# Patient Record
Sex: FEMALE | Race: WHITE | NOT HISPANIC OR LATINO | Employment: OTHER | URBAN - METROPOLITAN AREA
[De-identification: names, ages, dates, MRNs, and addresses within clinical notes are randomized per-mention and may not be internally consistent; named-entity substitution may affect disease eponyms.]

---

## 2017-01-23 ENCOUNTER — HOSPITAL ENCOUNTER (OUTPATIENT)
Dept: LAB | Facility: HOSPITAL | Age: 52
Discharge: HOME | End: 2017-01-23
Attending: INTERNAL MEDICINE | Admitting: INTERNAL MEDICINE

## 2017-01-23 LAB
ANION GAP SERPL CALC-SCNC: 9.5 MMOL/L (ref 10–20)
BASOPHILS # BLD AUTO: 0.1 10*3/UL (ref 0–0.2)
BASOPHILS NFR BLD AUTO: 1 % (ref 0–2)
BUN SERPL-MCNC: 11 MG/DL (ref 8–20)
BUN/CREAT SERPL: 13.8 (ref 5.4–26.2)
CALCIUM SERPL-MCNC: 9.3 MG/DL (ref 8.9–10.3)
CHLORIDE SERPL-SCNC: 114 MMOL/L (ref 101–111)
CONV CO2: 24 MMOL/L (ref 22–32)
CREAT UR-MCNC: 0.8 MG/DL (ref 0.4–1)
DIFFERENTIAL METHOD BLD: (no result)
EOSINOPHIL # BLD AUTO: 0.2 10*3/UL (ref 0–0.3)
EOSINOPHIL # BLD AUTO: 1 % (ref 0–3)
ERYTHROCYTE [DISTWIDTH] IN BLOOD BY AUTOMATED COUNT: 14.5 % (ref 11.5–14.5)
GLUCOSE SERPL-MCNC: 88 MG/DL (ref 65–99)
HCT VFR BLD AUTO: 44.2 % (ref 35–49)
HGB BLD-MCNC: 14.9 G/DL (ref 12–15)
LYMPHOCYTES # BLD AUTO: 3.6 10*3/UL (ref 0.8–4.8)
LYMPHOCYTES NFR BLD AUTO: 30 % (ref 18–42)
MCH RBC QN AUTO: 32 PG (ref 26–32)
MCHC RBC AUTO-ENTMCNC: 33.7 G/DL (ref 32–36)
MCV RBC AUTO: 94.9 FL (ref 80–94)
MONOCYTES # BLD AUTO: 0.8 10*3/UL (ref 0.1–1.3)
MONOCYTES NFR BLD AUTO: 7 % (ref 2–11)
NEUTROPHILS # BLD AUTO: 7.3 10*3/UL (ref 2.3–8.6)
NEUTROPHILS NFR BLD AUTO: 61 % (ref 50–75)
NRBC BLD AUTO-RTO: 0 /100{WBCS}
NRBC/RBC NFR BLD MANUAL: 0 10*3/UL
PLATELET # BLD AUTO: 347 10*3/UL (ref 150–450)
PMV BLD AUTO: 7 FL (ref 7.4–10.4)
POTASSIUM SERPL-SCNC: 3.5 MMOL/L (ref 3.6–5.1)
RBC # BLD AUTO: 4.66 10*6/UL (ref 4–5.4)
SODIUM SERPL-SCNC: 144 MMOL/L (ref 136–144)
WBC # BLD AUTO: 11.9 10*3/UL (ref 4.5–11.5)

## 2017-06-09 ENCOUNTER — OUTSIDE FACILITY SERVICE (OUTPATIENT)
Dept: NEUROLOGY | Facility: CLINIC | Age: 52
End: 2017-06-09

## 2017-06-09 ENCOUNTER — HOSPITAL ENCOUNTER (OUTPATIENT)
Dept: NEUROLOGY | Facility: HOSPITAL | Age: 52
Discharge: HOME | End: 2017-06-09
Attending: PODIATRIST | Admitting: PODIATRIST

## 2017-06-09 PROCEDURE — 95908 NRV CNDJ TST 3-4 STUDIES: CPT | Performed by: PSYCHIATRY & NEUROLOGY

## 2017-06-09 PROCEDURE — 95886 MUSC TEST DONE W/N TEST COMP: CPT | Performed by: PSYCHIATRY & NEUROLOGY

## 2017-06-20 ENCOUNTER — HOSPITAL ENCOUNTER (OUTPATIENT)
Dept: LAB | Facility: HOSPITAL | Age: 52
Discharge: HOME | End: 2017-06-20
Attending: PODIATRIST | Admitting: PODIATRIST

## 2017-06-20 LAB — ERYTHROCYTE [SEDIMENTATION RATE] IN BLOOD BY WESTERGREN METHOD: 6 MM/HR (ref 0–30)

## 2017-06-21 LAB
ANA SER QL IA: NORMAL
CHROMATIN AB SERPL-ACNC: <20 [IU]/ML (ref 0–20)

## 2017-06-26 ENCOUNTER — HOSPITAL ENCOUNTER (OUTPATIENT)
Dept: OTHER | Facility: HOSPITAL | Age: 52
Discharge: HOME | End: 2017-06-26
Attending: ANESTHESIOLOGY | Admitting: PODIATRIST

## 2017-06-26 LAB
ANION GAP SERPL CALC-SCNC: 12.8 MMOL/L (ref 10–20)
BASOPHILS # BLD AUTO: 0 10*3/UL (ref 0–0.2)
BASOPHILS NFR BLD AUTO: 0 % (ref 0–2)
BUN SERPL-MCNC: 11 MG/DL (ref 8–20)
BUN/CREAT SERPL: 12.2 (ref 5.4–26.2)
CALCIUM SERPL-MCNC: 9.8 MG/DL (ref 8.9–10.3)
CHLORIDE SERPL-SCNC: 113 MMOL/L (ref 101–111)
CONV CO2: 26 MMOL/L (ref 22–32)
CREAT UR-MCNC: 0.9 MG/DL (ref 0.4–1)
DIFFERENTIAL METHOD BLD: (no result)
EOSINOPHIL # BLD AUTO: 0 % (ref 0–3)
EOSINOPHIL # BLD AUTO: 0 10*3/UL (ref 0–0.3)
ERYTHROCYTE [DISTWIDTH] IN BLOOD BY AUTOMATED COUNT: 14.8 % (ref 11.5–14.5)
GLUCOSE SERPL-MCNC: 143 MG/DL (ref 65–99)
HCT VFR BLD AUTO: 46.2 % (ref 35–49)
HGB BLD-MCNC: 15.3 G/DL (ref 12–15)
LYMPHOCYTES # BLD AUTO: 3 10*3/UL (ref 0.8–4.8)
LYMPHOCYTES NFR BLD AUTO: 34 % (ref 18–42)
MCH RBC QN AUTO: 32.2 PG (ref 26–32)
MCHC RBC AUTO-ENTMCNC: 33.2 G/DL (ref 32–36)
MCV RBC AUTO: 96.9 FL (ref 80–94)
MONOCYTES # BLD AUTO: 0.5 10*3/UL (ref 0.1–1.3)
MONOCYTES NFR BLD AUTO: 6 % (ref 2–11)
NEUTROPHILS # BLD AUTO: 5.2 10*3/UL (ref 2.3–8.6)
NEUTROPHILS NFR BLD AUTO: 60 % (ref 50–75)
NRBC BLD AUTO-RTO: 0 /100{WBCS}
NRBC/RBC NFR BLD MANUAL: 0 10*3/UL
PLATELET # BLD AUTO: 357 10*3/UL (ref 150–450)
PMV BLD AUTO: 6.7 FL (ref 7.4–10.4)
POTASSIUM SERPL-SCNC: 4.8 MMOL/L (ref 3.6–5.1)
RBC # BLD AUTO: 4.77 10*6/UL (ref 4–5.4)
SODIUM SERPL-SCNC: 147 MMOL/L (ref 136–144)
WBC # BLD AUTO: 8.9 10*3/UL (ref 4.5–11.5)

## 2017-10-12 ENCOUNTER — HOSPITAL ENCOUNTER (OUTPATIENT)
Dept: LAB | Facility: HOSPITAL | Age: 52
Discharge: HOME | End: 2017-10-12
Attending: INTERNAL MEDICINE | Admitting: INTERNAL MEDICINE

## 2017-10-12 LAB — BNP SERPL-MCNC: 62 PG/ML

## 2017-10-13 ENCOUNTER — HOSPITAL ENCOUNTER (OUTPATIENT)
Dept: CARDIOLOGY | Facility: HOSPITAL | Age: 52
Discharge: HOME | End: 2017-10-13
Attending: INTERNAL MEDICINE | Admitting: INTERNAL MEDICINE

## 2017-12-05 ENCOUNTER — HOSPITAL ENCOUNTER (OUTPATIENT)
Dept: PREADMISSION TESTING | Facility: HOSPITAL | Age: 52
Discharge: HOME | End: 2017-12-05
Attending: PODIATRIST | Admitting: PODIATRIST

## 2017-12-05 LAB
ANION GAP SERPL CALC-SCNC: 10 MMOL/L (ref 10–20)
BASOPHILS # BLD AUTO: 0.1 10*3/UL (ref 0–0.2)
BASOPHILS NFR BLD AUTO: 1 % (ref 0–2)
BUN SERPL-MCNC: 11 MG/DL (ref 8–20)
BUN/CREAT SERPL: 12.2 (ref 5.4–26.2)
CALCIUM SERPL-MCNC: 9 MG/DL (ref 8.9–10.3)
CHLORIDE SERPL-SCNC: 112 MMOL/L (ref 101–111)
CONV CO2: 24 MMOL/L (ref 22–32)
CREAT UR-MCNC: 0.9 MG/DL (ref 0.4–1)
DIFFERENTIAL METHOD BLD: (no result)
EOSINOPHIL # BLD AUTO: 0.1 10*3/UL (ref 0–0.3)
EOSINOPHIL # BLD AUTO: 1 % (ref 0–3)
ERYTHROCYTE [DISTWIDTH] IN BLOOD BY AUTOMATED COUNT: 14.2 % (ref 11.5–14.5)
GLUCOSE SERPL-MCNC: 109 MG/DL (ref 65–99)
HCT VFR BLD AUTO: 41.6 % (ref 35–49)
HGB BLD-MCNC: 13.9 G/DL (ref 12–15)
LYMPHOCYTES # BLD AUTO: 2.1 10*3/UL (ref 0.8–4.8)
LYMPHOCYTES NFR BLD AUTO: 23 % (ref 18–42)
MCH RBC QN AUTO: 32.1 PG (ref 26–32)
MCHC RBC AUTO-ENTMCNC: 33.5 G/DL (ref 32–36)
MCV RBC AUTO: 95.7 FL (ref 80–94)
MONOCYTES # BLD AUTO: 0.3 10*3/UL (ref 0.1–1.3)
MONOCYTES NFR BLD AUTO: 3 % (ref 2–11)
NEUTROPHILS # BLD AUTO: 6.7 10*3/UL (ref 2.3–8.6)
NEUTROPHILS NFR BLD AUTO: 72 % (ref 50–75)
NRBC BLD AUTO-RTO: 0 /100{WBCS}
NRBC/RBC NFR BLD MANUAL: 0 10*3/UL
PLATELET # BLD AUTO: 301 10*3/UL (ref 150–450)
PMV BLD AUTO: 6.3 FL (ref 7.4–10.4)
POTASSIUM SERPL-SCNC: 4 MMOL/L (ref 3.6–5.1)
RBC # BLD AUTO: 4.34 10*6/UL (ref 4–5.4)
SODIUM SERPL-SCNC: 142 MMOL/L (ref 136–144)
WBC # BLD AUTO: 9.3 10*3/UL (ref 4.5–11.5)

## 2018-02-05 ENCOUNTER — HOSPITAL ENCOUNTER (OUTPATIENT)
Dept: PREADMISSION TESTING | Facility: HOSPITAL | Age: 53
Discharge: HOME | End: 2018-02-05
Attending: PODIATRIST | Admitting: PODIATRIST

## 2018-02-05 LAB
ANION GAP SERPL CALC-SCNC: 10.8 MMOL/L (ref 10–20)
BASOPHILS # BLD AUTO: 0.1 10*3/UL (ref 0–0.2)
BASOPHILS NFR BLD AUTO: 1 % (ref 0–2)
BUN SERPL-MCNC: 13 MG/DL (ref 8–20)
BUN/CREAT SERPL: 14.4 (ref 5.4–26.2)
CALCIUM SERPL-MCNC: 8.9 MG/DL (ref 8.9–10.3)
CHLORIDE SERPL-SCNC: 111 MMOL/L (ref 101–111)
CONV CO2: 23 MMOL/L (ref 22–32)
CREAT UR-MCNC: 0.9 MG/DL (ref 0.4–1)
DIFFERENTIAL METHOD BLD: (no result)
EOSINOPHIL # BLD AUTO: 0 % (ref 0–3)
EOSINOPHIL # BLD AUTO: 0 10*3/UL (ref 0–0.3)
ERYTHROCYTE [DISTWIDTH] IN BLOOD BY AUTOMATED COUNT: 14.1 % (ref 11.5–14.5)
GLUCOSE SERPL-MCNC: 119 MG/DL (ref 65–99)
HCT VFR BLD AUTO: 43 % (ref 35–49)
HGB BLD-MCNC: 14.6 G/DL (ref 12–15)
LYMPHOCYTES # BLD AUTO: 2.2 10*3/UL (ref 0.8–4.8)
LYMPHOCYTES NFR BLD AUTO: 22 % (ref 18–42)
MCH RBC QN AUTO: 31.8 PG (ref 26–32)
MCHC RBC AUTO-ENTMCNC: 33.9 G/DL (ref 32–36)
MCV RBC AUTO: 93.7 FL (ref 80–94)
MONOCYTES # BLD AUTO: 0.3 10*3/UL (ref 0.1–1.3)
MONOCYTES NFR BLD AUTO: 3 % (ref 2–11)
NEUTROPHILS # BLD AUTO: 7.2 10*3/UL (ref 2.3–8.6)
NEUTROPHILS NFR BLD AUTO: 74 % (ref 50–75)
NRBC BLD AUTO-RTO: 0 /100{WBCS}
NRBC/RBC NFR BLD MANUAL: 0 10*3/UL
PLATELET # BLD AUTO: 362 10*3/UL (ref 150–450)
PMV BLD AUTO: 6.7 FL (ref 7.4–10.4)
POTASSIUM SERPL-SCNC: 3.8 MMOL/L (ref 3.6–5.1)
RBC # BLD AUTO: 4.59 10*6/UL (ref 4–5.4)
SODIUM SERPL-SCNC: 141 MMOL/L (ref 136–144)
WBC # BLD AUTO: 9.7 10*3/UL (ref 4.5–11.5)

## 2018-06-18 ENCOUNTER — HOSPITAL ENCOUNTER (OUTPATIENT)
Dept: OTHER | Facility: HOSPITAL | Age: 53
Discharge: HOME | End: 2018-06-18
Attending: PODIATRIST | Admitting: PODIATRIST

## 2018-06-18 LAB
ANION GAP SERPL CALC-SCNC: 10.3 MMOL/L (ref 10–20)
BASOPHILS # BLD AUTO: 0.1 10*3/UL (ref 0–0.2)
BASOPHILS NFR BLD AUTO: 1 % (ref 0–2)
BUN SERPL-MCNC: 18 MG/DL (ref 8–20)
BUN/CREAT SERPL: 22.5 (ref 5.4–26.2)
CALCIUM SERPL-MCNC: 8.9 MG/DL (ref 8.9–10.3)
CHLORIDE SERPL-SCNC: 110 MMOL/L (ref 101–111)
CONV CO2: 24 MMOL/L (ref 22–32)
CREAT UR-MCNC: 0.8 MG/DL (ref 0.4–1)
DIFFERENTIAL METHOD BLD: (no result)
EOSINOPHIL # BLD AUTO: 0 % (ref 0–3)
EOSINOPHIL # BLD AUTO: 0 10*3/UL (ref 0–0.3)
ERYTHROCYTE [DISTWIDTH] IN BLOOD BY AUTOMATED COUNT: 15.1 % (ref 11.5–14.5)
GLUCOSE SERPL-MCNC: 149 MG/DL (ref 65–99)
HCT VFR BLD AUTO: 41.8 % (ref 35–49)
HGB BLD-MCNC: 13.5 G/DL (ref 12–15)
LYMPHOCYTES # BLD AUTO: 2.7 10*3/UL (ref 0.8–4.8)
LYMPHOCYTES NFR BLD AUTO: 23 % (ref 18–42)
MCH RBC QN AUTO: 30.3 PG (ref 26–32)
MCHC RBC AUTO-ENTMCNC: 32.4 G/DL (ref 32–36)
MCV RBC AUTO: 93.5 FL (ref 80–94)
MONOCYTES # BLD AUTO: 0.4 10*3/UL (ref 0.1–1.3)
MONOCYTES NFR BLD AUTO: 3 % (ref 2–11)
NEUTROPHILS # BLD AUTO: 8.8 10*3/UL (ref 2.3–8.6)
NEUTROPHILS NFR BLD AUTO: 73 % (ref 50–75)
NRBC BLD AUTO-RTO: 0 /100{WBCS}
NRBC/RBC NFR BLD MANUAL: 0 10*3/UL
PLATELET # BLD AUTO: 338 10*3/UL (ref 150–450)
PMV BLD AUTO: 6.4 FL (ref 7.4–10.4)
POTASSIUM SERPL-SCNC: 4.3 MMOL/L (ref 3.6–5.1)
RBC # BLD AUTO: 4.47 10*6/UL (ref 4–5.4)
SODIUM SERPL-SCNC: 140 MMOL/L (ref 136–144)
WBC # BLD AUTO: 12 10*3/UL (ref 4.5–11.5)

## 2018-10-03 ENCOUNTER — HOSPITAL ENCOUNTER (OUTPATIENT)
Dept: PREADMISSION TESTING | Facility: HOSPITAL | Age: 53
Discharge: HOME | End: 2018-10-03
Attending: PODIATRIST | Admitting: PODIATRIST

## 2018-10-03 LAB
ANION GAP SERPL CALC-SCNC: 14 MMOL/L (ref 10–20)
BASOPHILS # BLD AUTO: 0.1 10*3/UL (ref 0–0.2)
BASOPHILS NFR BLD AUTO: 1 % (ref 0–2)
BUN SERPL-MCNC: 15 MG/DL (ref 8–20)
BUN/CREAT SERPL: 16.7 (ref 5.4–26.2)
CALCIUM SERPL-MCNC: 9.1 MG/DL (ref 8.9–10.3)
CHLORIDE SERPL-SCNC: 108 MMOL/L (ref 101–111)
CONV CO2: 24 MMOL/L (ref 22–32)
CREAT UR-MCNC: 0.9 MG/DL (ref 0.4–1)
DIFFERENTIAL METHOD BLD: (no result)
EOSINOPHIL # BLD AUTO: 0 % (ref 0–3)
EOSINOPHIL # BLD AUTO: 0 10*3/UL (ref 0–0.3)
ERYTHROCYTE [DISTWIDTH] IN BLOOD BY AUTOMATED COUNT: 13.4 % (ref 11.5–14.5)
GLUCOSE SERPL-MCNC: 99 MG/DL (ref 65–99)
HCT VFR BLD AUTO: 42.2 % (ref 35–49)
HGB BLD-MCNC: 14.2 G/DL (ref 12–15)
LYMPHOCYTES # BLD AUTO: 3.4 10*3/UL (ref 0.8–4.8)
LYMPHOCYTES NFR BLD AUTO: 31 % (ref 18–42)
MCH RBC QN AUTO: 31.3 PG (ref 26–32)
MCHC RBC AUTO-ENTMCNC: 33.6 G/DL (ref 32–36)
MCV RBC AUTO: 93.1 FL (ref 80–94)
MONOCYTES # BLD AUTO: 0.5 10*3/UL (ref 0.1–1.3)
MONOCYTES NFR BLD AUTO: 5 % (ref 2–11)
NEUTROPHILS # BLD AUTO: 7.1 10*3/UL (ref 2.3–8.6)
NEUTROPHILS NFR BLD AUTO: 63 % (ref 50–75)
NRBC BLD AUTO-RTO: 0 /100{WBCS}
NRBC/RBC NFR BLD MANUAL: 0 10*3/UL
PLATELET # BLD AUTO: 313 10*3/UL (ref 150–450)
PMV BLD AUTO: 6.8 FL (ref 7.4–10.4)
POTASSIUM SERPL-SCNC: 4 MMOL/L (ref 3.6–5.1)
RBC # BLD AUTO: 4.53 10*6/UL (ref 4–5.4)
SODIUM SERPL-SCNC: 142 MMOL/L (ref 136–144)
WBC # BLD AUTO: 11.1 10*3/UL (ref 4.5–11.5)

## 2018-12-06 ENCOUNTER — HOSPITAL ENCOUNTER (OUTPATIENT)
Dept: FAMILY MEDICINE CLINIC | Facility: CLINIC | Age: 53
Setting detail: SPECIMEN
Discharge: HOME | End: 2018-12-06
Attending: FAMILY MEDICINE | Admitting: FAMILY MEDICINE

## 2018-12-06 LAB
ALBUMIN SERPL-MCNC: 3.7 G/DL (ref 3.5–4.8)
ALBUMIN/GLOB SERPL: 1.4 {RATIO} (ref 1–1.7)
ALP SERPL-CCNC: 93 IU/L (ref 32–91)
ALT SERPL-CCNC: 108 IU/L (ref 14–54)
AMPICILLIN SUSC ISLT: ABNORMAL
ANION GAP SERPL CALC-SCNC: 13.1 MMOL/L (ref 10–20)
AST SERPL-CCNC: 30 IU/L (ref 15–41)
AZTREONAM SUSC ISLT: ABNORMAL
BACTERIA ISLT: ABNORMAL
BACTERIA SPEC AEROBE CULT: ABNORMAL
BASOPHILS # BLD AUTO: 0.1 10*3/UL (ref 0–0.2)
BASOPHILS NFR BLD AUTO: 1 % (ref 0–2)
BILIRUB SERPL-MCNC: 0.6 MG/DL (ref 0.3–1.2)
BUN SERPL-MCNC: 18 MG/DL (ref 8–20)
BUN/CREAT SERPL: 22.5 (ref 5.4–26.2)
CALCIUM SERPL-MCNC: 9.2 MG/DL (ref 8.9–10.3)
CEFAZOLIN SUSC ISLT: ABNORMAL
CEFEPIME SUSC ISLT: ABNORMAL
CEFTRIAXONE SUSC ISLT: ABNORMAL
CHLORIDE SERPL-SCNC: 107 MMOL/L (ref 101–111)
CIPROFLOXACIN SUSC ISLT: ABNORMAL
COLONY COUNT: ABNORMAL
CONV CO2: 24 MMOL/L (ref 22–32)
CONV TOTAL PROTEIN: 6.3 G/DL (ref 6.1–7.9)
CREAT UR-MCNC: 0.8 MG/DL (ref 0.4–1)
DIFFERENTIAL METHOD BLD: (no result)
EOSINOPHIL # BLD AUTO: 0 % (ref 0–3)
EOSINOPHIL # BLD AUTO: 0 10*3/UL (ref 0–0.3)
ERYTHROCYTE [DISTWIDTH] IN BLOOD BY AUTOMATED COUNT: 14.3 % (ref 11.5–14.5)
GLOBULIN UR ELPH-MCNC: 2.6 G/DL (ref 2.5–3.8)
GLUCOSE SERPL-MCNC: 91 MG/DL (ref 65–99)
HCT VFR BLD AUTO: 41.6 % (ref 35–49)
HGB BLD-MCNC: 14.4 G/DL (ref 12–15)
LEVOFLOXACIN SUSC ISLT: ABNORMAL
LYMPHOCYTES # BLD AUTO: 3.1 10*3/UL (ref 0.8–4.8)
LYMPHOCYTES NFR BLD AUTO: 28 % (ref 18–42)
Lab: ABNORMAL
MCH RBC QN AUTO: 33.3 PG (ref 26–32)
MCHC RBC AUTO-ENTMCNC: 34.6 G/DL (ref 32–36)
MCV RBC AUTO: 96.1 FL (ref 80–94)
MEROPENEM SUSC ISLT: ABNORMAL
MICRO REPORT STATUS: ABNORMAL
MONOCYTES # BLD AUTO: 0.4 10*3/UL (ref 0.1–1.3)
MONOCYTES NFR BLD AUTO: 4 % (ref 2–11)
NEUTROPHILS # BLD AUTO: 7.4 10*3/UL (ref 2.3–8.6)
NEUTROPHILS NFR BLD AUTO: 67 % (ref 50–75)
NITROFURANTOIN SUSC ISLT: ABNORMAL
NRBC BLD AUTO-RTO: 0 /100{WBCS}
NRBC/RBC NFR BLD MANUAL: 0 10*3/UL
PIP+TAZO SUSC ISLT: ABNORMAL
PLATELET # BLD AUTO: 307 10*3/UL (ref 150–450)
PMV BLD AUTO: 7 FL (ref 7.4–10.4)
POTASSIUM SERPL-SCNC: 4.1 MMOL/L (ref 3.6–5.1)
RBC # BLD AUTO: 4.33 10*6/UL (ref 4–5.4)
SODIUM SERPL-SCNC: 140 MMOL/L (ref 136–144)
SPECIMEN SOURCE: ABNORMAL
SUSC METH SPEC: ABNORMAL
TETRACYCLINE SUSC ISLT: ABNORMAL
TOBRAMYCIN SUSC ISLT: ABNORMAL
TRIMETHOPRIM/SULFA: ABNORMAL
WBC # BLD AUTO: 10.9 10*3/UL (ref 4.5–11.5)

## 2018-12-07 LAB — HBA1C MFR BLD: 5.5 % (ref 0–5.6)

## 2018-12-12 ENCOUNTER — HOSPITAL ENCOUNTER (OUTPATIENT)
Dept: OTHER | Facility: HOSPITAL | Age: 53
Discharge: HOME | End: 2018-12-12
Attending: PODIATRIST | Admitting: PODIATRIST

## 2018-12-12 LAB
ANION GAP SERPL CALC-SCNC: 10.8 MMOL/L (ref 10–20)
BASOPHILS # BLD AUTO: 0 10*3/UL (ref 0–0.2)
BASOPHILS NFR BLD AUTO: 0 % (ref 0–2)
BUN SERPL-MCNC: 19 MG/DL (ref 8–20)
BUN/CREAT SERPL: 21.1 (ref 5.4–26.2)
CALCIUM SERPL-MCNC: 9.2 MG/DL (ref 8.9–10.3)
CHLORIDE SERPL-SCNC: 106 MMOL/L (ref 101–111)
CONV CO2: 25 MMOL/L (ref 22–32)
CREAT UR-MCNC: 0.9 MG/DL (ref 0.4–1)
DIFFERENTIAL METHOD BLD: (no result)
EOSINOPHIL # BLD AUTO: 0 % (ref 0–3)
EOSINOPHIL # BLD AUTO: 0 10*3/UL (ref 0–0.3)
ERYTHROCYTE [DISTWIDTH] IN BLOOD BY AUTOMATED COUNT: 14 % (ref 11.5–14.5)
GLUCOSE SERPL-MCNC: 139 MG/DL (ref 65–99)
HCT VFR BLD AUTO: 40.4 % (ref 35–49)
HGB BLD-MCNC: 14 G/DL (ref 12–15)
LYMPHOCYTES # BLD AUTO: 2.3 10*3/UL (ref 0.8–4.8)
LYMPHOCYTES NFR BLD AUTO: 24 % (ref 18–42)
MCH RBC QN AUTO: 32.9 PG (ref 26–32)
MCHC RBC AUTO-ENTMCNC: 34.7 G/DL (ref 32–36)
MCV RBC AUTO: 94.9 FL (ref 80–94)
MONOCYTES # BLD AUTO: 0.3 10*3/UL (ref 0.1–1.3)
MONOCYTES NFR BLD AUTO: 3 % (ref 2–11)
NEUTROPHILS # BLD AUTO: 6.9 10*3/UL (ref 2.3–8.6)
NEUTROPHILS NFR BLD AUTO: 73 % (ref 50–75)
NRBC BLD AUTO-RTO: 0 /100{WBCS}
NRBC/RBC NFR BLD MANUAL: 0 10*3/UL
PLATELET # BLD AUTO: 297 10*3/UL (ref 150–450)
PMV BLD AUTO: 6.7 FL (ref 7.4–10.4)
POTASSIUM SERPL-SCNC: 3.8 MMOL/L (ref 3.6–5.1)
RBC # BLD AUTO: 4.26 10*6/UL (ref 4–5.4)
SODIUM SERPL-SCNC: 138 MMOL/L (ref 136–144)
WBC # BLD AUTO: 9.6 10*3/UL (ref 4.5–11.5)

## 2019-03-08 ENCOUNTER — HOSPITAL ENCOUNTER (OUTPATIENT)
Dept: FAMILY MEDICINE CLINIC | Facility: CLINIC | Age: 54
Setting detail: SPECIMEN
Discharge: HOME | End: 2019-03-08
Attending: FAMILY MEDICINE | Admitting: FAMILY MEDICINE

## 2019-03-08 LAB
ALBUMIN SERPL-MCNC: 3.6 G/DL (ref 3.5–4.8)
ALBUMIN/GLOB SERPL: 1.4 {RATIO} (ref 1–1.7)
ALP SERPL-CCNC: 156 IU/L (ref 32–91)
ALT SERPL-CCNC: 182 IU/L (ref 14–54)
ANION GAP SERPL CALC-SCNC: 11.9 MMOL/L (ref 10–20)
AST SERPL-CCNC: 270 IU/L (ref 15–41)
BILIRUB SERPL-MCNC: 0.4 MG/DL (ref 0.3–1.2)
BUN SERPL-MCNC: 12 MG/DL (ref 8–20)
BUN/CREAT SERPL: 13.3 (ref 5.4–26.2)
CALCIUM SERPL-MCNC: 8.7 MG/DL (ref 8.9–10.3)
CHLORIDE SERPL-SCNC: 109 MMOL/L (ref 101–111)
CHOLEST SERPL-MCNC: 162 MG/DL
CHOLEST/HDLC SERPL: 3.2 {RATIO}
CONV CO2: 23 MMOL/L (ref 22–32)
CONV LDL CHOLESTEROL DIRECT: 88 MG/DL (ref 0–100)
CONV TOTAL PROTEIN: 6.2 G/DL (ref 6.1–7.9)
CREAT UR-MCNC: 0.9 MG/DL (ref 0.4–1)
GLOBULIN UR ELPH-MCNC: 2.6 G/DL (ref 2.5–3.8)
GLUCOSE SERPL-MCNC: 94 MG/DL (ref 65–99)
HDLC SERPL-MCNC: 50 MG/DL
LDLC/HDLC SERPL: 1.8 {RATIO}
LIPID INTERPRETATION: ABNORMAL
POTASSIUM SERPL-SCNC: 3.9 MMOL/L (ref 3.6–5.1)
SODIUM SERPL-SCNC: 140 MMOL/L (ref 136–144)
TRIGL SERPL-MCNC: 129 MG/DL
VLDLC SERPL CALC-MCNC: 24 MG/DL

## 2019-03-11 LAB
HAV IGM SERPL QL IA: NONREACTIVE
HBV CORE IGM SERPL QL IA: NONREACTIVE
HBV SURFACE AG SERPL QL IA: NONREACTIVE
HCV AB SER DONR QL: NORMAL
HCV AB SER DONR QL: NORMAL

## 2019-04-08 ENCOUNTER — HOSPITAL ENCOUNTER (OUTPATIENT)
Dept: PREADMISSION TESTING | Facility: HOSPITAL | Age: 54
Discharge: HOME | End: 2019-04-08
Attending: PODIATRIST | Admitting: PODIATRIST

## 2019-04-08 LAB
ANION GAP SERPL CALC-SCNC: 14 MMOL/L (ref 10–20)
BASOPHILS # BLD AUTO: 0.1 10*3/UL (ref 0–0.2)
BASOPHILS NFR BLD AUTO: 1 % (ref 0–2)
BUN SERPL-MCNC: 9 MG/DL (ref 8–20)
BUN/CREAT SERPL: 11.3 (ref 5.4–26.2)
CALCIUM SERPL-MCNC: 9 MG/DL (ref 8.9–10.3)
CHLORIDE SERPL-SCNC: 107 MMOL/L (ref 101–111)
CONV CO2: 23 MMOL/L (ref 22–32)
CREAT UR-MCNC: 0.8 MG/DL (ref 0.4–1)
DIFFERENTIAL METHOD BLD: (no result)
EOSINOPHIL # BLD AUTO: 0 % (ref 0–3)
EOSINOPHIL # BLD AUTO: 0 10*3/UL (ref 0–0.3)
ERYTHROCYTE [DISTWIDTH] IN BLOOD BY AUTOMATED COUNT: 14.9 % (ref 11.5–14.5)
GLUCOSE SERPL-MCNC: 104 MG/DL (ref 65–99)
HCT VFR BLD AUTO: 42.7 % (ref 35–49)
HGB BLD-MCNC: 14.3 G/DL (ref 12–15)
LYMPHOCYTES # BLD AUTO: 3 10*3/UL (ref 0.8–4.8)
LYMPHOCYTES NFR BLD AUTO: 29 % (ref 18–42)
MCH RBC QN AUTO: 31.8 PG (ref 26–32)
MCHC RBC AUTO-ENTMCNC: 33.4 G/DL (ref 32–36)
MCV RBC AUTO: 95.1 FL (ref 80–94)
MONOCYTES # BLD AUTO: 0.5 10*3/UL (ref 0.1–1.3)
MONOCYTES NFR BLD AUTO: 5 % (ref 2–11)
NEUTROPHILS # BLD AUTO: 6.8 10*3/UL (ref 2.3–8.6)
NEUTROPHILS NFR BLD AUTO: 65 % (ref 50–75)
NRBC BLD AUTO-RTO: 0 /100{WBCS}
NRBC/RBC NFR BLD MANUAL: 0 10*3/UL
PLATELET # BLD AUTO: 351 10*3/UL (ref 150–450)
PMV BLD AUTO: 6.4 FL (ref 7.4–10.4)
POTASSIUM SERPL-SCNC: 4 MMOL/L (ref 3.6–5.1)
RBC # BLD AUTO: 4.49 10*6/UL (ref 4–5.4)
SODIUM SERPL-SCNC: 140 MMOL/L (ref 136–144)
WBC # BLD AUTO: 10.3 10*3/UL (ref 4.5–11.5)

## 2019-04-30 ENCOUNTER — HOSPITAL ENCOUNTER (OUTPATIENT)
Dept: FAMILY MEDICINE CLINIC | Facility: CLINIC | Age: 54
Setting detail: SPECIMEN
Discharge: HOME | End: 2019-04-30
Attending: FAMILY MEDICINE | Admitting: FAMILY MEDICINE

## 2019-04-30 ENCOUNTER — CONVERSION ENCOUNTER (OUTPATIENT)
Dept: FAMILY MEDICINE CLINIC | Facility: CLINIC | Age: 54
End: 2019-04-30

## 2019-05-07 ENCOUNTER — HOSPITAL ENCOUNTER (OUTPATIENT)
Dept: FAMILY MEDICINE CLINIC | Facility: CLINIC | Age: 54
Setting detail: SPECIMEN
Discharge: HOME | End: 2019-05-07
Attending: FAMILY MEDICINE | Admitting: FAMILY MEDICINE

## 2019-05-07 LAB
ALBUMIN SERPL-MCNC: 3.7 G/DL (ref 3.5–4.8)
ALBUMIN/GLOB SERPL: 1.4 {RATIO} (ref 1–1.7)
ALP SERPL-CCNC: 88 IU/L (ref 32–91)
ALT SERPL-CCNC: 28 IU/L (ref 14–54)
ANION GAP SERPL CALC-SCNC: 14.1 MMOL/L (ref 10–20)
AST SERPL-CCNC: 27 IU/L (ref 15–41)
BILIRUB SERPL-MCNC: 0.6 MG/DL (ref 0.3–1.2)
BUN SERPL-MCNC: 10 MG/DL (ref 8–20)
BUN/CREAT SERPL: 12.5 (ref 5.4–26.2)
CALCIUM SERPL-MCNC: 9 MG/DL (ref 8.9–10.3)
CHLORIDE SERPL-SCNC: 110 MMOL/L (ref 101–111)
CONV CO2: 20 MMOL/L (ref 22–32)
CONV TOTAL PROTEIN: 6.3 G/DL (ref 6.1–7.9)
CREAT UR-MCNC: 0.8 MG/DL (ref 0.4–1)
GLOBULIN UR ELPH-MCNC: 2.6 G/DL (ref 2.5–3.8)
GLUCOSE SERPL-MCNC: 112 MG/DL (ref 65–99)
POTASSIUM SERPL-SCNC: 4.1 MMOL/L (ref 3.6–5.1)
SODIUM SERPL-SCNC: 140 MMOL/L (ref 136–144)

## 2019-05-20 ENCOUNTER — HOSPITAL ENCOUNTER (OUTPATIENT)
Dept: FAMILY MEDICINE CLINIC | Facility: CLINIC | Age: 54
Setting detail: SPECIMEN
Discharge: HOME | End: 2019-05-20
Attending: FAMILY MEDICINE | Admitting: FAMILY MEDICINE

## 2019-05-20 LAB
BACTERIA SPEC AEROBE CULT: NORMAL
BILIRUB UR QL STRIP: NEGATIVE MG/DL
CASTS URNS QL MICRO: ABNORMAL /[LPF]
COLOR UR: YELLOW
CONV BACTERIA IN URINE MICRO: NEGATIVE
CONV CLARITY OF URINE: CLEAR
CONV HYALINE CASTS IN URINE MICRO: 1 /[LPF] (ref 0–5)
CONV PROTEIN IN URINE BY AUTOMATED TEST STRIP: NEGATIVE MG/DL
CONV SMALL ROUND CELLS: ABNORMAL /[HPF]
CONV UROBILINOGEN IN URINE BY AUTOMATED TEST STRIP: 0.2 MG/DL
GLUCOSE UR QL: NEGATIVE MG/DL
HGB UR QL STRIP: NEGATIVE
KETONES UR QL STRIP: NEGATIVE MG/DL
LEUKOCYTE ESTERASE UR QL STRIP: ABNORMAL
Lab: NORMAL
MICRO REPORT STATUS: NORMAL
NITRITE UR QL STRIP: NEGATIVE
PH UR STRIP.AUTO: 6.5 [PH] (ref 4.5–8)
RBC #/AREA URNS HPF: 0 /[HPF] (ref 0–3)
SP GR UR: 1.02 (ref 1–1.03)
SPECIMEN SOURCE: NORMAL
SPERM URNS QL MICRO: ABNORMAL /[HPF]
SQUAMOUS SPT QL MICRO: 3 /[HPF] (ref 0–5)
UNIDENT CRYS URNS QL MICRO: ABNORMAL /[HPF]
WBC #/AREA URNS HPF: 5 /[HPF] (ref 0–5)
YEAST SPEC QL WET PREP: ABNORMAL /[HPF]

## 2019-06-04 VITALS
OXYGEN SATURATION: 95 % | HEART RATE: 77 BPM | BODY MASS INDEX: 18.83 KG/M2 | HEIGHT: 67 IN | DIASTOLIC BLOOD PRESSURE: 67 MMHG | SYSTOLIC BLOOD PRESSURE: 97 MMHG | WEIGHT: 120 LBS

## 2019-06-06 NOTE — PROGRESS NOTES
Visit Type:  Follow-up Visit  Referring Provider:  Momo Simeon MD  Primary Provider:  Marlo Maradiaga MD    CC:   f/u on COPD and  dysuria.    History of Present Illness:          The patient  presents for COPD follow-up visit.  The patient presents with cough and  shortness of breath with exertion, but has no history of wheezing.  Since the last visit, the patient feels their symptoms are unchanged and somewhat impair daily   activities.  The patient notes fair compliance with treatment plan.             The patient also  presents with dysuria.  The symptoms began 1 week ago.  The patient complains of burning on urination, frequency and urgency, but denies hesitancy, hematuria, fever and back pain.  No complaints of nausea, vomiting and abdominal   pain.        Past Medical History:     Reviewed history from 12/06/2018 and no changes required:        skin ca age 18        breast cancer (bilateral) 38        nodule in neck (ca? 2010)        Bone degeneration        lesion behind eyes (cancer associated retinopathopthy)        pancreatitis        Anxiety        Asthma        C O P D        Diabetes, Type 2        G E R D        Chest tightness        RA        back pain, lower        feet pain S/P multiple foot surgeries        Seizure disorder. generalized seizure, possible partial complex seizure, EEG 2015left frontal sharp activiey                                    tried depakote could not take due to nausea        migraines    Family History Summary:      Reviewed history Last on 03/08/2019 and no changes required:05/26/2019  Father - Has Family History of Heart Disease - Entered On: 1/20/2017  Mother - Has Family History of Heart Disease - Entered On: 1/20/2017  Father - Has FH Headaches - had co frequent ha - Entered On: 7/1/2015    General Comments - FH:  FH Heart Disease: Parents  FH COPD  FH Cancer  FH Diabetes  FH Stroke:Parents  FH Hypertension: Parents  FH High Cholesterol: Parents      Social  History:     Reviewed history from 2018 and no changes required:        Patient currently smokes every day.        Patient has been counseled to quit.        Passive Smoke: Y        Alcohol Use: N        Drug Use: N        HIV/High Risk: N        Regular Exercise: N                Risk Factors:     Smoked Tobacco Use:  Current every day smoker     Cigarettes:  Yes     Counseled to quit/cut down:  yes      Review of Systems     General       Complains of fatigue.    CV       Denies chest pain or discomfort.    Resp       Complains of cough and shortness of breath.       Denies chest discomfort, wheezing and excessive sputum.           Complains of urinary frequency and urinary urgency.       Denies kidney pain and flank pain.      Vital Signs:    Patient Profile:    54 Years Old Female  Height:     67 inches (170.18 cm)  Weight:     120 pounds  BMI:        18.79     O2 Sat:     95 %  Temp:       97.0 degrees F oral  Pulse rate: 77 / minute  BP Sittin / 67  (left arm)    Cuff size:  regular      Problems: Active problems were reviewed with the patient during this visit.  Medications: Medications were reviewed with the patient during this visit.  Allergies: Allergies were reviewed with the patient during this visit.        Vitals Entered By: Analilia CINTRON (2019 2:21 PM)        Physical Exam    General:      well developed, well nourished, in no acute distress.    Lungs:      clear bilaterally to auscultation.    Heart:      non-displaced PMI, chest non-tender; regular rate and rhythm, S1, S2 without murmurs, rubs, or gallops  Abdomen:       normal bowel sounds;.  no CVA tenderness.    Pulses:      pulses normal in all 4 extremities.    Extremities:      no clubbing, cyanosis, edema, or deformity noted with normal full range of motion of joints of all four extremities   Neurologic:      no focal deficits, cranial nerves II-XII grossly intact with normal sensation, reflexes, coordination, muscle  strength and tone.    Skin:      intact without lesions or rashes.      Diabetes Management Exam:      Foot Exam (with socks and/or shoes not present):        Pulses:           pulses normal in all 4 extremities.        Blood Pressure:  Today's BP: 97/67 mm Hg    Labwork:   Most Recent Lab Results:   LDL: 88 mg/dL 03/08/2019  HbA1c: : 5.5 % 12/07/2018    Active Medications (reviewed today):  VENTOLIN  (90 BASE) MCG/ACT INHALATION AEROSOL SOLUTION (ALBUTEROL SULFATE) 1 puff every 4-6 hours  KEFLEX 500 MG ORAL CAPSULE (CEPHALEXIN) Take one (1) tablet by mouth twice a day for 7 days  NITROFURANTOIN MONOHYD MACRO 100 MG ORAL CAPSULE (NITROFURANTOIN MONOHYD MACRO) Take 1 tablet by mouth twice a day.  FREESTYLE LITE TEST IN VITRO STRIP (GLUCOSE BLOOD) Check blood sugars twice a day.  FREESTYLE LANCETS 28 GAUGE (LANCETS) test EVERY DAY  PROMETHAZINE HCL 25 MG TABLET (PROMETHAZINE HCL) TAKE ONE TABLET BY MOUTH EVERY 4 TO 6 HOURS AS NEEDED  SPIRIVA RESPIMAT 2.5 MCG MIST INHAL (TIOTROPIUM BROMIDE MONOHYDRATE) TAKE TWO PUFFS BY MOUTH EVERY DAY  MIDODRINE HCL 2.5 MG TABLET (MIDODRINE HCL) TAKE ONE TABLET BY MOUTH EVERY DAY  TOPAMAX 100 MG ORAL TABLET (TOPIRAMATE) 1 tablet twice a day  ALPRAZOLAM 2 MG ORAL TABLET (ALPRAZOLAM) Take one (1) tablet by mouth three times a day  PERCOCET  MG ORAL TABLET (OXYCODONE-ACETAMINOPHEN) Take 1 tablet by mouth 4 times daily  METFORMIN  MG TABLET (METFORMIN HCL) TAKE ONE TABLET BY MOUTH THREE TIMES DAILY  METHOCARBAMOL 750 MG TABS (METHOCARBAMOL) TAKE ONE TABLET BY MOUTH THREE TIMES DAILY  PREDNISONE 10 MG TABLET (PREDNISONE) TAKE ONE TABLET BY MOUTH EVERY DAY  METOPROLOL SUCCINATE ER 25 MG ORAL TABLET EXTENDED RELEASE 24 HOUR (METOPROLOL SUCCINATE) p.o.  qd  HYDROXYZINE HCL 25 MG TABLET (HYDROXYZINE HCL) TAKE ONE TABLET BY MOUTH THREE TIMES DAILY  GABAPENTIN 300 MG ORAL CAPSULE (GABAPENTIN) qid  OMEPRAZOLE 20 MG TABLET DR (OMEPRAZOLE) TAKE ONE TABLET BY MOUTH TWICE  DAILY  SERTRALINE HCL 25 MG ORAL TABLET (SERTRALINE HCL) 1 q am  ALBUTEROL SULFATE (2.5 MG/3ML) 0.083% INHALATION NEBULIZATION SOLUTION (ALBUTEROL SULFATE) prn    Current Allergies (reviewed today):  PENICILLIN (Critical)  ASPIRIN (Critical)  CODEINE (Critical)  ERYTHROMYCIN (Critical)  MORPHINE (Critical)  SULFA (Critical)  * IV CONTRAST, FOR HEART CATH (Severe)        Impression & Recommendations:    Problem # 1:  UTI (ICD-599.0) (VFR08-Y64.0)  Assessment: New    The following medications were removed from the medication list:     Nitrofurantoin Monohyd Macro 100 Mg Oral Capsule (Nitrofurantoin monohyd macro) ..... Take 1 tablet by mouth twice a day.    Her updated medication list for this problem includes:     Keflex 500 Mg Oral Capsule (Cephalexin) ..... Take one (1) tablet by mouth twice a day     Macrobid 100 Mg Oral Capsule (Nitrofurantoin monohyd macro) ..... Take one (1) tablet by mouth twice a day for 7 days     Keflex 500 Mg Oral Capsule (Cephalexin) ..... Take one (1) tablet by mouth twice a day for 7 days    Encouraged to push clear liquids, get enough rest, and take acetaminophen as needed. To be seen in 10 days if no improvement, sooner if worse.      Problem # 2:  COPD (CIZ48-U29.9)  Assessment: Unchanged    Her updated medication list for this problem includes:     Ventolin Hfa 108 (90 Base) Mcg/act Inhalation Aerosol Solution (Albuterol sulfate) ..... 1 puff every 4-6 hours     Spiriva Respimat 2.5 Mcg Mist Inhal (Tiotropium bromide monohydrate) ..... Take two puffs by mouth every day     Albuterol Sulfate (2.5 Mg/3ml) 0.083% Inhalation Nebulization Solution (Albuterol sulfate) ..... Prn    Pulmonary Functions Reviewed:  O2 sat: 95 (04/30/2019)         Medications Added to Medication List This Visit:  1)  Benzonatate 200 Mg Oral Capsule (Benzonatate) .... Take 1 capsule three times daily as needed for cough  2)  Keflex 500 Mg Oral Capsule (Cephalexin) .... Take one (1) tablet by mouth twice a  day  3)  Macrobid 100 Mg Oral Capsule (Nitrofurantoin monohyd macro) .... Take one (1) tablet by mouth twice a day for 7 days    Other Orders:  Urinalysis Dip Stick/Tablet Rgnt NON-AUTO W/O [place]on ([id number])  [facility] CULTURE,URINE (URNC)  [facility] COMPREHENSIVE METABOLIC PANEL (CMP) (MPC)        Patient Instructions:  1)  Please schedule a follow-up appointment in 3 months.          Medications:  BENZONATATE 200 MG ORAL CAPSULE (BENZONATATE) Take 1 capsule three times daily as needed for cough  #15[Capsule] x 0      Entered and Authorized by:  Hiren Sellers MD      Electronically signed by:   Hiren Sellers MD on 05/06/2019      Method used:    Electronically to               [facility]* (retail)              [address] [place] [address]  [address]              Ph: [phone]              Fax: [phone]      Note to Pharmacy: Route: ORAL;       RxID:   [id number]  KEFLEX 500 MG ORAL CAPSULE (CEPHALEXIN) Take one (1) tablet by mouth twice a day  #14[Capsule] x 0      Entered and Authorized by:  Hiren Sellers MD      Electronically signed by:   Hiren Sellers MD on 05/03/2019      Method used:    Electronically to               [facility]* (retail)              [address] [place] [address]  [id number]              Ph: [phone]              Fax: [phone]      Note to Pharmacy: Route: ORAL;       RxID:   [id number]  MACROBID 100 MG ORAL CAPSULE (NITROFURANTOIN MONOHYD MACRO) Take one (1) tablet by mouth twice a day for 7 days  #14[Capsule] x 0      Entered and Authorized by:  Hiren Sellers MD      Electronically signed by:   Hiren Sellers MD on 04/30/2019      Method used:    Electronically to               [facility]* (retail)              [address] [place] [address]  [address]              Ph: [phone]              Fax: [phone]      Note to Pharmacy:  Route: ORAL;       RxID:   [id number]                Medication Administration    Orders Added:  1)  Urinalysis Dip Stick/Tablet Rgnt NON-AUTO W/O [place]on [[id number]]  2)  [facility] CULTURE,URINE [URNC]  3)  [facility] COMPREHENSIVE METABOLIC PANEL (CMP) [MPC]  4)  Ofc Vst, Est Level III [[id number]]  ]  Technician: Analilia CINTRON    Date/Time Collected: April 30, 2019 2:41 PM)  Date/Time Received: April 30, 2019 2:41 PM)  Performed by: yolanda    Routine Urinalysis          Normal Range   Color:      yellow          Color: Yellow   Appearance:  cloudy         Appearance: Clear  Leukocytes:  trace      Leukocytes: Negative   Nitrite:         positive       Nitrite: Negative  Urobilinogen:    0.2 mg/dL      Urobilinogen: Negative mg/dL  Protein:     negative mg/dL     Protein:  Negative mg/dL  pH:      6.0        pH:  4.5-8.0  Blood:       negative       Blood:  Negative  Spec. Gravity:   1.015      Spec Gravity:  1.005-1.030  Ketones:     negative mg/dL     Ketones:  Negative mg/dL  Bilirubin:   negative       Bilirubin:  Negative  Glucose:     negative mg/dL     Glucose:  Negative mg/dL                      Electronically signed by Hiren Sellers MD on 05/26/2019 at 4:32 PM  ________________________________________________________________________       Disclaimer: Converted Note message may not contain all data elements that existed in the legacy source system. Please see [facility] for the original note details.

## 2019-06-10 ENCOUNTER — HOSPITAL ENCOUNTER (OUTPATIENT)
Dept: LAB | Facility: HOSPITAL | Age: 54
Discharge: HOME | End: 2019-06-10
Attending: PODIATRIST | Admitting: PODIATRIST

## 2019-06-10 LAB
ANION GAP SERPL CALC-SCNC: 11.2 MMOL/L (ref 10–20)
BASOPHILS # BLD AUTO: 0.1 10*3/UL (ref 0–0.2)
BASOPHILS NFR BLD AUTO: 1 % (ref 0–2)
BUN SERPL-MCNC: 13 MG/DL (ref 8–20)
BUN/CREAT SERPL: 14.4 (ref 5.4–26.2)
CALCIUM SERPL-MCNC: 9.1 MG/DL (ref 8.9–10.3)
CHLORIDE SERPL-SCNC: 112 MMOL/L (ref 101–111)
CONV CO2: 22 MMOL/L (ref 22–32)
CREAT UR-MCNC: 0.9 MG/DL (ref 0.4–1)
DIFFERENTIAL METHOD BLD: (no result)
EOSINOPHIL # BLD AUTO: 0 % (ref 0–3)
EOSINOPHIL # BLD AUTO: 0 10*3/UL (ref 0–0.3)
ERYTHROCYTE [DISTWIDTH] IN BLOOD BY AUTOMATED COUNT: 15.2 % (ref 11.5–14.5)
GLUCOSE SERPL-MCNC: 104 MG/DL (ref 65–99)
HCT VFR BLD AUTO: 45.5 % (ref 35–49)
HGB BLD-MCNC: 15.1 G/DL (ref 12–15)
LYMPHOCYTES # BLD AUTO: 1.9 10*3/UL (ref 0.8–4.8)
LYMPHOCYTES NFR BLD AUTO: 19 % (ref 18–42)
MCH RBC QN AUTO: 31.8 PG (ref 26–32)
MCHC RBC AUTO-ENTMCNC: 33.2 G/DL (ref 32–36)
MCV RBC AUTO: 95.8 FL (ref 80–94)
MONOCYTES # BLD AUTO: 0.3 10*3/UL (ref 0.1–1.3)
MONOCYTES NFR BLD AUTO: 3 % (ref 2–11)
NEUTROPHILS # BLD AUTO: 7.8 10*3/UL (ref 2.3–8.6)
NEUTROPHILS NFR BLD AUTO: 77 % (ref 50–75)
NRBC BLD AUTO-RTO: 0 /100{WBCS}
NRBC/RBC NFR BLD MANUAL: 0 10*3/UL
PLATELET # BLD AUTO: 342 10*3/UL (ref 150–450)
PMV BLD AUTO: 6.5 FL (ref 7.4–10.4)
POTASSIUM SERPL-SCNC: 4.2 MMOL/L (ref 3.6–5.1)
RBC # BLD AUTO: 4.74 10*6/UL (ref 4–5.4)
SODIUM SERPL-SCNC: 141 MMOL/L (ref 136–144)
WBC # BLD AUTO: 10.1 10*3/UL (ref 4.5–11.5)

## 2019-06-17 ENCOUNTER — LAB (OUTPATIENT)
Dept: FAMILY MEDICINE CLINIC | Facility: CLINIC | Age: 54
End: 2019-06-17

## 2019-06-17 ENCOUNTER — OFFICE VISIT (OUTPATIENT)
Dept: FAMILY MEDICINE CLINIC | Facility: CLINIC | Age: 54
End: 2019-06-17

## 2019-06-17 VITALS
HEIGHT: 68 IN | DIASTOLIC BLOOD PRESSURE: 73 MMHG | TEMPERATURE: 97.1 F | HEART RATE: 74 BPM | SYSTOLIC BLOOD PRESSURE: 107 MMHG | BODY MASS INDEX: 18.49 KG/M2 | OXYGEN SATURATION: 97 % | WEIGHT: 122 LBS

## 2019-06-17 DIAGNOSIS — R10.32 ABDOMINAL PAIN, ACUTE, LEFT LOWER QUADRANT: ICD-10-CM

## 2019-06-17 DIAGNOSIS — R10.32 ABDOMINAL PAIN, ACUTE, LEFT LOWER QUADRANT: Primary | ICD-10-CM

## 2019-06-17 LAB
ALBUMIN SERPL-MCNC: 3.9 G/DL (ref 3.5–4.8)
ALBUMIN/GLOB SERPL: 1.4 G/DL (ref 1–1.7)
ALP SERPL-CCNC: 85 U/L (ref 32–91)
ALT SERPL W P-5'-P-CCNC: 59 U/L (ref 14–54)
ANION GAP SERPL CALCULATED.3IONS-SCNC: 7 MMOL/L (ref 10–20)
AST SERPL-CCNC: 100 U/L (ref 15–41)
BASOPHILS # BLD AUTO: 0 10*3/MM3 (ref 0–0.2)
BASOPHILS NFR BLD AUTO: 0.5 % (ref 0–1.5)
BILIRUB BLD-MCNC: NEGATIVE MG/DL
BILIRUB SERPL-MCNC: 0.6 MG/DL (ref 0.3–1.2)
BUN BLD-MCNC: 11 MG/DL (ref 8–20)
BUN/CREAT SERPL: 13.8 (ref 5.4–26.2)
CALCIUM SPEC-SCNC: 9 MG/DL (ref 8.9–10.3)
CHLORIDE SERPL-SCNC: 108 MMOL/L (ref 101–111)
CLARITY, POC: CLEAR
CO2 SERPL-SCNC: 23 MMOL/L (ref 22–32)
COLOR UR: YELLOW
CREAT BLD-MCNC: 0.8 MG/DL (ref 0.4–1)
DEPRECATED RDW RBC AUTO: 49.9 FL (ref 37–54)
EOSINOPHIL # BLD AUTO: 0 10*3/MM3 (ref 0–0.4)
EOSINOPHIL NFR BLD AUTO: 0.1 % (ref 0.3–6.2)
ERYTHROCYTE [DISTWIDTH] IN BLOOD BY AUTOMATED COUNT: 15 % (ref 12.3–15.4)
GFR SERPL CREATININE-BSD FRML MDRD: 75 ML/MIN/1.73
GLOBULIN UR ELPH-MCNC: 2.7 GM/DL (ref 2.5–3.8)
GLUCOSE BLD-MCNC: 112 MG/DL (ref 65–99)
GLUCOSE UR STRIP-MCNC: NEGATIVE MG/DL
HCT VFR BLD AUTO: 46.3 % (ref 34–46.6)
HGB BLD-MCNC: 15.2 G/DL (ref 12–15.9)
KETONES UR QL: NEGATIVE
LEUKOCYTE EST, POC: NEGATIVE
LYMPHOCYTES # BLD AUTO: 1.7 10*3/MM3 (ref 0.7–3.1)
LYMPHOCYTES NFR BLD AUTO: 16.9 % (ref 19.6–45.3)
MCH RBC QN AUTO: 31.4 PG (ref 26.6–33)
MCHC RBC AUTO-ENTMCNC: 32.9 G/DL (ref 31.5–35.7)
MCV RBC AUTO: 95.5 FL (ref 79–97)
MONOCYTES # BLD AUTO: 0.4 10*3/MM3 (ref 0.1–0.9)
MONOCYTES NFR BLD AUTO: 4.5 % (ref 5–12)
NEUTROPHILS # BLD AUTO: 7.6 10*3/MM3 (ref 1.7–7)
NEUTROPHILS NFR BLD AUTO: 78 % (ref 42.7–76)
NITRITE UR-MCNC: NEGATIVE MG/ML
NRBC BLD AUTO-RTO: 0.1 /100 WBC (ref 0–0.2)
PH UR: 6 [PH] (ref 5–8)
PLATELET # BLD AUTO: 328 10*3/MM3 (ref 140–450)
PMV BLD AUTO: 6.7 FL (ref 6–12)
POTASSIUM BLD-SCNC: 4 MMOL/L (ref 3.6–5.1)
PROT SERPL-MCNC: 6.6 G/DL (ref 6.1–7.9)
PROT UR STRIP-MCNC: NEGATIVE MG/DL
RBC # BLD AUTO: 4.84 10*6/MM3 (ref 3.77–5.28)
RBC # UR STRIP: NEGATIVE /UL
SODIUM BLD-SCNC: 138 MMOL/L (ref 136–144)
SP GR UR: 1.01 (ref 1–1.03)
UROBILINOGEN UR QL: NORMAL
WBC NRBC COR # BLD: 9.8 10*3/MM3 (ref 3.4–10.8)

## 2019-06-17 PROCEDURE — 99214 OFFICE O/P EST MOD 30 MIN: CPT | Performed by: FAMILY MEDICINE

## 2019-06-17 PROCEDURE — 85025 COMPLETE CBC W/AUTO DIFF WBC: CPT | Performed by: FAMILY MEDICINE

## 2019-06-17 PROCEDURE — 80053 COMPREHEN METABOLIC PANEL: CPT | Performed by: FAMILY MEDICINE

## 2019-06-17 PROCEDURE — 36415 COLL VENOUS BLD VENIPUNCTURE: CPT | Performed by: FAMILY MEDICINE

## 2019-06-17 RX ORDER — NITROFURANTOIN 25; 75 MG/1; MG/1
1 CAPSULE ORAL 2 TIMES DAILY
Refills: 0 | COMMUNITY
Start: 2019-04-30 | End: 2020-06-18

## 2019-06-17 RX ORDER — LIDOCAINE 50 MG/G
1 OINTMENT TOPICAL 4 TIMES DAILY
Refills: 0 | COMMUNITY
Start: 2019-05-02 | End: 2020-06-18

## 2019-06-17 RX ORDER — HYDROXYZINE PAMOATE 50 MG/1
50 CAPSULE ORAL 2 TIMES DAILY PRN
Refills: 1 | COMMUNITY
Start: 2019-06-03 | End: 2019-09-19 | Stop reason: SDUPTHER

## 2019-06-17 RX ORDER — TRAZODONE HYDROCHLORIDE 100 MG/1
TABLET ORAL
Refills: 1 | COMMUNITY
Start: 2019-06-03 | End: 2019-11-06 | Stop reason: SDUPTHER

## 2019-06-17 RX ORDER — OXYCODONE AND ACETAMINOPHEN 10; 325 MG/1; MG/1
1 TABLET ORAL 2 TIMES DAILY
Refills: 0 | COMMUNITY
Start: 2019-06-05 | End: 2022-05-21 | Stop reason: HOSPADM

## 2019-06-17 RX ORDER — PREDNISONE 10 MG/1
10 TABLET ORAL DAILY
Refills: 0 | COMMUNITY
Start: 2019-06-04 | End: 2019-08-27 | Stop reason: SDUPTHER

## 2019-06-17 RX ORDER — LAMOTRIGINE 200 MG/1
200 TABLET ORAL
Refills: 1 | COMMUNITY
Start: 2019-06-03 | End: 2019-12-18 | Stop reason: SDUPTHER

## 2019-06-17 RX ORDER — PROMETHAZINE HYDROCHLORIDE 25 MG/1
1 TABLET ORAL EVERY 4 HOURS PRN
Refills: 0 | COMMUNITY
Start: 2019-05-23 | End: 2019-06-17 | Stop reason: SDUPTHER

## 2019-06-17 RX ORDER — CIPROFLOXACIN 500 MG/1
500 TABLET, FILM COATED ORAL 2 TIMES DAILY
Qty: 14 TABLET | Refills: 0 | Status: SHIPPED | OUTPATIENT
Start: 2019-06-17 | End: 2019-06-24

## 2019-06-17 RX ORDER — PROMETHAZINE HYDROCHLORIDE 25 MG/1
25 TABLET ORAL EVERY 4 HOURS PRN
Qty: 20 TABLET | Refills: 0 | Status: SHIPPED | OUTPATIENT
Start: 2019-06-17 | End: 2019-08-30 | Stop reason: SDUPTHER

## 2019-06-17 RX ORDER — SERTRALINE HYDROCHLORIDE 25 MG/1
25 TABLET, FILM COATED ORAL
Refills: 1 | COMMUNITY
Start: 2019-06-03 | End: 2019-12-12 | Stop reason: SDUPTHER

## 2019-06-17 RX ORDER — ALPRAZOLAM 1 MG/1
1 TABLET ORAL 3 TIMES DAILY
Refills: 1 | COMMUNITY
Start: 2019-06-03 | End: 2019-12-18 | Stop reason: SDUPTHER

## 2019-06-17 RX ORDER — BLOOD-GLUCOSE METER
KIT MISCELLANEOUS 2 TIMES DAILY
Refills: 3 | COMMUNITY
Start: 2019-05-08 | End: 2019-11-27 | Stop reason: SDUPTHER

## 2019-06-17 RX ORDER — ALBUTEROL SULFATE 90 UG/1
1 AEROSOL, METERED RESPIRATORY (INHALATION)
Refills: 3 | COMMUNITY
Start: 2019-06-04 | End: 2019-11-06 | Stop reason: SDUPTHER

## 2019-06-17 RX ORDER — LANCETS 28 GAUGE
1 EACH MISCELLANEOUS 2 TIMES DAILY
Refills: 3 | COMMUNITY
Start: 2019-05-08 | End: 2020-10-04

## 2019-06-17 RX ORDER — HYDROXYZINE HYDROCHLORIDE 25 MG/1
25 TABLET, FILM COATED ORAL 3 TIMES DAILY
Refills: 3 | COMMUNITY
Start: 2019-06-04 | End: 2019-09-19 | Stop reason: SDUPTHER

## 2019-06-17 RX ORDER — LURASIDONE HYDROCHLORIDE 20 MG/1
1 TABLET, FILM COATED ORAL DAILY
Refills: 1 | COMMUNITY
Start: 2019-05-06 | End: 2019-11-22

## 2019-06-17 RX ORDER — METHOCARBAMOL 750 MG/1
750 TABLET, FILM COATED ORAL 3 TIMES DAILY
Refills: 0 | COMMUNITY
Start: 2019-06-04 | End: 2019-08-30 | Stop reason: SDUPTHER

## 2019-06-17 RX ORDER — OLANZAPINE 2.5 MG/1
2.5 TABLET ORAL
Refills: 1 | COMMUNITY
Start: 2019-06-03 | End: 2019-12-18 | Stop reason: SDUPTHER

## 2019-06-17 RX ORDER — GABAPENTIN 300 MG/1
1 CAPSULE ORAL 4 TIMES DAILY
Refills: 0 | COMMUNITY
Start: 2019-06-03 | End: 2020-09-02 | Stop reason: SDUPTHER

## 2019-06-17 RX ORDER — TIOTROPIUM BROMIDE INHALATION SPRAY 3.12 UG/1
2 SPRAY, METERED RESPIRATORY (INHALATION) DAILY
Refills: 3 | COMMUNITY
Start: 2019-06-04 | End: 2019-08-27 | Stop reason: SDUPTHER

## 2019-06-17 RX ORDER — TOPIRAMATE 100 MG/1
100 TABLET, FILM COATED ORAL 2 TIMES DAILY
Refills: 3 | COMMUNITY
Start: 2019-05-06 | End: 2019-10-07 | Stop reason: SDUPTHER

## 2019-06-17 NOTE — PROGRESS NOTES
"Oliver Rayo is a 54 y.o. female.     Urinary Frequency    Associated symptoms include frequency and nausea. Pertinent negatives include no hematuria or urgency.   Abdominal Pain   This is a new problem. The current episode started more than 1 month ago. The onset quality is gradual. The problem occurs daily. The problem has been gradually worsening. The pain is located in the LLQ. The pain is at a severity of 7/10. The pain is moderate. The quality of the pain is sharp and burning. Associated symptoms include dysuria, frequency and nausea. Pertinent negatives include no diarrhea, fever or hematuria. The pain is aggravated by movement. The pain is relieved by liquids. She has tried proton pump inhibitors for the symptoms. The treatment provided no relief. Her past medical history is significant for abdominal surgery.      Review of Systems   Constitutional: Positive for fatigue. Negative for fever.   Respiratory: Negative for cough, shortness of breath and wheezing.    Cardiovascular: Negative for chest pain and leg swelling.   Gastrointestinal: Positive for abdominal pain and nausea. Negative for blood in stool and diarrhea.   Genitourinary: Positive for dysuria and frequency. Negative for hematuria, pelvic pain and urgency.     Visit Vitals  /73 (BP Location: Right arm, Patient Position: Sitting, Cuff Size: Small Adult)   Pulse 74   Temp 97.1 °F (36.2 °C) (Oral)   Ht 172.7 cm (68\")   Wt 55.3 kg (122 lb)   SpO2 97%   BMI 18.55 kg/m²       Objective   Physical Exam   Constitutional: She is oriented to person, place, and time. She appears well-developed.   HENT:   Right Ear: External ear normal.   Left Ear: External ear normal.   Mouth/Throat: Oropharynx is clear and moist.   Eyes: Conjunctivae and EOM are normal. Pupils are equal, round, and reactive to light.   Neck: No thyromegaly present.   Cardiovascular: Normal rate and regular rhythm.   No murmur heard.  Pulmonary/Chest: Breath sounds " normal. She has no wheezes. She exhibits no tenderness.   Abdominal: Soft. Bowel sounds are normal. There is tenderness (LLQ + tenderness). There is no rebound and no guarding.   Musculoskeletal: Normal range of motion.   Lymphadenopathy:     She has no cervical adenopathy.   Neurological: She is alert and oriented to person, place, and time.   Skin: No rash noted.   Psychiatric: She has a normal mood and affect.   Vitals reviewed.        Assessment/Plan   Problems Addressed this Visit        Nervous and Auditory    Abdominal pain, acute, left lower quadrant - Primary    Relevant Medications    ciprofloxacin (CIPRO) 500 MG tablet    Other Relevant Orders    Comprehensive metabolic panel    CBC w AUTO Differential    CT Abdomen Pelvis With & Without Contrast    POCT urinalysis dipstick, automated (Completed)

## 2019-06-18 ENCOUNTER — TELEPHONE (OUTPATIENT)
Dept: FAMILY MEDICINE CLINIC | Facility: CLINIC | Age: 54
End: 2019-06-18

## 2019-06-18 NOTE — TELEPHONE ENCOUNTER
Tried calling pt about lab results but no answer and no voicemail.    Could not reach pt. Mailed her a letter with above information on labs.

## 2019-06-19 ENCOUNTER — CLINICAL SUPPORT NO REQUIREMENTS (OUTPATIENT)
Dept: CARDIOLOGY | Facility: CLINIC | Age: 54
End: 2019-06-19

## 2019-06-19 DIAGNOSIS — R55 SYNCOPE AND COLLAPSE: Primary | ICD-10-CM

## 2019-06-24 ENCOUNTER — TELEPHONE (OUTPATIENT)
Dept: FAMILY MEDICINE CLINIC | Facility: CLINIC | Age: 54
End: 2019-06-24

## 2019-06-24 ENCOUNTER — CLINICAL SUPPORT NO REQUIREMENTS (OUTPATIENT)
Dept: CARDIOLOGY | Facility: CLINIC | Age: 54
End: 2019-06-24

## 2019-06-24 DIAGNOSIS — R55 SYNCOPE AND COLLAPSE: Primary | ICD-10-CM

## 2019-06-24 RX ORDER — PREDNISONE 50 MG/1
TABLET ORAL
Qty: 3 TABLET | Refills: 0 | Status: SHIPPED | OUTPATIENT
Start: 2019-06-24 | End: 2019-11-06 | Stop reason: SDUPTHER

## 2019-06-24 RX ORDER — DIPHENHYDRAMINE HCL 50 MG
50 CAPSULE ORAL ONCE
Qty: 1 CAPSULE | Refills: 0 | Status: SHIPPED | OUTPATIENT
Start: 2019-06-24 | End: 2019-06-24

## 2019-06-24 NOTE — TELEPHONE ENCOUNTER
Pt. Called said [facility] called since she has slight allergy to contrast for the CT Scan, they said you would have to send in a pre-medication for her?  Needs med tonight!

## 2019-07-01 ENCOUNTER — CLINICAL SUPPORT NO REQUIREMENTS (OUTPATIENT)
Dept: CARDIOLOGY | Facility: CLINIC | Age: 54
End: 2019-07-01

## 2019-07-01 DIAGNOSIS — R55 SYNCOPE AND COLLAPSE: Primary | ICD-10-CM

## 2019-07-01 PROCEDURE — 93299 PR REM INTERROG ICPMS/SCRMS <30 D TECH REVIEW: CPT | Performed by: INTERNAL MEDICINE

## 2019-07-01 PROCEDURE — 93298 REM INTERROG DEV EVAL SCRMS: CPT | Performed by: INTERNAL MEDICINE

## 2019-07-01 RX ORDER — METOPROLOL SUCCINATE 25 MG/1
TABLET, EXTENDED RELEASE ORAL
Qty: 30 TABLET | Refills: 5 | Status: SHIPPED | OUTPATIENT
Start: 2019-07-01 | End: 2019-11-06 | Stop reason: SDUPTHER

## 2019-07-02 ENCOUNTER — CLINICAL SUPPORT NO REQUIREMENTS (OUTPATIENT)
Dept: CARDIOLOGY | Facility: CLINIC | Age: 54
End: 2019-07-02

## 2019-07-02 ENCOUNTER — TELEPHONE (OUTPATIENT)
Dept: FAMILY MEDICINE CLINIC | Facility: CLINIC | Age: 54
End: 2019-07-02

## 2019-07-02 DIAGNOSIS — R55 SYNCOPE, UNSPECIFIED SYNCOPE TYPE: Primary | ICD-10-CM

## 2019-07-03 RX ORDER — METHOCARBAMOL 750 MG/1
TABLET, FILM COATED ORAL
Qty: 90 TABLET | Refills: 0 | Status: SHIPPED | OUTPATIENT
Start: 2019-07-03 | End: 2019-08-01 | Stop reason: SDUPTHER

## 2019-07-04 RX ORDER — PROMETHAZINE HYDROCHLORIDE 25 MG/1
TABLET ORAL
Qty: 30 TABLET | Refills: 0 | Status: SHIPPED | OUTPATIENT
Start: 2019-07-04 | End: 2019-08-01 | Stop reason: SDUPTHER

## 2019-07-09 ENCOUNTER — CLINICAL SUPPORT NO REQUIREMENTS (OUTPATIENT)
Dept: CARDIOLOGY | Facility: CLINIC | Age: 54
End: 2019-07-09

## 2019-07-09 DIAGNOSIS — R55 SYNCOPE, UNSPECIFIED SYNCOPE TYPE: Primary | ICD-10-CM

## 2019-07-22 ENCOUNTER — TELEPHONE (OUTPATIENT)
Dept: NEUROLOGY | Facility: CLINIC | Age: 54
End: 2019-07-22

## 2019-07-22 RX ORDER — TOPIRAMATE 100 MG/1
100 TABLET, FILM COATED ORAL 2 TIMES DAILY
Refills: 3 | COMMUNITY
Start: 2019-05-06 | End: 2019-07-22 | Stop reason: SDUPTHER

## 2019-07-22 RX ORDER — TOPIRAMATE 100 MG/1
100 TABLET, FILM COATED ORAL 2 TIMES DAILY
Qty: 60 TABLET | Refills: 2 | Status: SHIPPED | OUTPATIENT
Start: 2019-07-22 | End: 2019-10-06 | Stop reason: SDUPTHER

## 2019-07-23 ENCOUNTER — CLINICAL SUPPORT NO REQUIREMENTS (OUTPATIENT)
Dept: CARDIOLOGY | Facility: CLINIC | Age: 54
End: 2019-07-23

## 2019-07-23 DIAGNOSIS — Z95.818 STATUS POST PLACEMENT OF IMPLANTABLE LOOP RECORDER: ICD-10-CM

## 2019-07-23 DIAGNOSIS — R55 SYNCOPE, UNSPECIFIED SYNCOPE TYPE: Primary | ICD-10-CM

## 2019-07-25 ENCOUNTER — CLINICAL SUPPORT NO REQUIREMENTS (OUTPATIENT)
Dept: CARDIOLOGY | Facility: CLINIC | Age: 54
End: 2019-07-25

## 2019-07-25 DIAGNOSIS — R55 SYNCOPE, UNSPECIFIED SYNCOPE TYPE: Primary | ICD-10-CM

## 2019-07-25 DIAGNOSIS — Z95.818 STATUS POST PLACEMENT OF IMPLANTABLE LOOP RECORDER: ICD-10-CM

## 2019-07-29 ENCOUNTER — CLINICAL SUPPORT NO REQUIREMENTS (OUTPATIENT)
Dept: CARDIOLOGY | Facility: CLINIC | Age: 54
End: 2019-07-29

## 2019-07-29 DIAGNOSIS — R55 SYNCOPE AND COLLAPSE: ICD-10-CM

## 2019-07-29 DIAGNOSIS — Z95.818 STATUS POST PLACEMENT OF IMPLANTABLE LOOP RECORDER: Primary | ICD-10-CM

## 2019-07-29 PROCEDURE — 93298 REM INTERROG DEV EVAL SCRMS: CPT | Performed by: INTERNAL MEDICINE

## 2019-07-29 PROCEDURE — 93299 PR REM INTERROG ICPMS/SCRMS <30 D TECH REVIEW: CPT | Performed by: INTERNAL MEDICINE

## 2019-07-30 RX ORDER — HYDROXYZINE HYDROCHLORIDE 25 MG/1
TABLET, FILM COATED ORAL
Qty: 90 TABLET | Refills: 1 | Status: SHIPPED | OUTPATIENT
Start: 2019-07-30 | End: 2019-09-19 | Stop reason: SDUPTHER

## 2019-07-30 RX ORDER — ALBUTEROL SULFATE 90 UG/1
AEROSOL, METERED RESPIRATORY (INHALATION)
Qty: 18 G | Refills: 3 | Status: SHIPPED | OUTPATIENT
Start: 2019-07-30 | End: 2019-11-24 | Stop reason: SDUPTHER

## 2019-07-30 RX ORDER — OMEPRAZOLE 20 MG/1
TABLET, DELAYED RELEASE ORAL
Qty: 30 EACH | Refills: 3 | Status: SHIPPED | OUTPATIENT
Start: 2019-07-30 | End: 2019-09-13 | Stop reason: SDUPTHER

## 2019-08-01 RX ORDER — PROMETHAZINE HYDROCHLORIDE 25 MG/1
TABLET ORAL
Qty: 30 TABLET | Refills: 0 | Status: SHIPPED | OUTPATIENT
Start: 2019-08-01 | End: 2019-08-30 | Stop reason: SDUPTHER

## 2019-08-01 RX ORDER — METHOCARBAMOL 750 MG/1
TABLET, FILM COATED ORAL
Qty: 90 TABLET | Refills: 0 | Status: SHIPPED | OUTPATIENT
Start: 2019-08-01 | End: 2019-08-30 | Stop reason: SDUPTHER

## 2019-08-02 ENCOUNTER — CLINICAL SUPPORT NO REQUIREMENTS (OUTPATIENT)
Dept: CARDIOLOGY | Facility: CLINIC | Age: 54
End: 2019-08-02

## 2019-08-02 DIAGNOSIS — R55 SYNCOPE, UNSPECIFIED SYNCOPE TYPE: Primary | ICD-10-CM

## 2019-08-02 DIAGNOSIS — Z95.818 STATUS POST PLACEMENT OF IMPLANTABLE LOOP RECORDER: ICD-10-CM

## 2019-08-05 ENCOUNTER — TELEPHONE (OUTPATIENT)
Dept: CARDIOLOGY | Facility: CLINIC | Age: 54
End: 2019-08-05

## 2019-08-05 ENCOUNTER — OFFICE VISIT (OUTPATIENT)
Dept: FAMILY MEDICINE CLINIC | Facility: CLINIC | Age: 54
End: 2019-08-05

## 2019-08-05 VITALS
DIASTOLIC BLOOD PRESSURE: 62 MMHG | SYSTOLIC BLOOD PRESSURE: 99 MMHG | OXYGEN SATURATION: 94 % | HEIGHT: 67 IN | BODY MASS INDEX: 19.46 KG/M2 | WEIGHT: 124 LBS | TEMPERATURE: 98 F | HEART RATE: 77 BPM

## 2019-08-05 DIAGNOSIS — R10.32 ABDOMINAL PAIN, LLQ: Primary | ICD-10-CM

## 2019-08-05 PROCEDURE — 99213 OFFICE O/P EST LOW 20 MIN: CPT | Performed by: FAMILY MEDICINE

## 2019-08-05 RX ORDER — DIPHENHYDRAMINE HCL 50 MG
1 CAPSULE ORAL DAILY PRN
Refills: 0 | COMMUNITY
Start: 2019-06-24 | End: 2019-09-19 | Stop reason: SDUPTHER

## 2019-08-05 RX ORDER — NICOTINE POLACRILEX 4 MG/1
1 GUM, CHEWING ORAL 2 TIMES DAILY
Refills: 3 | COMMUNITY
Start: 2019-07-30 | End: 2019-11-06 | Stop reason: SDUPTHER

## 2019-08-05 RX ORDER — METOPROLOL SUCCINATE 25 MG/1
25 TABLET, EXTENDED RELEASE ORAL DAILY
Refills: 5 | COMMUNITY
Start: 2019-07-29 | End: 2019-12-26

## 2019-08-05 NOTE — TELEPHONE ENCOUNTER
WE HAD REFERRED PATIENT TO DR. VORA BUT HE IS RETIRING AND HER APT WAS CANCELED. WHO ELSE CAN WE REFER THE PATIENT TO? #[phone]

## 2019-08-05 NOTE — PROGRESS NOTES
Subjective   Melvi Rayo is a 54 y.o. female.     History of Present Illness   The patient present for f/u on abdominal pain.The onset quality is gradual. The problem occurs daily. The problem has been gradually worsening. The pain is located in the LLQ. The pain is at a severity of 8/10. The pain is moderate. The quality of the pain is sharp and burning. Associated symptoms include  nausea. Pertinent negatives include no diarrhea, fever or hematuria. The pain is aggravated by movement.     The following portions of the patient's history were reviewed and updated as appropriate: allergies, current medications, past family history, past medical history, past social history and past surgical history.    Review of Systems   Constitutional: Positive for fatigue. Negative for fever.   Respiratory: Negative for cough and shortness of breath.    Cardiovascular: Negative for chest pain.   Gastrointestinal: Positive for abdominal pain and nausea. Negative for diarrhea and vomiting.   Genitourinary: Negative for dysuria and flank pain.       Objective   Physical Exam   Constitutional: She is oriented to person, place, and time. She appears well-developed.   Cardiovascular: Normal rate and regular rhythm.   Pulmonary/Chest: Effort normal and breath sounds normal. She has no wheezes.   Abdominal: Soft. Bowel sounds are normal. There is tenderness. There is no rebound.   Neurological: She is alert and oriented to person, place, and time.   Psychiatric: She has a normal mood and affect.   Vitals reviewed.        Assessment/Plan      Abdominal pain left lower quadrant  Unchanged - discussed lab result we will schedule for CT abdomen and pelvis.

## 2019-08-07 ENCOUNTER — CLINICAL SUPPORT NO REQUIREMENTS (OUTPATIENT)
Dept: CARDIOLOGY | Facility: CLINIC | Age: 54
End: 2019-08-07

## 2019-08-07 DIAGNOSIS — R55 SYNCOPE, UNSPECIFIED SYNCOPE TYPE: Primary | ICD-10-CM

## 2019-08-07 DIAGNOSIS — Z95.818 STATUS POST PLACEMENT OF IMPLANTABLE LOOP RECORDER: ICD-10-CM

## 2019-08-07 PROCEDURE — 93299 PR REM INTERROG ICPMS/SCRMS <30 D TECH REVIEW: CPT | Performed by: INTERNAL MEDICINE

## 2019-08-07 PROCEDURE — 93298 REM INTERROG DEV EVAL SCRMS: CPT | Performed by: INTERNAL MEDICINE

## 2019-08-09 ENCOUNTER — CLINICAL SUPPORT NO REQUIREMENTS (OUTPATIENT)
Dept: CARDIOLOGY | Facility: CLINIC | Age: 54
End: 2019-08-09

## 2019-08-09 DIAGNOSIS — Z95.818 STATUS POST PLACEMENT OF IMPLANTABLE LOOP RECORDER: ICD-10-CM

## 2019-08-09 DIAGNOSIS — R55 SYNCOPE, UNSPECIFIED SYNCOPE TYPE: Primary | ICD-10-CM

## 2019-08-12 ENCOUNTER — CLINICAL SUPPORT NO REQUIREMENTS (OUTPATIENT)
Dept: CARDIOLOGY | Facility: CLINIC | Age: 54
End: 2019-08-12

## 2019-08-12 ENCOUNTER — TELEPHONE (OUTPATIENT)
Dept: FAMILY MEDICINE CLINIC | Facility: CLINIC | Age: 54
End: 2019-08-12

## 2019-08-12 DIAGNOSIS — Z95.818 STATUS POST PLACEMENT OF IMPLANTABLE LOOP RECORDER: ICD-10-CM

## 2019-08-12 DIAGNOSIS — R55 SYNCOPE, UNSPECIFIED SYNCOPE TYPE: Primary | ICD-10-CM

## 2019-08-12 NOTE — TELEPHONE ENCOUNTER
The abdomen and pelvis negative for any acute pathology but it shows large burden of stool due to constipation.  Recommend stool softener and plenty of fluids

## 2019-08-13 NOTE — TELEPHONE ENCOUNTER
Patient notified and wants to know if something can be sent in, already tried Miralax and makes her sick.

## 2019-08-16 ENCOUNTER — CLINICAL SUPPORT NO REQUIREMENTS (OUTPATIENT)
Dept: CARDIOLOGY | Facility: CLINIC | Age: 54
End: 2019-08-16

## 2019-08-16 DIAGNOSIS — Z95.818 STATUS POST PLACEMENT OF IMPLANTABLE LOOP RECORDER: Primary | ICD-10-CM

## 2019-08-16 DIAGNOSIS — R55 SYNCOPE AND COLLAPSE: ICD-10-CM

## 2019-08-19 ENCOUNTER — CLINICAL SUPPORT NO REQUIREMENTS (OUTPATIENT)
Dept: CARDIOLOGY | Facility: CLINIC | Age: 54
End: 2019-08-19

## 2019-08-19 DIAGNOSIS — R55 SYNCOPE AND COLLAPSE: Primary | ICD-10-CM

## 2019-08-19 DIAGNOSIS — Z95.818 STATUS POST PLACEMENT OF IMPLANTABLE LOOP RECORDER: ICD-10-CM

## 2019-08-25 ENCOUNTER — HOSPITAL ENCOUNTER (OUTPATIENT)
Dept: CARDIOLOGY | Facility: HOSPITAL | Age: 54
Discharge: HOME | End: 2019-08-25

## 2019-08-25 ENCOUNTER — TRANSCRIBE ORDERS (OUTPATIENT)
Dept: ADMINISTRATIVE | Facility: HOSPITAL | Age: 54
End: 2019-08-25

## 2019-08-25 DIAGNOSIS — Z01.818 PRE-OP EXAM: Primary | ICD-10-CM

## 2019-08-25 DIAGNOSIS — Z01.818 PRE-OP EXAM: ICD-10-CM

## 2019-08-26 ENCOUNTER — TRANSCRIBE ORDERS (OUTPATIENT)
Dept: ADMINISTRATIVE | Facility: HOSPITAL | Age: 54
End: 2019-08-26

## 2019-08-26 ENCOUNTER — LAB (OUTPATIENT)
Dept: LAB | Facility: HOSPITAL | Age: 54
End: 2019-08-26

## 2019-08-26 ENCOUNTER — HOSPITAL ENCOUNTER (OUTPATIENT)
Dept: GENERAL RADIOLOGY | Facility: HOSPITAL | Age: 54
Discharge: HOME | End: 2019-08-26
Admitting: PODIATRIST

## 2019-08-26 ENCOUNTER — HOSPITAL ENCOUNTER (OUTPATIENT)
Dept: CARDIOLOGY | Facility: HOSPITAL | Age: 54
Discharge: HOME | End: 2019-08-26

## 2019-08-26 DIAGNOSIS — Z01.818 PRE-OP TESTING: ICD-10-CM

## 2019-08-26 DIAGNOSIS — Z01.818 PRE-OP TESTING: Primary | ICD-10-CM

## 2019-08-26 LAB
ANION GAP SERPL CALCULATED.3IONS-SCNC: 14.9 MMOL/L (ref 5–15)
BASOPHILS # BLD AUTO: 0.1 10*3/MM3 (ref 0–0.2)
BASOPHILS NFR BLD AUTO: 0.7 % (ref 0–1.5)
BUN BLD-MCNC: 17 MG/DL (ref 8–20)
BUN/CREAT SERPL: 17 (ref 5.4–26.2)
CALCIUM SPEC-SCNC: 8.9 MG/DL (ref 8.9–10.3)
CHLORIDE SERPL-SCNC: 106 MMOL/L (ref 101–111)
CO2 SERPL-SCNC: 22 MMOL/L (ref 22–32)
CREAT BLD-MCNC: 1 MG/DL (ref 0.4–1)
DEPRECATED RDW RBC AUTO: 46.8 FL (ref 37–54)
EOSINOPHIL # BLD AUTO: 0 10*3/MM3 (ref 0–0.4)
EOSINOPHIL NFR BLD AUTO: 0.4 % (ref 0.3–6.2)
ERYTHROCYTE [DISTWIDTH] IN BLOOD BY AUTOMATED COUNT: 14.1 % (ref 12.3–15.4)
GFR SERPL CREATININE-BSD FRML MDRD: 58 ML/MIN/1.73
GLUCOSE BLD-MCNC: 115 MG/DL (ref 65–99)
HCT VFR BLD AUTO: 42.5 % (ref 34–46.6)
HGB BLD-MCNC: 14.5 G/DL (ref 12–15.9)
LYMPHOCYTES # BLD AUTO: 2.6 10*3/MM3 (ref 0.7–3.1)
LYMPHOCYTES NFR BLD AUTO: 25.2 % (ref 19.6–45.3)
MCH RBC QN AUTO: 32 PG (ref 26.6–33)
MCHC RBC AUTO-ENTMCNC: 34.1 G/DL (ref 31.5–35.7)
MCV RBC AUTO: 93.8 FL (ref 79–97)
MONOCYTES # BLD AUTO: 0.4 10*3/MM3 (ref 0.1–0.9)
MONOCYTES NFR BLD AUTO: 4.3 % (ref 5–12)
NEUTROPHILS # BLD AUTO: 7 10*3/MM3 (ref 1.7–7)
NEUTROPHILS NFR BLD AUTO: 69.4 % (ref 42.7–76)
NRBC BLD AUTO-RTO: 0 /100 WBC (ref 0–0.2)
PLATELET # BLD AUTO: 326 10*3/MM3 (ref 140–450)
PMV BLD AUTO: 6.5 FL (ref 6–12)
POTASSIUM BLD-SCNC: 3.9 MMOL/L (ref 3.6–5.1)
RBC # BLD AUTO: 4.53 10*6/MM3 (ref 3.77–5.28)
SODIUM BLD-SCNC: 139 MMOL/L (ref 136–144)
WBC NRBC COR # BLD: 10.1 10*3/MM3 (ref 3.4–10.8)

## 2019-08-26 PROCEDURE — 93005 ELECTROCARDIOGRAM TRACING: CPT | Performed by: PODIATRIST

## 2019-08-26 PROCEDURE — 85025 COMPLETE CBC W/AUTO DIFF WBC: CPT

## 2019-08-26 PROCEDURE — 36415 COLL VENOUS BLD VENIPUNCTURE: CPT

## 2019-08-26 PROCEDURE — 80048 BASIC METABOLIC PNL TOTAL CA: CPT

## 2019-08-26 PROCEDURE — 71046 X-RAY EXAM CHEST 2 VIEWS: CPT

## 2019-08-27 ENCOUNTER — CLINICAL SUPPORT NO REQUIREMENTS (OUTPATIENT)
Dept: CARDIOLOGY | Facility: CLINIC | Age: 54
End: 2019-08-27

## 2019-08-27 DIAGNOSIS — Z95.818 STATUS POST PLACEMENT OF IMPLANTABLE LOOP RECORDER: ICD-10-CM

## 2019-08-27 DIAGNOSIS — R55 SYNCOPE, UNSPECIFIED SYNCOPE TYPE: Primary | ICD-10-CM

## 2019-08-27 RX ORDER — TIOTROPIUM BROMIDE INHALATION SPRAY 3.12 UG/1
SPRAY, METERED RESPIRATORY (INHALATION)
Qty: 4 G | Refills: 3 | Status: SHIPPED | OUTPATIENT
Start: 2019-08-27 | End: 2019-12-26

## 2019-08-27 RX ORDER — PREDNISONE 10 MG/1
TABLET ORAL
Qty: 90 TABLET | Refills: 0 | Status: SHIPPED | OUTPATIENT
Start: 2019-08-27 | End: 2019-11-06 | Stop reason: SDUPTHER

## 2019-08-30 RX ORDER — METHOCARBAMOL 750 MG/1
750 TABLET, FILM COATED ORAL 3 TIMES DAILY
Qty: 90 TABLET | Refills: 1 | Status: SHIPPED | OUTPATIENT
Start: 2019-08-30 | End: 2019-11-01 | Stop reason: SDUPTHER

## 2019-08-30 RX ORDER — PROMETHAZINE HYDROCHLORIDE 25 MG/1
25 TABLET ORAL EVERY 4 HOURS PRN
Qty: 30 TABLET | Refills: 0 | Status: SHIPPED | OUTPATIENT
Start: 2019-08-30 | End: 2019-09-25 | Stop reason: SDUPTHER

## 2019-09-03 ENCOUNTER — CLINICAL SUPPORT NO REQUIREMENTS (OUTPATIENT)
Dept: CARDIOLOGY | Facility: CLINIC | Age: 54
End: 2019-09-03

## 2019-09-03 DIAGNOSIS — Z95.818 STATUS POST PLACEMENT OF IMPLANTABLE LOOP RECORDER: ICD-10-CM

## 2019-09-03 DIAGNOSIS — R55 SYNCOPE, UNSPECIFIED SYNCOPE TYPE: Primary | ICD-10-CM

## 2019-09-13 RX ORDER — NICOTINE POLACRILEX 4 MG/1
GUM, CHEWING ORAL
Qty: 30 EACH | Refills: 3 | Status: SHIPPED | OUTPATIENT
Start: 2019-09-13 | End: 2020-02-06

## 2019-09-16 ENCOUNTER — CLINICAL SUPPORT NO REQUIREMENTS (OUTPATIENT)
Dept: CARDIOLOGY | Facility: CLINIC | Age: 54
End: 2019-09-16

## 2019-09-16 DIAGNOSIS — Z95.818 STATUS POST PLACEMENT OF IMPLANTABLE LOOP RECORDER: ICD-10-CM

## 2019-09-16 DIAGNOSIS — R55 SYNCOPE, UNSPECIFIED SYNCOPE TYPE: Primary | ICD-10-CM

## 2019-09-16 PROCEDURE — 93299 PR REM INTERROG ICPMS/SCRMS <30 D TECH REVIEW: CPT | Performed by: INTERNAL MEDICINE

## 2019-09-16 PROCEDURE — 93298 REM INTERROG DEV EVAL SCRMS: CPT | Performed by: INTERNAL MEDICINE

## 2019-09-19 RX ORDER — HYDROXYZINE HYDROCHLORIDE 25 MG/1
TABLET, FILM COATED ORAL
Qty: 90 TABLET | Refills: 1 | Status: SHIPPED | OUTPATIENT
Start: 2019-09-19 | End: 2019-11-24 | Stop reason: SDUPTHER

## 2019-09-25 PROCEDURE — 93010 ELECTROCARDIOGRAM REPORT: CPT | Performed by: INTERNAL MEDICINE

## 2019-09-25 RX ORDER — PROMETHAZINE HYDROCHLORIDE 25 MG/1
25 TABLET ORAL EVERY 4 HOURS PRN
Qty: 30 TABLET | Refills: 0 | Status: SHIPPED | OUTPATIENT
Start: 2019-09-25 | End: 2019-10-10 | Stop reason: SDUPTHER

## 2019-10-07 RX ORDER — TOPIRAMATE 100 MG/1
TABLET, FILM COATED ORAL
Qty: 60 TABLET | Refills: 3 | Status: SHIPPED | OUTPATIENT
Start: 2019-10-07 | End: 2019-12-18 | Stop reason: SDUPTHER

## 2019-10-10 RX ORDER — PROMETHAZINE HYDROCHLORIDE 25 MG/1
25 TABLET ORAL EVERY 6 HOURS PRN
Qty: 30 TABLET | Refills: 0 | Status: SHIPPED | OUTPATIENT
Start: 2019-10-10 | End: 2019-10-24 | Stop reason: SDUPTHER

## 2019-10-14 ENCOUNTER — CLINICAL SUPPORT NO REQUIREMENTS (OUTPATIENT)
Dept: CARDIOLOGY | Facility: CLINIC | Age: 54
End: 2019-10-14

## 2019-10-14 DIAGNOSIS — Z95.818 STATUS POST PLACEMENT OF IMPLANTABLE LOOP RECORDER: Primary | ICD-10-CM

## 2019-10-14 DIAGNOSIS — R55 SYNCOPE, UNSPECIFIED SYNCOPE TYPE: ICD-10-CM

## 2019-10-16 ENCOUNTER — CLINICAL SUPPORT NO REQUIREMENTS (OUTPATIENT)
Dept: CARDIOLOGY | Facility: CLINIC | Age: 54
End: 2019-10-16

## 2019-10-16 DIAGNOSIS — R55 SYNCOPE AND COLLAPSE: ICD-10-CM

## 2019-10-16 DIAGNOSIS — Z95.818 STATUS POST PLACEMENT OF IMPLANTABLE LOOP RECORDER: Primary | ICD-10-CM

## 2019-10-22 ENCOUNTER — CLINICAL SUPPORT NO REQUIREMENTS (OUTPATIENT)
Dept: CARDIOLOGY | Facility: CLINIC | Age: 54
End: 2019-10-22

## 2019-10-22 DIAGNOSIS — Z95.818 STATUS POST PLACEMENT OF IMPLANTABLE LOOP RECORDER: ICD-10-CM

## 2019-10-22 DIAGNOSIS — R55 SYNCOPE, UNSPECIFIED SYNCOPE TYPE: Primary | ICD-10-CM

## 2019-10-22 PROCEDURE — 93298 REM INTERROG DEV EVAL SCRMS: CPT | Performed by: INTERNAL MEDICINE

## 2019-10-22 PROCEDURE — 93299 PR REM INTERROG ICPMS/SCRMS <30 D TECH REVIEW: CPT | Performed by: INTERNAL MEDICINE

## 2019-10-23 ENCOUNTER — CLINICAL SUPPORT NO REQUIREMENTS (OUTPATIENT)
Dept: CARDIOLOGY | Facility: CLINIC | Age: 54
End: 2019-10-23

## 2019-10-23 DIAGNOSIS — Z95.818 STATUS POST PLACEMENT OF IMPLANTABLE LOOP RECORDER: ICD-10-CM

## 2019-10-23 DIAGNOSIS — R55 SYNCOPE AND COLLAPSE: Primary | ICD-10-CM

## 2019-10-24 ENCOUNTER — CLINICAL SUPPORT NO REQUIREMENTS (OUTPATIENT)
Dept: CARDIOLOGY | Facility: CLINIC | Age: 54
End: 2019-10-24

## 2019-10-24 DIAGNOSIS — R55 SYNCOPE AND COLLAPSE: Primary | ICD-10-CM

## 2019-10-24 DIAGNOSIS — Z95.818 STATUS POST PLACEMENT OF IMPLANTABLE LOOP RECORDER: ICD-10-CM

## 2019-10-25 RX ORDER — PROMETHAZINE HYDROCHLORIDE 25 MG/1
25 TABLET ORAL EVERY 6 HOURS PRN
Qty: 30 TABLET | Refills: 0 | Status: SHIPPED | OUTPATIENT
Start: 2019-10-25 | End: 2019-11-22 | Stop reason: SDUPTHER

## 2019-11-01 ENCOUNTER — TELEPHONE (OUTPATIENT)
Dept: CARDIOLOGY | Facility: CLINIC | Age: 54
End: 2019-11-01

## 2019-11-01 ENCOUNTER — TELEPHONE (OUTPATIENT)
Dept: NEUROLOGY | Facility: CLINIC | Age: 54
End: 2019-11-01

## 2019-11-01 NOTE — TELEPHONE ENCOUNTER
Pt called to ask about rheumatology referral. At last ofc visit he recommended to see rheumatology. Can you please put in a referral for the [facility]?

## 2019-11-01 NOTE — TELEPHONE ENCOUNTER
Patient calling because Dr. Steele had referred her to Rheumatology, Dr. Quijano.  Patient called back to let us know Dr. Quijano was retiring. Ivette advised apt with anyone in group. This was Aug 2019.    She is now saying they are not accepting new patients.... Needing new referral somewhere else. She said she has been having really bad, hard chest pains that last 45 minutes. I advised ER. Patient is on medicaid and has to pay everything she goes to ER.  I let her know Ivette is out of the office, and once again advised ER.

## 2019-11-01 NOTE — TELEPHONE ENCOUNTER
Pt is asking for medication for her migraine headaches. She is still on percocet 10/325 1TID from pain mgmt and has tried OTC tylenol and ibuprofen. Next F/U 12/8/19

## 2019-11-03 RX ORDER — METHOCARBAMOL 750 MG/1
TABLET, FILM COATED ORAL
Qty: 90 TABLET | Refills: 1 | Status: SHIPPED | OUTPATIENT
Start: 2019-11-03 | End: 2019-12-26

## 2019-11-05 NOTE — TELEPHONE ENCOUNTER
Note last summer indicated since her last visit 3/18 she had three no shows and cx her July 19 appt,    So no medication penidng the fu visit scheduled December

## 2019-11-06 RX ORDER — PREDNISONE 10 MG/1
TABLET ORAL
Qty: 90 TABLET | Refills: 0 | Status: SHIPPED | OUTPATIENT
Start: 2019-11-06 | End: 2020-02-10

## 2019-11-06 RX ORDER — MIDODRINE HYDROCHLORIDE 2.5 MG/1
1 TABLET ORAL DAILY
COMMUNITY
Start: 2018-04-03 | End: 2020-06-18

## 2019-11-06 RX ORDER — HYDROXYZINE PAMOATE 50 MG/1
50 CAPSULE ORAL 2 TIMES DAILY PRN
Refills: 2 | COMMUNITY
Start: 2019-10-09 | End: 2019-11-22

## 2019-11-06 RX ORDER — FLUTICASONE PROPIONATE 0.05 %
1 CREAM (GRAM) TOPICAL DAILY
Refills: 0 | COMMUNITY
Start: 2019-10-04 | End: 2020-12-21

## 2019-11-06 RX ORDER — TRAZODONE HYDROCHLORIDE 150 MG/1
300 TABLET ORAL NIGHTLY PRN
Refills: 2 | COMMUNITY
Start: 2019-10-09 | End: 2019-12-11 | Stop reason: SDUPTHER

## 2019-11-11 ENCOUNTER — CLINICAL SUPPORT NO REQUIREMENTS (OUTPATIENT)
Dept: CARDIOLOGY | Facility: CLINIC | Age: 54
End: 2019-11-11

## 2019-11-11 DIAGNOSIS — R55 SYNCOPE AND COLLAPSE: Primary | ICD-10-CM

## 2019-11-11 DIAGNOSIS — Z95.818 STATUS POST PLACEMENT OF IMPLANTABLE LOOP RECORDER: ICD-10-CM

## 2019-11-14 ENCOUNTER — CLINICAL SUPPORT NO REQUIREMENTS (OUTPATIENT)
Dept: CARDIOLOGY | Facility: CLINIC | Age: 54
End: 2019-11-14

## 2019-11-14 DIAGNOSIS — R55 SYNCOPE AND COLLAPSE: Primary | ICD-10-CM

## 2019-11-14 DIAGNOSIS — R55 SYNCOPE, UNSPECIFIED SYNCOPE TYPE: ICD-10-CM

## 2019-11-14 DIAGNOSIS — Z95.818 STATUS POST PLACEMENT OF IMPLANTABLE LOOP RECORDER: ICD-10-CM

## 2019-11-15 ENCOUNTER — CLINICAL SUPPORT NO REQUIREMENTS (OUTPATIENT)
Dept: CARDIOLOGY | Facility: CLINIC | Age: 54
End: 2019-11-15

## 2019-11-15 DIAGNOSIS — Z95.818 STATUS POST PLACEMENT OF IMPLANTABLE LOOP RECORDER: ICD-10-CM

## 2019-11-15 DIAGNOSIS — R55 SYNCOPE AND COLLAPSE: Primary | ICD-10-CM

## 2019-11-18 ENCOUNTER — TELEPHONE (OUTPATIENT)
Dept: FAMILY MEDICINE CLINIC | Facility: CLINIC | Age: 54
End: 2019-11-18

## 2019-11-18 ENCOUNTER — CLINICAL SUPPORT NO REQUIREMENTS (OUTPATIENT)
Dept: CARDIOLOGY | Facility: CLINIC | Age: 54
End: 2019-11-18

## 2019-11-18 DIAGNOSIS — R55 SYNCOPE AND COLLAPSE: Primary | ICD-10-CM

## 2019-11-18 DIAGNOSIS — Z95.818 STATUS POST PLACEMENT OF IMPLANTABLE LOOP RECORDER: ICD-10-CM

## 2019-11-19 DIAGNOSIS — F41.9 ANXIETY: Primary | ICD-10-CM

## 2019-11-19 NOTE — TELEPHONE ENCOUNTER
PATIENT IS SAYING NO ONE IN OFFICE IS TAKING NEW PATIENTS. IS THERE ANOTHER GROUP WE CAN REFER HER TOO?

## 2019-11-20 ENCOUNTER — CLINICAL SUPPORT NO REQUIREMENTS (OUTPATIENT)
Dept: CARDIOLOGY | Facility: CLINIC | Age: 54
End: 2019-11-20

## 2019-11-20 DIAGNOSIS — Z95.818 STATUS POST PLACEMENT OF IMPLANTABLE LOOP RECORDER: ICD-10-CM

## 2019-11-20 DIAGNOSIS — R55 SYNCOPE AND COLLAPSE: Primary | ICD-10-CM

## 2019-11-20 NOTE — TELEPHONE ENCOUNTER
Pt called back I explained that she needs to see her PCP for eval and see if they will refer her to RA doctor     Pt said she understood     Completed.

## 2019-11-20 NOTE — TELEPHONE ENCOUNTER
Lmtc, Referral was made based upon pt telling Dr. Steele that her foot doctor dx her with RA. He recommends that she follow up with her PCP at this time for eval and referral if they recommend it. Cardio does not handle RA.

## 2019-11-22 ENCOUNTER — OFFICE VISIT (OUTPATIENT)
Dept: FAMILY MEDICINE CLINIC | Facility: CLINIC | Age: 54
End: 2019-11-22

## 2019-11-22 ENCOUNTER — CLINICAL SUPPORT NO REQUIREMENTS (OUTPATIENT)
Dept: CARDIOLOGY | Facility: CLINIC | Age: 54
End: 2019-11-22

## 2019-11-22 VITALS
HEART RATE: 88 BPM | HEIGHT: 68 IN | TEMPERATURE: 97.1 F | DIASTOLIC BLOOD PRESSURE: 68 MMHG | BODY MASS INDEX: 19.55 KG/M2 | OXYGEN SATURATION: 94 % | SYSTOLIC BLOOD PRESSURE: 100 MMHG | WEIGHT: 129 LBS

## 2019-11-22 DIAGNOSIS — R25.2 LEG CRAMPS: ICD-10-CM

## 2019-11-22 DIAGNOSIS — R55 SYNCOPE AND COLLAPSE: Primary | ICD-10-CM

## 2019-11-22 DIAGNOSIS — R53.83 FATIGUE, UNSPECIFIED TYPE: ICD-10-CM

## 2019-11-22 DIAGNOSIS — Z95.818 STATUS POST PLACEMENT OF IMPLANTABLE LOOP RECORDER: ICD-10-CM

## 2019-11-22 DIAGNOSIS — E11.65 TYPE 2 DIABETES MELLITUS WITH HYPERGLYCEMIA, WITHOUT LONG-TERM CURRENT USE OF INSULIN (HCC): Primary | ICD-10-CM

## 2019-11-22 DIAGNOSIS — R30.0 BURNING WITH URINATION: ICD-10-CM

## 2019-11-22 DIAGNOSIS — I10 ESSENTIAL HYPERTENSION: ICD-10-CM

## 2019-11-22 DIAGNOSIS — F41.1 GENERALIZED ANXIETY DISORDER: ICD-10-CM

## 2019-11-22 PROBLEM — F41.8 MIXED ANXIETY DEPRESSIVE DISORDER: Status: ACTIVE | Noted: 2018-08-23

## 2019-11-22 LAB
BASOPHILS # BLD AUTO: 0.06 10*3/MM3 (ref 0–0.2)
BASOPHILS NFR BLD AUTO: 0.6 % (ref 0–1.5)
BILIRUB BLD-MCNC: ABNORMAL MG/DL
CLARITY, POC: CLEAR
COLOR UR: ABNORMAL
DEPRECATED RDW RBC AUTO: 44.8 FL (ref 37–54)
EOSINOPHIL # BLD AUTO: 0.01 10*3/MM3 (ref 0–0.4)
EOSINOPHIL NFR BLD AUTO: 0.1 % (ref 0.3–6.2)
ERYTHROCYTE [DISTWIDTH] IN BLOOD BY AUTOMATED COUNT: 13.1 % (ref 12.3–15.4)
GLUCOSE UR STRIP-MCNC: NEGATIVE MG/DL
HBA1C MFR BLD: 5.4 % (ref 3.5–5.6)
HCT VFR BLD AUTO: 43.4 % (ref 34–46.6)
HGB BLD-MCNC: 14 G/DL (ref 12–15.9)
IMM GRANULOCYTES # BLD AUTO: 0.02 10*3/MM3 (ref 0–0.05)
IMM GRANULOCYTES NFR BLD AUTO: 0.2 % (ref 0–0.5)
KETONES UR QL: NEGATIVE
LEUKOCYTE EST, POC: NEGATIVE
LYMPHOCYTES # BLD AUTO: 2.39 10*3/MM3 (ref 0.7–3.1)
LYMPHOCYTES NFR BLD AUTO: 23.4 % (ref 19.6–45.3)
MCH RBC QN AUTO: 29.5 PG (ref 26.6–33)
MCHC RBC AUTO-ENTMCNC: 32.3 G/DL (ref 31.5–35.7)
MCV RBC AUTO: 91.4 FL (ref 79–97)
MONOCYTES # BLD AUTO: 0.56 10*3/MM3 (ref 0.1–0.9)
MONOCYTES NFR BLD AUTO: 5.5 % (ref 5–12)
NEUTROPHILS # BLD AUTO: 7.19 10*3/MM3 (ref 1.7–7)
NEUTROPHILS NFR BLD AUTO: 70.2 % (ref 42.7–76)
NITRITE UR-MCNC: NEGATIVE MG/ML
NRBC BLD AUTO-RTO: 0 /100 WBC (ref 0–0.2)
PH UR: 5.5 [PH] (ref 5–8)
PLATELET # BLD AUTO: 384 10*3/MM3 (ref 140–450)
PMV BLD AUTO: 8.6 FL (ref 6–12)
PROT UR STRIP-MCNC: NEGATIVE MG/DL
RBC # BLD AUTO: 4.75 10*6/MM3 (ref 3.77–5.28)
RBC # UR STRIP: NEGATIVE /UL
SP GR UR: 1.03 (ref 1–1.03)
UROBILINOGEN UR QL: NORMAL
WBC NRBC COR # BLD: 10.23 10*3/MM3 (ref 3.4–10.8)

## 2019-11-22 PROCEDURE — 99214 OFFICE O/P EST MOD 30 MIN: CPT | Performed by: FAMILY MEDICINE

## 2019-11-22 PROCEDURE — 84443 ASSAY THYROID STIM HORMONE: CPT | Performed by: FAMILY MEDICINE

## 2019-11-22 PROCEDURE — 85025 COMPLETE CBC W/AUTO DIFF WBC: CPT | Performed by: FAMILY MEDICINE

## 2019-11-22 PROCEDURE — 82607 VITAMIN B-12: CPT | Performed by: FAMILY MEDICINE

## 2019-11-22 PROCEDURE — 80053 COMPREHEN METABOLIC PANEL: CPT | Performed by: FAMILY MEDICINE

## 2019-11-22 PROCEDURE — 83036 HEMOGLOBIN GLYCOSYLATED A1C: CPT | Performed by: FAMILY MEDICINE

## 2019-11-22 PROCEDURE — 36415 COLL VENOUS BLD VENIPUNCTURE: CPT | Performed by: FAMILY MEDICINE

## 2019-11-22 PROCEDURE — 82306 VITAMIN D 25 HYDROXY: CPT | Performed by: FAMILY MEDICINE

## 2019-11-23 LAB
25(OH)D3 SERPL-MCNC: 8.6 NG/ML (ref 30–100)
ALBUMIN SERPL-MCNC: 4 G/DL (ref 3.5–5.2)
ALBUMIN/GLOB SERPL: 1.3 G/DL
ALP SERPL-CCNC: 105 U/L (ref 39–117)
ALT SERPL W P-5'-P-CCNC: 17 U/L (ref 1–33)
ANION GAP SERPL CALCULATED.3IONS-SCNC: 11.2 MMOL/L (ref 5–15)
AST SERPL-CCNC: 12 U/L (ref 1–32)
BILIRUB SERPL-MCNC: <0.2 MG/DL (ref 0.2–1.2)
BUN BLD-MCNC: 14 MG/DL (ref 6–20)
BUN/CREAT SERPL: 16.7 (ref 7–25)
CALCIUM SPEC-SCNC: 9.1 MG/DL (ref 8.6–10.5)
CHLORIDE SERPL-SCNC: 106 MMOL/L (ref 98–107)
CO2 SERPL-SCNC: 22.8 MMOL/L (ref 22–29)
CREAT BLD-MCNC: 0.84 MG/DL (ref 0.57–1)
GFR SERPL CREATININE-BSD FRML MDRD: 71 ML/MIN/1.73
GLOBULIN UR ELPH-MCNC: 3.2 GM/DL
GLUCOSE BLD-MCNC: 105 MG/DL (ref 65–99)
POTASSIUM BLD-SCNC: 4.5 MMOL/L (ref 3.5–5.2)
PROT SERPL-MCNC: 7.2 G/DL (ref 6–8.5)
SODIUM BLD-SCNC: 140 MMOL/L (ref 136–145)
TSH SERPL DL<=0.05 MIU/L-ACNC: 0.47 UIU/ML (ref 0.27–4.2)
VIT B12 BLD-MCNC: 775 PG/ML (ref 211–946)

## 2019-11-25 ENCOUNTER — CLINICAL SUPPORT NO REQUIREMENTS (OUTPATIENT)
Dept: CARDIOLOGY | Facility: CLINIC | Age: 54
End: 2019-11-25

## 2019-11-25 DIAGNOSIS — R55 SYNCOPE AND COLLAPSE: Primary | ICD-10-CM

## 2019-11-25 DIAGNOSIS — Z95.818 STATUS POST PLACEMENT OF IMPLANTABLE LOOP RECORDER: ICD-10-CM

## 2019-11-25 DIAGNOSIS — Z95.818 STATUS POST PLACEMENT OF IMPLANTABLE LOOP RECORDER: Primary | ICD-10-CM

## 2019-11-25 DIAGNOSIS — R55 SYNCOPE AND COLLAPSE: ICD-10-CM

## 2019-11-25 PROCEDURE — 93298 REM INTERROG DEV EVAL SCRMS: CPT | Performed by: INTERNAL MEDICINE

## 2019-11-25 PROCEDURE — 93299 PR REM INTERROG ICPMS/SCRMS <30 D TECH REVIEW: CPT | Performed by: INTERNAL MEDICINE

## 2019-11-25 RX ORDER — PROMETHAZINE HYDROCHLORIDE 25 MG/1
TABLET ORAL
Qty: 30 TABLET | Refills: 0 | Status: SHIPPED | OUTPATIENT
Start: 2019-11-25 | End: 2019-12-31

## 2019-11-25 RX ORDER — HYDROXYZINE HYDROCHLORIDE 25 MG/1
TABLET, FILM COATED ORAL
Qty: 90 TABLET | Refills: 1 | Status: SHIPPED | OUTPATIENT
Start: 2019-11-25 | End: 2019-12-12 | Stop reason: SDUPTHER

## 2019-11-25 RX ORDER — ALBUTEROL SULFATE 90 UG/1
AEROSOL, METERED RESPIRATORY (INHALATION)
Qty: 8.5 G | Refills: 3 | Status: SHIPPED | OUTPATIENT
Start: 2019-11-25 | End: 2020-04-01

## 2019-11-27 RX ORDER — BLOOD-GLUCOSE METER
KIT MISCELLANEOUS
Qty: 90 EACH | Refills: 3 | Status: SHIPPED | OUTPATIENT
Start: 2019-11-27 | End: 2021-07-19

## 2019-12-01 PROBLEM — R25.2 LEG CRAMPS: Status: ACTIVE | Noted: 2019-12-01

## 2019-12-01 PROBLEM — R53.83 FATIGUE: Status: ACTIVE | Noted: 2019-12-01

## 2019-12-01 PROBLEM — E11.65 TYPE 2 DIABETES MELLITUS WITH HYPERGLYCEMIA, WITHOUT LONG-TERM CURRENT USE OF INSULIN (HCC): Status: ACTIVE | Noted: 2019-12-01

## 2019-12-02 RX ORDER — ERGOCALCIFEROL 1.25 MG/1
50000 CAPSULE ORAL WEEKLY
Qty: 12 CAPSULE | Refills: 4 | Status: SHIPPED | OUTPATIENT
Start: 2019-12-02 | End: 2020-12-21

## 2019-12-02 NOTE — ASSESSMENT & PLAN NOTE
Psychological condition is improving with treatment.  Regular aerobic exercise.  Psychological condition  will be reassessed in 3 months.

## 2019-12-02 NOTE — ASSESSMENT & PLAN NOTE
Diabetes is improving with treatment.   Continue current treatment regimen.  Reminded to bring in blood sugar diary at next visit.  Regular aerobic exercise.  Discussed ways to avoid symptomatic hypoglycemia.  Discussed sick day management.  Discussed foot care.  Reminded to get yearly retinal exam.  Diabetes will be reassessed in 3 months.

## 2019-12-05 RX ORDER — PROMETHAZINE HYDROCHLORIDE 25 MG/1
TABLET ORAL
Qty: 30 TABLET | Refills: 0 | OUTPATIENT
Start: 2019-12-05

## 2019-12-12 RX ORDER — TRAZODONE HYDROCHLORIDE 150 MG/1
300 TABLET ORAL NIGHTLY
Qty: 60 TABLET | Refills: 1 | Status: SHIPPED | OUTPATIENT
Start: 2019-12-12 | End: 2019-12-18 | Stop reason: SDUPTHER

## 2019-12-12 RX ORDER — SERTRALINE HYDROCHLORIDE 25 MG/1
25 TABLET, FILM COATED ORAL
Qty: 30 TABLET | Refills: 1 | Status: SHIPPED | OUTPATIENT
Start: 2019-12-12 | End: 2019-12-18 | Stop reason: SDUPTHER

## 2019-12-12 RX ORDER — ALPRAZOLAM 1 MG/1
1 TABLET ORAL 3 TIMES DAILY
Qty: 90 TABLET | Refills: 0 | Status: CANCELLED | OUTPATIENT
Start: 2019-12-12

## 2019-12-12 RX ORDER — HYDROXYZINE HYDROCHLORIDE 25 MG/1
25 TABLET, FILM COATED ORAL 3 TIMES DAILY
Qty: 90 TABLET | Refills: 1 | Status: SHIPPED | OUTPATIENT
Start: 2019-12-12 | End: 2019-12-18 | Stop reason: SDUPTHER

## 2019-12-12 RX ORDER — LAMOTRIGINE 200 MG/1
200 TABLET ORAL
Qty: 30 TABLET | Refills: 1 | Status: CANCELLED | OUTPATIENT
Start: 2019-12-12

## 2019-12-12 RX ORDER — OLANZAPINE 2.5 MG/1
2.5 TABLET ORAL
Qty: 30 TABLET | Refills: 1 | Status: CANCELLED | OUTPATIENT
Start: 2019-12-12

## 2019-12-13 ENCOUNTER — TELEPHONE (OUTPATIENT)
Dept: FAMILY MEDICINE CLINIC | Facility: CLINIC | Age: 54
End: 2019-12-13

## 2019-12-13 RX ORDER — HYDROXYZINE PAMOATE 50 MG/1
CAPSULE ORAL
Qty: 60 CAPSULE | OUTPATIENT
Start: 2019-12-13

## 2019-12-13 RX ORDER — LAMOTRIGINE 200 MG/1
TABLET ORAL
Qty: 30 TABLET | OUTPATIENT
Start: 2019-12-13

## 2019-12-13 RX ORDER — OLANZAPINE 2.5 MG/1
TABLET ORAL
Qty: 30 TABLET | OUTPATIENT
Start: 2019-12-13

## 2019-12-13 RX ORDER — ALPRAZOLAM 1 MG/1
TABLET ORAL
Qty: 90 TABLET | OUTPATIENT
Start: 2019-12-13

## 2019-12-18 ENCOUNTER — OFFICE VISIT (OUTPATIENT)
Dept: PSYCHIATRY | Facility: CLINIC | Age: 54
End: 2019-12-18

## 2019-12-18 ENCOUNTER — OFFICE VISIT (OUTPATIENT)
Dept: NEUROLOGY | Facility: CLINIC | Age: 54
End: 2019-12-18

## 2019-12-18 VITALS
WEIGHT: 130.2 LBS | BODY MASS INDEX: 20.93 KG/M2 | SYSTOLIC BLOOD PRESSURE: 137 MMHG | DIASTOLIC BLOOD PRESSURE: 85 MMHG | HEIGHT: 66 IN | HEART RATE: 88 BPM

## 2019-12-18 DIAGNOSIS — G40.909 SEIZURE DISORDER (HCC): ICD-10-CM

## 2019-12-18 DIAGNOSIS — F41.1 GENERALIZED ANXIETY DISORDER: ICD-10-CM

## 2019-12-18 DIAGNOSIS — F43.12 CHRONIC POSTTRAUMATIC STRESS DISORDER: ICD-10-CM

## 2019-12-18 DIAGNOSIS — F33.2 SEVERE RECURRENT MAJOR DEPRESSION WITHOUT PSYCHOTIC FEATURES (HCC): Primary | ICD-10-CM

## 2019-12-18 DIAGNOSIS — F41.1 GENERALIZED ANXIETY DISORDER: Primary | ICD-10-CM

## 2019-12-18 DIAGNOSIS — R51.9 NEW ONSET HEADACHE: ICD-10-CM

## 2019-12-18 PROBLEM — G43.909 MIGRAINES: Status: ACTIVE | Noted: 2019-12-18

## 2019-12-18 PROCEDURE — 90792 PSYCH DIAG EVAL W/MED SRVCS: CPT | Performed by: PSYCHIATRY & NEUROLOGY

## 2019-12-18 PROCEDURE — 99214 OFFICE O/P EST MOD 30 MIN: CPT | Performed by: PSYCHIATRY & NEUROLOGY

## 2019-12-18 RX ORDER — ALPRAZOLAM 1 MG/1
1 TABLET ORAL 3 TIMES DAILY
Qty: 90 TABLET | Refills: 3 | Status: SHIPPED | OUTPATIENT
Start: 2019-12-18 | End: 2020-01-14

## 2019-12-18 RX ORDER — TOPIRAMATE 100 MG/1
100 TABLET, FILM COATED ORAL 2 TIMES DAILY
Qty: 180 TABLET | Refills: 3 | Status: SHIPPED | OUTPATIENT
Start: 2019-12-18 | End: 2020-01-14

## 2019-12-18 RX ORDER — SERTRALINE HYDROCHLORIDE 25 MG/1
25 TABLET, FILM COATED ORAL
Qty: 30 TABLET | Refills: 2 | Status: SHIPPED | OUTPATIENT
Start: 2019-12-18 | End: 2020-04-01

## 2019-12-18 RX ORDER — LAMOTRIGINE 200 MG/1
200 TABLET ORAL
Qty: 90 TABLET | Refills: 3 | Status: SHIPPED | OUTPATIENT
Start: 2019-12-18 | End: 2019-12-18

## 2019-12-18 RX ORDER — HYDROXYZINE HYDROCHLORIDE 25 MG/1
25 TABLET, FILM COATED ORAL 3 TIMES DAILY
Qty: 90 TABLET | Refills: 2 | Status: SHIPPED | OUTPATIENT
Start: 2019-12-18 | End: 2020-01-31 | Stop reason: SDUPTHER

## 2019-12-18 RX ORDER — TRAZODONE HYDROCHLORIDE 150 MG/1
300 TABLET ORAL NIGHTLY
Qty: 60 TABLET | Refills: 2 | Status: SHIPPED | OUTPATIENT
Start: 2019-12-18 | End: 2020-04-01

## 2019-12-18 RX ORDER — OLANZAPINE 2.5 MG/1
2.5 TABLET ORAL
Qty: 30 TABLET | Refills: 2 | Status: SHIPPED | OUTPATIENT
Start: 2019-12-18 | End: 2020-03-06

## 2019-12-18 NOTE — PROGRESS NOTES
"Subjective:     Patient ID: Melvi Rayo is a 54 y.o. female.    Chief complaint: Complex Seizure Disorder,     Yearly f/u for complex seizures, doing well with current medications, Lamictal 200 mg per day.  Last seizure 4 months ago.  Witnessed by brother, \"goes into a back bend\" Feels drained for about 1/2 hour.     Migraines, not working for her headaches currently takes ibuprofen and doesn't help May have about 6 days without lizama per month. . Patient woke up this morning with a headache nearly everyday she's with a migraine. For past two months has new pain on right side of head Patient been having a sharp pain in the right eye, radiating to back of head with  a feeling like water running down her face. This has been associated with involuntary myoclonic jerk of right arm.    For chronic back pain she takes  patient having to take her percocet 4 times a day    Patient wants discuss getting a refill on xanax due to no show appointment with Dr. Smith.    Patient pcp was able to give her anti depressant and wanted her to discuss getting her xanax rx until her appointment with behavior health apt. 3/12/20.     The following portions of the patient's history were reviewed and updated as appropriate: allergies, current medications, past family history, past medical history, past social history, past surgical history and problem list.    Family History   Problem Relation Age of Onset   • Heart disease Mother    • Stroke Mother    • Hyperlipidemia Mother    • Hypertension Mother    • Heart disease Father    • Stroke Father    • Hypertension Father    • Hyperlipidemia Father    • Heart disease Other    • COPD Other    • Cancer Other    • Diabetes Other    • Hypertension Other    • Hyperlipidemia Other    • Stroke Other        Past Medical History:   Diagnosis Date   • Anxiety    • Asthma    • Breast cancer (CMS/[facility])    • Chest tightness    • COPD (chronic obstructive pulmonary disease) (CMS/HCC)    • Degenerative " disorder of bone    • Foot pain     S/P multiple foot surguries.   • GERD (gastroesophageal reflux disease)    • Lesion of eye     Lesion behind eye, cancer associated retinopathopthy. Documented from [facility].    • Low back pain    • Migraines    • Neck nodule 2010   • Pancreatitis    • RA (rheumatoid arthritis) (CMS/[facility])    • Seizure disorder (CMS/[facility])     Generalized seizure, possible partial complex.   • Skin cancer     Age 18.   • Type 2 diabetes mellitus (CMS/[facility])        Social History     Socioeconomic History   • Marital status:      Spouse name: Not on file   • Number of children: Not on file   • Years of education: Not on file   • Highest education level: Not on file   Tobacco Use   • Smoking status: Current Every Day Smoker   • Smokeless tobacco: Never Used   Substance and Sexual Activity   • Alcohol use: No     Frequency: Never   • Drug use: No   • Sexual activity: Defer         Current Outpatient Medications:   •  ALBUTEROL SULFATE  (90 Base) MCG/ACT inhaler, INHALE ONE PUFF by mouth EVERY 4 TO 6 HOURS, Disp: 8.5 g, Rfl: 3  •  ALPRAZolam (XANAX) 1 MG tablet, Take 1 tablet by mouth 3 (Three) Times a Day., Disp: 90 tablet, Rfl: 3  •  Diclofenac Sodium 1.5 % solution, 40 drops by Other route Daily., Disp: , Rfl: 0  •  docusate sodium (COLACE) 50 MG capsule, Take 1 capsule by mouth 2 (Two) Times a Day., Disp: 30 capsule, Rfl: 3  •  fluticasone (CUTIVATE) 0.05 % cream, Apply 1 dose topically Daily., Disp: , Rfl: 0  •  FREESTYLE LITE test strip, test TWICE DAILY, Disp: 90 each, Rfl: 3  •  gabapentin (NEURONTIN) 300 MG capsule, Take 1 capsule by mouth 4 (Four) Times a Day., Disp: , Rfl: 0  •  hydrOXYzine (ATARAX) 25 MG tablet, Take 1 tablet by mouth 3 (Three) Times a Day., Disp: 90 tablet, Rfl: 1  •  lamoTRIgine (LaMICtal) 200 MG tablet, Take 200 mg by mouth every night at bedtime., Disp: , Rfl: 1  •  Lancets (FREESTYLE) lancets, 1 each by Other route 2 (Two) Times a Day., Disp: , Rfl: 3  •   lidocaine (XYLOCAINE) 5 % ointment, Apply 1 tube topically to the appropriate area as directed 4 (Four) Times a Day., Disp: , Rfl: 0  •  metFORMIN (GLUCOPHAGE) 500 MG tablet, TAKE ONE TABLET BY MOUTH THREE TIMES DAILY, Disp: 90 tablet, Rfl: 3  •  methocarbamol (ROBAXIN) 750 MG tablet, TAKE ONE TABLET BY MOUTH THREE TIMES DAILY, Disp: 90 tablet, Rfl: 1  •  metoprolol succinate XL (TOPROL-XL) 25 MG 24 hr tablet, Take 25 mg by mouth Daily., Disp: , Rfl: 5  •  metoprolol tartrate (LOPRESSOR) 25 MG tablet, Take 1 tablet by mouth Daily., Disp: , Rfl:   •  midodrine (PROAMATINE) 2.5 MG tablet, Take 1 tablet by mouth Daily., Disp: , Rfl:   •  nitrofurantoin, macrocrystal-monohydrate, (MACROBID) 100 MG capsule, Take 1 capsule by mouth 2 (Two) Times a Day., Disp: , Rfl: 0  •  OLANZapine (zyPREXA) 2.5 MG tablet, Take 2.5 mg by mouth every night at bedtime., Disp: , Rfl: 1  •  Omeprazole 20 MG tablet delayed-release, TAKE ONE TABLET BY MOUTH TWICE DAILY, Disp: 30 each, Rfl: 3  •  oxyCODONE-acetaminophen (PERCOCET)  MG per tablet, Take 1 tablet by mouth Every 6 (Six) Hours As Needed., Disp: , Rfl: 0  •  predniSONE (DELTASONE) 10 MG tablet, TAKE ONE TABLET BY MOUTH EVERY DAY, Disp: 90 tablet, Rfl: 0  •  promethazine (PHENERGAN) 25 MG tablet, TAKE ONE TABLET BY MOUTH EVERY 6 HOURS AS NEEDED FOR NAUSEA, Disp: 30 tablet, Rfl: 0  •  sertraline (ZOLOFT) 25 MG tablet, Take 1 tablet by mouth every night at bedtime., Disp: 30 tablet, Rfl: 1  •  SPIRIVA RESPIMAT 2.5 MCG/ACT aerosol solution inhaler, TAKE TWO PUFFS BY MOUTH EVERY DAY, Disp: 4 g, Rfl: 3  •  topiramate (TOPAMAX) 100 MG tablet, TAKE ONE TABLET BY MOUTH TWICE DAILY, Disp: 60 tablet, Rfl: 3  •  traZODone (DESYREL) 150 MG tablet, Take 2 tablets by mouth Every Night., Disp: 60 tablet, Rfl: 1  •  vitamin D (ERGOCALCIFEROL) 1.25 MG (17588 UT) capsule capsule, Take 1 capsule by mouth 1 (One) Time Per Week., Disp: 12 capsule, Rfl: 4    Review of Systems   Constitutional:  Positive for fatigue. Negative for appetite change.   HENT: Negative for sinus pressure and sinus pain.    Eyes: Positive for visual disturbance. Negative for pain and itching.   Respiratory: Positive for shortness of breath. Negative for cough.    Gastrointestinal: Negative for constipation and diarrhea.   Endocrine: Negative for cold intolerance and heat intolerance.   Genitourinary: Negative for difficulty urinating and frequency.   Musculoskeletal: Positive for back pain. Negative for neck pain.   Allergic/Immunologic: Negative for environmental allergies.   Neurological: Positive for headaches. Negative for dizziness, tremors, seizures, syncope, facial asymmetry, speech difficulty, weakness, light-headedness and numbness.   Psychiatric/Behavioral: Negative for agitation and confusion.      I have reviewed ROS completed by medical assistant.     Objective:    Physical Exam   Constitutional: She is oriented to person, place, and time. She appears well-developed and well-nourished.   Neurological: She is alert and oriented to person, place, and time.   Psychiatric: She has a normal mood and affect. Her behavior is normal.   Vitals reviewed.      Assessment/Plan:    Melvi was seen today for seizures and migraine.    Diagnoses and all orders for this visit:    Generalized anxiety disorder  -     ALPRAZolam (XANAX) 1 MG tablet; Take 1 tablet by mouth 3 (Three) Times a Day.    Seizure disorder (CMS/[facility])  -     EEG (Hospital Performed); Future  -     MRI Brain With & Without Contrast; Future    New onset headache  -     MRI Brain With & Without Contrast; Future      Pt has history of seizure do, continue Lamictal  Pt has anxiety do,--will refill xanax through her appt with behavioral health, will likely have seizures if runs out of this mediation  New HA on the right side of the head, will obtain mri brain and EEG  Migraine, continue current medications, topamax    This document has been electronically signed by Mateus  Seipel, MD on December 18, 2019 9:11 AM

## 2019-12-18 NOTE — PATIENT INSTRUCTIONS
Living With Anxiety    After being diagnosed with an anxiety disorder, you may be relieved to know why you have felt or behaved a certain way. It is natural to also feel overwhelmed about the treatment ahead and what it will mean for your life. With care and support, you can manage this condition and recover from it.  How to cope with anxiety  Dealing with stress  Stress is your body’s reaction to life changes and events, both good and bad. Stress can last just a few hours or it can be ongoing. Stress can play a major role in anxiety, so it is important to learn both how to cope with stress and how to think about it differently.  Talk with your health care provider or a counselor to learn more about stress reduction. He or she may suggest some stress reduction techniques, such as:  · Music therapy. This can include creating or listening to music that you enjoy and that inspires you.  · Mindfulness-based meditation. This involves being aware of your normal breaths, rather than trying to control your breathing. It can be done while sitting or walking.  · Centering prayer. This is a kind of meditation that involves focusing on a word, phrase, or sacred image that is meaningful to you and that brings you peace.  · Deep breathing. To do this, expand your stomach and inhale slowly through your nose. Hold your breath for 3-5 seconds. Then exhale slowly, allowing your stomach muscles to relax.  · Self-talk. This is a skill where you identify thought patterns that lead to anxiety reactions and correct those thoughts.  · Muscle relaxation. This involves tensing muscles then relaxing them.  Choose a stress reduction technique that fits your lifestyle and personality. Stress reduction techniques take time and practice. Set aside 5-15 minutes a day to do them. Therapists can offer training in these techniques. The training may be covered by some insurance plans. Other things you can do to manage stress include:  · Keeping a  stress diary. This can help you learn what triggers your stress and ways to control your response.  · Thinking about how you respond to certain situations. You may not be able to control everything, but you can control your reaction.  · Making time for activities that help you relax, and not feeling guilty about spending your time in this way.  Therapy combined with coping and stress-reduction skills provides the best chance for successful treatment.  Medicines  Medicines can help ease symptoms. Medicines for anxiety include:  · Anti-anxiety drugs.  · Antidepressants.  · Beta-blockers.  Medicines may be used as the main treatment for anxiety disorder, along with therapy, or if other treatments are not working. Medicines should be prescribed by a health care provider.  Relationships  Relationships can play a big part in helping you recover. Try to spend more time connecting with trusted friends and family members. Consider going to couples counseling, taking family education classes, or going to family therapy. Therapy can help you and others better understand the condition.  How to recognize changes in your condition  Everyone has a different response to treatment for anxiety. Recovery from anxiety happens when symptoms decrease and stop interfering with your daily activities at home or work. This may mean that you will start to:  · Have better concentration and focus.  · Sleep better.  · Be less irritable.  · Have more energy.  · Have improved memory.  It is important to recognize when your condition is getting worse. Contact your health care provider if your symptoms interfere with home or work and you do not feel like your condition is improving.  Where to find help and support:  You can get help and support from these sources:  · Self-help groups.  · Online and community organizations.  · A trusted spiritual leader.  · Couples counseling.  · Family education classes.  · Family therapy.  Follow these instructions  at home:  · Eat a healthy diet that includes plenty of vegetables, fruits, whole grains, low-fat dairy products, and lean protein. Do not eat a lot of foods that are high in solid fats, added sugars, or salt.  · Exercise. Most adults should do the following:  ? Exercise for at least 150 minutes each week. The exercise should increase your heart rate and make you sweat (moderate-intensity exercise).  ? Strengthening exercises at least twice a week.  · Cut down on caffeine, tobacco, alcohol, and other potentially harmful substances.  · Get the right amount and quality of sleep. Most adults need 7-9 hours of sleep each night.  · Make choices that simplify your life.  · Take over-the-counter and prescription medicines only as told by your health care provider.  · Avoid caffeine, alcohol, and certain over-the-counter cold medicines. These may make you feel worse. Ask your pharmacist which medicines to avoid.  · Keep all follow-up visits as told by your health care provider. This is important.  Questions to ask your health care provider  · Would I benefit from therapy?  · How often should I follow up with a health care provider?  · How long do I need to take medicine?  · Are there any long-term side effects of my medicine?  · Are there any alternatives to taking medicine?  Contact a health care provider if:  · You have a hard time staying focused or finishing daily tasks.  · You spend many hours a day feeling worried about everyday life.  · You become exhausted by worry.  · You start to have headaches, feel tense, or have nausea.  · You urinate more than normal.  · You have diarrhea.  Get help right away if:  · You have a racing heart and shortness of breath.  · You have thoughts of hurting yourself or others.  If you ever feel like you may hurt yourself or others, or have thoughts about taking your own life, get help right away. You can go to your nearest emergency department or call:  · Your local emergency services  (911 in the [place]  · A suicide crisis helpline, such as the National Suicide Prevention Lifeline at 1-687.282.8270. This is open 24-hours a day.  Summary  · Taking steps to deal with stress can help calm you.  · Medicines cannot cure anxiety disorders, but they can help ease symptoms.  · Family, friends, and partners can play a big part in helping you recover from an anxiety disorder.  This information is not intended to replace advice given to you by your health care provider. Make sure you discuss any questions you have with your health care provider.  Document Released: 12/12/2017 Document Revised: 12/12/2017 Document Reviewed: 12/12/2017  [employer] Patient Education © 2019 [employer]
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
## 2019-12-20 NOTE — TELEPHONE ENCOUNTER
I have not send these meds before  Xanax, Lamictal, olanzapine, patient need to see psych which I have discussed her at the appointment and put order for psych referral in November.

## 2019-12-23 ENCOUNTER — CLINICAL SUPPORT NO REQUIREMENTS (OUTPATIENT)
Dept: CARDIOLOGY | Facility: CLINIC | Age: 54
End: 2019-12-23

## 2019-12-23 DIAGNOSIS — R55 SYNCOPE AND COLLAPSE: ICD-10-CM

## 2019-12-23 DIAGNOSIS — Z95.818 STATUS POST PLACEMENT OF IMPLANTABLE LOOP RECORDER: Primary | ICD-10-CM

## 2019-12-26 ENCOUNTER — CLINICAL SUPPORT NO REQUIREMENTS (OUTPATIENT)
Dept: CARDIOLOGY | Facility: CLINIC | Age: 54
End: 2019-12-26

## 2019-12-26 DIAGNOSIS — R55 SYNCOPE AND COLLAPSE: ICD-10-CM

## 2019-12-26 DIAGNOSIS — Z95.818 STATUS POST PLACEMENT OF IMPLANTABLE LOOP RECORDER: Primary | ICD-10-CM

## 2019-12-26 RX ORDER — TIOTROPIUM BROMIDE INHALATION SPRAY 3.12 UG/1
SPRAY, METERED RESPIRATORY (INHALATION)
Qty: 4 G | Refills: 3 | Status: SHIPPED | OUTPATIENT
Start: 2019-12-26 | End: 2020-04-26

## 2019-12-26 RX ORDER — METHOCARBAMOL 750 MG/1
TABLET, FILM COATED ORAL
Qty: 90 TABLET | Refills: 1 | Status: SHIPPED | OUTPATIENT
Start: 2019-12-26 | End: 2020-02-20

## 2019-12-26 RX ORDER — METOPROLOL SUCCINATE 25 MG/1
TABLET, EXTENDED RELEASE ORAL
Qty: 30 TABLET | Refills: 0 | Status: SHIPPED | OUTPATIENT
Start: 2019-12-26 | End: 2020-01-22

## 2019-12-27 PROCEDURE — 93298 REM INTERROG DEV EVAL SCRMS: CPT | Performed by: INTERNAL MEDICINE

## 2019-12-27 PROCEDURE — 93299 PR REM INTERROG ICPMS/SCRMS <30 D TECH REVIEW: CPT | Performed by: INTERNAL MEDICINE

## 2019-12-30 ENCOUNTER — CLINICAL SUPPORT NO REQUIREMENTS (OUTPATIENT)
Dept: CARDIOLOGY | Facility: CLINIC | Age: 54
End: 2019-12-30

## 2019-12-30 DIAGNOSIS — R55 SYNCOPE, UNSPECIFIED SYNCOPE TYPE: Primary | ICD-10-CM

## 2019-12-30 DIAGNOSIS — Z95.818 STATUS POST PLACEMENT OF IMPLANTABLE LOOP RECORDER: ICD-10-CM

## 2019-12-31 RX ORDER — PROMETHAZINE HYDROCHLORIDE 25 MG/1
TABLET ORAL
Qty: 30 TABLET | Refills: 0 | Status: SHIPPED | OUTPATIENT
Start: 2019-12-31 | End: 2020-01-30 | Stop reason: SDUPTHER

## 2019-12-31 NOTE — PROGRESS NOTES
NC, report received on 12/28 and reviewed on 12/30, symptom noted with regular rate and rhythm. [email or web address]

## 2020-01-02 ENCOUNTER — HOSPITAL ENCOUNTER (OUTPATIENT)
Dept: MRI IMAGING | Facility: HOSPITAL | Age: 55
Discharge: HOME | End: 2020-01-02

## 2020-01-02 ENCOUNTER — CLINICAL SUPPORT NO REQUIREMENTS (OUTPATIENT)
Dept: CARDIOLOGY | Facility: CLINIC | Age: 55
End: 2020-01-02

## 2020-01-02 ENCOUNTER — TELEPHONE (OUTPATIENT)
Dept: NEUROLOGY | Facility: CLINIC | Age: 55
End: 2020-01-02

## 2020-01-02 ENCOUNTER — HOSPITAL ENCOUNTER (OUTPATIENT)
Dept: NEUROLOGY | Facility: HOSPITAL | Age: 55
Discharge: HOME | End: 2020-01-02
Admitting: PSYCHIATRY & NEUROLOGY

## 2020-01-02 DIAGNOSIS — G40.909 SEIZURE DISORDER (HCC): ICD-10-CM

## 2020-01-02 DIAGNOSIS — R55 SYNCOPE, UNSPECIFIED SYNCOPE TYPE: Primary | ICD-10-CM

## 2020-01-02 DIAGNOSIS — R51.9 NEW ONSET HEADACHE: ICD-10-CM

## 2020-01-02 LAB — CREAT BLDA-MCNC: 0.8 MG/DL (ref 0.6–1.3)

## 2020-01-02 PROCEDURE — 25010000002 GADOTERIDOL PER 1 ML: Performed by: PSYCHIATRY & NEUROLOGY

## 2020-01-02 PROCEDURE — 95816 EEG AWAKE AND DROWSY: CPT

## 2020-01-02 PROCEDURE — 82565 ASSAY OF CREATININE: CPT

## 2020-01-02 PROCEDURE — A9576 INJ PROHANCE MULTIPACK: HCPCS | Performed by: PSYCHIATRY & NEUROLOGY

## 2020-01-02 PROCEDURE — 95816 EEG AWAKE AND DROWSY: CPT | Performed by: PSYCHIATRY & NEUROLOGY

## 2020-01-02 PROCEDURE — 70553 MRI BRAIN STEM W/O & W/DYE: CPT

## 2020-01-02 RX ADMIN — GADOTERIDOL 11 ML: 279.3 INJECTION, SOLUTION INTRAVENOUS at 13:15

## 2020-01-02 NOTE — TELEPHONE ENCOUNTER
The EEG showed potential epileptiform activity arising in the left frontal temporal region.    Structurally no abnormality is seen on the MRI scan in the left frontal temporal region but this may be the area of the brain from which the seizures are arising.

## 2020-01-06 ENCOUNTER — CLINICAL SUPPORT NO REQUIREMENTS (OUTPATIENT)
Dept: CARDIOLOGY | Facility: CLINIC | Age: 55
End: 2020-01-06

## 2020-01-06 DIAGNOSIS — Z95.818 STATUS POST PLACEMENT OF IMPLANTABLE LOOP RECORDER: ICD-10-CM

## 2020-01-06 DIAGNOSIS — R55 SYNCOPE, UNSPECIFIED SYNCOPE TYPE: Primary | ICD-10-CM

## 2020-01-06 NOTE — TELEPHONE ENCOUNTER
Patient feels she had a seizure on the left side frontal its swollen and there's a knot and staying really disoriented from it and when touches it makes her really dizzy. Patient noticed it 3 days ago and still staying dizzy not sure if she had a seizure she don't remember.

## 2020-01-06 NOTE — TELEPHONE ENCOUNTER
She hit he head?  Is there a bruise?    If she continues to get better, then there if nothing to do     if she feels she is getting worse and she hit her head then go to er, may need a ct of head

## 2020-01-07 ENCOUNTER — CLINICAL SUPPORT NO REQUIREMENTS (OUTPATIENT)
Dept: CARDIOLOGY | Facility: CLINIC | Age: 55
End: 2020-01-07

## 2020-01-07 DIAGNOSIS — R55 SYNCOPE, UNSPECIFIED SYNCOPE TYPE: Primary | ICD-10-CM

## 2020-01-07 DIAGNOSIS — R55 SYNCOPE AND COLLAPSE: ICD-10-CM

## 2020-01-07 DIAGNOSIS — Z95.818 STATUS POST PLACEMENT OF IMPLANTABLE LOOP RECORDER: ICD-10-CM

## 2020-01-09 ENCOUNTER — TELEPHONE (OUTPATIENT)
Dept: PSYCHIATRY | Facility: CLINIC | Age: 55
End: 2020-01-09

## 2020-01-09 NOTE — TELEPHONE ENCOUNTER
PATIENT IS CURRENTLY SEEING PAIN MANAGEMENT AND IS NEEDING A TAPER SCHEDULE FOR ALPRAZOLAM PLEASE

## 2020-01-10 NOTE — TELEPHONE ENCOUNTER
Xanax 1 mg BID for 2 weeks,   Then decrease to 0.5 mg TID for 2 weeks,   Then decrease to 0.5 mg po BID for 2 weeks, then 0.5 mg once per day for 2 weeks, then d/c

## 2020-01-13 DIAGNOSIS — F41.1 GENERALIZED ANXIETY DISORDER: ICD-10-CM

## 2020-01-14 RX ORDER — LAMOTRIGINE 200 MG/1
TABLET ORAL
Qty: 90 TABLET | Refills: 3 | Status: SHIPPED | OUTPATIENT
Start: 2020-01-14 | End: 2020-06-18 | Stop reason: ALTCHOICE

## 2020-01-14 RX ORDER — ALPRAZOLAM 1 MG/1
TABLET ORAL
Qty: 90 TABLET | Refills: 1 | Status: SHIPPED | OUTPATIENT
Start: 2020-01-14 | End: 2020-03-06

## 2020-01-14 RX ORDER — TOPIRAMATE 100 MG/1
TABLET, FILM COATED ORAL
Qty: 180 TABLET | Refills: 3 | Status: SHIPPED | OUTPATIENT
Start: 2020-01-14 | End: 2020-06-18 | Stop reason: SDUPTHER

## 2020-01-15 ENCOUNTER — CLINICAL SUPPORT NO REQUIREMENTS (OUTPATIENT)
Dept: CARDIOLOGY | Facility: CLINIC | Age: 55
End: 2020-01-15

## 2020-01-15 DIAGNOSIS — Z95.818 STATUS POST PLACEMENT OF IMPLANTABLE LOOP RECORDER: Primary | ICD-10-CM

## 2020-01-15 DIAGNOSIS — R55 SYNCOPE AND COLLAPSE: ICD-10-CM

## 2020-01-17 ENCOUNTER — OFFICE VISIT (OUTPATIENT)
Dept: FAMILY MEDICINE CLINIC | Facility: CLINIC | Age: 55
End: 2020-01-17

## 2020-01-17 VITALS
BODY MASS INDEX: 20.82 KG/M2 | SYSTOLIC BLOOD PRESSURE: 110 MMHG | TEMPERATURE: 97.4 F | OXYGEN SATURATION: 96 % | DIASTOLIC BLOOD PRESSURE: 75 MMHG | WEIGHT: 129 LBS | HEART RATE: 82 BPM

## 2020-01-17 DIAGNOSIS — R05.9 COUGH: ICD-10-CM

## 2020-01-17 DIAGNOSIS — R06.02 SOB (SHORTNESS OF BREATH): ICD-10-CM

## 2020-01-17 DIAGNOSIS — J44.1 COPD WITH ACUTE EXACERBATION (HCC): Primary | ICD-10-CM

## 2020-01-17 DIAGNOSIS — M19.90 ARTHRITIS: Primary | ICD-10-CM

## 2020-01-17 PROBLEM — J44.9 CHRONIC OBSTRUCTIVE PULMONARY DISEASE: Status: ACTIVE | Noted: 2019-03-08

## 2020-01-17 PROCEDURE — 87804 INFLUENZA ASSAY W/OPTIC: CPT | Performed by: FAMILY MEDICINE

## 2020-01-17 PROCEDURE — 99214 OFFICE O/P EST MOD 30 MIN: CPT | Performed by: FAMILY MEDICINE

## 2020-01-17 RX ORDER — BUDESONIDE AND FORMOTEROL FUMARATE DIHYDRATE 160; 4.5 UG/1; UG/1
2 AEROSOL RESPIRATORY (INHALATION)
Qty: 10.2 G | Refills: 3 | Status: SHIPPED | OUTPATIENT
Start: 2020-01-17 | End: 2020-01-30

## 2020-01-17 RX ORDER — CEFDINIR 300 MG/1
300 CAPSULE ORAL 2 TIMES DAILY
Qty: 14 CAPSULE | Refills: 0 | Status: SHIPPED | OUTPATIENT
Start: 2020-01-17 | End: 2020-01-24

## 2020-01-17 RX ORDER — BENZONATATE 200 MG/1
200 CAPSULE ORAL 3 TIMES DAILY PRN
Qty: 20 CAPSULE | Refills: 0 | Status: SHIPPED | OUTPATIENT
Start: 2020-01-17 | End: 2020-02-04 | Stop reason: SDUPTHER

## 2020-01-17 NOTE — PROGRESS NOTES
Subjective   Melvi Rayo is a 54 y.o. female.     URI    This is a new problem. The current episode started in the past 7 days. The problem has been gradually worsening. There has been no fever. Associated symptoms include congestion, coughing, headaches, nausea, rhinorrhea, sinus pain, swollen glands and wheezing. Pertinent negatives include no abdominal pain, chest pain, ear pain or vomiting.        The following portions of the patient's history were reviewed and updated as appropriate: past medical history, past social history, past surgical history and problem list.    Review of Systems   Constitutional: Positive for fatigue. Negative for fever.   HENT: Positive for congestion, postnasal drip, rhinorrhea and swollen glands. Negative for ear pain and trouble swallowing.    Eyes: Negative for blurred vision.   Respiratory: Positive for cough, shortness of breath and wheezing.    Cardiovascular: Negative for chest pain, palpitations and leg swelling.   Gastrointestinal: Positive for nausea. Negative for abdominal pain and vomiting.   Musculoskeletal: Positive for arthralgias.   Neurological: Positive for headache. Negative for dizziness.       Objective   Physical Exam   Constitutional: She is oriented to person, place, and time. She appears well-developed.   HENT:   Right Ear: External ear normal.   Left Ear: External ear normal.   Mouth/Throat: Oropharynx is clear and moist.   Eyes: Pupils are equal, round, and reactive to light. Conjunctivae and EOM are normal.   Cardiovascular: Normal rate, regular rhythm and normal heart sounds.   Pulmonary/Chest: Breath sounds normal. She has no wheezes.   Abdominal: Soft. Bowel sounds are normal. There is no tenderness.   Neurological: She is alert and oriented to person, place, and time.   Psychiatric: She has a normal mood and affect.   Vitals reviewed.        Assessment/Plan   Problems Addressed this Visit        Respiratory    Cough    Relevant Orders    POCT  Influenza A/B (Completed)    Full Pulmonary Function Test With Bronchodilator    SOB (shortness of breath)    Relevant Orders    POCT Influenza A/B (Completed)    Full Pulmonary Function Test With Bronchodilator    COPD with acute exacerbation (CMS/[facility]) - Primary     COPD is worsening.  Discussed monitoring symptoms and use of quick-relief medications and contacting us early in the course of exacerbations.  Warning signs of respiratory distress were reviewed with the patient.   Counseled to avoid exposure to cigarette smoke.  Medication changes per orders.             Relevant Medications    benzonatate (TESSALON) 200 MG capsule    budesonide-formoterol (SYMBICORT) 160-4.5 MCG/ACT inhaler

## 2020-01-18 PROBLEM — J44.1 COPD WITH ACUTE EXACERBATION (HCC): Status: ACTIVE | Noted: 2020-01-18

## 2020-01-18 NOTE — ASSESSMENT & PLAN NOTE
COPD is worsening.  Discussed monitoring symptoms and use of quick-relief medications and contacting us early in the course of exacerbations.  Warning signs of respiratory distress were reviewed with the patient.   Counseled to avoid exposure to cigarette smoke.  Medication changes per orders.

## 2020-01-20 LAB
EXPIRATION DATE: NORMAL
FLUAV AG NPH QL: NEGATIVE
FLUBV AG NPH QL: NEGATIVE
INTERNAL CONTROL: NORMAL
Lab: NORMAL

## 2020-01-22 RX ORDER — METOPROLOL SUCCINATE 25 MG/1
TABLET, EXTENDED RELEASE ORAL
Qty: 30 TABLET | Refills: 0 | Status: SHIPPED | OUTPATIENT
Start: 2020-01-22 | End: 2020-03-02

## 2020-01-27 ENCOUNTER — CLINICAL SUPPORT NO REQUIREMENTS (OUTPATIENT)
Dept: CARDIOLOGY | Facility: CLINIC | Age: 55
End: 2020-01-27

## 2020-01-29 ENCOUNTER — TELEPHONE (OUTPATIENT)
Dept: PSYCHIATRY | Facility: CLINIC | Age: 55
End: 2020-01-29

## 2020-01-30 ENCOUNTER — TELEPHONE (OUTPATIENT)
Dept: FAMILY MEDICINE CLINIC | Facility: CLINIC | Age: 55
End: 2020-01-30

## 2020-01-30 RX ORDER — PROMETHAZINE HYDROCHLORIDE 25 MG/1
25 TABLET ORAL EVERY 6 HOURS PRN
Qty: 30 TABLET | Refills: 0 | Status: SHIPPED | OUTPATIENT
Start: 2020-01-30 | End: 2020-02-28 | Stop reason: SDUPTHER

## 2020-01-31 RX ORDER — HYDROXYZINE 50 MG/1
50 TABLET, FILM COATED ORAL 3 TIMES DAILY
Qty: 90 TABLET | Refills: 1 | Status: SHIPPED | OUTPATIENT
Start: 2020-01-31 | End: 2020-04-21 | Stop reason: SDUPTHER

## 2020-02-03 ENCOUNTER — CLINICAL SUPPORT NO REQUIREMENTS (OUTPATIENT)
Dept: CARDIOLOGY | Facility: CLINIC | Age: 55
End: 2020-02-03

## 2020-02-03 DIAGNOSIS — R55 SYNCOPE, UNSPECIFIED SYNCOPE TYPE: Primary | ICD-10-CM

## 2020-02-03 DIAGNOSIS — Z95.818 STATUS POST PLACEMENT OF IMPLANTABLE LOOP RECORDER: ICD-10-CM

## 2020-02-04 RX ORDER — BENZONATATE 200 MG/1
200 CAPSULE ORAL 3 TIMES DAILY PRN
Qty: 20 CAPSULE | Refills: 0 | Status: SHIPPED | OUTPATIENT
Start: 2020-02-04 | End: 2020-02-28 | Stop reason: SDUPTHER

## 2020-02-05 PROCEDURE — 93298 REM INTERROG DEV EVAL SCRMS: CPT | Performed by: INTERNAL MEDICINE

## 2020-02-06 RX ORDER — NICOTINE POLACRILEX 4 MG/1
GUM, CHEWING ORAL
Qty: 56 EACH | Refills: 3 | Status: SHIPPED | OUTPATIENT
Start: 2020-02-06 | End: 2020-06-03

## 2020-02-10 RX ORDER — PREDNISONE 10 MG/1
TABLET ORAL
Qty: 90 TABLET | Refills: 0 | Status: SHIPPED | OUTPATIENT
Start: 2020-02-10 | End: 2020-05-07

## 2020-02-12 ENCOUNTER — CLINICAL SUPPORT NO REQUIREMENTS (OUTPATIENT)
Dept: CARDIOLOGY | Facility: CLINIC | Age: 55
End: 2020-02-12

## 2020-02-12 DIAGNOSIS — Z95.818 STATUS POST PLACEMENT OF IMPLANTABLE LOOP RECORDER: ICD-10-CM

## 2020-02-12 DIAGNOSIS — R55 SYNCOPE AND COLLAPSE: Primary | ICD-10-CM

## 2020-02-20 ENCOUNTER — TELEPHONE (OUTPATIENT)
Dept: PSYCHIATRY | Facility: CLINIC | Age: 55
End: 2020-02-20

## 2020-02-20 RX ORDER — METHOCARBAMOL 750 MG/1
TABLET, FILM COATED ORAL
Qty: 90 TABLET | Refills: 1 | Status: SHIPPED | OUTPATIENT
Start: 2020-02-20 | End: 2020-04-26

## 2020-02-25 ENCOUNTER — TELEPHONE (OUTPATIENT)
Dept: CARDIOLOGY | Facility: CLINIC | Age: 55
End: 2020-02-25

## 2020-02-26 RX ORDER — METHOCARBAMOL 750 MG/1
750 TABLET, FILM COATED ORAL 3 TIMES DAILY
Qty: 90 TABLET | Refills: 1 | OUTPATIENT
Start: 2020-02-26

## 2020-02-28 ENCOUNTER — TELEPHONE (OUTPATIENT)
Dept: FAMILY MEDICINE CLINIC | Facility: CLINIC | Age: 55
End: 2020-02-28

## 2020-02-28 ENCOUNTER — OFFICE VISIT (OUTPATIENT)
Dept: FAMILY MEDICINE CLINIC | Facility: CLINIC | Age: 55
End: 2020-02-28

## 2020-02-28 ENCOUNTER — LAB (OUTPATIENT)
Dept: FAMILY MEDICINE CLINIC | Facility: CLINIC | Age: 55
End: 2020-02-28

## 2020-02-28 ENCOUNTER — CLINICAL SUPPORT NO REQUIREMENTS (OUTPATIENT)
Dept: CARDIOLOGY | Facility: CLINIC | Age: 55
End: 2020-02-28

## 2020-02-28 VITALS
SYSTOLIC BLOOD PRESSURE: 92 MMHG | WEIGHT: 130 LBS | TEMPERATURE: 97.5 F | OXYGEN SATURATION: 94 % | BODY MASS INDEX: 20.89 KG/M2 | HEIGHT: 66 IN | DIASTOLIC BLOOD PRESSURE: 66 MMHG | HEART RATE: 81 BPM

## 2020-02-28 DIAGNOSIS — J44.9 CHRONIC OBSTRUCTIVE PULMONARY DISEASE, UNSPECIFIED COPD TYPE (HCC): Primary | ICD-10-CM

## 2020-02-28 DIAGNOSIS — E11.65 TYPE 2 DIABETES MELLITUS WITH HYPERGLYCEMIA, WITHOUT LONG-TERM CURRENT USE OF INSULIN (HCC): ICD-10-CM

## 2020-02-28 DIAGNOSIS — R55 SYNCOPE, UNSPECIFIED SYNCOPE TYPE: Primary | ICD-10-CM

## 2020-02-28 DIAGNOSIS — R42 DIZZINESS: ICD-10-CM

## 2020-02-28 DIAGNOSIS — Z95.818 STATUS POST PLACEMENT OF IMPLANTABLE LOOP RECORDER: ICD-10-CM

## 2020-02-28 PROCEDURE — 99214 OFFICE O/P EST MOD 30 MIN: CPT | Performed by: FAMILY MEDICINE

## 2020-02-28 PROCEDURE — 85025 COMPLETE CBC W/AUTO DIFF WBC: CPT | Performed by: FAMILY MEDICINE

## 2020-02-28 PROCEDURE — 36415 COLL VENOUS BLD VENIPUNCTURE: CPT

## 2020-02-28 PROCEDURE — 80048 BASIC METABOLIC PNL TOTAL CA: CPT | Performed by: FAMILY MEDICINE

## 2020-02-28 RX ORDER — HYDROXYZINE HYDROCHLORIDE 25 MG/1
25 TABLET, FILM COATED ORAL 3 TIMES DAILY
COMMUNITY
Start: 2020-02-05 | End: 2020-03-31

## 2020-02-28 RX ORDER — BENZONATATE 200 MG/1
200 CAPSULE ORAL 3 TIMES DAILY PRN
Qty: 20 CAPSULE | Refills: 0 | Status: SHIPPED | OUTPATIENT
Start: 2020-02-28 | End: 2020-05-27

## 2020-02-28 RX ORDER — PROMETHAZINE HYDROCHLORIDE 25 MG/1
25 TABLET ORAL EVERY 6 HOURS PRN
Qty: 30 TABLET | Refills: 0 | Status: SHIPPED | OUTPATIENT
Start: 2020-02-28 | End: 2020-03-18

## 2020-02-28 NOTE — TELEPHONE ENCOUNTER
Rx  done for Tessalon Pearles and promethazine  but too soon for methocarbamol  just refilled on 2/20/20.

## 2020-02-28 NOTE — TELEPHONE ENCOUNTER
Pt requesting refills on teslon pearls, promethazine and methocarbamol to [facility] in [place]. Thanks.

## 2020-02-29 LAB
ANION GAP SERPL CALCULATED.3IONS-SCNC: 10.8 MMOL/L (ref 5–15)
BASOPHILS # BLD AUTO: 0.05 10*3/MM3 (ref 0–0.2)
BASOPHILS NFR BLD AUTO: 0.4 % (ref 0–1.5)
BUN BLD-MCNC: 12 MG/DL (ref 6–20)
BUN/CREAT SERPL: 14.6 (ref 7–25)
CALCIUM SPEC-SCNC: 8.7 MG/DL (ref 8.6–10.5)
CHLORIDE SERPL-SCNC: 107 MMOL/L (ref 98–107)
CO2 SERPL-SCNC: 22.2 MMOL/L (ref 22–29)
CREAT BLD-MCNC: 0.82 MG/DL (ref 0.57–1)
DEPRECATED RDW RBC AUTO: 47.8 FL (ref 37–54)
EOSINOPHIL # BLD AUTO: 0.01 10*3/MM3 (ref 0–0.4)
EOSINOPHIL NFR BLD AUTO: 0.1 % (ref 0.3–6.2)
ERYTHROCYTE [DISTWIDTH] IN BLOOD BY AUTOMATED COUNT: 13.9 % (ref 12.3–15.4)
GFR SERPL CREATININE-BSD FRML MDRD: 73 ML/MIN/1.73
GLUCOSE BLD-MCNC: 121 MG/DL (ref 65–99)
HCT VFR BLD AUTO: 44.1 % (ref 34–46.6)
HGB BLD-MCNC: 14.5 G/DL (ref 12–15.9)
IMM GRANULOCYTES # BLD AUTO: 0.04 10*3/MM3 (ref 0–0.05)
IMM GRANULOCYTES NFR BLD AUTO: 0.4 % (ref 0–0.5)
LYMPHOCYTES # BLD AUTO: 2.34 10*3/MM3 (ref 0.7–3.1)
LYMPHOCYTES NFR BLD AUTO: 20.6 % (ref 19.6–45.3)
MCH RBC QN AUTO: 30.5 PG (ref 26.6–33)
MCHC RBC AUTO-ENTMCNC: 32.9 G/DL (ref 31.5–35.7)
MCV RBC AUTO: 92.8 FL (ref 79–97)
MONOCYTES # BLD AUTO: 0.44 10*3/MM3 (ref 0.1–0.9)
MONOCYTES NFR BLD AUTO: 3.9 % (ref 5–12)
NEUTROPHILS # BLD AUTO: 8.48 10*3/MM3 (ref 1.7–7)
NEUTROPHILS NFR BLD AUTO: 74.6 % (ref 42.7–76)
NRBC BLD AUTO-RTO: 0 /100 WBC (ref 0–0.2)
PLATELET # BLD AUTO: 373 10*3/MM3 (ref 140–450)
PMV BLD AUTO: 8.6 FL (ref 6–12)
POTASSIUM BLD-SCNC: 4.1 MMOL/L (ref 3.5–5.2)
RBC # BLD AUTO: 4.75 10*6/MM3 (ref 3.77–5.28)
SODIUM BLD-SCNC: 140 MMOL/L (ref 136–145)
WBC NRBC COR # BLD: 11.36 10*3/MM3 (ref 3.4–10.8)

## 2020-03-02 ENCOUNTER — CLINICAL SUPPORT NO REQUIREMENTS (OUTPATIENT)
Dept: CARDIOLOGY | Facility: CLINIC | Age: 55
End: 2020-03-02

## 2020-03-02 DIAGNOSIS — Z95.818 STATUS POST PLACEMENT OF IMPLANTABLE LOOP RECORDER: ICD-10-CM

## 2020-03-02 DIAGNOSIS — R55 SYNCOPE, UNSPECIFIED SYNCOPE TYPE: Primary | ICD-10-CM

## 2020-03-02 RX ORDER — METOPROLOL SUCCINATE 25 MG/1
TABLET, EXTENDED RELEASE ORAL
Qty: 30 TABLET | Refills: 0 | Status: SHIPPED | OUTPATIENT
Start: 2020-03-02 | End: 2020-04-03 | Stop reason: SDUPTHER

## 2020-03-05 ENCOUNTER — CLINICAL SUPPORT NO REQUIREMENTS (OUTPATIENT)
Dept: CARDIOLOGY | Facility: CLINIC | Age: 55
End: 2020-03-05

## 2020-03-05 ENCOUNTER — TELEPHONE (OUTPATIENT)
Dept: FAMILY MEDICINE CLINIC | Facility: CLINIC | Age: 55
End: 2020-03-05

## 2020-03-05 DIAGNOSIS — Z95.818 STATUS POST PLACEMENT OF IMPLANTABLE LOOP RECORDER: ICD-10-CM

## 2020-03-05 DIAGNOSIS — R55 SYNCOPE, UNSPECIFIED SYNCOPE TYPE: Primary | ICD-10-CM

## 2020-03-05 PROCEDURE — 93298 REM INTERROG DEV EVAL SCRMS: CPT | Performed by: INTERNAL MEDICINE

## 2020-03-06 DIAGNOSIS — F41.1 GENERALIZED ANXIETY DISORDER: ICD-10-CM

## 2020-03-06 RX ORDER — ALPRAZOLAM 1 MG/1
TABLET ORAL
Qty: 90 TABLET | Refills: 0 | Status: SHIPPED | OUTPATIENT
Start: 2020-03-06 | End: 2020-04-09

## 2020-03-06 RX ORDER — OLANZAPINE 2.5 MG/1
TABLET ORAL
Qty: 30 TABLET | Refills: 2 | Status: SHIPPED | OUTPATIENT
Start: 2020-03-06 | End: 2020-06-03 | Stop reason: SDUPTHER

## 2020-03-08 PROBLEM — R42 DIZZINESS: Status: ACTIVE | Noted: 2020-03-08

## 2020-03-08 NOTE — ASSESSMENT & PLAN NOTE
COPD is unchanged.  Discussed monitoring symptoms and use of quick-relief medications and contacting us early in the course of exacerbations.  Warning signs of respiratory distress were reviewed with the patient.   Counseled to avoid exposure to cigarette smoke.  Continue current medications.

## 2020-03-09 ENCOUNTER — CLINICAL SUPPORT NO REQUIREMENTS (OUTPATIENT)
Dept: CARDIOLOGY | Facility: CLINIC | Age: 55
End: 2020-03-09

## 2020-03-09 DIAGNOSIS — R55 SYNCOPE, UNSPECIFIED SYNCOPE TYPE: Primary | ICD-10-CM

## 2020-03-09 DIAGNOSIS — Z95.818 STATUS POST PLACEMENT OF IMPLANTABLE LOOP RECORDER: ICD-10-CM

## 2020-03-16 ENCOUNTER — CLINICAL SUPPORT NO REQUIREMENTS (OUTPATIENT)
Dept: CARDIOLOGY | Facility: CLINIC | Age: 55
End: 2020-03-16

## 2020-03-16 DIAGNOSIS — Z95.818 STATUS POST PLACEMENT OF IMPLANTABLE LOOP RECORDER: ICD-10-CM

## 2020-03-16 DIAGNOSIS — R55 SYNCOPE AND COLLAPSE: Primary | ICD-10-CM

## 2020-03-17 ENCOUNTER — CLINICAL SUPPORT NO REQUIREMENTS (OUTPATIENT)
Dept: CARDIOLOGY | Facility: CLINIC | Age: 55
End: 2020-03-17

## 2020-03-17 DIAGNOSIS — R55 SYNCOPE AND COLLAPSE: ICD-10-CM

## 2020-03-17 DIAGNOSIS — Z95.818 STATUS POST PLACEMENT OF IMPLANTABLE LOOP RECORDER: Primary | ICD-10-CM

## 2020-03-18 RX ORDER — PROMETHAZINE HYDROCHLORIDE 25 MG/1
TABLET ORAL
Qty: 30 TABLET | Refills: 0 | Status: SHIPPED | OUTPATIENT
Start: 2020-03-18 | End: 2020-04-23 | Stop reason: SDUPTHER

## 2020-03-26 ENCOUNTER — CLINICAL SUPPORT NO REQUIREMENTS (OUTPATIENT)
Dept: CARDIOLOGY | Facility: CLINIC | Age: 55
End: 2020-03-26

## 2020-03-26 DIAGNOSIS — R55 SYNCOPE, UNSPECIFIED SYNCOPE TYPE: Primary | ICD-10-CM

## 2020-03-26 DIAGNOSIS — Z95.818 STATUS POST PLACEMENT OF IMPLANTABLE LOOP RECORDER: ICD-10-CM

## 2020-03-30 ENCOUNTER — CLINICAL SUPPORT NO REQUIREMENTS (OUTPATIENT)
Dept: CARDIOLOGY | Facility: CLINIC | Age: 55
End: 2020-03-30

## 2020-03-30 DIAGNOSIS — R55 SYNCOPE AND COLLAPSE: Primary | ICD-10-CM

## 2020-03-30 DIAGNOSIS — Z95.818 STATUS POST PLACEMENT OF IMPLANTABLE LOOP RECORDER: ICD-10-CM

## 2020-03-31 RX ORDER — HYDROXYZINE HYDROCHLORIDE 25 MG/1
TABLET, FILM COATED ORAL
Qty: 90 TABLET | Refills: 2 | Status: SHIPPED | OUTPATIENT
Start: 2020-03-31 | End: 2020-07-06

## 2020-04-01 RX ORDER — TRAZODONE HYDROCHLORIDE 150 MG/1
TABLET ORAL
Qty: 60 TABLET | Refills: 2 | Status: SHIPPED | OUTPATIENT
Start: 2020-04-01 | End: 2020-06-03 | Stop reason: SDUPTHER

## 2020-04-01 RX ORDER — ALBUTEROL SULFATE 90 UG/1
AEROSOL, METERED RESPIRATORY (INHALATION)
Qty: 18 G | Refills: 3 | Status: SHIPPED | OUTPATIENT
Start: 2020-04-01 | End: 2020-08-02

## 2020-04-01 RX ORDER — SERTRALINE HYDROCHLORIDE 25 MG/1
TABLET, FILM COATED ORAL
Qty: 30 TABLET | Refills: 2 | Status: SHIPPED | OUTPATIENT
Start: 2020-04-01 | End: 2020-06-03 | Stop reason: SDUPTHER

## 2020-04-03 RX ORDER — METOPROLOL SUCCINATE 25 MG/1
25 TABLET, EXTENDED RELEASE ORAL DAILY
Qty: 90 TABLET | Refills: 0 | OUTPATIENT
Start: 2020-04-03 | End: 2020-06-25

## 2020-04-06 ENCOUNTER — CLINICAL SUPPORT NO REQUIREMENTS (OUTPATIENT)
Dept: CARDIOLOGY | Facility: CLINIC | Age: 55
End: 2020-04-06

## 2020-04-06 DIAGNOSIS — R55 SYNCOPE AND COLLAPSE: Primary | ICD-10-CM

## 2020-04-06 DIAGNOSIS — Z95.818 STATUS POST PLACEMENT OF IMPLANTABLE LOOP RECORDER: ICD-10-CM

## 2020-04-09 DIAGNOSIS — F41.1 GENERALIZED ANXIETY DISORDER: ICD-10-CM

## 2020-04-09 RX ORDER — ALPRAZOLAM 1 MG/1
TABLET ORAL
Qty: 90 TABLET | Refills: 1 | Status: SHIPPED | OUTPATIENT
Start: 2020-04-09 | End: 2020-06-18

## 2020-04-10 ENCOUNTER — CLINICAL SUPPORT NO REQUIREMENTS (OUTPATIENT)
Dept: CARDIOLOGY | Facility: CLINIC | Age: 55
End: 2020-04-10

## 2020-04-10 DIAGNOSIS — Z95.818 STATUS POST PLACEMENT OF IMPLANTABLE LOOP RECORDER: ICD-10-CM

## 2020-04-10 DIAGNOSIS — R55 SYNCOPE, UNSPECIFIED SYNCOPE TYPE: Primary | ICD-10-CM

## 2020-04-13 PROCEDURE — 93298 REM INTERROG DEV EVAL SCRMS: CPT | Performed by: INTERNAL MEDICINE

## 2020-04-22 RX ORDER — PROMETHAZINE HYDROCHLORIDE 25 MG/1
25 TABLET ORAL EVERY 6 HOURS PRN
Qty: 30 TABLET | Refills: 0 | Status: CANCELLED | OUTPATIENT
Start: 2020-04-22

## 2020-04-22 RX ORDER — HYDROXYZINE 50 MG/1
50 TABLET, FILM COATED ORAL 3 TIMES DAILY
Qty: 90 TABLET | Refills: 1 | Status: SHIPPED | OUTPATIENT
Start: 2020-04-22 | End: 2020-05-28

## 2020-04-23 ENCOUNTER — TELEPHONE (OUTPATIENT)
Dept: FAMILY MEDICINE CLINIC | Facility: CLINIC | Age: 55
End: 2020-04-23

## 2020-04-23 RX ORDER — PROMETHAZINE HYDROCHLORIDE 25 MG/1
25 TABLET ORAL EVERY 6 HOURS PRN
Qty: 30 TABLET | Refills: 0 | Status: SHIPPED | OUTPATIENT
Start: 2020-04-23 | End: 2020-05-08

## 2020-04-26 RX ORDER — METHOCARBAMOL 750 MG/1
TABLET, FILM COATED ORAL
Qty: 90 TABLET | Refills: 1 | Status: SHIPPED | OUTPATIENT
Start: 2020-04-26 | End: 2020-06-27

## 2020-04-26 RX ORDER — TIOTROPIUM BROMIDE INHALATION SPRAY 3.12 UG/1
SPRAY, METERED RESPIRATORY (INHALATION)
Qty: 4 G | Refills: 3 | Status: SHIPPED | OUTPATIENT
Start: 2020-04-26 | End: 2020-12-21

## 2020-05-07 RX ORDER — PREDNISONE 10 MG/1
TABLET ORAL
Qty: 90 TABLET | Refills: 0 | Status: SHIPPED | OUTPATIENT
Start: 2020-05-07 | End: 2020-08-02

## 2020-05-08 ENCOUNTER — CLINICAL SUPPORT NO REQUIREMENTS (OUTPATIENT)
Dept: CARDIOLOGY | Facility: CLINIC | Age: 55
End: 2020-05-08

## 2020-05-08 DIAGNOSIS — Z95.818 STATUS POST PLACEMENT OF IMPLANTABLE LOOP RECORDER: Primary | ICD-10-CM

## 2020-05-08 DIAGNOSIS — R55 SYNCOPE AND COLLAPSE: ICD-10-CM

## 2020-05-08 RX ORDER — PROMETHAZINE HYDROCHLORIDE 25 MG/1
TABLET ORAL
Qty: 30 TABLET | Refills: 0 | Status: SHIPPED | OUTPATIENT
Start: 2020-05-08 | End: 2020-06-30 | Stop reason: SDUPTHER

## 2020-05-11 ENCOUNTER — CLINICAL SUPPORT NO REQUIREMENTS (OUTPATIENT)
Dept: CARDIOLOGY | Facility: CLINIC | Age: 55
End: 2020-05-11

## 2020-05-11 DIAGNOSIS — R55 SYNCOPE AND COLLAPSE: Primary | ICD-10-CM

## 2020-05-11 DIAGNOSIS — Z95.818 STATUS POST PLACEMENT OF IMPLANTABLE LOOP RECORDER: ICD-10-CM

## 2020-05-11 PROCEDURE — 93298 REM INTERROG DEV EVAL SCRMS: CPT | Performed by: INTERNAL MEDICINE

## 2020-05-12 ENCOUNTER — CLINICAL SUPPORT NO REQUIREMENTS (OUTPATIENT)
Dept: CARDIOLOGY | Facility: CLINIC | Age: 55
End: 2020-05-12

## 2020-05-12 DIAGNOSIS — R55 SYNCOPE, UNSPECIFIED SYNCOPE TYPE: Primary | ICD-10-CM

## 2020-05-12 DIAGNOSIS — Z95.818 STATUS POST PLACEMENT OF IMPLANTABLE LOOP RECORDER: ICD-10-CM

## 2020-05-13 ENCOUNTER — CLINICAL SUPPORT NO REQUIREMENTS (OUTPATIENT)
Dept: CARDIOLOGY | Facility: CLINIC | Age: 55
End: 2020-05-13

## 2020-05-13 DIAGNOSIS — Z95.818 STATUS POST PLACEMENT OF IMPLANTABLE LOOP RECORDER: Primary | ICD-10-CM

## 2020-05-13 DIAGNOSIS — R55 SYNCOPE AND COLLAPSE: ICD-10-CM

## 2020-05-18 ENCOUNTER — CLINICAL SUPPORT NO REQUIREMENTS (OUTPATIENT)
Dept: CARDIOLOGY | Facility: CLINIC | Age: 55
End: 2020-05-18

## 2020-05-18 DIAGNOSIS — R55 SYNCOPE AND COLLAPSE: ICD-10-CM

## 2020-05-18 DIAGNOSIS — Z95.818 STATUS POST PLACEMENT OF IMPLANTABLE LOOP RECORDER: Primary | ICD-10-CM

## 2020-05-26 DIAGNOSIS — F41.1 GENERALIZED ANXIETY DISORDER: ICD-10-CM

## 2020-05-26 RX ORDER — PROMETHAZINE HYDROCHLORIDE 25 MG/1
TABLET ORAL
Qty: 30 TABLET | Refills: 0 | OUTPATIENT
Start: 2020-05-26

## 2020-05-27 ENCOUNTER — CLINICAL SUPPORT NO REQUIREMENTS (OUTPATIENT)
Dept: CARDIOLOGY | Facility: CLINIC | Age: 55
End: 2020-05-27

## 2020-05-27 DIAGNOSIS — R55 SYNCOPE, UNSPECIFIED SYNCOPE TYPE: Primary | ICD-10-CM

## 2020-05-27 RX ORDER — BENZONATATE 200 MG/1
CAPSULE ORAL
Qty: 20 CAPSULE | Refills: 0 | Status: SHIPPED | OUTPATIENT
Start: 2020-05-27 | End: 2020-08-11 | Stop reason: SDUPTHER

## 2020-05-28 RX ORDER — HYDROXYZINE 50 MG/1
TABLET, FILM COATED ORAL
Qty: 90 TABLET | Refills: 1 | Status: SHIPPED | OUTPATIENT
Start: 2020-05-28 | End: 2020-08-23

## 2020-05-28 RX ORDER — ALPRAZOLAM 1 MG/1
TABLET ORAL
Qty: 90 TABLET | Refills: 1 | OUTPATIENT
Start: 2020-05-28

## 2020-06-03 ENCOUNTER — OFFICE VISIT (OUTPATIENT)
Dept: PSYCHIATRY | Facility: CLINIC | Age: 55
End: 2020-06-03

## 2020-06-03 DIAGNOSIS — F33.2 SEVERE RECURRENT MAJOR DEPRESSION WITHOUT PSYCHOTIC FEATURES (HCC): Primary | ICD-10-CM

## 2020-06-03 DIAGNOSIS — F41.1 GENERALIZED ANXIETY DISORDER: ICD-10-CM

## 2020-06-03 DIAGNOSIS — F43.12 CHRONIC POSTTRAUMATIC STRESS DISORDER: ICD-10-CM

## 2020-06-03 DIAGNOSIS — G47.19 EXCESSIVE DAYTIME SLEEPINESS: ICD-10-CM

## 2020-06-03 PROCEDURE — 99213 OFFICE O/P EST LOW 20 MIN: CPT | Performed by: PSYCHIATRY & NEUROLOGY

## 2020-06-03 RX ORDER — TRAZODONE HYDROCHLORIDE 150 MG/1
300 TABLET ORAL NIGHTLY
Qty: 60 TABLET | Refills: 3 | Status: SHIPPED | OUTPATIENT
Start: 2020-06-03 | End: 2020-09-25

## 2020-06-03 RX ORDER — SERTRALINE HYDROCHLORIDE 25 MG/1
25 TABLET, FILM COATED ORAL
Qty: 30 TABLET | Refills: 3 | Status: SHIPPED | OUTPATIENT
Start: 2020-06-03 | End: 2020-09-02 | Stop reason: SDUPTHER

## 2020-06-03 RX ORDER — OLANZAPINE 2.5 MG/1
2.5 TABLET ORAL
Qty: 30 TABLET | Refills: 3 | Status: SHIPPED | OUTPATIENT
Start: 2020-06-03 | End: 2020-09-02 | Stop reason: SDUPTHER

## 2020-06-03 RX ORDER — NICOTINE POLACRILEX 4 MG/1
GUM, CHEWING ORAL
Qty: 30 EACH | Refills: 3 | Status: SHIPPED | OUTPATIENT
Start: 2020-06-03 | End: 2020-08-02

## 2020-06-03 NOTE — PATIENT INSTRUCTIONS
Living With Anxiety    After being diagnosed with an anxiety disorder, you may be relieved to know why you have felt or behaved a certain way. It is natural to also feel overwhelmed about the treatment ahead and what it will mean for your life. With care and support, you can manage this condition and recover from it.  How to cope with anxiety  Dealing with stress  Stress is your body’s reaction to life changes and events, both good and bad. Stress can last just a few hours or it can be ongoing. Stress can play a major role in anxiety, so it is important to learn both how to cope with stress and how to think about it differently.  Talk with your health care provider or a counselor to learn more about stress reduction. He or she may suggest some stress reduction techniques, such as:  · Music therapy. This can include creating or listening to music that you enjoy and that inspires you.  · Mindfulness-based meditation. This involves being aware of your normal breaths, rather than trying to control your breathing. It can be done while sitting or walking.  · Centering prayer. This is a kind of meditation that involves focusing on a word, phrase, or sacred image that is meaningful to you and that brings you peace.  · Deep breathing. To do this, expand your stomach and inhale slowly through your nose. Hold your breath for 3-5 seconds. Then exhale slowly, allowing your stomach muscles to relax.  · Self-talk. This is a skill where you identify thought patterns that lead to anxiety reactions and correct those thoughts.  · Muscle relaxation. This involves tensing muscles then relaxing them.  Choose a stress reduction technique that fits your lifestyle and personality. Stress reduction techniques take time and practice. Set aside 5-15 minutes a day to do them. Therapists can offer training in these techniques. The training may be covered by some insurance plans. Other things you can do to manage stress include:  · Keeping a  stress diary. This can help you learn what triggers your stress and ways to control your response.  · Thinking about how you respond to certain situations. You may not be able to control everything, but you can control your reaction.  · Making time for activities that help you relax, and not feeling guilty about spending your time in this way.  Therapy combined with coping and stress-reduction skills provides the best chance for successful treatment.  Medicines  Medicines can help ease symptoms. Medicines for anxiety include:  · Anti-anxiety drugs.  · Antidepressants.  · Beta-blockers.  Medicines may be used as the main treatment for anxiety disorder, along with therapy, or if other treatments are not working. Medicines should be prescribed by a health care provider.  Relationships  Relationships can play a big part in helping you recover. Try to spend more time connecting with trusted friends and family members. Consider going to couples counseling, taking family education classes, or going to family therapy. Therapy can help you and others better understand the condition.  How to recognize changes in your condition  Everyone has a different response to treatment for anxiety. Recovery from anxiety happens when symptoms decrease and stop interfering with your daily activities at home or work. This may mean that you will start to:  · Have better concentration and focus.  · Sleep better.  · Be less irritable.  · Have more energy.  · Have improved memory.  It is important to recognize when your condition is getting worse. Contact your health care provider if your symptoms interfere with home or work and you do not feel like your condition is improving.  Where to find help and support:  You can get help and support from these sources:  · Self-help groups.  · Online and community organizations.  · A trusted spiritual leader.  · Couples counseling.  · Family education classes.  · Family therapy.  Follow these instructions  at home:  · Eat a healthy diet that includes plenty of vegetables, fruits, whole grains, low-fat dairy products, and lean protein. Do not eat a lot of foods that are high in solid fats, added sugars, or salt.  · Exercise. Most adults should do the following:  ? Exercise for at least 150 minutes each week. The exercise should increase your heart rate and make you sweat (moderate-intensity exercise).  ? Strengthening exercises at least twice a week.  · Cut down on caffeine, tobacco, alcohol, and other potentially harmful substances.  · Get the right amount and quality of sleep. Most adults need 7-9 hours of sleep each night.  · Make choices that simplify your life.  · Take over-the-counter and prescription medicines only as told by your health care provider.  · Avoid caffeine, alcohol, and certain over-the-counter cold medicines. These may make you feel worse. Ask your pharmacist which medicines to avoid.  · Keep all follow-up visits as told by your health care provider. This is important.  Questions to ask your health care provider  · Would I benefit from therapy?  · How often should I follow up with a health care provider?  · How long do I need to take medicine?  · Are there any long-term side effects of my medicine?  · Are there any alternatives to taking medicine?  Contact a health care provider if:  · You have a hard time staying focused or finishing daily tasks.  · You spend many hours a day feeling worried about everyday life.  · You become exhausted by worry.  · You start to have headaches, feel tense, or have nausea.  · You urinate more than normal.  · You have diarrhea.  Get help right away if:  · You have a racing heart and shortness of breath.  · You have thoughts of hurting yourself or others.  If you ever feel like you may hurt yourself or others, or have thoughts about taking your own life, get help right away. You can go to your nearest emergency department or call:  · Your local emergency services  (911 in the [place]  · A suicide crisis helpline, such as the National Suicide Prevention Lifeline at 1-362.265.1302. This is open 24-hours a day.  Summary  · Taking steps to deal with stress can help calm you.  · Medicines cannot cure anxiety disorders, but they can help ease symptoms.  · Family, friends, and partners can play a big part in helping you recover from an anxiety disorder.  This information is not intended to replace advice given to you by your health care provider. Make sure you discuss any questions you have with your health care provider.  Document Released: 12/12/2017 Document Revised: 11/30/2018 Document Reviewed: 12/12/2017  Elsevier Patient Education © 2020 [employer]
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
## 2020-06-09 ENCOUNTER — CLINICAL SUPPORT NO REQUIREMENTS (OUTPATIENT)
Dept: CARDIOLOGY | Facility: CLINIC | Age: 55
End: 2020-06-09

## 2020-06-09 DIAGNOSIS — R55 SYNCOPE AND COLLAPSE: ICD-10-CM

## 2020-06-09 DIAGNOSIS — Z95.818 STATUS POST PLACEMENT OF IMPLANTABLE LOOP RECORDER: Primary | ICD-10-CM

## 2020-06-11 ENCOUNTER — TELEPHONE (OUTPATIENT)
Dept: CARDIOLOGY | Facility: CLINIC | Age: 55
End: 2020-06-11

## 2020-06-11 ENCOUNTER — CLINICAL SUPPORT NO REQUIREMENTS (OUTPATIENT)
Dept: CARDIOLOGY | Facility: CLINIC | Age: 55
End: 2020-06-11

## 2020-06-11 DIAGNOSIS — R55 SYNCOPE, UNSPECIFIED SYNCOPE TYPE: Primary | ICD-10-CM

## 2020-06-11 DIAGNOSIS — Z95.818 STATUS POST PLACEMENT OF IMPLANTABLE LOOP RECORDER: ICD-10-CM

## 2020-06-11 PROCEDURE — 93298 REM INTERROG DEV EVAL SCRMS: CPT | Performed by: INTERNAL MEDICINE

## 2020-06-11 NOTE — TELEPHONE ENCOUNTER
Spoke to patient, she is becoming SOA and lightheaded as well. Left arm numbness. Discussed OV for follow up.

## 2020-06-12 ENCOUNTER — CLINICAL SUPPORT NO REQUIREMENTS (OUTPATIENT)
Dept: CARDIOLOGY | Facility: CLINIC | Age: 55
End: 2020-06-12

## 2020-06-12 DIAGNOSIS — R55 SYNCOPE, UNSPECIFIED SYNCOPE TYPE: Primary | ICD-10-CM

## 2020-06-12 DIAGNOSIS — Z95.818 STATUS POST PLACEMENT OF IMPLANTABLE LOOP RECORDER: ICD-10-CM

## 2020-06-15 ENCOUNTER — OFFICE VISIT (OUTPATIENT)
Dept: CARDIOLOGY | Facility: CLINIC | Age: 55
End: 2020-06-15

## 2020-06-15 VITALS
WEIGHT: 126 LBS | HEART RATE: 74 BPM | SYSTOLIC BLOOD PRESSURE: 100 MMHG | HEIGHT: 66 IN | OXYGEN SATURATION: 94 % | DIASTOLIC BLOOD PRESSURE: 68 MMHG | BODY MASS INDEX: 20.25 KG/M2

## 2020-06-15 DIAGNOSIS — E11.9 TYPE 2 DIABETES MELLITUS WITHOUT COMPLICATION, WITH LONG-TERM CURRENT USE OF INSULIN (HCC): ICD-10-CM

## 2020-06-15 DIAGNOSIS — R55 SYNCOPE AND COLLAPSE: ICD-10-CM

## 2020-06-15 DIAGNOSIS — Z45.09 ENCOUNTER FOR LOOP RECORDER AT END OF BATTERY LIFE: Primary | ICD-10-CM

## 2020-06-15 DIAGNOSIS — J43.1 PANLOBULAR EMPHYSEMA (HCC): ICD-10-CM

## 2020-06-15 DIAGNOSIS — Z79.4 TYPE 2 DIABETES MELLITUS WITHOUT COMPLICATION, WITH LONG-TERM CURRENT USE OF INSULIN (HCC): ICD-10-CM

## 2020-06-15 DIAGNOSIS — R07.89 OTHER CHEST PAIN: ICD-10-CM

## 2020-06-15 PROCEDURE — 99214 OFFICE O/P EST MOD 30 MIN: CPT | Performed by: INTERNAL MEDICINE

## 2020-06-15 PROCEDURE — 93000 ELECTROCARDIOGRAM COMPLETE: CPT | Performed by: INTERNAL MEDICINE

## 2020-06-15 NOTE — PROGRESS NOTES
Subjective:     Encounter Date:06/15/2020      Patient ID: Melvi Rayo is a 55 y.o. female.    Chief Complaint : Follow-up for recurrent syncope, chest pain.  History of diabetes  History of Present Illness          This is a 55-year-old white female with past medical history of     #. Recurrent syncope status post IL are 05/16/2014, require explantation 09/03/2014 because of patient's insistence and pain,, Orthostatic hypotension possibly due to autonomic insufficiency  #  Recurrent CHEST PAIN  AND NEGATIVE Cath, 3/25/15,  2014,  and 2005  #. Diabetes, COPD, asthma, tobacco abuse  #. Questionable history of CVA and TIA in the past details are not available  #.  cholecystectomy, MARY JANE, next surgery, GERD, Diabetic neuropathy  #Cigarette smoker      here for  followup. Patient is complaining of  chest pain.,  Substernal, occurring at rest,   has no aggravating or relieving factors for chest pain.  Has reproducible tenderness in the  midsternal area.  Patient has recurrent dizziness and syncope which has not occurred in the recent past.  Labs done 12/06/2018 revealed a hemoglobin A1c of 5.5.  CMP, CBC was normal.  Labs from 2/28/2020 revealed glucose of 121 otherwise normal Chem-7 and CBC.  Patient's arterial blood pressure is 100/68, heart rate 74, O2 sat of 94% on room air.  Patient continues to smoke in spite of counseling.  Patient's ILR generator is end-of-life.       ASSESSMENT:  #  chest pain with reproducible tenderness, atypical for ischemia  #  autonomic insufficiency  #  bradycardia  #.  recurrent typical chest pain  #. diabetes and  autonomic a insufficiency  #. COPD tobacco abuse      PLAN:  We will schedule for loop recorder explant.  Patient wanted to know if family and implantable loop recorder but told her in the last few years she did not have any significant arrhythmia.  Explained risk benefits alternatives.  Reviewed EKG results with patient  Patient's chest pain appears atypical for angina,  her foot doctor told she has rheumatoid arthritis, will schedule her an appointment with rheumatology.  Loop recorder is not revealing any significant arrhythmias  Patient's syncope appears to be vasovagal      patient's chest pain i is of unclear etiology. patient had a cardiac catheterization in 2015 and 2014 which were normal. but she continues to smoke and has diabetes Will schedule for Lexiscan Cardiolite to help guide therapy.  Counseled on smoking cessation             Assessment:          Diagnosis Plan   1. Encounter for loop recorder at end of battery life  Loop Recorder Explant - Treatment Room    COVID PRE-OP / PRE-PROCEDURE SCREENING ORDER (NO ISOLATION) - Swab, Nasopharynx   2. Type 2 diabetes mellitus without complication, with long-term current use of insulin (CMS/[facility])  Loop Recorder Explant - Treatment Room    COVID PRE-OP / PRE-PROCEDURE SCREENING ORDER (NO ISOLATION) - Swab, Nasopharynx   3. Other chest pain  Loop Recorder Explant - Treatment Room    COVID PRE-OP / PRE-PROCEDURE SCREENING ORDER (NO ISOLATION) - Swab, Nasopharynx   4. Panlobular emphysema (CMS/[facility])  Loop Recorder Explant - Treatment Room    COVID PRE-OP / PRE-PROCEDURE SCREENING ORDER (NO ISOLATION) - Swab, Nasopharynx          Plan:         Past Medical History:  Past Medical History:   Diagnosis Date   • Anxiety    • Asthma    • Breast cancer (CMS/[facility])    • Chest tightness    • COPD (chronic obstructive pulmonary disease) (CMS/[facility])    • Degenerative disorder of bone    • Foot pain     S/P multiple foot surguries.   • GERD (gastroesophageal reflux disease)    • Lesion of eye     Lesion behind eye, cancer associated retinopathopthy. [facility]    • Low back pain    • Migraines    • Neck nodule 2010   • Pancreatitis    • RA (rheumatoid arthritis) (CMS/[facility])    • Seizure disorder (CMS/HCC)     Generalized seizure, possible partial complex.   • Skin cancer     Age 18.   • Type 2 diabetes mellitus (CMS/HCC)      Past  Surgical History:  Past Surgical History:   Procedure Laterality Date   • CARDIAC CATHETERIZATION      x2   • CARDIAC CATHETERIZATION  2015   •  SECTION      x2   • ESOPHAGEAL DILATATION     • FOOT SURGERY  2015   • FOOT SURGERY Left 2016   • FOOT SURGERY Right 2017   • MASTECTOMY Bilateral    • OTHER SURGICAL HISTORY  2017    LOOP Recorder   • DC  2016    [facility]   • TOTAL ABDOMINAL HYSTERECTOMY WITH SALPINGO OOPHORECTOMY        Allergies:  Allergies   Allergen Reactions   • Aspirin Palpitations   • Codeine Shortness Of Breath   • Contrast Dye Shortness Of Breath   • Erythromycin Hives   • Morphine Unknown (See Comments)   • Penicillin G Itching   • Sulfa Antibiotics Unknown (See Comments)     Home Meds:  Current Meds:     Current Outpatient Medications:   •  ALBUTEROL SULFATE  (90 Base) MCG/ACT inhaler, INHALE ONE PUFF by mouth EVERY 4 TO 6 HOURS, Disp: 18 g, Rfl: 3  •  ALPRAZolam (XANAX) 1 MG tablet, TAKE ONE TABLET BY MOUTH THREE TIMES DAILY (Patient taking differently: 2 (two) times a day.), Disp: 90 tablet, Rfl: 1  •  benzonatate (TESSALON) 200 MG capsule, TAKE ONE CAPSULE BY MOUTH THREE TIMES DAILY AS NEEDED FOR COUGH, Disp: 20 capsule, Rfl: 0  •  BREO ELLIPTA 200-25 MCG/INH inhaler, INHALE ONE PUFF BY MOUTH DAILY, Disp: 60 each, Rfl: 3  •  docusate sodium (COLACE) 50 MG capsule, Take 1 capsule by mouth 2 (Two) Times a Day., Disp: 30 capsule, Rfl: 3  •  gabapentin (NEURONTIN) 300 MG capsule, Take 1 capsule by mouth 4 (Four) Times a Day., Disp: , Rfl: 0  •  hydrOXYzine (ATARAX) 50 MG tablet, TAKE ONE TABLET BY MOUTH THREE TIMES DAILY, Disp: 90 tablet, Rfl: 1  •  lamoTRIgine (LaMICtal) 200 MG tablet, TAKE ONE TABLET BY MOUTH EVERY NIGHT AT BEDTIME, Disp: 90 tablet, Rfl: 3  •  lidocaine (XYLOCAINE) 5 % ointment, Apply 1 tube topically to the appropriate area as directed 4 (Four) Times a Day., Disp: , Rfl: 0  •  metFORMIN (GLUCOPHAGE) 500 MG tablet, TAKE ONE TABLET BY  MOUTH THREE TIMES DAILY, Disp: 90 tablet, Rfl: 3  •  methocarbamol (ROBAXIN) 750 MG tablet, TAKE ONE TABLET BY MOUTH THREE TIMES DAILY, Disp: 90 tablet, Rfl: 1  •  metoprolol succinate XL (TOPROL-XL) 25 MG 24 hr tablet, Take 1 tablet by mouth Daily., Disp: 90 tablet, Rfl: 0  •  midodrine (PROAMATINE) 2.5 MG tablet, Take 1 tablet by mouth Daily., Disp: , Rfl:   •  OLANZapine (zyPREXA) 2.5 MG tablet, Take 1 tablet by mouth every night at bedtime., Disp: 30 tablet, Rfl: 3  •  Omeprazole 20 MG tablet delayed-release, TAKE ONE TABLET BY MOUTH TWICE DAILY, Disp: 30 each, Rfl: 3  •  oxyCODONE-acetaminophen (PERCOCET)  MG per tablet, Take 1 tablet by mouth 3 (Three) Times a Day., Disp: , Rfl: 0  •  predniSONE (DELTASONE) 10 MG tablet, TAKE ONE TABLET BY MOUTH EVERY DAY, Disp: 90 tablet, Rfl: 0  •  sertraline (ZOLOFT) 25 MG tablet, Take 1 tablet by mouth every night at bedtime., Disp: 30 tablet, Rfl: 3  •  SPIRIVA RESPIMAT 2.5 MCG/ACT aerosol solution inhaler, INHALE TWO PUFFS BY MOUTH EVERY DAY, Disp: 4 g, Rfl: 3  •  topiramate (TOPAMAX) 100 MG tablet, TAKE ONE TABLET BY MOUTH TWICE DAILY, Disp: 180 tablet, Rfl: 3  •  traZODone (DESYREL) 150 MG tablet, Take 2 tablets by mouth Every Night., Disp: 60 tablet, Rfl: 3  •  vitamin D (ERGOCALCIFEROL) 1.25 MG (63455 UT) capsule capsule, Take 1 capsule by mouth 1 (One) Time Per Week., Disp: 12 capsule, Rfl: 4  •  Diclofenac Sodium 1.5 % solution, 40 drops by Other route Daily., Disp: , Rfl: 0  •  fluticasone (CUTIVATE) 0.05 % cream, Apply 1 dose topically Daily., Disp: , Rfl: 0  •  FREESTYLE LITE test strip, test TWICE DAILY, Disp: 90 each, Rfl: 3  •  hydrOXYzine (ATARAX) 25 MG tablet, TAKE ONE TABLET BY MOUTH THREE TIMES DAILY, Disp: 90 tablet, Rfl: 2  •  Lancets (FREESTYLE) lancets, 1 each by Other route 2 (Two) Times a Day., Disp: , Rfl: 3  •  nitrofurantoin, macrocrystal-monohydrate, (MACROBID) 100 MG capsule, Take 1 capsule by mouth 2 (Two) Times a Day., Disp: , Rfl: 0  •   promethazine (PHENERGAN) 25 MG tablet, TAKE ONE TABLET BY MOUTH EVERY SIX HOURS AS NEEDED FOR NAUSEA, Disp: 30 tablet, Rfl: 0  Social History:   Social History     Tobacco Use   • Smoking status: Current Every Day Smoker     Types: Cigarettes   • Smokeless tobacco: Never Used   Substance Use Topics   • Alcohol use: No     Frequency: Never      Family History:  Family History   Problem Relation Age of Onset   • Heart disease Mother    • Stroke Mother    • Hyperlipidemia Mother    • Hypertension Mother    • Heart disease Father    • Stroke Father    • Hypertension Father    • Hyperlipidemia Father    • Heart disease Other    • COPD Other    • Cancer Other    • Diabetes Other    • Hypertension Other    • Hyperlipidemia Other    • Stroke Other         The following portions of the patient's history were reviewed and updated as appropriate: allergies, current medications, past family history, past medical history, past social history, past surgical history and problem list.      Review of Systems   Constitution: Positive for malaise/fatigue. Negative for fever.   HENT: Negative for congestion and hearing loss.    Eyes: Negative for double vision and visual disturbance.   Cardiovascular: Positive for chest pain and syncope. Negative for claudication, dyspnea on exertion and leg swelling.   Respiratory: Negative for cough and shortness of breath.    Endocrine: Negative for cold intolerance.   Skin: Negative for color change and rash.   Musculoskeletal: Negative for arthritis and joint pain.   Gastrointestinal: Negative for abdominal pain and heartburn.   Genitourinary: Negative for hematuria.   Neurological: Positive for dizziness. Negative for excessive daytime sleepiness.   Psychiatric/Behavioral: Negative for depression. The patient is not nervous/anxious.    All other systems reviewed and are negative.    Comprehensive review of systems were reviewed and all others review of systems were found to be negative other  "than HPI      ECG 12 Lead  Date/Time: 6/15/2020 12:20 PM  Performed by: Bright Steele MD  Authorized by: Bright Steele MD   Comparison: compared with previous ECG from 8/26/2019  Comparison to previous ECG: EKG done today reviewed by me shows sinus rhythm at the rate of 70 bpm with nonspecific ST-T changes, no new change compared to 8/26/2019                 Objective:     Physical Exam  /68   Pulse 74   Ht 167.6 cm (66\")   Wt 57.2 kg (126 lb)   SpO2 94%   BMI 20.34 kg/m²   General:  Appears in no acute distress  Eyes: Sclera is anicteric,  conjunctiva is clear   HEENT:  No JVD. Thyroid not visibly enlarged. No mucosal pallor or cyanosis  Respiratory: Respirations regular and unlabored at rest.  Bilaterally good breath sounds, with good air entry in all fields. No crackles, rubs or wheezes auscultated  Cardiovascular: S1,S2 Regular rate and rhythm. No murmur, rub or gallop auscultated. No pretibial pitting edema  Gastrointestinal: Abdomen soft, flat, non tender. Bowel sounds present.   Musculoskeletal:  No abnormal movements  Extremities: No digital clubbing or cyanosis  Skin: Color pink. Skin warm and dry to touch. No rashes  No xanthoma  Neuro: Alert and awake, no lateralizing deficits appreciated    Lab Reviewed:                  "

## 2020-06-17 NOTE — PROGRESS NOTES
"Subjective: sz    Patient ID: Melvi Rayo is a 55 y.o. female.    History of Present Illness  TELE MED  TELEPHONE  You have chosen to receive care through a telephone visit. Do you consent to use a telephone visit for your medical care today? Yes  TOTAL TIME 14 MIN      Six month follow up on sieuzres doing well on Lamictal 200 mg per day.  Last seizure..........possible seizure about 6 weeks ago. ..  New problem pt reports spells of possible apnea during sleep, co shaking on the inside  Brother reports she has spells of her head falls on the table.  MRI  Mild changes suggestive chronic microvascular ischemic disease.  EEG   This is an abnormal study due to the finding of potential epileptiform activity arising in the left frontal temporal region  =================visit dec 2019==========================  Yearly f/u for complex seizures, doing well with current medications, Lamictal 200 mg per day.  Last seizure 4 months ago.  Witnessed by brother, \"goes into a back bend\" Feels drained for about 1/2 hour.    Migraines, not working for her headaches currently takes ibuprofen and doesn't help May have about 6 days without lizama per month. . Patient woke up this morning with a headache nearly everyday she's with a migraine. For past two months has new pain on right side of head Patient been having a sharp pain in the right eye, radiating to back of head with  a feeling like water running down her face. This has been associated with involuntary myoclonic jerk of right arm.   For chronic back pain she takes  patient having to take her percocet 4 times a day   Patient wants discuss getting a refill on xanax due to no show appointment with Dr. Smith.   Patient pcp was able to give her anti depressant and wanted her to discuss getting her xanax rx until her appointment with behavior health apt. 3/12/20.    Generalized anxiety disorder  -     ALPRAZolam (XANAX) 1 MG tablet; Take 1 tablet by mouth 3 (Three) Times a " Day.   Seizure disorder (CMS/HCC)  -     EEG (Hospital Performed); Future  -     MRI Brain With & Without Contrast; Future   New onset headache  -     MRI Brain With & Without Contrast; Future   Pt has history of seizure do, continue Lamictal  Pt has anxiety do,--will refill xanax through her appt with behavioral health, will likely have seizures if runs out of this mediation  New HA on the right side of the head, will obtain mri brain and EEG  Migraine, continue current medications, topamax  ========================================================      The following portions of the patient's history were reviewed and updated as appropriate: allergies, current medications, past family history, past medical history, past social history, past surgical history and problem list.    Family History   Problem Relation Age of Onset   • Heart disease Mother    • Stroke Mother    • Hyperlipidemia Mother    • Hypertension Mother    • Heart disease Father    • Stroke Father    • Hypertension Father    • Hyperlipidemia Father    • Heart disease Other    • COPD Other    • Cancer Other    • Diabetes Other    • Hypertension Other    • Hyperlipidemia Other    • Stroke Other        Past Medical History:   Diagnosis Date   • Anxiety    • Asthma    • Breast cancer (CMS/[facility])    • Chest tightness    • COPD (chronic obstructive pulmonary disease) (CMS/[facility])    • Degenerative disorder of bone    • Foot pain     S/P multiple foot surguries.   • GERD (gastroesophageal reflux disease)    • Lesion of eye     Lesion behind eye, cancer associated retinopathopthy. [facility]    • Low back pain    • Migraines    • Neck nodule 2010   • Pancreatitis    • RA (rheumatoid arthritis) (CMS/[facility])    • Seizure disorder (CMS/[facility])     Generalized seizure, possible partial complex.   • Skin cancer     Age 18.   • Type 2 diabetes mellitus (CMS/[facility])        Social History     Socioeconomic History   • Marital status:      Spouse name: Not  on file   • Number of children: Not on file   • Years of education: Not on file   • Highest education level: Not on file   Tobacco Use   • Smoking status: Current Every Day Smoker     Types: Cigarettes   • Smokeless tobacco: Never Used   Substance and Sexual Activity   • Alcohol use: No     Frequency: Never   • Drug use: No   • Sexual activity: Defer         Current Outpatient Medications:   •  ALBUTEROL SULFATE  (90 Base) MCG/ACT inhaler, INHALE ONE PUFF by mouth EVERY 4 TO 6 HOURS, Disp: 18 g, Rfl: 3  •  ALPRAZolam (XANAX) 1 MG tablet, TAKE ONE TABLET BY MOUTH THREE TIMES DAILY (Patient taking differently: 2 (two) times a day.), Disp: 90 tablet, Rfl: 1  •  benzonatate (TESSALON) 200 MG capsule, TAKE ONE CAPSULE BY MOUTH THREE TIMES DAILY AS NEEDED FOR COUGH, Disp: 20 capsule, Rfl: 0  •  BREO ELLIPTA 200-25 MCG/INH inhaler, INHALE ONE PUFF BY MOUTH DAILY, Disp: 60 each, Rfl: 3  •  Diclofenac Sodium 1.5 % solution, 40 drops by Other route Daily., Disp: , Rfl: 0  •  docusate sodium (COLACE) 50 MG capsule, Take 1 capsule by mouth 2 (Two) Times a Day., Disp: 30 capsule, Rfl: 3  •  fluticasone (CUTIVATE) 0.05 % cream, Apply 1 dose topically Daily., Disp: , Rfl: 0  •  FREESTYLE LITE test strip, test TWICE DAILY, Disp: 90 each, Rfl: 3  •  gabapentin (NEURONTIN) 300 MG capsule, Take 1 capsule by mouth 4 (Four) Times a Day., Disp: , Rfl: 0  •  hydrOXYzine (ATARAX) 25 MG tablet, TAKE ONE TABLET BY MOUTH THREE TIMES DAILY, Disp: 90 tablet, Rfl: 2  •  hydrOXYzine (ATARAX) 50 MG tablet, TAKE ONE TABLET BY MOUTH THREE TIMES DAILY, Disp: 90 tablet, Rfl: 1  •  lamoTRIgine (LaMICtal) 100 MG tablet, One in am and one and 1/2 each pm, Disp: 75 tablet, Rfl: 11  •  Lancets (FREESTYLE) lancets, 1 each by Other route 2 (Two) Times a Day., Disp: , Rfl: 3  •  lidocaine (XYLOCAINE) 5 % ointment, Apply 1 tube topically to the appropriate area as directed 4 (Four) Times a Day., Disp: , Rfl: 0  •  metFORMIN (GLUCOPHAGE) 500 MG tablet,  TAKE ONE TABLET BY MOUTH THREE TIMES DAILY, Disp: 90 tablet, Rfl: 3  •  methocarbamol (ROBAXIN) 750 MG tablet, TAKE ONE TABLET BY MOUTH THREE TIMES DAILY, Disp: 90 tablet, Rfl: 1  •  metoprolol succinate XL (TOPROL-XL) 25 MG 24 hr tablet, Take 1 tablet by mouth Daily., Disp: 90 tablet, Rfl: 0  •  midodrine (PROAMATINE) 2.5 MG tablet, Take 1 tablet by mouth Daily., Disp: , Rfl:   •  nitrofurantoin, macrocrystal-monohydrate, (MACROBID) 100 MG capsule, Take 1 capsule by mouth 2 (Two) Times a Day., Disp: , Rfl: 0  •  OLANZapine (zyPREXA) 2.5 MG tablet, Take 1 tablet by mouth every night at bedtime., Disp: 30 tablet, Rfl: 3  •  Omeprazole 20 MG tablet delayed-release, TAKE ONE TABLET BY MOUTH TWICE DAILY, Disp: 30 each, Rfl: 3  •  oxyCODONE-acetaminophen (PERCOCET)  MG per tablet, Take 1 tablet by mouth 3 (Three) Times a Day., Disp: , Rfl: 0  •  predniSONE (DELTASONE) 10 MG tablet, TAKE ONE TABLET BY MOUTH EVERY DAY, Disp: 90 tablet, Rfl: 0  •  promethazine (PHENERGAN) 25 MG tablet, TAKE ONE TABLET BY MOUTH EVERY SIX HOURS AS NEEDED FOR NAUSEA, Disp: 30 tablet, Rfl: 0  •  sertraline (ZOLOFT) 25 MG tablet, Take 1 tablet by mouth every night at bedtime., Disp: 30 tablet, Rfl: 3  •  SPIRIVA RESPIMAT 2.5 MCG/ACT aerosol solution inhaler, INHALE TWO PUFFS BY MOUTH EVERY DAY, Disp: 4 g, Rfl: 3  •  topiramate (TOPAMAX) 100 MG tablet, TAKE ONE TABLET BY MOUTH TWICE DAILY, Disp: 180 tablet, Rfl: 3  •  traZODone (DESYREL) 150 MG tablet, Take 2 tablets by mouth Every Night., Disp: 60 tablet, Rfl: 3  •  vitamin D (ERGOCALCIFEROL) 1.25 MG (02713 UT) capsule capsule, Take 1 capsule by mouth 1 (One) Time Per Week., Disp: 12 capsule, Rfl: 4    Review of Systems   Constitutional: Negative for activity change and appetite change.   HENT: Negative for ear discharge and ear pain.    Eyes: Negative for discharge and itching.   Respiratory: Negative for choking and shortness of breath.    Cardiovascular: Negative for chest pain.    Gastrointestinal: Negative for abdominal pain and nausea.   Endocrine: Negative for cold intolerance and heat intolerance.   Genitourinary: Negative for urgency.   Musculoskeletal: Negative for neck pain and neck stiffness.   Neurological: Negative for dizziness and headaches.   Psychiatric/Behavioral: Negative for agitation and behavioral problems.          I have reviewed ROS completed by medical assistant.     Objective:  Pt sounds alert, speech normal    Assessment/Plan:    Melvi was seen today for seizures.    Diagnoses and all orders for this visit:    Seizure disorder (CMS/HCC)    Intractable migraine without aura and without status migrainosus    Other orders  -     lamoTRIgine (LaMICtal) 100 MG tablet; One in am and one and 1/2 each pm      Possible continued seizure s  Increase lamictal to 250mg per day    Reviewed mri and eeg and labs    This document has been electronically signed by Joseph Seipel, MD on June 18, 2020 13:59

## 2020-06-18 ENCOUNTER — OFFICE VISIT (OUTPATIENT)
Dept: NEUROLOGY | Facility: CLINIC | Age: 55
End: 2020-06-18

## 2020-06-18 DIAGNOSIS — G40.909 SEIZURE DISORDER (HCC): Primary | ICD-10-CM

## 2020-06-18 DIAGNOSIS — G43.019 INTRACTABLE MIGRAINE WITHOUT AURA AND WITHOUT STATUS MIGRAINOSUS: ICD-10-CM

## 2020-06-18 PROCEDURE — 99213 OFFICE O/P EST LOW 20 MIN: CPT | Performed by: PSYCHIATRY & NEUROLOGY

## 2020-06-18 RX ORDER — TOPIRAMATE 100 MG/1
TABLET, FILM COATED ORAL
Qty: 75 TABLET | Refills: 5 | Status: SHIPPED | OUTPATIENT
Start: 2020-06-18 | End: 2020-12-21

## 2020-06-18 RX ORDER — ALPRAZOLAM 1 MG/1
1 TABLET ORAL 3 TIMES DAILY
COMMUNITY
End: 2020-06-30

## 2020-06-18 RX ORDER — LAMOTRIGINE 100 MG/1
TABLET ORAL
Qty: 75 TABLET | Refills: 11 | Status: SHIPPED | OUTPATIENT
Start: 2020-06-18 | End: 2020-06-18 | Stop reason: SINTOL

## 2020-06-22 ENCOUNTER — HOSPITAL ENCOUNTER (OUTPATIENT)
Dept: CARDIOLOGY | Facility: HOSPITAL | Age: 55
Discharge: HOME | End: 2020-06-22

## 2020-06-25 RX ORDER — METOPROLOL SUCCINATE 25 MG/1
TABLET, EXTENDED RELEASE ORAL
Qty: 90 TABLET | Refills: 2 | Status: SHIPPED | OUTPATIENT
Start: 2020-06-25 | End: 2020-12-21

## 2020-06-27 RX ORDER — METHOCARBAMOL 750 MG/1
TABLET, FILM COATED ORAL
Qty: 90 TABLET | Refills: 0 | Status: SHIPPED | OUTPATIENT
Start: 2020-06-27 | End: 2020-08-02

## 2020-06-29 ENCOUNTER — CLINICAL SUPPORT NO REQUIREMENTS (OUTPATIENT)
Dept: CARDIOLOGY | Facility: CLINIC | Age: 55
End: 2020-06-29

## 2020-06-29 DIAGNOSIS — R55 SYNCOPE AND COLLAPSE: Primary | ICD-10-CM

## 2020-06-29 DIAGNOSIS — Z95.818 STATUS POST PLACEMENT OF IMPLANTABLE LOOP RECORDER: ICD-10-CM

## 2020-06-29 DIAGNOSIS — F41.1 GENERALIZED ANXIETY DISORDER: Primary | ICD-10-CM

## 2020-06-30 ENCOUNTER — TELEPHONE (OUTPATIENT)
Dept: FAMILY MEDICINE CLINIC | Facility: CLINIC | Age: 55
End: 2020-06-30

## 2020-06-30 RX ORDER — ALPRAZOLAM 1 MG/1
TABLET ORAL
Qty: 90 TABLET | Refills: 2 | Status: SHIPPED | OUTPATIENT
Start: 2020-06-30 | End: 2020-09-02 | Stop reason: SDUPTHER

## 2020-06-30 RX ORDER — PROMETHAZINE HYDROCHLORIDE 25 MG/1
25 TABLET ORAL EVERY 8 HOURS PRN
Qty: 30 TABLET | Refills: 0 | Status: SHIPPED | OUTPATIENT
Start: 2020-06-30 | End: 2020-08-04

## 2020-07-06 RX ORDER — HYDROXYZINE HYDROCHLORIDE 25 MG/1
TABLET, FILM COATED ORAL
Qty: 90 TABLET | Refills: 2 | Status: SHIPPED | OUTPATIENT
Start: 2020-07-06 | End: 2020-09-25

## 2020-07-13 ENCOUNTER — CLINICAL SUPPORT NO REQUIREMENTS (OUTPATIENT)
Dept: CARDIOLOGY | Facility: CLINIC | Age: 55
End: 2020-07-13

## 2020-07-13 DIAGNOSIS — R55 SYNCOPE, UNSPECIFIED SYNCOPE TYPE: Primary | ICD-10-CM

## 2020-07-13 DIAGNOSIS — Z95.818 STATUS POST PLACEMENT OF IMPLANTABLE LOOP RECORDER: ICD-10-CM

## 2020-07-13 PROCEDURE — 93298 REM INTERROG DEV EVAL SCRMS: CPT | Performed by: INTERNAL MEDICINE

## 2020-07-14 DIAGNOSIS — R07.2 PRECORDIAL PAIN: Primary | ICD-10-CM

## 2020-08-02 RX ORDER — METHOCARBAMOL 750 MG/1
TABLET, FILM COATED ORAL
Qty: 90 TABLET | Refills: 0 | Status: SHIPPED | OUTPATIENT
Start: 2020-08-02 | End: 2020-09-03

## 2020-08-02 RX ORDER — NICOTINE POLACRILEX 4 MG/1
GUM, CHEWING ORAL
Qty: 90 EACH | Refills: 2 | Status: SHIPPED | OUTPATIENT
Start: 2020-08-02 | End: 2020-11-22

## 2020-08-02 RX ORDER — PREDNISONE 10 MG/1
TABLET ORAL
Qty: 90 TABLET | Refills: 0 | Status: SHIPPED | OUTPATIENT
Start: 2020-08-02 | End: 2020-11-05

## 2020-08-02 RX ORDER — ALBUTEROL SULFATE 90 UG/1
AEROSOL, METERED RESPIRATORY (INHALATION)
Qty: 8.5 G | Refills: 3 | Status: SHIPPED | OUTPATIENT
Start: 2020-08-02 | End: 2020-12-21

## 2020-08-04 RX ORDER — PROMETHAZINE HYDROCHLORIDE 25 MG/1
TABLET ORAL
Qty: 30 TABLET | Refills: 0 | Status: SHIPPED | OUTPATIENT
Start: 2020-08-04 | End: 2020-08-24 | Stop reason: SDUPTHER

## 2020-08-11 ENCOUNTER — OFFICE VISIT (OUTPATIENT)
Dept: FAMILY MEDICINE CLINIC | Facility: CLINIC | Age: 55
End: 2020-08-11

## 2020-08-11 VITALS
WEIGHT: 128 LBS | TEMPERATURE: 97.1 F | HEIGHT: 66 IN | OXYGEN SATURATION: 90 % | SYSTOLIC BLOOD PRESSURE: 96 MMHG | BODY MASS INDEX: 20.57 KG/M2 | DIASTOLIC BLOOD PRESSURE: 65 MMHG | HEART RATE: 88 BPM

## 2020-08-11 DIAGNOSIS — J44.9 CHRONIC OBSTRUCTIVE PULMONARY DISEASE, UNSPECIFIED COPD TYPE (HCC): Primary | ICD-10-CM

## 2020-08-11 DIAGNOSIS — I10 ESSENTIAL HYPERTENSION: ICD-10-CM

## 2020-08-11 DIAGNOSIS — R19.00 SWELLING OF ABDOMINAL WALL: ICD-10-CM

## 2020-08-11 DIAGNOSIS — E11.65 TYPE 2 DIABETES MELLITUS WITH HYPERGLYCEMIA, WITHOUT LONG-TERM CURRENT USE OF INSULIN (HCC): ICD-10-CM

## 2020-08-11 DIAGNOSIS — R10.811 RIGHT UPPER QUADRANT ABDOMINAL TENDERNESS, REBOUND TENDERNESS PRESENCE NOT SPECIFIED: ICD-10-CM

## 2020-08-11 PROCEDURE — 99214 OFFICE O/P EST MOD 30 MIN: CPT | Performed by: FAMILY MEDICINE

## 2020-08-11 RX ORDER — LAMOTRIGINE 200 MG/1
200 TABLET ORAL
COMMUNITY
Start: 2020-07-02 | End: 2020-09-02 | Stop reason: SDUPTHER

## 2020-08-11 RX ORDER — BENZONATATE 200 MG/1
200 CAPSULE ORAL 3 TIMES DAILY PRN
Qty: 20 CAPSULE | Refills: 0 | Status: SHIPPED | OUTPATIENT
Start: 2020-08-11 | End: 2020-09-24

## 2020-08-11 RX ORDER — SUMATRIPTAN 100 MG/1
1 TABLET, FILM COATED ORAL TAKE AS DIRECTED
Status: ON HOLD | COMMUNITY
Start: 2020-07-03 | End: 2022-05-18

## 2020-08-11 NOTE — PROGRESS NOTES
Subjective   Melvi Rayo is a 55 y.o. female.     Abdominal Pain   This is a new problem. The current episode started 1 to 4 weeks ago (1 to 2 weeks ago). The onset quality is gradual. The problem occurs constantly. The problem has been gradually worsening. The pain is located in the RLQ. The pain is at a severity of 7/10. The pain is moderate. The quality of the pain is sharp and aching. The abdominal pain radiates to the back. Associated symptoms include nausea. Pertinent negatives include no anorexia, diarrhea, dysuria, fever, frequency or vomiting. The pain is aggravated by movement. The pain is relieved by nothing. She has tried acetaminophen (NSAIDs) for the symptoms.    COPD  The patient  presents for  f/u on COPD.  The patient presents with cough, shortness of breath with exertion, but has no history of  shortness of breath at rest, wheezing, nocturnal awakenings and heartburn.  Since the last visit,   the patient feels their symptoms are improving and somewhat impair daily activities. The patient notes fair compliance with treatment plan.     HTN  The patient present for six-month follow-up on hypertension. The problem has been gradually improving since onset. The problem is controlled. Pertinent negatives include no anxiety, blurred vision, chest pain, palpitations, peripheral edema or shortness of breath. Diabetes   She presents for her follow-up diabetic visit. She has type 2 diabetes mellitus. Her disease course has been stable. Pertinent negatives for hypoglycemia include no dizziness, nervousness/anxiousness or tremors. Pertinent negatives for diabetes include no blurred vision, no foot paresthesias, no foot ulcerations, no polydipsia, no polyphagia and no polyuria. Symptoms are stable.    The following portions of the patient's history were reviewed and updated as appropriate: past medical history, past social history, past surgical history and problem list.    Review of Systems   Constitutional:  Positive for fatigue. Negative for fever.   Respiratory: Negative for cough, shortness of breath and wheezing.    Cardiovascular: Negative for chest pain and palpitations.   Gastrointestinal: Positive for abdominal distention, abdominal pain and nausea. Negative for anorexia, blood in stool, diarrhea and vomiting.   Genitourinary: Negative for difficulty urinating, dysuria and frequency.   Musculoskeletal: Positive for back pain.   Neurological: Negative for dizziness, weakness and headache.   Psychiatric/Behavioral: Negative for sleep disturbance. The patient is nervous/anxious.        Objective   Physical Exam   Constitutional: She is oriented to person, place, and time. She appears well-developed.   Neck: Normal range of motion. Neck supple.   Cardiovascular: Normal rate, regular rhythm and normal heart sounds.   Pulmonary/Chest: Effort normal and breath sounds normal. She has no wheezes.   Abdominal: Soft. Bowel sounds are normal. She exhibits distension. There is tenderness. There is guarding.   Neurological: She is alert and oriented to person, place, and time.   Psychiatric: She has a normal mood and affect.   Vitals reviewed.    Vitals:    08/11/20 1115   BP: 96/65   Pulse: 88   Temp: 97.1 °F (36.2 °C)   SpO2: 90%     Current Outpatient Medications on File Prior to Visit   Medication Sig Dispense Refill   • lamoTRIgine (LaMICtal) 200 MG tablet Take 200 mg by mouth every night at bedtime.     • SUMAtriptan (IMITREX) 100 MG tablet Take 1 tablet by mouth Take As Directed.     • ALBUTEROL SULFATE  (90 Base) MCG/ACT inhaler INHALE ONE PUFF BY MOUTH EVERY 4 TO 6 HOURS 8.5 g 3   • ALPRAZolam (XANAX) 1 MG tablet TAKE ONE TABLET BY MOUTH THREE TIMES DAILY 90 tablet 2   • BREO ELLIPTA 200-25 MCG/INH inhaler INHALE ONE PUFF BY MOUTH DAILY 60 each 3   • fluticasone (CUTIVATE) 0.05 % cream Apply 1 dose topically Daily.  0   • FREESTYLE LITE test strip test TWICE DAILY 90 each 3   • gabapentin (NEURONTIN) 300 MG  capsule Take 1 capsule by mouth 4 (Four) Times a Day.  0   • hydrOXYzine (ATARAX) 25 MG tablet TAKE ONE TABLET BY MOUTH THREE TIMES DAILY 90 tablet 2   • hydrOXYzine (ATARAX) 50 MG tablet TAKE ONE TABLET BY MOUTH THREE TIMES DAILY 90 tablet 1   • Lancets (FREESTYLE) lancets 1 each by Other route 2 (Two) Times a Day.  3   • metFORMIN (GLUCOPHAGE) 500 MG tablet TAKE ONE TABLET BY MOUTH THREE TIMES DAILY 90 tablet 3   • methocarbamol (ROBAXIN) 750 MG tablet TAKE ONE TABLET BY MOUTH THREE TIMES DAILY 90 tablet 0   • metoprolol succinate XL (TOPROL-XL) 25 MG 24 hr tablet TAKE ONE TABLET BY MOUTH EVERY DAY, need appointment for more refills. 90 tablet 2   • NON FORMULARY 1 dose into the nostril(s) as directed by provider Continuous. Oxygen 3lpm overnight and often during daytime     • OLANZapine (zyPREXA) 2.5 MG tablet Take 1 tablet by mouth every night at bedtime. 30 tablet 3   • Omeprazole 20 MG tablet delayed-release TAKE ONE TABLET BY MOUTH TWICE DAILY 90 each 2   • oxyCODONE-acetaminophen (PERCOCET)  MG per tablet Take 1 tablet by mouth 3 (Three) Times a Day.  0   • predniSONE (DELTASONE) 10 MG tablet TAKE ONE TABLET BY MOUTH EVERY DAY 90 tablet 0   • promethazine (PHENERGAN) 25 MG tablet TAKE ONE TABLET BY MOUTH EVERY EIGHT HOURS AS NEEDED FOR NAUSEA AND VOMITING 30 tablet 0   • sertraline (ZOLOFT) 25 MG tablet Take 1 tablet by mouth every night at bedtime. 30 tablet 3   • SPIRIVA RESPIMAT 2.5 MCG/ACT aerosol solution inhaler INHALE TWO PUFFS BY MOUTH EVERY DAY 4 g 3   • topiramate (TOPAMAX) 100 MG tablet One in am and one 1/2 at hs 75 tablet 5   • traZODone (DESYREL) 150 MG tablet Take 2 tablets by mouth Every Night. 60 tablet 3   • vitamin D (ERGOCALCIFEROL) 1.25 MG (67048 UT) capsule capsule Take 1 capsule by mouth 1 (One) Time Per Week. 12 capsule 4     No current facility-administered medications on file prior to visit.            Assessment/Plan   Problems Addressed this Visit        Cardiovascular and  Mediastinum    Essential hypertension     Hypertension is improving with treatment.  Continue current treatment regimen.  Dietary sodium restriction.  Weight loss.  Stop smoking.  Blood pressure will be reassessed in 3 months.         Relevant Orders    Comprehensive metabolic panel    Lipid panel       Respiratory    Chronic obstructive pulmonary disease (CMS/[facility]) - Primary     COPD is improving with treatment.  Discussed monitoring symptoms and use of quick-relief medications and contacting us early in the course of exacerbations.  Warning signs of respiratory distress were reviewed with the patient.   Counseled to avoid exposure to cigarette smoke.  Continue current medications.             Relevant Medications    benzonatate (TESSALON) 200 MG capsule       Endocrine    Type 2 diabetes mellitus with hyperglycemia, without long-term current use of insulin (CMS/[facility])     Diabetes is improving with treatment.   Continue current treatment regimen.  Reminded to bring in blood sugar diary at next visit.  Dietary recommendations for ADA diet.  Regular aerobic exercise.  Discussed ways to avoid symptomatic hypoglycemia.  Discussed sick day management.  Discussed foot care.  Reminded to get yearly retinal exam.  Diabetes will be reassessed in 3 months.         Relevant Orders    Hemoglobin A1c    Comprehensive metabolic panel    Lipid panel    MicroAlbumin, Urine, Random - Urine, Clean Catch       Nervous and Auditory    Right upper quadrant abdominal tenderness    Relevant Orders    Ambulatory Referral to General Surgery       Other    Swelling of abdominal wall - RUQ     Patient is S/P cholecystectomy - RUQ pain ans swelling.  Refer to general surgery.         Relevant Orders    Ambulatory Referral to General Surgery

## 2020-08-16 NOTE — ASSESSMENT & PLAN NOTE
Diabetes is improving with treatment.   Continue current treatment regimen.  Reminded to bring in blood sugar diary at next visit.  Dietary recommendations for ADA diet.  Regular aerobic exercise.  Discussed ways to avoid symptomatic hypoglycemia.  Discussed sick day management.  Discussed foot care.  Reminded to get yearly retinal exam.  Diabetes will be reassessed in 3 months.

## 2020-08-16 NOTE — ASSESSMENT & PLAN NOTE
Hypertension is improving with treatment.  Continue current treatment regimen.  Dietary sodium restriction.  Weight loss.  Stop smoking.  Blood pressure will be reassessed in 3 months.

## 2020-08-23 RX ORDER — HYDROXYZINE 50 MG/1
TABLET, FILM COATED ORAL
Qty: 90 TABLET | Refills: 1 | Status: SHIPPED | OUTPATIENT
Start: 2020-08-23 | End: 2020-10-27

## 2020-08-25 RX ORDER — PROMETHAZINE HYDROCHLORIDE 25 MG/1
25 TABLET ORAL EVERY 8 HOURS PRN
Qty: 30 TABLET | Refills: 0 | Status: SHIPPED | OUTPATIENT
Start: 2020-08-25 | End: 2020-09-24

## 2020-09-02 ENCOUNTER — OFFICE VISIT (OUTPATIENT)
Dept: PSYCHIATRY | Facility: CLINIC | Age: 55
End: 2020-09-02

## 2020-09-02 DIAGNOSIS — F41.1 GENERALIZED ANXIETY DISORDER: ICD-10-CM

## 2020-09-02 DIAGNOSIS — F43.12 CHRONIC POSTTRAUMATIC STRESS DISORDER: ICD-10-CM

## 2020-09-02 DIAGNOSIS — F33.2 SEVERE RECURRENT MAJOR DEPRESSION WITHOUT PSYCHOTIC FEATURES (HCC): Primary | ICD-10-CM

## 2020-09-02 PROCEDURE — 99213 OFFICE O/P EST LOW 20 MIN: CPT | Performed by: PSYCHIATRY & NEUROLOGY

## 2020-09-02 RX ORDER — LAMOTRIGINE 200 MG/1
200 TABLET ORAL
Qty: 30 TABLET | Refills: 3 | Status: SHIPPED | OUTPATIENT
Start: 2020-09-02 | End: 2020-11-18 | Stop reason: SDUPTHER

## 2020-09-02 RX ORDER — ALPRAZOLAM 1 MG/1
1 TABLET ORAL 3 TIMES DAILY
Qty: 90 TABLET | Refills: 2 | Status: SHIPPED | OUTPATIENT
Start: 2020-09-02 | End: 2020-12-30

## 2020-09-02 RX ORDER — SERTRALINE HYDROCHLORIDE 25 MG/1
25 TABLET, FILM COATED ORAL
Qty: 30 TABLET | Refills: 3 | Status: SHIPPED | OUTPATIENT
Start: 2020-09-02 | End: 2020-11-18 | Stop reason: SDUPTHER

## 2020-09-02 RX ORDER — GABAPENTIN 300 MG/1
300 CAPSULE ORAL 4 TIMES DAILY
Qty: 120 CAPSULE | Refills: 2 | Status: SHIPPED | OUTPATIENT
Start: 2020-09-02

## 2020-09-02 RX ORDER — OLANZAPINE 2.5 MG/1
2.5 TABLET ORAL
Qty: 30 TABLET | Refills: 3 | Status: SHIPPED | OUTPATIENT
Start: 2020-09-02 | End: 2020-11-18 | Stop reason: SDUPTHER

## 2020-09-02 NOTE — PATIENT INSTRUCTIONS
Living With Depression  Everyone experiences occasional disappointment, sadness, and loss in their lives. When you are feeling down, blue, or sad for at least 2 weeks in a row, it may mean that you have depression. Depression can affect your thoughts and feelings, relationships, daily activities, and physical health. It is caused by changes in the way your brain functions. If you receive a diagnosis of depression, your health care provider will tell you which type of depression you have and what treatment options are available to you.  If you are living with depression, there are ways to help you recover from it and also ways to prevent it from coming back.  How to cope with lifestyle changes  Coping with stress         Stress is your body’s reaction to life changes and events, both good and bad. Stressful situations may include:  · Getting .  · The death of a spouse.  · Losing a job.  · Retiring.  · Having a baby.  Stress can last just a few hours or it can be ongoing. Stress can play a major role in depression, so it is important to learn both how to cope with stress and how to think about it differently.  Talk with your health care provider or a counselor if you would like to learn more about stress reduction. He or she may suggest some stress reduction techniques, such as:  · Music therapy. This can include creating music or listening to music. Choose music that you enjoy and that inspires you.  · Mindfulness-based meditation. This kind of meditation can be done while sitting or walking. It involves being aware of your normal breaths, rather than trying to control your breathing.  · Centering prayer. This is a kind of meditation that involves focusing on a spiritual word or phrase. Choose a word, phrase, or sacred image that is meaningful to you and that brings you peace.  · Deep breathing. To do this, expand your stomach and inhale slowly through your nose. Hold your breath for 3-5 seconds, then exhale  slowly, allowing your stomach muscles to relax.  · Muscle relaxation. This involves intentionally tensing muscles then relaxing them.  Choose a stress reduction technique that fits your lifestyle and personality. Stress reduction techniques take time and practice to develop. Set aside 5-15 minutes a day to do them. Therapists can offer training in these techniques. The training may be covered by some insurance plans. Other things you can do to manage stress include:  · Keeping a stress diary. This can help you learn what triggers your stress and ways to control your response.  · Understanding what your limits are and saying no to requests or events that lead to a schedule that is too full.  · Thinking about how you respond to certain situations. You may not be able to control everything, but you can control how you react.  · Adding humor to your life by watching funny films or TV shows.  · Making time for activities that help you relax and not feeling guilty about spending your time this way.    Medicines  Your health care provider may suggest certain medicines if he or she feels that they will help improve your condition. Avoid using alcohol and other substances that may prevent your medicines from working properly (may interact). It is also important to:  · Talk with your pharmacist or health care provider about all the medicines that you take, their possible side effects, and what medicines are safe to take together.  · Make it your goal to take part in all treatment decisions (shared decision-making). This includes giving input on the side effects of medicines. It is best if shared decision-making with your health care provider is part of your total treatment plan.  If your health care provider prescribes a medicine, you may not notice the full benefits of it for 4-8 weeks. Most people who are treated for depression need to be on medicine for at least 6-12 months after they feel better. If you are taking  medicines as part of your treatment, do not stop taking medicines without first talking to your health care provider. You may need to have the medicine slowly decreased (tapered) over time to decrease the risk of harmful side effects.  Relationships  Your health care provider may suggest family therapy along with individual therapy and drug therapy. While there may not be family problems that are causing you to feel depressed, it is still important to make sure your family learns as much as they can about your mental health. Having your family’s support can help make your treatment successful.  How to recognize changes in your condition  Everyone has a different response to treatment for depression. Recovery from major depression happens when you have not had signs of major depression for two months. This may mean that you will start to:  · Have more interest in doing activities.  · Feel less hopeless than you did 2 months ago.  · Have more energy.  · Overeat less often, or have better or improving appetite.  · Have better concentration.  Your health care provider will work with you to decide the next steps in your recovery. It is also important to recognize when your condition is getting worse. Watch for these signs:  · Having fatigue or low energy.  · Eating too much or too little.  · Sleeping too much or too little.  · Feeling restless, agitated, or hopeless.  · Having trouble concentrating or making decisions.  · Having unexplained physical complaints.  · Feeling irritable, angry, or aggressive.  Get help as soon as you or your family members notice these symptoms coming back.  How to get support and help from others  How to talk with friends and family members about your condition    Talking to friends and family members about your condition can provide you with one way to get support and guidance. Reach out to trusted friends or family members, explain your symptoms to them, and let them know that you are  working with a health care provider to treat your depression.  Financial resources  Not all insurance plans cover mental health care, so it is important to check with your insurance carrier. If paying for co-pays or counseling services is a problem, search for a local or [facility] mental health care center. They may be able to offer public mental health care services at low or no cost when you are not able to see a private health care provider.  If you are taking medicine for depression, you may be able to get the generic form, which may be less expensive. Some makers of prescription medicines also offer help to patients who cannot afford the medicines they need.  Follow these instructions at home:    · Get the right amount and quality of sleep.  · Cut down on using caffeine, tobacco, alcohol, and other potentially harmful substances.  · Try to exercise, such as walking or lifting small weights.  · Take over-the-counter and prescription medicines only as told by your health care provider.  · Eat a healthy diet that includes plenty of vegetables, fruits, whole grains, low-fat dairy products, and lean protein. Do not eat a lot of foods that are high in solid fats, added sugars, or salt.  · Keep all follow-up visits as told by your health care provider. This is important.  Contact a health care provider if:  · You stop taking your antidepressant medicines, and you have any of these symptoms:  ? Nausea.  ? Headache.  ? Feeling lightheaded.  ? Chills and body aches.  ? Not being able to sleep (insomnia).  · You or your friends and family think your depression is getting worse.  Get help right away if:  · You have thoughts of hurting yourself or others.  If you ever feel like you may hurt yourself or others, or have thoughts about taking your own life, get help right away. You can go to your nearest emergency department or call:  · Your local emergency services (911 in the [place]  · A suicide crisis helpline, such as the  National Suicide Prevention Lifeline at 1-123.512.6922. This is open 24-hours a day.  Summary  · If you are living with depression, there are ways to help you recover from it and also ways to prevent it from coming back.  · Work with your health care team to create a management plan that includes counseling, stress management techniques, and healthy lifestyle habits.  This information is not intended to replace advice given to you by your health care provider. Make sure you discuss any questions you have with your health care provider.  Document Released: 11/20/2017 Document Revised: 04/10/2020 Document Reviewed: 11/20/2017  [employer] Patient Education © 2020 [employer]
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
## 2020-09-03 RX ORDER — METHOCARBAMOL 750 MG/1
TABLET, FILM COATED ORAL
Qty: 90 TABLET | Refills: 0 | Status: SHIPPED | OUTPATIENT
Start: 2020-09-03 | End: 2020-10-04

## 2020-09-24 RX ORDER — BENZONATATE 200 MG/1
CAPSULE ORAL
Qty: 20 CAPSULE | Refills: 0 | Status: SHIPPED | OUTPATIENT
Start: 2020-09-24 | End: 2020-12-21

## 2020-09-24 RX ORDER — PROMETHAZINE HYDROCHLORIDE 25 MG/1
TABLET ORAL
Qty: 30 TABLET | Refills: 0 | Status: SHIPPED | OUTPATIENT
Start: 2020-09-24 | End: 2020-10-30 | Stop reason: SDUPTHER

## 2020-09-25 RX ORDER — TRAZODONE HYDROCHLORIDE 150 MG/1
TABLET ORAL
Qty: 60 TABLET | Refills: 3 | Status: SHIPPED | OUTPATIENT
Start: 2020-09-25 | End: 2020-11-18 | Stop reason: SDUPTHER

## 2020-09-25 RX ORDER — HYDROXYZINE HYDROCHLORIDE 25 MG/1
TABLET, FILM COATED ORAL
Qty: 90 TABLET | Refills: 2 | Status: SHIPPED | OUTPATIENT
Start: 2020-09-25 | End: 2020-12-03

## 2020-09-29 ENCOUNTER — TELEPHONE (OUTPATIENT)
Dept: CARDIOLOGY | Facility: CLINIC | Age: 55
End: 2020-09-29

## 2020-10-02 DIAGNOSIS — E11.9 TYPE 2 DIABETES MELLITUS WITHOUT COMPLICATIONS (HCC): ICD-10-CM

## 2020-10-04 RX ORDER — METHOCARBAMOL 750 MG/1
TABLET, FILM COATED ORAL
Qty: 90 TABLET | Refills: 0 | Status: SHIPPED | OUTPATIENT
Start: 2020-10-04 | End: 2020-11-19 | Stop reason: SDUPTHER

## 2020-10-04 RX ORDER — LANCETS 28 GAUGE
EACH MISCELLANEOUS
Qty: 100 EACH | Refills: 3 | Status: SHIPPED | OUTPATIENT
Start: 2020-10-04

## 2020-10-06 ENCOUNTER — TELEPHONE (OUTPATIENT)
Dept: CARDIOLOGY | Facility: CLINIC | Age: 55
End: 2020-10-06

## 2020-10-27 RX ORDER — HYDROXYZINE 50 MG/1
TABLET, FILM COATED ORAL
Qty: 90 TABLET | Refills: 1 | Status: SHIPPED | OUTPATIENT
Start: 2020-10-27 | End: 2020-12-21

## 2020-10-31 RX ORDER — PROMETHAZINE HYDROCHLORIDE 25 MG/1
25 TABLET ORAL EVERY 8 HOURS PRN
Qty: 30 TABLET | Refills: 0 | Status: SHIPPED | OUTPATIENT
Start: 2020-10-31 | End: 2020-12-07

## 2020-11-05 RX ORDER — PREDNISONE 10 MG/1
TABLET ORAL
Qty: 90 TABLET | Refills: 0 | Status: SHIPPED | OUTPATIENT
Start: 2020-11-05 | End: 2020-12-21

## 2020-11-18 ENCOUNTER — OFFICE VISIT (OUTPATIENT)
Dept: PSYCHIATRY | Facility: CLINIC | Age: 55
End: 2020-11-18

## 2020-11-18 DIAGNOSIS — F43.12 CHRONIC POSTTRAUMATIC STRESS DISORDER: ICD-10-CM

## 2020-11-18 DIAGNOSIS — F41.1 GENERALIZED ANXIETY DISORDER: ICD-10-CM

## 2020-11-18 DIAGNOSIS — F33.2 SEVERE RECURRENT MAJOR DEPRESSION WITHOUT PSYCHOTIC FEATURES (HCC): Primary | ICD-10-CM

## 2020-11-18 PROCEDURE — 99213 OFFICE O/P EST LOW 20 MIN: CPT | Performed by: PSYCHIATRY & NEUROLOGY

## 2020-11-18 RX ORDER — SERTRALINE HYDROCHLORIDE 25 MG/1
25 TABLET, FILM COATED ORAL
Qty: 30 TABLET | Refills: 3 | Status: SHIPPED | OUTPATIENT
Start: 2020-11-18 | End: 2021-03-19 | Stop reason: SDUPTHER

## 2020-11-18 RX ORDER — OLANZAPINE 2.5 MG/1
2.5 TABLET ORAL
Qty: 30 TABLET | Refills: 3 | Status: SHIPPED | OUTPATIENT
Start: 2020-11-18 | End: 2020-12-20

## 2020-11-18 RX ORDER — LAMOTRIGINE 200 MG/1
200 TABLET ORAL
Qty: 30 TABLET | Refills: 3 | Status: SHIPPED | OUTPATIENT
Start: 2020-11-18 | End: 2021-04-14

## 2020-11-18 RX ORDER — TRAZODONE HYDROCHLORIDE 150 MG/1
300 TABLET ORAL NIGHTLY
Qty: 60 TABLET | Refills: 3 | Status: SHIPPED | OUTPATIENT
Start: 2020-11-18 | End: 2021-03-19 | Stop reason: SDUPTHER

## 2020-11-18 NOTE — PATIENT INSTRUCTIONS
Managing Post-Traumatic Stress Disorder  If you have been diagnosed with post-traumatic stress disorder (PTSD), you may be relieved that you now know why you have felt or behaved a certain way. Still, you may feel overwhelmed about the treatment ahead. You may also wonder how to get the support you need and how to deal with the condition day-to-day.  If you are living with PTSD, there are ways to help you recover from it and manage your symptoms.  How to manage lifestyle changes  Managing stress  Stress is your body's reaction to life changes and events, both good and bad. Stress can make PTSD worse. Take the following steps to manage stress:  · Talk with your health care provider or a counselor if you would like to learn more about techniques to reduce your stress. He or she may suggest some stress reduction techniques such as:  ? Muscle relaxation exercises.  ? Regular exercise.  ? Meditation, yoga, or other mind-body exercises.  ? Breathing exercises.  ? Listening to quiet music.  ? Spending time outside.  · Maintain a healthy lifestyle. Eat a healthy diet, exercise regularly, get plenty of sleep, and take time to relax.  · Spend time with others. Talk with them about how you are feeling and what kind of support you need. Try not to isolate yourself, even though you may feel like doing that. Isolating yourself can delay your recovery.  · Do activities and hobbies that you enjoy.  · Pace yourself when doing stressful things. Take breaks, and reward yourself when you finish. Make sure that you do not overload your schedule.    Medicines  Your health care provider may suggest certain medicines if he or she feels that they will help to improve your condition. Medicines for depression (antidepressants) or severe loss of contact with reality (antipsychotics) may be used to treat PTSD. Avoid using alcohol and other substances that may prevent your medicines from working properly. It is also important to:  · Talk with  your pharmacist or health care provider about all medicines that you take, their possible side effects, and which medicines are safe to take together.  · Make it your goal to take part in all treatment decisions (shared decision-making). Ask about possible side effects of medicines that your health care provider recommends, and tell him or her how you feel about having those side effects. It is best if shared decision-making with your health care provider is part of your total treatment plan.  If your health care provider prescribes a medicine, you may not notice the full benefits of it for 4-8 weeks. Most people who are treated for PTSD need to take medicine for at least 6-12 months after they feel better. If you are taking medicines as part of your treatment, do not stop taking medicines before you ask your health care provider if it is safe to stop. You may need to have the medicine slowly decreased (tapered) over time to lower the risk of harmful side effects.  Relationships  Many people who have PTSD have difficulty trusting others. Make an effort to:  · Take risks and develop trust with close friends and family members. Developing trust in others can help you feel safe and connect you with emotional support.  · Be open and honest about your feelings.  · Have fun and relax in safe spaces, such as with friends and family.  · Think about going to couples counseling, family education classes, or family therapy. Your loved ones may not always know how to be supportive. Therapy can be helpful for everyone.  How to recognize changes in your condition  Be aware of your symptoms and how often you have them. The following symptoms mean that you need to seek help for your PTSD:  · You feel suspicious and angry.  · You have repeated flashbacks.  · You avoid going out or being with others.  · You have an increasing number of fights with close friends or family members, such as your spouse.  · You have thoughts about  hurting yourself or others.  · You cannot get relief from feelings of depression or anxiety.  Follow these instructions at home:  Lifestyle  · Exercise regularly. Try to do 30 or more minutes of physical activity on most days of the week.  · Try to get 7-9 hours of sleep each night. To help with sleep:  ? Keep your bedroom cool and dark.  ? Avoid screen time before bedtime. This means avoiding use of your TV, computer, tablet, and cell phone.  · Practice self-soothing skills and use them daily.  · Try to have fun and seek humor in your life.  Eating and drinking  · Do not eat a heavy meal during the hour before you go to bed.  · Do not drink alcohol or caffeinated drinks before bed.  · Avoid using alcohol or drugs.  General instructions  · If your PTSD is affecting your marriage or family, seek help from a family therapist.  · Take over-the-counter and prescription medicines only as told by your health care provider.  · Make sure to let all of your health care providers know that you have PTSD. This is especially important if you are having surgery or need to be admitted to the hospital.  · Keep all follow-up visits as told by your health care providers. This is important.  Where to find support  Talking to others  · Explain that PTSD is a mental health problem. It is something that a person can develop after experiencing or seeing a life-threatening event. Tell them that PTSD makes you feel stress like you did during the event.  · Talk to your loved ones about the symptoms you have. Also tell them what things or situations can cause symptoms to start (are triggers for you).  · Assure your loved ones that there are treatments to help PTSD. Discuss possibly seeking family therapy or couples therapy.  · If you are worried or fearful about seeking treatment, ask for support.  · Keep daily contact with at least one trusted friend or family member.  Finances  Not all insurance plans cover mental health care, so it is  important to check with your insurance carrier. If paying for co-pays or counseling services is a problem, search for a local or [facility] mental health care center. Public mental health care services may be offered there at a low cost or no cost when you are not able to see a private health care provider. If you are a , contact a local veterans organization or [facility] for more information.  If you are taking medicine for PTSD, you may be able to get the genericform, which may be less expensive than brand-name medicine. Some makers of prescription medicines also offer help to patients who cannot afford the medicines that they need.  Therapy and support groups  · Find a support group in your community. Often, groups are available for  veterans, trauma victims, and family members or caregivers.  · Look into volunteer opportunities. Taking part in these can help you feel more connected to your community.  · Contact a local organization to find out if you are eligible for a service dog.  Where to find more information  Go to this website to find more information about PTSD, treatment of PTSD, and how to get support:  · National Center for PTSD: www.ptsd.va.gov  Contact a health care provider if:  · Your symptoms get worse or do not get better.  Get help right away if:  · You have thoughts about hurting yourself or others.  If you ever feel like you may hurt yourself or others, or have thoughts about taking your own life, get help right away. You can go to your nearest emergency department or call:  · Your local emergency services (911 in the [place]  · A suicide crisis helpline, such as the National Suicide Prevention Lifeline at 1-815.149.7331. This is open 24-hours a day.  Summary  · If you are living with PTSD, there are ways to help you recover from it and manage your symptoms.  · Find supportive environments and people who understand PTSD. Spend time in those places, and maintain contact with  those people.  · Work with your health care team to create a plan for managing PTSD. The plan should include counseling, stress reduction techniques, and healthy lifestyle habits.  This information is not intended to replace advice given to you by your health care provider. Make sure you discuss any questions you have with your health care provider.  Document Released: 04/19/2018 Document Revised: 04/10/2020 Document Reviewed: 04/19/2018  Elsevier Patient Education © 2020 [employer]
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
## 2020-11-19 RX ORDER — METHOCARBAMOL 750 MG/1
750 TABLET, FILM COATED ORAL 3 TIMES DAILY
Qty: 90 TABLET | Refills: 0 | Status: SHIPPED | OUTPATIENT
Start: 2020-11-19 | End: 2020-12-28

## 2020-11-22 RX ORDER — OMEPRAZOLE 20 MG/1
TABLET, DELAYED RELEASE ORAL
Qty: 90 EACH | Refills: 2 | Status: SHIPPED | OUTPATIENT
Start: 2020-11-22 | End: 2020-12-21

## 2020-12-03 ENCOUNTER — OFFICE VISIT (OUTPATIENT)
Dept: CARDIOLOGY | Facility: CLINIC | Age: 55
End: 2020-12-03

## 2020-12-03 VITALS
WEIGHT: 129 LBS | BODY MASS INDEX: 20.73 KG/M2 | HEIGHT: 66 IN | OXYGEN SATURATION: 91 % | HEART RATE: 71 BPM | TEMPERATURE: 97.1 F | SYSTOLIC BLOOD PRESSURE: 85 MMHG | DIASTOLIC BLOOD PRESSURE: 51 MMHG

## 2020-12-03 DIAGNOSIS — R42 DIZZINESS: Primary | ICD-10-CM

## 2020-12-03 DIAGNOSIS — E11.9 TYPE 2 DIABETES MELLITUS WITHOUT COMPLICATION, WITH LONG-TERM CURRENT USE OF INSULIN (HCC): ICD-10-CM

## 2020-12-03 DIAGNOSIS — Z79.4 TYPE 2 DIABETES MELLITUS WITHOUT COMPLICATION, WITH LONG-TERM CURRENT USE OF INSULIN (HCC): ICD-10-CM

## 2020-12-03 DIAGNOSIS — R55 SYNCOPE AND COLLAPSE: ICD-10-CM

## 2020-12-03 DIAGNOSIS — R07.2 PRECORDIAL PAIN: ICD-10-CM

## 2020-12-03 DIAGNOSIS — I95.89 CHRONIC HYPOTENSION: ICD-10-CM

## 2020-12-03 DIAGNOSIS — J43.1 PANLOBULAR EMPHYSEMA (HCC): ICD-10-CM

## 2020-12-03 DIAGNOSIS — Z45.09 ENCOUNTER FOR LOOP RECORDER AT END OF BATTERY LIFE: ICD-10-CM

## 2020-12-03 PROCEDURE — 99214 OFFICE O/P EST MOD 30 MIN: CPT | Performed by: INTERNAL MEDICINE

## 2020-12-03 RX ORDER — MIDODRINE HYDROCHLORIDE 10 MG/1
10 TABLET ORAL
Qty: 90 TABLET | Refills: 5 | Status: SHIPPED | OUTPATIENT
Start: 2020-12-03 | End: 2021-05-14

## 2020-12-03 NOTE — PROGRESS NOTES
Subjective:     Encounter Date:12/03/2020      Patient ID: Melvi Rayo is a 55 y.o. female.    Chief Complaint : Follow-up for chronic chest pain, chronic dizziness, chronic hypotension, implantable loop recorder.  History of Present Illness        This is a 55-year-old white female with past medical history of     #. Recurrent syncope status post IL are 05/16/2014, require explantation 09/03/2014 because of patient's insistence and pain,, Orthostatic hypotension possibly due to autonomic insufficiency  #  Recurrent CHEST PAIN  AND NEGATIVE Cath, 3/25/15,  2014,  and 2005  #. Diabetes, COPD, asthma, tobacco abuse  #. Questionable history of CVA and TIA in the past details are not available  #.  cholecystectomy, MARY JANE, next surgery, GERD, Diabetic neuropathy  #Cigarette smoker      here for  followup.  Patient is complaining of recurrent dizziness with no aggravating or relieving factors.  Patient's arterial blood pressure is low at 85/51.  Heart rate 71, O2 sat of 91% on room air.  Patient has chronic chest pain will schedule for stress test in the past and patient canceled and refused to have stress test.  Patient has reproducible tenderness in midsternal area.  Labs done 12/06/2018 revealed a hemoglobin A1c of 5.5.  CMP, CBC was normal.  Labs from 2/28/2020 revealed glucose of 121 otherwise normal Chem-7 and CBC.  Patient continues to smoke in spite of counseling.  Patient's ILR generator is end-of-life.       ASSESSMENT:  #ILR end-of-life  #Chronic hypotension  #  chest pain with reproducible tenderness, atypical for ischemia  #  autonomic insufficiency  #  bradycardia  #. diabetes and  autonomic a insufficiency  #. COPD tobacco abuse      PLAN:  Patient's blood pressure is low we will start her on midodrine.  Risk benefits alternatives explained.  Patient's ILR is end-of-life will schedule explained.  Counseled on smoking cessation.  Patient's chest pain i is of unclear etiology. patient had a cardiac  catheterization in 2015 and 2014 which were normal. but she continues to smoke and has diabetes Will schedule for Lexiscan Cardiolite to help guide therapy, patient refused to have stress test..  Advised follow-up closely with PMD.  Patient states her diabetes is running well.        Assessment:          Diagnosis Plan   1. Dizziness  COVID PRE-OP / PRE-PROCEDURE SCREENING ORDER (NO ISOLATION) - Swab, Nasopharynx    Loop Recorder Explant - Treatment Room   2. Chronic hypotension  COVID PRE-OP / PRE-PROCEDURE SCREENING ORDER (NO ISOLATION) - Swab, Nasopharynx    Loop Recorder Explant - Treatment Room   3. Precordial pain  COVID PRE-OP / PRE-PROCEDURE SCREENING ORDER (NO ISOLATION) - Swab, Nasopharynx    Loop Recorder Explant - Treatment Room   4. Encounter for loop recorder at end of battery life  COVID PRE-OP / PRE-PROCEDURE SCREENING ORDER (NO ISOLATION) - Swab, Nasopharynx    Loop Recorder Explant - Treatment Room   5. Syncope and collapse  COVID PRE-OP / PRE-PROCEDURE SCREENING ORDER (NO ISOLATION) - Swab, Nasopharynx    Loop Recorder Explant - Treatment Room   6. Type 2 diabetes mellitus without complication, with long-term current use of insulin (CMS/[facility])  COVID PRE-OP / PRE-PROCEDURE SCREENING ORDER (NO ISOLATION) - Swab, Nasopharynx    Loop Recorder Explant - Treatment Room   7. Panlobular emphysema (CMS/HCC)  COVID PRE-OP / PRE-PROCEDURE SCREENING ORDER (NO ISOLATION) - Swab, Nasopharynx    Loop Recorder Explant - Treatment Room          Plan:         Past Medical History:  Past Medical History:   Diagnosis Date   • Anxiety    • Asthma    • Breast cancer (CMS/[facility])    • Chest tightness    • COPD (chronic obstructive pulmonary disease) (CMS/[facility])    • Degenerative disorder of bone    • Foot pain     S/P multiple foot surguries.   • GERD (gastroesophageal reflux disease)    • Lesion of eye     Lesion behind eye, cancer associated retinopathopthy. Documented from [facility].    • Low back pain    • Migraines    •  Neck nodule    • Pancreatitis    • RA (rheumatoid arthritis) (CMS/HCC)    • Seizure disorder (CMS/HCC)     Generalized seizure, possible partial complex.   • Skin cancer     Age 18.   • Stroke (CMS/HCC)    • Type 2 diabetes mellitus (CMS/HCC)      Past Surgical History:  Past Surgical History:   Procedure Laterality Date   • CARDIAC CATHETERIZATION      x2   • CARDIAC CATHETERIZATION  2015   •  SECTION      x2   • ESOPHAGEAL DILATATION     • FOOT SURGERY  2015   • FOOT SURGERY Left 2016   • FOOT SURGERY Right 2017   • MASTECTOMY Bilateral    • OTHER SURGICAL HISTORY  2017    LOOP Recorder   • DC  2016    [facility]   • TOTAL ABDOMINAL HYSTERECTOMY WITH SALPINGO OOPHORECTOMY        Allergies:  Allergies   Allergen Reactions   • Aspirin Palpitations   • Codeine Shortness Of Breath   • Contrast Dye Shortness Of Breath   • Erythromycin Hives   • Morphine Unknown (See Comments)   • Penicillin G Itching   • Sulfa Antibiotics Unknown (See Comments)     Home Meds:  Current Meds:     Current Outpatient Medications:   •  ALBUTEROL SULFATE  (90 Base) MCG/ACT inhaler, INHALE ONE PUFF BY MOUTH EVERY 4 TO 6 HOURS, Disp: 8.5 g, Rfl: 3  •  ALPRAZolam (XANAX) 1 MG tablet, Take 1 tablet by mouth 3 (Three) Times a Day., Disp: 90 tablet, Rfl: 2  •  benzonatate (TESSALON) 200 MG capsule, TAKE ONE CAPSULE BY MOUTH THREE TIMES DAILY AS NEEDED FOR cough, Disp: 20 capsule, Rfl: 0  •  Breo Ellipta 200-25 MCG/INH inhaler, INHALE ONE PUFF BY MOUTH DAILY, Disp: 60 each, Rfl: 3  •  fluticasone (CUTIVATE) 0.05 % cream, Apply 1 dose topically Daily., Disp: , Rfl: 0  •  FREESTYLE LITE test strip, test TWICE DAILY, Disp: 90 each, Rfl: 3  •  gabapentin (NEURONTIN) 300 MG capsule, Take 1 capsule by mouth 4 (Four) Times a Day., Disp: 120 capsule, Rfl: 2  •  GNP Omeprazole 20 MG tablet delayed-release, TAKE ONE TABLET BY MOUTH TWICE DAILY, Disp: 90 each, Rfl: 2  •  hydrOXYzine (ATARAX) 50 MG tablet, TAKE ONE  TABLET BY MOUTH THREE TIMES DAILY, Disp: 90 tablet, Rfl: 1  •  lamoTRIgine (LaMICtal) 200 MG tablet, Take 1 tablet by mouth every night at bedtime., Disp: 30 tablet, Rfl: 3  •  Lancets (freestyle) lancets, TEST EVERY DAY, Disp: 100 each, Rfl: 3  •  metFORMIN (GLUCOPHAGE) 500 MG tablet, TAKE ONE TABLET BY MOUTH THREE TIMES DAILY, Disp: 90 tablet, Rfl: 3  •  methocarbamol (ROBAXIN) 750 MG tablet, Take 1 tablet by mouth 3 (Three) Times a Day., Disp: 90 tablet, Rfl: 0  •  metoprolol succinate XL (TOPROL-XL) 25 MG 24 hr tablet, TAKE ONE TABLET BY MOUTH EVERY DAY, need appointment for more refills., Disp: 90 tablet, Rfl: 2  •  NON FORMULARY, 1 dose into the nostril(s) as directed by provider Continuous. Oxygen 3lpm overnight and often during daytime, Disp: , Rfl:   •  OLANZapine (zyPREXA) 2.5 MG tablet, Take 1 tablet by mouth every night at bedtime., Disp: 30 tablet, Rfl: 3  •  oxyCODONE-acetaminophen (PERCOCET)  MG per tablet, Take 1 tablet by mouth 3 (Three) Times a Day., Disp: , Rfl: 0  •  predniSONE (DELTASONE) 10 MG tablet, TAKE ONE TABLET BY MOUTH EVERY DAY, Disp: 90 tablet, Rfl: 0  •  promethazine (PHENERGAN) 25 MG tablet, Take 1 tablet by mouth Every 8 (Eight) Hours As Needed for Nausea or Vomiting., Disp: 30 tablet, Rfl: 0  •  sertraline (ZOLOFT) 25 MG tablet, Take 1 tablet by mouth every night at bedtime., Disp: 30 tablet, Rfl: 3  •  SPIRIVA RESPIMAT 2.5 MCG/ACT aerosol solution inhaler, INHALE TWO PUFFS BY MOUTH EVERY DAY, Disp: 4 g, Rfl: 3  •  SUMAtriptan (IMITREX) 100 MG tablet, Take 1 tablet by mouth Take As Directed., Disp: , Rfl:   •  topiramate (TOPAMAX) 100 MG tablet, One in am and one 1/2 at hs, Disp: 75 tablet, Rfl: 5  •  traZODone (DESYREL) 150 MG tablet, Take 2 tablets by mouth Every Night., Disp: 60 tablet, Rfl: 3  •  vitamin D (ERGOCALCIFEROL) 1.25 MG (23266 UT) capsule capsule, Take 1 capsule by mouth 1 (One) Time Per Week., Disp: 12 capsule, Rfl: 4  •  midodrine (PROAMATINE) 10 MG tablet,  "Take 1 tablet by mouth 3 (Three) Times a Day Before Meals., Disp: 90 tablet, Rfl: 5  Social History:   Social History     Tobacco Use   • Smoking status: Current Every Day Smoker     Types: Cigarettes   • Smokeless tobacco: Never Used   Substance Use Topics   • Alcohol use: No     Frequency: Never      Family History:  Family History   Problem Relation Age of Onset   • Heart disease Mother    • Stroke Mother    • Hyperlipidemia Mother    • Hypertension Mother    • Heart disease Father    • Stroke Father    • Hypertension Father    • Hyperlipidemia Father    • Heart disease Other    • COPD Other    • Cancer Other    • Diabetes Other    • Hypertension Other    • Hyperlipidemia Other    • Stroke Other         The following portions of the patient's history were reviewed and updated as appropriate: allergies, current medications, past family history, past medical history, past social history, past surgical history and problem list.      Review of Systems   Cardiovascular: Positive for chest pain. Negative for leg swelling and palpitations.   Respiratory: Positive for shortness of breath.    Neurological: Positive for dizziness and numbness.     All other systems are negative    Procedures EKG from 6/18/2020 reviewed by me shows sinus rhythm with rate of 70 bpm with nonspecific ST-T changes       Objective:     Physical Exam  BP (!) 85/51 (BP Location: Left arm, Patient Position: Sitting, Cuff Size: Large Adult)   Pulse 71   Temp 97.1 °F (36.2 °C)   Ht 167.6 cm (66\")   Wt 58.5 kg (129 lb)   SpO2 91%   BMI 20.82 kg/m²   General:  Appears in no acute distress  Eyes: Sclera is anicteric,  conjunctiva is clear   HEENT:  No JVD. Thyroid not visibly enlarged. No mucosal pallor or cyanosis  Respiratory: Respirations regular and unlabored at rest.  Bilaterally good breath sounds, with good air entry in all fields. No crackles, rubs or wheezes auscultated  Cardiovascular: S1,S2 Regular rate and rhythm. No murmur, rub or " gallop auscultated. No pretibial pitting edema.  Reproducible tenderness to chest wall palpitation present.  Gastrointestinal: Abdomen soft, flat, non tender. Bowel sounds present.   Musculoskeletal:  No abnormal movements  Extremities: No digital clubbing or cyanosis  Skin: Color pink. Skin warm and dry to touch. No rashes  No xanthoma  Neuro: Alert and awake, no lateralizing deficits appreciated  Physical exam is unchanged.  Lab Reviewed:

## 2020-12-03 NOTE — H&P (VIEW-ONLY)
Subjective:     Encounter Date:12/03/2020      Patient ID: Melvi Rayo is a 55 y.o. female.    Chief Complaint : Follow-up for chronic chest pain, chronic dizziness, chronic hypotension, implantable loop recorder.  History of Present Illness        This is a 55-year-old white female with past medical history of     #. Recurrent syncope status post IL are 05/16/2014, require explantation 09/03/2014 because of patient's insistence and pain,, Orthostatic hypotension possibly due to autonomic insufficiency  #  Recurrent CHEST PAIN  AND NEGATIVE Cath, 3/25/15,  2014,  and 2005  #. Diabetes, COPD, asthma, tobacco abuse  #. Questionable history of CVA and TIA in the past details are not available  #.  cholecystectomy, MARY JANE, next surgery, GERD, Diabetic neuropathy  #Cigarette smoker      here for  followup.  Patient is complaining of recurrent dizziness with no aggravating or relieving factors.  Patient's arterial blood pressure is low at 85/51.  Heart rate 71, O2 sat of 91% on room air.  Patient has chronic chest pain will schedule for stress test in the past and patient canceled and refused to have stress test.  Patient has reproducible tenderness in midsternal area.  Labs done 12/06/2018 revealed a hemoglobin A1c of 5.5.  CMP, CBC was normal.  Labs from 2/28/2020 revealed glucose of 121 otherwise normal Chem-7 and CBC.  Patient continues to smoke in spite of counseling.  Patient's ILR generator is end-of-life.       ASSESSMENT:  #ILR end-of-life  #Chronic hypotension  #  chest pain with reproducible tenderness, atypical for ischemia  #  autonomic insufficiency  #  bradycardia  #. diabetes and  autonomic a insufficiency  #. COPD tobacco abuse      PLAN:  Patient's blood pressure is low we will start her on midodrine.  Risk benefits alternatives explained.  Patient's ILR is end-of-life will schedule explained.  Counseled on smoking cessation.  Patient's chest pain i is of unclear etiology. patient had a cardiac  catheterization in 2015 and 2014 which were normal. but she continues to smoke and has diabetes Will schedule for Lexiscan Cardiolite to help guide therapy, patient refused to have stress test..  Advised follow-up closely with PMD.  Patient states her diabetes is running well.        Assessment:          Diagnosis Plan   1. Dizziness  COVID PRE-OP / PRE-PROCEDURE SCREENING ORDER (NO ISOLATION) - Swab, Nasopharynx    Loop Recorder Explant - Treatment Room   2. Chronic hypotension  COVID PRE-OP / PRE-PROCEDURE SCREENING ORDER (NO ISOLATION) - Swab, Nasopharynx    Loop Recorder Explant - Treatment Room   3. Precordial pain  COVID PRE-OP / PRE-PROCEDURE SCREENING ORDER (NO ISOLATION) - Swab, Nasopharynx    Loop Recorder Explant - Treatment Room   4. Encounter for loop recorder at end of battery life  COVID PRE-OP / PRE-PROCEDURE SCREENING ORDER (NO ISOLATION) - Swab, Nasopharynx    Loop Recorder Explant - Treatment Room   5. Syncope and collapse  COVID PRE-OP / PRE-PROCEDURE SCREENING ORDER (NO ISOLATION) - Swab, Nasopharynx    Loop Recorder Explant - Treatment Room   6. Type 2 diabetes mellitus without complication, with long-term current use of insulin (CMS/[facility])  COVID PRE-OP / PRE-PROCEDURE SCREENING ORDER (NO ISOLATION) - Swab, Nasopharynx    Loop Recorder Explant - Treatment Room   7. Panlobular emphysema (CMS/HCC)  COVID PRE-OP / PRE-PROCEDURE SCREENING ORDER (NO ISOLATION) - Swab, Nasopharynx    Loop Recorder Explant - Treatment Room          Plan:         Past Medical History:  Past Medical History:   Diagnosis Date   • Anxiety    • Asthma    • Breast cancer (CMS/[facility])    • Chest tightness    • COPD (chronic obstructive pulmonary disease) (CMS/[facility])    • Degenerative disorder of bone    • Foot pain     S/P multiple foot surguries.   • GERD (gastroesophageal reflux disease)    • Lesion of eye     Lesion behind eye, cancer associated retinopathopthy. Documented from [facility].    • Low back pain    • Migraines    •  Neck nodule    • Pancreatitis    • RA (rheumatoid arthritis) (CMS/HCC)    • Seizure disorder (CMS/HCC)     Generalized seizure, possible partial complex.   • Skin cancer     Age 18.   • Stroke (CMS/HCC)    • Type 2 diabetes mellitus (CMS/HCC)      Past Surgical History:  Past Surgical History:   Procedure Laterality Date   • CARDIAC CATHETERIZATION      x2   • CARDIAC CATHETERIZATION  2015   •  SECTION      x2   • ESOPHAGEAL DILATATION     • FOOT SURGERY  2015   • FOOT SURGERY Left 2016   • FOOT SURGERY Right 2017   • MASTECTOMY Bilateral    • OTHER SURGICAL HISTORY  2017    LOOP Recorder   • DC  2016    [facility]   • TOTAL ABDOMINAL HYSTERECTOMY WITH SALPINGO OOPHORECTOMY        Allergies:  Allergies   Allergen Reactions   • Aspirin Palpitations   • Codeine Shortness Of Breath   • Contrast Dye Shortness Of Breath   • Erythromycin Hives   • Morphine Unknown (See Comments)   • Penicillin G Itching   • Sulfa Antibiotics Unknown (See Comments)     Home Meds:  Current Meds:     Current Outpatient Medications:   •  ALBUTEROL SULFATE  (90 Base) MCG/ACT inhaler, INHALE ONE PUFF BY MOUTH EVERY 4 TO 6 HOURS, Disp: 8.5 g, Rfl: 3  •  ALPRAZolam (XANAX) 1 MG tablet, Take 1 tablet by mouth 3 (Three) Times a Day., Disp: 90 tablet, Rfl: 2  •  benzonatate (TESSALON) 200 MG capsule, TAKE ONE CAPSULE BY MOUTH THREE TIMES DAILY AS NEEDED FOR cough, Disp: 20 capsule, Rfl: 0  •  Breo Ellipta 200-25 MCG/INH inhaler, INHALE ONE PUFF BY MOUTH DAILY, Disp: 60 each, Rfl: 3  •  fluticasone (CUTIVATE) 0.05 % cream, Apply 1 dose topically Daily., Disp: , Rfl: 0  •  FREESTYLE LITE test strip, test TWICE DAILY, Disp: 90 each, Rfl: 3  •  gabapentin (NEURONTIN) 300 MG capsule, Take 1 capsule by mouth 4 (Four) Times a Day., Disp: 120 capsule, Rfl: 2  •  GNP Omeprazole 20 MG tablet delayed-release, TAKE ONE TABLET BY MOUTH TWICE DAILY, Disp: 90 each, Rfl: 2  •  hydrOXYzine (ATARAX) 50 MG tablet, TAKE ONE  TABLET BY MOUTH THREE TIMES DAILY, Disp: 90 tablet, Rfl: 1  •  lamoTRIgine (LaMICtal) 200 MG tablet, Take 1 tablet by mouth every night at bedtime., Disp: 30 tablet, Rfl: 3  •  Lancets (freestyle) lancets, TEST EVERY DAY, Disp: 100 each, Rfl: 3  •  metFORMIN (GLUCOPHAGE) 500 MG tablet, TAKE ONE TABLET BY MOUTH THREE TIMES DAILY, Disp: 90 tablet, Rfl: 3  •  methocarbamol (ROBAXIN) 750 MG tablet, Take 1 tablet by mouth 3 (Three) Times a Day., Disp: 90 tablet, Rfl: 0  •  metoprolol succinate XL (TOPROL-XL) 25 MG 24 hr tablet, TAKE ONE TABLET BY MOUTH EVERY DAY, need appointment for more refills., Disp: 90 tablet, Rfl: 2  •  NON FORMULARY, 1 dose into the nostril(s) as directed by provider Continuous. Oxygen 3lpm overnight and often during daytime, Disp: , Rfl:   •  OLANZapine (zyPREXA) 2.5 MG tablet, Take 1 tablet by mouth every night at bedtime., Disp: 30 tablet, Rfl: 3  •  oxyCODONE-acetaminophen (PERCOCET)  MG per tablet, Take 1 tablet by mouth 3 (Three) Times a Day., Disp: , Rfl: 0  •  predniSONE (DELTASONE) 10 MG tablet, TAKE ONE TABLET BY MOUTH EVERY DAY, Disp: 90 tablet, Rfl: 0  •  promethazine (PHENERGAN) 25 MG tablet, Take 1 tablet by mouth Every 8 (Eight) Hours As Needed for Nausea or Vomiting., Disp: 30 tablet, Rfl: 0  •  sertraline (ZOLOFT) 25 MG tablet, Take 1 tablet by mouth every night at bedtime., Disp: 30 tablet, Rfl: 3  •  SPIRIVA RESPIMAT 2.5 MCG/ACT aerosol solution inhaler, INHALE TWO PUFFS BY MOUTH EVERY DAY, Disp: 4 g, Rfl: 3  •  SUMAtriptan (IMITREX) 100 MG tablet, Take 1 tablet by mouth Take As Directed., Disp: , Rfl:   •  topiramate (TOPAMAX) 100 MG tablet, One in am and one 1/2 at hs, Disp: 75 tablet, Rfl: 5  •  traZODone (DESYREL) 150 MG tablet, Take 2 tablets by mouth Every Night., Disp: 60 tablet, Rfl: 3  •  vitamin D (ERGOCALCIFEROL) 1.25 MG (13562 UT) capsule capsule, Take 1 capsule by mouth 1 (One) Time Per Week., Disp: 12 capsule, Rfl: 4  •  midodrine (PROAMATINE) 10 MG tablet,  "Take 1 tablet by mouth 3 (Three) Times a Day Before Meals., Disp: 90 tablet, Rfl: 5  Social History:   Social History     Tobacco Use   • Smoking status: Current Every Day Smoker     Types: Cigarettes   • Smokeless tobacco: Never Used   Substance Use Topics   • Alcohol use: No     Frequency: Never      Family History:  Family History   Problem Relation Age of Onset   • Heart disease Mother    • Stroke Mother    • Hyperlipidemia Mother    • Hypertension Mother    • Heart disease Father    • Stroke Father    • Hypertension Father    • Hyperlipidemia Father    • Heart disease Other    • COPD Other    • Cancer Other    • Diabetes Other    • Hypertension Other    • Hyperlipidemia Other    • Stroke Other         The following portions of the patient's history were reviewed and updated as appropriate: allergies, current medications, past family history, past medical history, past social history, past surgical history and problem list.      Review of Systems   Cardiovascular: Positive for chest pain. Negative for leg swelling and palpitations.   Respiratory: Positive for shortness of breath.    Neurological: Positive for dizziness and numbness.     All other systems are negative    Procedures EKG from 6/18/2020 reviewed by me shows sinus rhythm with rate of 70 bpm with nonspecific ST-T changes       Objective:     Physical Exam  BP (!) 85/51 (BP Location: Left arm, Patient Position: Sitting, Cuff Size: Large Adult)   Pulse 71   Temp 97.1 °F (36.2 °C)   Ht 167.6 cm (66\")   Wt 58.5 kg (129 lb)   SpO2 91%   BMI 20.82 kg/m²   General:  Appears in no acute distress  Eyes: Sclera is anicteric,  conjunctiva is clear   HEENT:  No JVD. Thyroid not visibly enlarged. No mucosal pallor or cyanosis  Respiratory: Respirations regular and unlabored at rest.  Bilaterally good breath sounds, with good air entry in all fields. No crackles, rubs or wheezes auscultated  Cardiovascular: S1,S2 Regular rate and rhythm. No murmur, rub or " gallop auscultated. No pretibial pitting edema.  Reproducible tenderness to chest wall palpitation present.  Gastrointestinal: Abdomen soft, flat, non tender. Bowel sounds present.   Musculoskeletal:  No abnormal movements  Extremities: No digital clubbing or cyanosis  Skin: Color pink. Skin warm and dry to touch. No rashes  No xanthoma  Neuro: Alert and awake, no lateralizing deficits appreciated  Physical exam is unchanged.  Lab Reviewed:

## 2020-12-07 ENCOUNTER — TELEPHONE (OUTPATIENT)
Dept: CARDIOLOGY | Facility: CLINIC | Age: 55
End: 2020-12-07

## 2020-12-07 RX ORDER — PROMETHAZINE HYDROCHLORIDE 25 MG/1
TABLET ORAL
Qty: 30 TABLET | Refills: 0 | Status: SHIPPED | OUTPATIENT
Start: 2020-12-07 | End: 2020-12-21

## 2020-12-19 ENCOUNTER — LAB (OUTPATIENT)
Dept: LAB | Facility: HOSPITAL | Age: 55
End: 2020-12-19

## 2020-12-19 DIAGNOSIS — R07.2 PRECORDIAL PAIN: ICD-10-CM

## 2020-12-19 DIAGNOSIS — I95.89 CHRONIC HYPOTENSION: ICD-10-CM

## 2020-12-19 DIAGNOSIS — R55 SYNCOPE AND COLLAPSE: ICD-10-CM

## 2020-12-19 DIAGNOSIS — E11.9 TYPE 2 DIABETES MELLITUS WITHOUT COMPLICATION, WITH LONG-TERM CURRENT USE OF INSULIN (HCC): ICD-10-CM

## 2020-12-19 DIAGNOSIS — J43.1 PANLOBULAR EMPHYSEMA (HCC): ICD-10-CM

## 2020-12-19 DIAGNOSIS — R42 DIZZINESS: ICD-10-CM

## 2020-12-19 DIAGNOSIS — Z79.4 TYPE 2 DIABETES MELLITUS WITHOUT COMPLICATION, WITH LONG-TERM CURRENT USE OF INSULIN (HCC): ICD-10-CM

## 2020-12-19 DIAGNOSIS — Z45.09 ENCOUNTER FOR LOOP RECORDER AT END OF BATTERY LIFE: ICD-10-CM

## 2020-12-19 PROCEDURE — U0004 COV-19 TEST NON-CDC HGH THRU: HCPCS

## 2020-12-19 PROCEDURE — C9803 HOPD COVID-19 SPEC COLLECT: HCPCS

## 2020-12-20 DIAGNOSIS — F33.2 SEVERE RECURRENT MAJOR DEPRESSION WITHOUT PSYCHOTIC FEATURES (HCC): ICD-10-CM

## 2020-12-20 LAB — SARS-COV-2 RNA RESP QL NAA+PROBE: NOT DETECTED

## 2020-12-20 RX ORDER — OLANZAPINE 2.5 MG/1
TABLET ORAL
Qty: 30 TABLET | Refills: 3 | Status: SHIPPED | OUTPATIENT
Start: 2020-12-20 | End: 2020-12-21

## 2020-12-21 RX ORDER — OLANZAPINE 2.5 MG/1
2.5 TABLET ORAL NIGHTLY
COMMUNITY
End: 2021-03-19 | Stop reason: SDUPTHER

## 2020-12-21 RX ORDER — HYDROXYZINE 50 MG/1
50 TABLET, FILM COATED ORAL 3 TIMES DAILY
COMMUNITY
End: 2020-12-30

## 2020-12-21 RX ORDER — PROMETHAZINE HYDROCHLORIDE 25 MG/1
25 TABLET ORAL 3 TIMES DAILY
COMMUNITY
End: 2021-01-08

## 2020-12-21 RX ORDER — METOPROLOL SUCCINATE 25 MG/1
25 TABLET, EXTENDED RELEASE ORAL DAILY
COMMUNITY
End: 2021-04-20

## 2020-12-21 RX ORDER — OMEPRAZOLE 20 MG/1
20 CAPSULE, DELAYED RELEASE ORAL 2 TIMES DAILY
COMMUNITY

## 2020-12-21 RX ORDER — BENZONATATE 200 MG/1
200 CAPSULE ORAL 3 TIMES DAILY PRN
COMMUNITY
End: 2021-04-07

## 2020-12-21 RX ORDER — TOPIRAMATE 100 MG/1
100 TABLET, FILM COATED ORAL 2 TIMES DAILY
COMMUNITY
End: 2021-07-15

## 2020-12-21 RX ORDER — ALBUTEROL SULFATE 90 UG/1
2 AEROSOL, METERED RESPIRATORY (INHALATION) EVERY 4 HOURS PRN
COMMUNITY
End: 2021-01-08

## 2020-12-21 RX ORDER — ERGOCALCIFEROL 1.25 MG/1
50000 CAPSULE ORAL WEEKLY
COMMUNITY
End: 2020-12-28

## 2020-12-21 RX ORDER — PREDNISONE 10 MG/1
10 TABLET ORAL DAILY
COMMUNITY
End: 2021-01-27

## 2020-12-21 RX ORDER — TIOTROPIUM BROMIDE INHALATION SPRAY 3.12 UG/1
2 SPRAY, METERED RESPIRATORY (INHALATION)
Status: ON HOLD | COMMUNITY
End: 2022-05-18

## 2020-12-22 ENCOUNTER — HOSPITAL ENCOUNTER (OUTPATIENT)
Dept: CARDIOLOGY | Facility: HOSPITAL | Age: 55
Discharge: HOME | End: 2020-12-22
Admitting: INTERNAL MEDICINE

## 2020-12-22 VITALS
SYSTOLIC BLOOD PRESSURE: 99 MMHG | HEART RATE: 77 BPM | RESPIRATION RATE: 16 BRPM | TEMPERATURE: 97.6 F | OXYGEN SATURATION: 96 % | DIASTOLIC BLOOD PRESSURE: 63 MMHG | HEIGHT: 67 IN | WEIGHT: 128.31 LBS | BODY MASS INDEX: 20.14 KG/M2

## 2020-12-22 DIAGNOSIS — J43.1 PANLOBULAR EMPHYSEMA (HCC): ICD-10-CM

## 2020-12-22 DIAGNOSIS — R55 SYNCOPE AND COLLAPSE: ICD-10-CM

## 2020-12-22 DIAGNOSIS — Z45.09 ENCOUNTER FOR LOOP RECORDER AT END OF BATTERY LIFE: ICD-10-CM

## 2020-12-22 DIAGNOSIS — Z79.4 TYPE 2 DIABETES MELLITUS WITHOUT COMPLICATION, WITH LONG-TERM CURRENT USE OF INSULIN (HCC): ICD-10-CM

## 2020-12-22 DIAGNOSIS — R42 DIZZINESS: ICD-10-CM

## 2020-12-22 DIAGNOSIS — E11.9 TYPE 2 DIABETES MELLITUS WITHOUT COMPLICATION, WITH LONG-TERM CURRENT USE OF INSULIN (HCC): ICD-10-CM

## 2020-12-22 DIAGNOSIS — I95.89 CHRONIC HYPOTENSION: ICD-10-CM

## 2020-12-22 DIAGNOSIS — R07.2 PRECORDIAL PAIN: ICD-10-CM

## 2020-12-22 PROCEDURE — 33286 RMVL SUBQ CAR RHYTHM MNTR: CPT | Performed by: INTERNAL MEDICINE

## 2020-12-22 PROCEDURE — 33286 RMVL SUBQ CAR RHYTHM MNTR: CPT

## 2020-12-22 RX ORDER — LIDOCAINE HYDROCHLORIDE AND EPINEPHRINE BITARTRATE 20; .01 MG/ML; MG/ML
20 INJECTION, SOLUTION SUBCUTANEOUS ONCE
Status: COMPLETED | OUTPATIENT
Start: 2020-12-22 | End: 2020-12-22

## 2020-12-22 RX ADMIN — LIDOCAINE HYDROCHLORIDE,EPINEPHRINE BITARTRATE 10 ML: 20; .01 INJECTION, SOLUTION INFILTRATION; PERINEURAL at 11:30

## 2020-12-22 NOTE — DISCHARGE INSTRUCTIONS
Wound Closure Removal, Care After  This sheet gives you information about how to care for yourself after your stitches (sutures), staples, or skin adhesives have been removed. Your health care provider may also give you more specific instructions. If you have problems or questions, contact your health care provider.  What can I expect after the procedure?  After your sutures or staples have been removed or your skin adhesives have fallen off, it is common to have:  · Some discomfort and swelling in the area.  · Slight redness in the area.  Follow these instructions at home:  If you have a bandage:  · Wash your hands with soap and water before you change your bandage (dressing). If soap and water are not available, use hand .  · Change your dressing as told by your health care provider. If your dressing becomes wet or dirty, or develops a bad smell, change it as soon as possible.  · If your dressing sticks to your skin, pour warm, clean water over it until it loosens and can be removed without pulling apart the wound edges. Pat the area dry with a soft, clean towel. Do not rub the wound because that may cause bleeding.  Wound care    · Keep the wound area dry and clean.  · Check your wound every day for signs of infection. Check for:  ? Redness, swelling, or pain.  ? Fluid or blood.  ? Warmth.  ? Pus or a bad smell.  · Wash your hands with soap and water before and after touching your wound.  · Apply cream or ointment only as told by your health care provider. If you are using cream or ointment, wash the area with soap and water 2 times a day to remove all the cream or ointment. Rinse off the soap and pat the area dry with a clean towel.  · If skin glue or adhesive strips were applied after sutures or staples were removed, leave these closures in place until they peel off on their own. If adhesive strip edges start to loosen and curl up, you may trim the loose edges. Do not remove adhesive strips completely  unless your health care provider tells you to do that.  · Continue to protect the wound from injury.  · Do not pick at your wound. Picking can cause an infection.  Bathing  · Do not take baths, swim, or use a hot tub until your health care provider approves.  · Ask your health care provider when it is okay to shower.  · Follow these steps for showering:  ? If you have a dressing, remove it before getting into the shower.  ? In the shower, allow soapy water to get on the wound. Avoid scrubbing the wound.  ? When you get out of the shower, dry the wound by patting it with a clean towel.  ? Reapply a dressing over the wound if needed.  Scar care  · When your wound has completely healed, take actions to help decrease the size of your scar:  ? Wear sunscreen over the scar or cover it with clothing when you are outside. New scars get sunburned easily, which can make scarring worse.  ? Gently massage the scarred area. This can decrease scar thickness.  General instructions  · Take over-the-counter and prescription medicines only as told by your health care provider.  · Keep all follow-up visits as told by your health care provider. This is important.  Contact a health care provider if:  · You have redness, swelling, or pain around your wound.  · You have fluid or blood coming from your wound.  · Your wound feels warm to the touch.  · You have pus or a bad smell coming from your wound.  · Your wound opens up.  · You have chills.  Get help right away if:  · You have a fever.  · You have redness that is spreading from your wound.  Summary  · Change your dressing as told by your health care provider. If your dressing becomes wet or dirty, or develops a bad smell, change it as soon as possible.  · Check your wound every day for signs of infection.  · Wash your hands with soap and water before and after touching your wound.  This information is not intended to replace advice given to you by your health care provider. Make sure  you discuss any questions you have with your health care provider.  Document Revised: 11/30/2018 Document Reviewed: 10/08/2018  Elsevier Patient Education © 2020 [employer]  FOLLOW UP WITH DR. CLEMENTS 1 WEEK IN OFFICE FOR WOUND CHECK AND STABLE REMOVAL.  KEEP DRESSING ON AND CLEAN AND DRY. OK TO COVER TO TAKE A SHOWER.

## 2020-12-22 NOTE — DISCHARGE INSTR - APPOINTMENTS
Call to make follow up appointment with Dr. Steele for 1 week.  Keep dressing on and clean and dry.  Can cover to take shower.

## 2020-12-28 DIAGNOSIS — F41.1 GENERALIZED ANXIETY DISORDER: ICD-10-CM

## 2020-12-28 RX ORDER — METHOCARBAMOL 750 MG/1
TABLET, FILM COATED ORAL
Qty: 90 TABLET | Refills: 0 | Status: SHIPPED | OUTPATIENT
Start: 2020-12-28 | End: 2021-01-27

## 2020-12-28 RX ORDER — ERGOCALCIFEROL 1.25 MG/1
CAPSULE ORAL
Qty: 12 CAPSULE | Refills: 4 | Status: SHIPPED | OUTPATIENT
Start: 2020-12-28 | End: 2022-01-15

## 2020-12-29 ENCOUNTER — CLINICAL SUPPORT NO REQUIREMENTS (OUTPATIENT)
Dept: CARDIOLOGY | Facility: CLINIC | Age: 55
End: 2020-12-29

## 2020-12-29 DIAGNOSIS — R42 DIZZINESS: ICD-10-CM

## 2020-12-29 DIAGNOSIS — Z95.818 STATUS POST PLACEMENT OF IMPLANTABLE LOOP RECORDER: Primary | ICD-10-CM

## 2020-12-29 NOTE — PROGRESS NOTES
Patient came to office today s/p loop recorder removal. Removed dressing and staples with no problem. Incision well approximated, no redness, swelling or drainage noted. Advised her she may shower, no creams or lotions for 2 more weeks to allow skin to heal.

## 2020-12-30 RX ORDER — HYDROXYZINE HYDROCHLORIDE 25 MG/1
TABLET, FILM COATED ORAL
Qty: 90 TABLET | Refills: 2 | Status: SHIPPED | OUTPATIENT
Start: 2020-12-30 | End: 2021-03-19 | Stop reason: DRUGHIGH

## 2020-12-30 RX ORDER — HYDROXYZINE 50 MG/1
TABLET, FILM COATED ORAL
Qty: 90 TABLET | Refills: 1 | Status: SHIPPED | OUTPATIENT
Start: 2020-12-30 | End: 2021-02-24

## 2020-12-30 RX ORDER — ALPRAZOLAM 1 MG/1
TABLET ORAL
Qty: 90 TABLET | Refills: 1 | Status: SHIPPED | OUTPATIENT
Start: 2020-12-30 | End: 2021-03-19

## 2021-01-08 RX ORDER — ALBUTEROL SULFATE 90 UG/1
AEROSOL, METERED RESPIRATORY (INHALATION)
Qty: 18 G | Refills: 1 | Status: SHIPPED | OUTPATIENT
Start: 2021-01-08 | End: 2021-03-06

## 2021-01-08 RX ORDER — PROMETHAZINE HYDROCHLORIDE 25 MG/1
TABLET ORAL
Qty: 30 TABLET | Refills: 0 | Status: SHIPPED | OUTPATIENT
Start: 2021-01-08

## 2021-01-27 RX ORDER — PREDNISONE 10 MG/1
TABLET ORAL
Qty: 90 TABLET | Refills: 1 | Status: SHIPPED | OUTPATIENT
Start: 2021-01-27 | End: 2021-12-04 | Stop reason: HOSPADM

## 2021-01-27 RX ORDER — METHOCARBAMOL 750 MG/1
TABLET, FILM COATED ORAL
Qty: 90 TABLET | Refills: 0 | Status: SHIPPED | OUTPATIENT
Start: 2021-01-27

## 2021-02-20 DIAGNOSIS — F41.1 GENERALIZED ANXIETY DISORDER: ICD-10-CM

## 2021-02-20 RX ORDER — ALPRAZOLAM 1 MG/1
TABLET ORAL
Qty: 90 TABLET | Refills: 1 | OUTPATIENT
Start: 2021-02-20

## 2021-02-23 RX ORDER — METHOCARBAMOL 750 MG/1
TABLET, FILM COATED ORAL
Qty: 90 TABLET | Refills: 0 | OUTPATIENT
Start: 2021-02-23

## 2021-02-23 RX ORDER — NICOTINE POLACRILEX 4 MG/1
GUM, CHEWING ORAL
OUTPATIENT
Start: 2021-02-23

## 2021-02-24 RX ORDER — HYDROXYZINE 50 MG/1
TABLET, FILM COATED ORAL
Qty: 90 TABLET | Refills: 1 | Status: SHIPPED | OUTPATIENT
Start: 2021-02-24 | End: 2021-04-15

## 2021-03-06 RX ORDER — ALBUTEROL SULFATE 90 UG/1
AEROSOL, METERED RESPIRATORY (INHALATION)
Qty: 18 G | Refills: 1 | Status: SHIPPED | OUTPATIENT
Start: 2021-03-06 | End: 2021-05-17

## 2021-03-19 ENCOUNTER — OFFICE VISIT (OUTPATIENT)
Dept: PSYCHIATRY | Facility: CLINIC | Age: 56
End: 2021-03-19

## 2021-03-19 DIAGNOSIS — F41.1 GENERALIZED ANXIETY DISORDER: Chronic | ICD-10-CM

## 2021-03-19 DIAGNOSIS — F43.12 CHRONIC POSTTRAUMATIC STRESS DISORDER: Chronic | ICD-10-CM

## 2021-03-19 DIAGNOSIS — F33.2 SEVERE RECURRENT MAJOR DEPRESSION WITHOUT PSYCHOTIC FEATURES (HCC): Primary | Chronic | ICD-10-CM

## 2021-03-19 DIAGNOSIS — Z79.899 ENCOUNTER FOR LONG-TERM (CURRENT) USE OF OTHER MEDICATIONS: ICD-10-CM

## 2021-03-19 PROCEDURE — 99214 OFFICE O/P EST MOD 30 MIN: CPT | Performed by: PSYCHIATRY & NEUROLOGY

## 2021-03-19 RX ORDER — ALPRAZOLAM 1 MG/1
TABLET ORAL
Qty: 75 TABLET | Refills: 2 | Status: SHIPPED | OUTPATIENT
Start: 2021-03-19 | End: 2021-06-11

## 2021-03-19 RX ORDER — TRAZODONE HYDROCHLORIDE 150 MG/1
300 TABLET ORAL NIGHTLY
Qty: 60 TABLET | Refills: 3 | Status: SHIPPED | OUTPATIENT
Start: 2021-03-19 | End: 2021-05-12

## 2021-03-19 RX ORDER — OLANZAPINE 5 MG/1
5 TABLET ORAL NIGHTLY
Qty: 30 TABLET | Refills: 2 | Status: SHIPPED | OUTPATIENT
Start: 2021-03-19 | End: 2021-06-25 | Stop reason: SDUPTHER

## 2021-03-19 RX ORDER — SERTRALINE HYDROCHLORIDE 25 MG/1
25 TABLET, FILM COATED ORAL
Qty: 30 TABLET | Refills: 3 | Status: SHIPPED | OUTPATIENT
Start: 2021-03-19 | End: 2021-06-25

## 2021-03-19 NOTE — PATIENT INSTRUCTIONS
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
## 2021-04-07 RX ORDER — BENZONATATE 200 MG/1
CAPSULE ORAL
Qty: 20 CAPSULE | Refills: 0 | Status: ON HOLD | OUTPATIENT
Start: 2021-04-07 | End: 2022-05-18

## 2021-04-14 DIAGNOSIS — F33.2 SEVERE RECURRENT MAJOR DEPRESSION WITHOUT PSYCHOTIC FEATURES (HCC): ICD-10-CM

## 2021-04-14 RX ORDER — LAMOTRIGINE 200 MG/1
TABLET ORAL
Qty: 30 TABLET | Refills: 3 | Status: SHIPPED | OUTPATIENT
Start: 2021-04-14 | End: 2021-06-25 | Stop reason: SDUPTHER

## 2021-04-14 RX ORDER — OLANZAPINE 2.5 MG/1
TABLET ORAL
Qty: 30 TABLET | Refills: 3 | OUTPATIENT
Start: 2021-04-14

## 2021-04-15 RX ORDER — HYDROXYZINE 50 MG/1
TABLET, FILM COATED ORAL
Qty: 90 TABLET | Refills: 1 | Status: SHIPPED | OUTPATIENT
Start: 2021-04-15 | End: 2021-06-15 | Stop reason: SDUPTHER

## 2021-04-20 RX ORDER — METOPROLOL SUCCINATE 25 MG/1
TABLET, EXTENDED RELEASE ORAL
Qty: 90 TABLET | Refills: 2 | Status: SHIPPED | OUTPATIENT
Start: 2021-04-20 | End: 2022-01-10

## 2021-04-30 ENCOUNTER — OUTSIDE FACILITY SERVICE (OUTPATIENT)
Dept: FAMILY MEDICINE CLINIC | Facility: CLINIC | Age: 56
End: 2021-04-30

## 2021-04-30 PROCEDURE — OUTSIDEPOS PR OUTSIDE POS PLACEHOLDER: Performed by: FAMILY MEDICINE

## 2021-05-12 RX ORDER — TRAZODONE HYDROCHLORIDE 150 MG/1
TABLET ORAL
Qty: 60 TABLET | Refills: 1 | Status: SHIPPED | OUTPATIENT
Start: 2021-05-12 | End: 2021-06-25 | Stop reason: SDUPTHER

## 2021-05-14 RX ORDER — MIDODRINE HYDROCHLORIDE 10 MG/1
TABLET ORAL
Qty: 90 TABLET | Refills: 5 | Status: SHIPPED | OUTPATIENT
Start: 2021-05-14 | End: 2021-11-17

## 2021-05-17 RX ORDER — ALBUTEROL SULFATE 90 UG/1
AEROSOL, METERED RESPIRATORY (INHALATION)
Qty: 18 G | Refills: 1 | Status: SHIPPED | OUTPATIENT
Start: 2021-05-17 | End: 2021-07-15

## 2021-05-18 ENCOUNTER — TELEPHONE (OUTPATIENT)
Dept: FAMILY MEDICINE CLINIC | Facility: CLINIC | Age: 56
End: 2021-05-18

## 2021-05-18 NOTE — TELEPHONE ENCOUNTER
Rx changed to Anoro, patient must make appointment she is past due for fasting labs and appointment.

## 2021-05-18 NOTE — TELEPHONE ENCOUNTER
Received fax from [facility] in [place] that the Breo is not covered.  Preferred:  Fluticasone Propion-Salmeterol 250-50 mcg  Budesonide-Formoterol  160-4.5 mcg  Albuterol Sulfate  Anoro Ellipta  Flovent HFA  110 mcg  Incruse Ellipta  Spiriva with HandiHaler  Stiolto Respimat

## 2021-06-11 DIAGNOSIS — F41.1 GENERALIZED ANXIETY DISORDER: Chronic | ICD-10-CM

## 2021-06-11 RX ORDER — ALPRAZOLAM 1 MG/1
TABLET ORAL
Qty: 75 TABLET | Refills: 0 | Status: SHIPPED | OUTPATIENT
Start: 2021-06-11 | End: 2021-07-15

## 2021-06-15 RX ORDER — HYDROXYZINE 50 MG/1
50 TABLET, FILM COATED ORAL 3 TIMES DAILY
Qty: 90 TABLET | Refills: 0 | Status: SHIPPED | OUTPATIENT
Start: 2021-06-15 | End: 2021-07-15

## 2021-06-25 ENCOUNTER — OFFICE VISIT (OUTPATIENT)
Dept: PSYCHIATRY | Facility: CLINIC | Age: 56
End: 2021-06-25

## 2021-06-25 DIAGNOSIS — F33.2 SEVERE RECURRENT MAJOR DEPRESSION WITHOUT PSYCHOTIC FEATURES (HCC): Primary | Chronic | ICD-10-CM

## 2021-06-25 DIAGNOSIS — F41.1 GENERALIZED ANXIETY DISORDER: Chronic | ICD-10-CM

## 2021-06-25 DIAGNOSIS — F43.12 CHRONIC POSTTRAUMATIC STRESS DISORDER: Chronic | ICD-10-CM

## 2021-06-25 PROBLEM — F41.8 MIXED ANXIETY DEPRESSIVE DISORDER: Status: RESOLVED | Noted: 2018-08-23 | Resolved: 2021-06-25

## 2021-06-25 PROCEDURE — 99214 OFFICE O/P EST MOD 30 MIN: CPT | Performed by: PSYCHIATRY & NEUROLOGY

## 2021-06-25 RX ORDER — LAMOTRIGINE 200 MG/1
200 TABLET ORAL
Qty: 30 TABLET | Refills: 3 | Status: SHIPPED | OUTPATIENT
Start: 2021-06-25 | End: 2021-08-13

## 2021-06-25 RX ORDER — OLANZAPINE 5 MG/1
5 TABLET ORAL NIGHTLY
Qty: 30 TABLET | Refills: 2 | Status: ON HOLD | OUTPATIENT
Start: 2021-06-25 | End: 2022-05-18

## 2021-06-25 RX ORDER — SERTRALINE HYDROCHLORIDE 25 MG/1
25 TABLET, FILM COATED ORAL
Qty: 30 TABLET | Refills: 3 | Status: SHIPPED | OUTPATIENT
Start: 2021-06-25 | End: 2021-08-13

## 2021-06-25 RX ORDER — TRAZODONE HYDROCHLORIDE 150 MG/1
300 TABLET ORAL NIGHTLY
Qty: 60 TABLET | Refills: 2 | Status: SHIPPED | OUTPATIENT
Start: 2021-06-25 | End: 2021-10-19
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
## 2021-06-25 NOTE — PATIENT INSTRUCTIONS

## 2021-07-14 DIAGNOSIS — F41.1 GENERALIZED ANXIETY DISORDER: Chronic | ICD-10-CM

## 2021-07-15 RX ORDER — ALPRAZOLAM 1 MG/1
TABLET ORAL
Qty: 75 TABLET | Refills: 2 | Status: SHIPPED | OUTPATIENT
Start: 2021-07-15 | End: 2021-10-21

## 2021-07-15 RX ORDER — TOPIRAMATE 100 MG/1
TABLET, FILM COATED ORAL
Qty: 75 TABLET | Refills: 5 | Status: SHIPPED | OUTPATIENT
Start: 2021-07-15 | End: 2022-05-27 | Stop reason: SDUPTHER

## 2021-07-15 RX ORDER — ALBUTEROL SULFATE 90 UG/1
AEROSOL, METERED RESPIRATORY (INHALATION)
Qty: 18 G | Refills: 1 | Status: SHIPPED | OUTPATIENT
Start: 2021-07-15 | End: 2021-09-21

## 2021-07-15 RX ORDER — HYDROXYZINE 50 MG/1
TABLET, FILM COATED ORAL
Qty: 90 TABLET | Refills: 0 | Status: SHIPPED | OUTPATIENT
Start: 2021-07-15 | End: 2022-01-13

## 2021-07-19 RX ORDER — BLOOD-GLUCOSE METER
KIT MISCELLANEOUS
Qty: 100 EACH | Refills: 3 | Status: SHIPPED | OUTPATIENT
Start: 2021-07-19

## 2021-08-13 DIAGNOSIS — F33.2 SEVERE RECURRENT MAJOR DEPRESSION WITHOUT PSYCHOTIC FEATURES (HCC): Chronic | ICD-10-CM

## 2021-08-13 RX ORDER — LAMOTRIGINE 200 MG/1
TABLET ORAL
Qty: 30 TABLET | Refills: 3 | Status: SHIPPED | OUTPATIENT
Start: 2021-08-13 | End: 2022-04-06

## 2021-08-13 RX ORDER — SERTRALINE HYDROCHLORIDE 25 MG/1
25 TABLET, FILM COATED ORAL
Qty: 30 TABLET | Refills: 3 | Status: SHIPPED | OUTPATIENT
Start: 2021-08-13 | End: 2021-11-17 | Stop reason: SDUPTHER

## 2021-09-21 RX ORDER — ALBUTEROL SULFATE 90 UG/1
AEROSOL, METERED RESPIRATORY (INHALATION)
Qty: 18 G | Refills: 3 | Status: SHIPPED | OUTPATIENT
Start: 2021-09-21 | End: 2022-05-10

## 2021-10-18 DIAGNOSIS — F41.1 GENERALIZED ANXIETY DISORDER: Chronic | ICD-10-CM

## 2021-10-19 RX ORDER — TRAZODONE HYDROCHLORIDE 150 MG/1
300 TABLET ORAL NIGHTLY
Qty: 60 TABLET | Refills: 0 | Status: SHIPPED | OUTPATIENT
Start: 2021-10-19 | End: 2021-11-17 | Stop reason: SDUPTHER

## 2021-10-21 RX ORDER — ALPRAZOLAM 1 MG/1
TABLET ORAL
Qty: 75 TABLET | Refills: 0 | Status: SHIPPED | OUTPATIENT
Start: 2021-10-21 | End: 2021-11-17 | Stop reason: SDUPTHER

## 2021-11-17 ENCOUNTER — OFFICE VISIT (OUTPATIENT)
Dept: PSYCHIATRY | Facility: CLINIC | Age: 56
End: 2021-11-17

## 2021-11-17 DIAGNOSIS — F43.12 CHRONIC POSTTRAUMATIC STRESS DISORDER: Chronic | ICD-10-CM

## 2021-11-17 DIAGNOSIS — F33.2 SEVERE RECURRENT MAJOR DEPRESSION WITHOUT PSYCHOTIC FEATURES (HCC): Primary | Chronic | ICD-10-CM

## 2021-11-17 DIAGNOSIS — F41.1 GENERALIZED ANXIETY DISORDER: Chronic | ICD-10-CM

## 2021-11-17 PROCEDURE — 99214 OFFICE O/P EST MOD 30 MIN: CPT | Performed by: PSYCHIATRY & NEUROLOGY

## 2021-11-17 RX ORDER — ALPRAZOLAM 1 MG/1
TABLET ORAL
Qty: 75 TABLET | Refills: 2 | Status: SHIPPED | OUTPATIENT
Start: 2021-11-17 | End: 2022-02-10

## 2021-11-17 RX ORDER — SERTRALINE HYDROCHLORIDE 25 MG/1
25 TABLET, FILM COATED ORAL
Qty: 30 TABLET | Refills: 3 | Status: SHIPPED | OUTPATIENT
Start: 2021-11-17 | End: 2022-04-01

## 2021-11-17 RX ORDER — MIDODRINE HYDROCHLORIDE 10 MG/1
TABLET ORAL
Qty: 90 TABLET | Refills: 0 | Status: ON HOLD | OUTPATIENT
Start: 2021-11-17 | End: 2021-12-04

## 2021-11-17 RX ORDER — TRAZODONE HYDROCHLORIDE 150 MG/1
300 TABLET ORAL NIGHTLY
Qty: 60 TABLET | Refills: 3 | Status: SHIPPED | OUTPATIENT
Start: 2021-11-17 | End: 2022-03-08

## 2021-11-17 RX ORDER — TRAZODONE HYDROCHLORIDE 150 MG/1
TABLET ORAL
Qty: 60 TABLET | Refills: 0 | OUTPATIENT
Start: 2021-11-17
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
## 2021-11-17 NOTE — TELEPHONE ENCOUNTER
Rx Refill Note  Requested Prescriptions     Pending Prescriptions Disp Refills   • midodrine (PROAMATINE) 10 MG tablet [Pharmacy Med Name: midodrine 10 mg tablet] 90 tablet 5     Sig: TAKE ONE TABLET BY MOUTH THREE TIMES DAILY      Last office visit with prescribing clinician: 12/3/2020      Next office visit with prescribing clinician: Visit date not found            Maddi Garnica MA  11/17/21, 08:05 EST

## 2021-11-17 NOTE — PATIENT INSTRUCTIONS

## 2021-11-19 DIAGNOSIS — F41.1 GENERALIZED ANXIETY DISORDER: Chronic | ICD-10-CM

## 2021-11-19 RX ORDER — ALPRAZOLAM 1 MG/1
TABLET ORAL
Qty: 75 TABLET | Refills: 0 | OUTPATIENT
Start: 2021-11-19

## 2021-12-03 ENCOUNTER — HOSPITAL ENCOUNTER (OUTPATIENT)
Facility: HOSPITAL | Age: 56
Setting detail: OBSERVATION
Discharge: HOME | End: 2021-12-04
Attending: EMERGENCY MEDICINE | Admitting: EMERGENCY MEDICINE

## 2021-12-03 ENCOUNTER — APPOINTMENT (OUTPATIENT)
Dept: GENERAL RADIOLOGY | Facility: HOSPITAL | Age: 56
End: 2021-12-03

## 2021-12-03 DIAGNOSIS — R07.89 CHEST TIGHTNESS: Primary | ICD-10-CM

## 2021-12-03 DIAGNOSIS — J44.1 CHRONIC OBSTRUCTIVE PULMONARY DISEASE WITH ACUTE EXACERBATION (HCC): ICD-10-CM

## 2021-12-03 LAB
ALBUMIN SERPL-MCNC: 3.7 G/DL (ref 3.5–5.2)
ALBUMIN/GLOB SERPL: 1.3 G/DL
ALP SERPL-CCNC: 121 U/L (ref 39–117)
ALT SERPL W P-5'-P-CCNC: 13 U/L (ref 1–33)
ANION GAP SERPL CALCULATED.3IONS-SCNC: 8 MMOL/L (ref 5–15)
AST SERPL-CCNC: 13 U/L (ref 1–32)
BASOPHILS # BLD AUTO: 0 10*3/MM3 (ref 0–0.2)
BASOPHILS NFR BLD AUTO: 0.4 % (ref 0–1.5)
BILIRUB SERPL-MCNC: 0.2 MG/DL (ref 0–1.2)
BUN SERPL-MCNC: 9 MG/DL (ref 6–20)
BUN/CREAT SERPL: 12 (ref 7–25)
CALCIUM SPEC-SCNC: 8.5 MG/DL (ref 8.6–10.5)
CHLORIDE SERPL-SCNC: 109 MMOL/L (ref 98–107)
CO2 SERPL-SCNC: 27 MMOL/L (ref 22–29)
CREAT SERPL-MCNC: 0.75 MG/DL (ref 0.57–1)
DEPRECATED RDW RBC AUTO: 48.6 FL (ref 37–54)
EOSINOPHIL # BLD AUTO: 0.1 10*3/MM3 (ref 0–0.4)
EOSINOPHIL NFR BLD AUTO: 1.1 % (ref 0.3–6.2)
ERYTHROCYTE [DISTWIDTH] IN BLOOD BY AUTOMATED COUNT: 14.9 % (ref 12.3–15.4)
GFR SERPL CREATININE-BSD FRML MDRD: 80 ML/MIN/1.73
GLOBULIN UR ELPH-MCNC: 2.8 GM/DL
GLUCOSE SERPL-MCNC: 89 MG/DL (ref 65–99)
HCT VFR BLD AUTO: 42 % (ref 34–46.6)
HGB BLD-MCNC: 14.3 G/DL (ref 12–15.9)
LYMPHOCYTES # BLD AUTO: 3.1 10*3/MM3 (ref 0.7–3.1)
LYMPHOCYTES NFR BLD AUTO: 30 % (ref 19.6–45.3)
MCH RBC QN AUTO: 31.5 PG (ref 26.6–33)
MCHC RBC AUTO-ENTMCNC: 34 G/DL (ref 31.5–35.7)
MCV RBC AUTO: 92.7 FL (ref 79–97)
MONOCYTES # BLD AUTO: 0.7 10*3/MM3 (ref 0.1–0.9)
MONOCYTES NFR BLD AUTO: 6.5 % (ref 5–12)
NEUTROPHILS NFR BLD AUTO: 6.5 10*3/MM3 (ref 1.7–7)
NEUTROPHILS NFR BLD AUTO: 62 % (ref 42.7–76)
NRBC BLD AUTO-RTO: 0.1 /100 WBC (ref 0–0.2)
NT-PROBNP SERPL-MCNC: 117.7 PG/ML (ref 0–900)
PLATELET # BLD AUTO: 346 10*3/MM3 (ref 140–450)
PMV BLD AUTO: 6.3 FL (ref 6–12)
POTASSIUM SERPL-SCNC: 4.5 MMOL/L (ref 3.5–5.2)
PROT SERPL-MCNC: 6.5 G/DL (ref 6–8.5)
RBC # BLD AUTO: 4.53 10*6/MM3 (ref 3.77–5.28)
SARS-COV-2 RNA PNL SPEC NAA+PROBE: NOT DETECTED
SODIUM SERPL-SCNC: 144 MMOL/L (ref 136–145)
TROPONIN T SERPL-MCNC: <0.01 NG/ML (ref 0–0.03)
WBC NRBC COR # BLD: 10.4 10*3/MM3 (ref 3.4–10.8)

## 2021-12-03 PROCEDURE — 71045 X-RAY EXAM CHEST 1 VIEW: CPT

## 2021-12-03 PROCEDURE — 87635 SARS-COV-2 COVID-19 AMP PRB: CPT | Performed by: EMERGENCY MEDICINE

## 2021-12-03 PROCEDURE — 80053 COMPREHEN METABOLIC PANEL: CPT | Performed by: EMERGENCY MEDICINE

## 2021-12-03 PROCEDURE — 94799 UNLISTED PULMONARY SVC/PX: CPT

## 2021-12-03 PROCEDURE — G0378 HOSPITAL OBSERVATION PER HR: HCPCS

## 2021-12-03 PROCEDURE — 93005 ELECTROCARDIOGRAM TRACING: CPT | Performed by: EMERGENCY MEDICINE

## 2021-12-03 PROCEDURE — 25010000002 ONDANSETRON PER 1 MG: Performed by: EMERGENCY MEDICINE

## 2021-12-03 PROCEDURE — C9803 HOPD COVID-19 SPEC COLLECT: HCPCS

## 2021-12-03 PROCEDURE — 25010000002 FUROSEMIDE PER 20 MG: Performed by: EMERGENCY MEDICINE

## 2021-12-03 PROCEDURE — 36415 COLL VENOUS BLD VENIPUNCTURE: CPT

## 2021-12-03 PROCEDURE — 25010000002 METHYLPREDNISOLONE PER 125 MG: Performed by: EMERGENCY MEDICINE

## 2021-12-03 PROCEDURE — 94640 AIRWAY INHALATION TREATMENT: CPT

## 2021-12-03 PROCEDURE — 99284 EMERGENCY DEPT VISIT MOD MDM: CPT

## 2021-12-03 PROCEDURE — 83880 ASSAY OF NATRIURETIC PEPTIDE: CPT | Performed by: EMERGENCY MEDICINE

## 2021-12-03 PROCEDURE — 96374 THER/PROPH/DIAG INJ IV PUSH: CPT

## 2021-12-03 PROCEDURE — 93005 ELECTROCARDIOGRAM TRACING: CPT

## 2021-12-03 PROCEDURE — 84484 ASSAY OF TROPONIN QUANT: CPT | Performed by: EMERGENCY MEDICINE

## 2021-12-03 PROCEDURE — 85025 COMPLETE CBC W/AUTO DIFF WBC: CPT | Performed by: EMERGENCY MEDICINE

## 2021-12-03 PROCEDURE — 96375 TX/PRO/DX INJ NEW DRUG ADDON: CPT

## 2021-12-03 RX ORDER — FENTANYL CITRATE 50 UG/ML
25 INJECTION, SOLUTION INTRAMUSCULAR; INTRAVENOUS ONCE
Status: DISCONTINUED | OUTPATIENT
Start: 2021-12-03 | End: 2021-12-03

## 2021-12-03 RX ORDER — ALBUTEROL SULFATE 2.5 MG/3ML
2.5 SOLUTION RESPIRATORY (INHALATION) ONCE
Status: COMPLETED | OUTPATIENT
Start: 2021-12-03 | End: 2021-12-03

## 2021-12-03 RX ORDER — SODIUM CHLORIDE 0.9 % (FLUSH) 0.9 %
10 SYRINGE (ML) INJECTION AS NEEDED
Status: DISCONTINUED | OUTPATIENT
Start: 2021-12-03 | End: 2021-12-04 | Stop reason: HOSPADM

## 2021-12-03 RX ORDER — ONDANSETRON 2 MG/ML
4 INJECTION INTRAMUSCULAR; INTRAVENOUS ONCE
Status: COMPLETED | OUTPATIENT
Start: 2021-12-03 | End: 2021-12-03

## 2021-12-03 RX ORDER — ACETAMINOPHEN 500 MG
1000 TABLET ORAL ONCE
Status: COMPLETED | OUTPATIENT
Start: 2021-12-03 | End: 2021-12-03

## 2021-12-03 RX ORDER — FUROSEMIDE 10 MG/ML
40 INJECTION INTRAMUSCULAR; INTRAVENOUS ONCE
Status: COMPLETED | OUTPATIENT
Start: 2021-12-03 | End: 2021-12-03

## 2021-12-03 RX ORDER — IPRATROPIUM BROMIDE AND ALBUTEROL SULFATE 2.5; .5 MG/3ML; MG/3ML
3 SOLUTION RESPIRATORY (INHALATION) ONCE
Status: COMPLETED | OUTPATIENT
Start: 2021-12-03 | End: 2021-12-03

## 2021-12-03 RX ORDER — METHYLPREDNISOLONE SODIUM SUCCINATE 125 MG/2ML
125 INJECTION, POWDER, LYOPHILIZED, FOR SOLUTION INTRAMUSCULAR; INTRAVENOUS ONCE
Status: COMPLETED | OUTPATIENT
Start: 2021-12-03 | End: 2021-12-03

## 2021-12-03 RX ORDER — ASPIRIN 325 MG
325 TABLET ORAL ONCE
Status: DISCONTINUED | OUTPATIENT
Start: 2021-12-03 | End: 2021-12-04 | Stop reason: HOSPADM

## 2021-12-03 RX ADMIN — IPRATROPIUM BROMIDE AND ALBUTEROL SULFATE 3 ML: .5; 3 SOLUTION RESPIRATORY (INHALATION) at 21:40

## 2021-12-03 RX ADMIN — SODIUM CHLORIDE, PRESERVATIVE FREE 10 ML: 5 INJECTION INTRAVENOUS at 21:47

## 2021-12-03 RX ADMIN — ONDANSETRON 4 MG: 2 INJECTION INTRAMUSCULAR; INTRAVENOUS at 20:49

## 2021-12-03 RX ADMIN — ALBUTEROL SULFATE 2.5 MG: 2.5 SOLUTION RESPIRATORY (INHALATION) at 21:34

## 2021-12-03 RX ADMIN — FUROSEMIDE 40 MG: 10 INJECTION INTRAMUSCULAR; INTRAVENOUS at 20:52

## 2021-12-03 RX ADMIN — ACETAMINOPHEN 1000 MG: 500 TABLET, FILM COATED ORAL at 21:46

## 2021-12-03 RX ADMIN — METHYLPREDNISOLONE SODIUM SUCCINATE 125 MG: 125 INJECTION, POWDER, FOR SOLUTION INTRAMUSCULAR; INTRAVENOUS at 21:46

## 2021-12-04 ENCOUNTER — APPOINTMENT (OUTPATIENT)
Dept: NUCLEAR MEDICINE | Facility: HOSPITAL | Age: 56
End: 2021-12-04

## 2021-12-04 ENCOUNTER — APPOINTMENT (OUTPATIENT)
Dept: CARDIOLOGY | Facility: HOSPITAL | Age: 56
End: 2021-12-04

## 2021-12-04 VITALS
HEART RATE: 84 BPM | RESPIRATION RATE: 17 BRPM | TEMPERATURE: 97.5 F | BODY MASS INDEX: 26.06 KG/M2 | HEIGHT: 67 IN | WEIGHT: 166 LBS | OXYGEN SATURATION: 92 % | SYSTOLIC BLOOD PRESSURE: 151 MMHG | DIASTOLIC BLOOD PRESSURE: 96 MMHG

## 2021-12-04 LAB
ANION GAP SERPL CALCULATED.3IONS-SCNC: 10 MMOL/L (ref 5–15)
BH CV ECHO MEAS - ACS: 1.7 CM
BH CV ECHO MEAS - AO MAX PG (FULL): 1.9 MMHG
BH CV ECHO MEAS - AO MAX PG: 6.3 MMHG
BH CV ECHO MEAS - AO MEAN PG (FULL): 1.5 MMHG
BH CV ECHO MEAS - AO MEAN PG: 3.3 MMHG
BH CV ECHO MEAS - AO ROOT AREA (BSA CORRECTED): 1.6
BH CV ECHO MEAS - AO ROOT AREA: 7.4 CM^2
BH CV ECHO MEAS - AO ROOT DIAM: 3.1 CM
BH CV ECHO MEAS - AO V2 MAX: 125.7 CM/SEC
BH CV ECHO MEAS - AO V2 MEAN: 84.6 CM/SEC
BH CV ECHO MEAS - AO V2 VTI: 32.4 CM
BH CV ECHO MEAS - AORTIC HR: 69.8 BPM
BH CV ECHO MEAS - AORTIC R-R: 0.86 SEC
BH CV ECHO MEAS - ASC AORTA: 3.4 CM
BH CV ECHO MEAS - AVA(I,A): 2 CM^2
BH CV ECHO MEAS - AVA(I,D): 2 CM^2
BH CV ECHO MEAS - AVA(V,A): 2.4 CM^2
BH CV ECHO MEAS - AVA(V,D): 2.4 CM^2
BH CV ECHO MEAS - BSA(HAYCOCK): 1.9 M^2
BH CV ECHO MEAS - BSA: 1.9 M^2
BH CV ECHO MEAS - BZI_BMI: 26 KILOGRAMS/M^2
BH CV ECHO MEAS - BZI_METRIC_HEIGHT: 170.2 CM
BH CV ECHO MEAS - BZI_METRIC_WEIGHT: 75.3 KG
BH CV ECHO MEAS - CI(AO): 8.9 L/MIN/M^2
BH CV ECHO MEAS - CI(LVOT): 2.4 L/MIN/M^2
BH CV ECHO MEAS - CO(AO): 16.6 L/MIN
BH CV ECHO MEAS - CO(LVOT): 4.5 L/MIN
BH CV ECHO MEAS - EDV(CUBED): 82.5 ML
BH CV ECHO MEAS - EDV(MOD-SP2): 71.3 ML
BH CV ECHO MEAS - EDV(MOD-SP4): 65.8 ML
BH CV ECHO MEAS - EDV(TEICH): 85.5 ML
BH CV ECHO MEAS - EF(CUBED): 69 %
BH CV ECHO MEAS - EF(MOD-BP): 67 %
BH CV ECHO MEAS - EF(MOD-SP2): 67.8 %
BH CV ECHO MEAS - EF(MOD-SP4): 69.1 %
BH CV ECHO MEAS - EF(TEICH): 60.9 %
BH CV ECHO MEAS - ESV(CUBED): 25.5 ML
BH CV ECHO MEAS - ESV(MOD-SP2): 23 ML
BH CV ECHO MEAS - ESV(MOD-SP4): 20.3 ML
BH CV ECHO MEAS - ESV(TEICH): 33.4 ML
BH CV ECHO MEAS - FS: 32.4 %
BH CV ECHO MEAS - IVS/LVPW: 0.81
BH CV ECHO MEAS - IVSD: 1 CM
BH CV ECHO MEAS - LA DIMENSION(2D): 3.5 CM
BH CV ECHO MEAS - LA DIMENSION: 4.2 CM
BH CV ECHO MEAS - LA/AO: 1.4
BH CV ECHO MEAS - LV DIASTOLIC VOL/BSA (35-75): 35.2 ML/M^2
BH CV ECHO MEAS - LV MASS(C)D: 172.6 GRAMS
BH CV ECHO MEAS - LV MASS(C)DI: 92.4 GRAMS/M^2
BH CV ECHO MEAS - LV MAX PG: 4.4 MMHG
BH CV ECHO MEAS - LV MEAN PG: 1.7 MMHG
BH CV ECHO MEAS - LV SYSTOLIC VOL/BSA (12-30): 10.9 ML/M^2
BH CV ECHO MEAS - LV V1 MAX: 105.3 CM/SEC
BH CV ECHO MEAS - LV V1 MEAN: 59.2 CM/SEC
BH CV ECHO MEAS - LV V1 VTI: 22 CM
BH CV ECHO MEAS - LVIDD: 4.4 CM
BH CV ECHO MEAS - LVIDS: 2.9 CM
BH CV ECHO MEAS - LVOT AREA: 2.9 CM^2
BH CV ECHO MEAS - LVOT DIAM: 1.9 CM
BH CV ECHO MEAS - LVPWD: 1.2 CM
BH CV ECHO MEAS - MV A MAX VEL: 65.6 CM/SEC
BH CV ECHO MEAS - MV DEC SLOPE: 325.3 CM/SEC^2
BH CV ECHO MEAS - MV DEC TIME: 0.23 SEC
BH CV ECHO MEAS - MV E MAX VEL: 75.5 CM/SEC
BH CV ECHO MEAS - MV E/A: 1.2
BH CV ECHO MEAS - MV MAX PG: 3.7 MMHG
BH CV ECHO MEAS - MV MEAN PG: 1.7 MMHG
BH CV ECHO MEAS - MV V2 MAX: 96.6 CM/SEC
BH CV ECHO MEAS - MV V2 MEAN: 60.7 CM/SEC
BH CV ECHO MEAS - MV V2 VTI: 22.7 CM
BH CV ECHO MEAS - MVA(VTI): 2.8 CM^2
BH CV ECHO MEAS - PA ACC TIME: 0.06 SEC
BH CV ECHO MEAS - PA MAX PG (FULL): 3.7 MMHG
BH CV ECHO MEAS - PA MAX PG: 4.9 MMHG
BH CV ECHO MEAS - PA MEAN PG (FULL): 1.6 MMHG
BH CV ECHO MEAS - PA MEAN PG: 2.3 MMHG
BH CV ECHO MEAS - PA PR(ACCEL): 53.3 MMHG
BH CV ECHO MEAS - PA V2 MAX: 111.2 CM/SEC
BH CV ECHO MEAS - PA V2 MEAN: 69.4 CM/SEC
BH CV ECHO MEAS - PA V2 VTI: 23.2 CM
BH CV ECHO MEAS - PULM A REVS DUR: 0.1 SEC
BH CV ECHO MEAS - PULM A REVS VEL: 33.3 CM/SEC
BH CV ECHO MEAS - PULM DIAS VEL: 53.1 CM/SEC
BH CV ECHO MEAS - PULM S/D: 1.1
BH CV ECHO MEAS - PULM SYS VEL: 57.2 CM/SEC
BH CV ECHO MEAS - RV MAX PG: 1.2 MMHG
BH CV ECHO MEAS - RV MEAN PG: 0.74 MMHG
BH CV ECHO MEAS - RV V1 MAX: 54.7 CM/SEC
BH CV ECHO MEAS - RV V1 MEAN: 40.6 CM/SEC
BH CV ECHO MEAS - RV V1 VTI: 12.1 CM
BH CV ECHO MEAS - RVDD: 3.3 CM
BH CV ECHO MEAS - SI(AO): 127.4 ML/M^2
BH CV ECHO MEAS - SI(CUBED): 30.5 ML/M^2
BH CV ECHO MEAS - SI(LVOT): 34.1 ML/M^2
BH CV ECHO MEAS - SI(MOD-SP2): 25.9 ML/M^2
BH CV ECHO MEAS - SI(MOD-SP4): 24.3 ML/M^2
BH CV ECHO MEAS - SI(TEICH): 27.9 ML/M^2
BH CV ECHO MEAS - SV(AO): 238 ML
BH CV ECHO MEAS - SV(CUBED): 56.9 ML
BH CV ECHO MEAS - SV(LVOT): 63.7 ML
BH CV ECHO MEAS - SV(MOD-SP2): 48.3 ML
BH CV ECHO MEAS - SV(MOD-SP4): 45.4 ML
BH CV ECHO MEAS - SV(TEICH): 52.1 ML
BH CV REST NUCLEAR ISOTOPE DOSE: 7.9 MCI
BH CV STRESS BP STAGE 1: NORMAL
BH CV STRESS BP STAGE 2: NORMAL
BH CV STRESS COMMENTS STAGE 1: NORMAL
BH CV STRESS COMMENTS STAGE 2: NORMAL
BH CV STRESS DOSE REGADENOSON STAGE 1: 0.4
BH CV STRESS DURATION MIN STAGE 1: 1
BH CV STRESS DURATION MIN STAGE 2: 0
BH CV STRESS DURATION SEC STAGE 1: 10
BH CV STRESS DURATION SEC STAGE 2: 9
BH CV STRESS HR STAGE 1: 98
BH CV STRESS HR STAGE 2: 100
BH CV STRESS NUCLEAR ISOTOPE DOSE: 21.4 MCI
BH CV STRESS PROTOCOL 1: NORMAL
BH CV STRESS RECOVERY BP: NORMAL MMHG
BH CV STRESS RECOVERY HR: 102 BPM
BH CV STRESS STAGE 1: 1
BH CV STRESS STAGE 2: 2
BUN SERPL-MCNC: 9 MG/DL (ref 6–20)
BUN/CREAT SERPL: 12.9 (ref 7–25)
CALCIUM SPEC-SCNC: 8.3 MG/DL (ref 8.6–10.5)
CHLORIDE SERPL-SCNC: 108 MMOL/L (ref 98–107)
CO2 SERPL-SCNC: 25 MMOL/L (ref 22–29)
CREAT SERPL-MCNC: 0.7 MG/DL (ref 0.57–1)
DEPRECATED RDW RBC AUTO: 46.4 FL (ref 37–54)
ERYTHROCYTE [DISTWIDTH] IN BLOOD BY AUTOMATED COUNT: 14.5 % (ref 12.3–15.4)
GFR SERPL CREATININE-BSD FRML MDRD: 87 ML/MIN/1.73
GLUCOSE BLDC GLUCOMTR-MCNC: 102 MG/DL (ref 70–105)
GLUCOSE BLDC GLUCOMTR-MCNC: 152 MG/DL (ref 70–105)
GLUCOSE BLDC GLUCOMTR-MCNC: 193 MG/DL (ref 70–105)
GLUCOSE SERPL-MCNC: 150 MG/DL (ref 65–99)
HCT VFR BLD AUTO: 40.5 % (ref 34–46.6)
HGB BLD-MCNC: 13.9 G/DL (ref 12–15.9)
LV EF NUC BP: 78 %
MAXIMAL PREDICTED HEART RATE: 165 BPM
MCH RBC QN AUTO: 32.1 PG (ref 26.6–33)
MCHC RBC AUTO-ENTMCNC: 34.2 G/DL (ref 31.5–35.7)
MCV RBC AUTO: 93.7 FL (ref 79–97)
PERCENT MAX PREDICTED HR: 65.45 %
PLATELET # BLD AUTO: 333 10*3/MM3 (ref 140–450)
PMV BLD AUTO: 6.7 FL (ref 6–12)
POTASSIUM SERPL-SCNC: 3.6 MMOL/L (ref 3.5–5.2)
RBC # BLD AUTO: 4.32 10*6/MM3 (ref 3.77–5.28)
SODIUM SERPL-SCNC: 143 MMOL/L (ref 136–145)
STRESS BASELINE BP: NORMAL MMHG
STRESS BASELINE HR: 73 BPM
STRESS PERCENT HR: 77 %
STRESS POST PEAK BP: NORMAL MMHG
STRESS POST PEAK HR: 108 BPM
STRESS TARGET HR: 140 BPM
TROPONIN T SERPL-MCNC: <0.01 NG/ML (ref 0–0.03)
WBC NRBC COR # BLD: 9.1 10*3/MM3 (ref 3.4–10.8)

## 2021-12-04 PROCEDURE — G0378 HOSPITAL OBSERVATION PER HR: HCPCS

## 2021-12-04 PROCEDURE — 93306 TTE W/DOPPLER COMPLETE: CPT | Performed by: INTERNAL MEDICINE

## 2021-12-04 PROCEDURE — 93017 CV STRESS TEST TRACING ONLY: CPT

## 2021-12-04 PROCEDURE — 93005 ELECTROCARDIOGRAM TRACING: CPT | Performed by: EMERGENCY MEDICINE

## 2021-12-04 PROCEDURE — 80048 BASIC METABOLIC PNL TOTAL CA: CPT | Performed by: EMERGENCY MEDICINE

## 2021-12-04 PROCEDURE — 82962 GLUCOSE BLOOD TEST: CPT

## 2021-12-04 PROCEDURE — 84484 ASSAY OF TROPONIN QUANT: CPT | Performed by: EMERGENCY MEDICINE

## 2021-12-04 PROCEDURE — 93016 CV STRESS TEST SUPVJ ONLY: CPT | Performed by: NURSE PRACTITIONER

## 2021-12-04 PROCEDURE — A9502 TC99M TETROFOSMIN: HCPCS | Performed by: EMERGENCY MEDICINE

## 2021-12-04 PROCEDURE — 25010000002 REGADENOSON 0.4 MG/5ML SOLUTION: Performed by: EMERGENCY MEDICINE

## 2021-12-04 PROCEDURE — 78452 HT MUSCLE IMAGE SPECT MULT: CPT

## 2021-12-04 PROCEDURE — 78452 HT MUSCLE IMAGE SPECT MULT: CPT | Performed by: INTERNAL MEDICINE

## 2021-12-04 PROCEDURE — 0 TECHNETIUM TETROFOSMIN KIT: Performed by: EMERGENCY MEDICINE

## 2021-12-04 PROCEDURE — 63710000001 INSULIN LISPRO (HUMAN) PER 5 UNITS: Performed by: PHYSICIAN ASSISTANT

## 2021-12-04 PROCEDURE — 93018 CV STRESS TEST I&R ONLY: CPT | Performed by: INTERNAL MEDICINE

## 2021-12-04 PROCEDURE — 85027 COMPLETE CBC AUTOMATED: CPT | Performed by: EMERGENCY MEDICINE

## 2021-12-04 PROCEDURE — 63710000001 PREDNISONE PER 1 MG: Performed by: PHYSICIAN ASSISTANT

## 2021-12-04 PROCEDURE — 93010 ELECTROCARDIOGRAM REPORT: CPT | Performed by: INTERNAL MEDICINE

## 2021-12-04 PROCEDURE — 93306 TTE W/DOPPLER COMPLETE: CPT

## 2021-12-04 RX ORDER — SODIUM CHLORIDE 0.9 % (FLUSH) 0.9 %
10 SYRINGE (ML) INJECTION AS NEEDED
Status: DISCONTINUED | OUTPATIENT
Start: 2021-12-04 | End: 2021-12-04 | Stop reason: HOSPADM

## 2021-12-04 RX ORDER — MIDODRINE HYDROCHLORIDE 5 MG/1
10 TABLET ORAL
Status: DISCONTINUED | OUTPATIENT
Start: 2021-12-04 | End: 2021-12-04

## 2021-12-04 RX ORDER — OXYCODONE HYDROCHLORIDE 5 MG/1
10 TABLET ORAL EVERY 4 HOURS PRN
Refills: 0 | Status: DISCONTINUED | OUTPATIENT
Start: 2021-12-04 | End: 2021-12-04 | Stop reason: HOSPADM

## 2021-12-04 RX ORDER — OLANZAPINE 10 MG/2ML
1 INJECTION, POWDER, LYOPHILIZED, FOR SOLUTION INTRAMUSCULAR
Status: DISCONTINUED | OUTPATIENT
Start: 2021-12-04 | End: 2021-12-04 | Stop reason: HOSPADM

## 2021-12-04 RX ORDER — NICOTINE POLACRILEX 4 MG
15 LOZENGE BUCCAL
Status: DISCONTINUED | OUTPATIENT
Start: 2021-12-04 | End: 2021-12-04 | Stop reason: HOSPADM

## 2021-12-04 RX ORDER — METOPROLOL SUCCINATE 25 MG/1
25 TABLET, EXTENDED RELEASE ORAL
Status: DISCONTINUED | OUTPATIENT
Start: 2021-12-04 | End: 2021-12-04 | Stop reason: HOSPADM

## 2021-12-04 RX ORDER — METHYLPREDNISOLONE SODIUM SUCCINATE 125 MG/2ML
60 INJECTION, POWDER, LYOPHILIZED, FOR SOLUTION INTRAMUSCULAR; INTRAVENOUS EVERY 12 HOURS
Status: DISCONTINUED | OUTPATIENT
Start: 2021-12-04 | End: 2021-12-04

## 2021-12-04 RX ORDER — PANTOPRAZOLE SODIUM 40 MG/1
40 TABLET, DELAYED RELEASE ORAL EVERY MORNING
Status: DISCONTINUED | OUTPATIENT
Start: 2021-12-04 | End: 2021-12-04 | Stop reason: HOSPADM

## 2021-12-04 RX ORDER — TOPIRAMATE 100 MG/1
100 TABLET, FILM COATED ORAL DAILY
Status: DISCONTINUED | OUTPATIENT
Start: 2021-12-04 | End: 2021-12-04 | Stop reason: HOSPADM

## 2021-12-04 RX ORDER — METHOCARBAMOL 500 MG/1
750 TABLET, FILM COATED ORAL 3 TIMES DAILY
Status: DISCONTINUED | OUTPATIENT
Start: 2021-12-04 | End: 2021-12-04 | Stop reason: HOSPADM

## 2021-12-04 RX ORDER — GABAPENTIN 300 MG/1
300 CAPSULE ORAL 4 TIMES DAILY
Status: DISCONTINUED | OUTPATIENT
Start: 2021-12-04 | End: 2021-12-04 | Stop reason: HOSPADM

## 2021-12-04 RX ORDER — NITROGLYCERIN 0.4 MG/1
0.4 TABLET SUBLINGUAL
Status: DISCONTINUED | OUTPATIENT
Start: 2021-12-04 | End: 2021-12-04 | Stop reason: HOSPADM

## 2021-12-04 RX ORDER — SODIUM CHLORIDE 0.9 % (FLUSH) 0.9 %
10 SYRINGE (ML) INJECTION EVERY 12 HOURS SCHEDULED
Status: DISCONTINUED | OUTPATIENT
Start: 2021-12-04 | End: 2021-12-04 | Stop reason: HOSPADM

## 2021-12-04 RX ORDER — TRAZODONE HYDROCHLORIDE 100 MG/1
300 TABLET ORAL NIGHTLY
Status: DISCONTINUED | OUTPATIENT
Start: 2021-12-04 | End: 2021-12-04 | Stop reason: HOSPADM

## 2021-12-04 RX ORDER — LIDOCAINE 50 MG/G
1 PATCH TOPICAL
Status: DISCONTINUED | OUTPATIENT
Start: 2021-12-04 | End: 2021-12-04 | Stop reason: HOSPADM

## 2021-12-04 RX ORDER — INSULIN LISPRO 100 [IU]/ML
0-9 INJECTION, SOLUTION INTRAVENOUS; SUBCUTANEOUS
Status: DISCONTINUED | OUTPATIENT
Start: 2021-12-04 | End: 2021-12-04 | Stop reason: HOSPADM

## 2021-12-04 RX ORDER — IPRATROPIUM BROMIDE AND ALBUTEROL SULFATE 2.5; .5 MG/3ML; MG/3ML
3 SOLUTION RESPIRATORY (INHALATION) EVERY 6 HOURS PRN
Status: DISCONTINUED | OUTPATIENT
Start: 2021-12-04 | End: 2021-12-04 | Stop reason: HOSPADM

## 2021-12-04 RX ORDER — DEXTROSE MONOHYDRATE 25 G/50ML
25 INJECTION, SOLUTION INTRAVENOUS
Status: DISCONTINUED | OUTPATIENT
Start: 2021-12-04 | End: 2021-12-04 | Stop reason: HOSPADM

## 2021-12-04 RX ORDER — HYDROXYZINE HYDROCHLORIDE 25 MG/1
50 TABLET, FILM COATED ORAL 3 TIMES DAILY
Status: DISCONTINUED | OUTPATIENT
Start: 2021-12-04 | End: 2021-12-04 | Stop reason: HOSPADM

## 2021-12-04 RX ORDER — INSULIN LISPRO 100 [IU]/ML
0-9 INJECTION, SOLUTION INTRAVENOUS; SUBCUTANEOUS AS NEEDED
Status: DISCONTINUED | OUTPATIENT
Start: 2021-12-04 | End: 2021-12-04 | Stop reason: HOSPADM

## 2021-12-04 RX ORDER — TOPIRAMATE 100 MG/1
150 TABLET, FILM COATED ORAL NIGHTLY
Status: DISCONTINUED | OUTPATIENT
Start: 2021-12-04 | End: 2021-12-04 | Stop reason: HOSPADM

## 2021-12-04 RX ORDER — ALPRAZOLAM 0.5 MG/1
0.5 TABLET ORAL DAILY
Status: DISCONTINUED | OUTPATIENT
Start: 2021-12-04 | End: 2021-12-04 | Stop reason: HOSPADM

## 2021-12-04 RX ORDER — PREDNISONE 10 MG/1
TABLET ORAL
Qty: 30 TABLET | Refills: 0 | Status: ON HOLD | OUTPATIENT
Start: 2021-12-04 | End: 2022-05-18

## 2021-12-04 RX ORDER — PREDNISONE 20 MG/1
40 TABLET ORAL
Status: DISCONTINUED | OUTPATIENT
Start: 2021-12-04 | End: 2021-12-04 | Stop reason: HOSPADM

## 2021-12-04 RX ORDER — SODIUM CHLORIDE 9 MG/ML
100 INJECTION, SOLUTION INTRAVENOUS CONTINUOUS
Status: DISCONTINUED | OUTPATIENT
Start: 2021-12-04 | End: 2021-12-04

## 2021-12-04 RX ORDER — ACETAMINOPHEN 325 MG/1
650 TABLET ORAL EVERY 6 HOURS PRN
Status: DISCONTINUED | OUTPATIENT
Start: 2021-12-04 | End: 2021-12-04 | Stop reason: HOSPADM

## 2021-12-04 RX ORDER — LAMOTRIGINE 100 MG/1
200 TABLET ORAL NIGHTLY
Status: DISCONTINUED | OUTPATIENT
Start: 2021-12-04 | End: 2021-12-04 | Stop reason: HOSPADM

## 2021-12-04 RX ORDER — ALPRAZOLAM 1 MG/1
1 TABLET ORAL 2 TIMES DAILY
Status: DISCONTINUED | OUTPATIENT
Start: 2021-12-04 | End: 2021-12-04 | Stop reason: HOSPADM

## 2021-12-04 RX ORDER — NICOTINE 21 MG/24HR
1 PATCH, TRANSDERMAL 24 HOURS TRANSDERMAL
Status: DISCONTINUED | OUTPATIENT
Start: 2021-12-04 | End: 2021-12-04 | Stop reason: HOSPADM

## 2021-12-04 RX ORDER — SERTRALINE HYDROCHLORIDE 25 MG/1
25 TABLET, FILM COATED ORAL NIGHTLY
Status: DISCONTINUED | OUTPATIENT
Start: 2021-12-04 | End: 2021-12-04 | Stop reason: HOSPADM

## 2021-12-04 RX ORDER — OLANZAPINE 5 MG/1
5 TABLET ORAL NIGHTLY
Status: DISCONTINUED | OUTPATIENT
Start: 2021-12-04 | End: 2021-12-04 | Stop reason: HOSPADM

## 2021-12-04 RX ORDER — METHOCARBAMOL 500 MG/1
750 TABLET, FILM COATED ORAL 3 TIMES DAILY
Status: DISCONTINUED | OUTPATIENT
Start: 2021-12-04 | End: 2021-12-04

## 2021-12-04 RX ORDER — CEPHALEXIN 500 MG/1
500 CAPSULE ORAL 2 TIMES DAILY
Qty: 14 CAPSULE | Refills: 0 | Status: SHIPPED | OUTPATIENT
Start: 2021-12-04 | End: 2021-12-11

## 2021-12-04 RX ADMIN — TOPIRAMATE 100 MG: 100 TABLET, FILM COATED ORAL at 13:31

## 2021-12-04 RX ADMIN — NICOTINE 1 PATCH: 21 PATCH, EXTENDED RELEASE TRANSDERMAL at 13:59

## 2021-12-04 RX ADMIN — GABAPENTIN 300 MG: 300 CAPSULE ORAL at 17:45

## 2021-12-04 RX ADMIN — METHOCARBAMOL 750 MG: 500 TABLET ORAL at 12:55

## 2021-12-04 RX ADMIN — METOPROLOL SUCCINATE 25 MG: 25 TABLET, EXTENDED RELEASE ORAL at 13:31

## 2021-12-04 RX ADMIN — SODIUM CHLORIDE, PRESERVATIVE FREE 10 ML: 5 INJECTION INTRAVENOUS at 01:52

## 2021-12-04 RX ADMIN — SODIUM CHLORIDE, PRESERVATIVE FREE 10 ML: 5 INJECTION INTRAVENOUS at 08:51

## 2021-12-04 RX ADMIN — TETROFOSMIN 1 DOSE: 1.38 INJECTION, POWDER, LYOPHILIZED, FOR SOLUTION INTRAVENOUS at 07:50

## 2021-12-04 RX ADMIN — HYDROXYZINE HYDROCHLORIDE 50 MG: 25 TABLET, FILM COATED ORAL at 17:45

## 2021-12-04 RX ADMIN — ACETAMINOPHEN 650 MG: 325 TABLET, FILM COATED ORAL at 08:49

## 2021-12-04 RX ADMIN — PREDNISONE 40 MG: 20 TABLET ORAL at 08:49

## 2021-12-04 RX ADMIN — SODIUM CHLORIDE 100 ML/HR: 9 INJECTION, SOLUTION INTRAVENOUS at 01:52

## 2021-12-04 RX ADMIN — TETROFOSMIN 1 DOSE: 1.38 INJECTION, POWDER, LYOPHILIZED, FOR SOLUTION INTRAVENOUS at 09:30

## 2021-12-04 RX ADMIN — PANTOPRAZOLE SODIUM 40 MG: 40 TABLET, DELAYED RELEASE ORAL at 13:31

## 2021-12-04 RX ADMIN — ALPRAZOLAM 0.5 MG: 0.5 TABLET ORAL at 13:59

## 2021-12-04 RX ADMIN — OXYCODONE 10 MG: 5 TABLET ORAL at 12:55

## 2021-12-04 RX ADMIN — INSULIN LISPRO 2 UNITS: 100 INJECTION, SOLUTION INTRAVENOUS; SUBCUTANEOUS at 08:51

## 2021-12-04 RX ADMIN — REGADENOSON 0.4 MG: 0.08 INJECTION, SOLUTION INTRAVENOUS at 09:30

## 2021-12-04 RX ADMIN — GABAPENTIN 300 MG: 300 CAPSULE ORAL at 13:28

## 2021-12-04 NOTE — PLAN OF CARE
Goal Outcome Evaluation:  Plan of Care Reviewed With: patient        Progress: improving  Outcome Summary: Pt was admitted for COPD exacerbation and chest tightness. VSS. Pt is on 3L NC which is also her home oxygen. Pt was very sleepy when first got here but few hours later she was alert and oriented. Pt c/o of pain when coughing. She states that the pain is in her lungs.She did not ask for any medication. Pt refused bedside comode and is ambulating to the bathroom with the use of her cane. NS at 100 ml/hr. Myoview scheduled for today. NPO since midnight. Pt states that she has had a myoview recently and she will get another one. Will continue to monitor.

## 2021-12-04 NOTE — PLAN OF CARE
Goal Outcome Evaluation:  Plan of Care Reviewed With: patient           Outcome Summary: Pt  to get myoview today r/t chest tightness. VSS.  Pt on 3L NC. No c/o pain at this time. Will continue to monitor.

## 2021-12-04 NOTE — ED PROVIDER NOTES
Subjective   Chief complaint shortness of breath chest tightness retaining water    History present is a 56-year-old female history of COPD on oxygen 3 L nasal cannula at home complains of about a 3-day history of a cough has been nonproductive tightness in her chest shortness of breath and retaining water.  States she ran out of her oxygen a few days ago.  Denies any fever chills or ill exposures foreign travels.  Symptoms are moderate to severe worse with exertion better with rest.  Denies any recent long car ride plane or immobilization foreign travels or recent hospitalization.  No one in the home with similar illness.  And no other associated symptoms.  There is no neck arm or jaw pain no syncope but some lightheadedness.          Review of Systems   Constitutional: Negative for chills and fever.   HENT: Positive for congestion. Negative for sinus pressure.    Eyes: Negative for photophobia and visual disturbance.   Respiratory: Positive for chest tightness and shortness of breath.    Cardiovascular: Positive for chest pain and leg swelling.   Gastrointestinal: Negative for abdominal pain and vomiting.   Endocrine: Negative for cold intolerance and heat intolerance.   Genitourinary: Negative for difficulty urinating and dysuria.   Musculoskeletal: Positive for arthralgias. Negative for back pain.   Skin: Negative for color change and rash.   Neurological: Positive for light-headedness. Negative for dizziness.   Psychiatric/Behavioral: Negative for agitation and behavioral problems.       Past Medical History:   Diagnosis Date   • Anxiety    • Asthma    • Breast cancer ([facility])    • Chest tightness    • COPD (chronic obstructive pulmonary disease) ([facility])    • Degenerative disorder of bone    • Foot pain     S/P multiple foot surguries.   • GERD (gastroesophageal reflux disease)    • Lesion of eye     Lesion behind eye, cancer associated retinopathopthy. Documented from [facility].    • Low back pain    • Migraines     • Neck nodule    • Pancreatitis    • RA (rheumatoid arthritis) (HCC)    • Seizure disorder (HCC)     Generalized seizure, possible partial complex.   • Skin cancer     Age 18.   • Stroke (HCC)    • Type 2 diabetes mellitus (HCC)        Allergies   Allergen Reactions   • Aspirin Palpitations   • Codeine Shortness Of Breath   • Contrast Dye Shortness Of Breath   • Erythromycin Hives   • Morphine Unknown (See Comments)   • Penicillin G Itching   • Sulfa Antibiotics Unknown (See Comments)   • Fentanyl Itching       Past Surgical History:   Procedure Laterality Date   • CARDIAC CATHETERIZATION      x2   • CARDIAC CATHETERIZATION  2015   •  SECTION      x2   • ESOPHAGEAL DILATATION     • FOOT SURGERY  2015   • FOOT SURGERY Left 2016   • FOOT SURGERY Right 2017   • MASTECTOMY Bilateral    • OTHER SURGICAL HISTORY  2017    LOOP Recorder   • DC  2016    [facility]   • TOTAL ABDOMINAL HYSTERECTOMY WITH SALPINGO OOPHORECTOMY         Family History   Problem Relation Age of Onset   • Heart disease Mother    • Stroke Mother    • Hyperlipidemia Mother    • Hypertension Mother    • Heart disease Father    • Stroke Father    • Hypertension Father    • Hyperlipidemia Father    • Heart disease Other    • COPD Other    • Cancer Other    • Diabetes Other    • Hypertension Other    • Hyperlipidemia Other    • Stroke Other        Social History     Socioeconomic History   • Marital status:    Tobacco Use   • Smoking status: Current Every Day Smoker     Types: Cigarettes   • Smokeless tobacco: Never Used   Vaping Use   • Vaping Use: Never used   Substance and Sexual Activity   • Alcohol use: No   • Drug use: No   • Sexual activity: Defer     Prior to Admission medications    Medication Sig Start Date End Date Taking? Authorizing Provider   albuterol sulfate  (90 Base) MCG/ACT inhaler INHALE ONE PUFF BY MOUTH EVERY 4 TO 6 HOURS 21   Hiren Sellers MD   ALPRAZolam (XANAX) 1 MG  tablet 1 tab po at 8 AM and 8 Pm and 0.5 tab po at noon 11/17/21   Mayra Jaime MD   benzonatate (TESSALON) 200 MG capsule TAKE ONE CAPSULE BY MOUTH THREE TIMES DAILY AS NEEDED FOR COUGH 4/7/21   Hiren Sellers MD   Breo Ellipta 200-25 MCG/INH inhaler INHALE ONE PUFFS BY MOUTH DAILY 9/21/21   Hiren Sellers MD   cephalexin (KEFLEX) 500 MG capsule Take 1,000 mg by mouth 2 (Two) Times a Day. 2/26/21   Evelin Winters MD   FREESTYLE LITE test strip test TWICE DAILY 7/19/21   Hiren Sellers MD   gabapentin (NEURONTIN) 300 MG capsule Take 1 capsule by mouth 4 (Four) Times a Day. 9/2/20   Mayra Jaime MD   hydrOXYzine (ATARAX) 50 MG tablet TAKE ONE TABLET BY MOUTH THREE TIMES DAILY 7/15/21   Mayra Jaime MD   lamoTRIgine (LaMICtal) 200 MG tablet TAKE ONE TABLET BY MOUTH EVERY NIGHT AT BEDTIME 8/13/21   Mayra Jaime MD   Lancets (freestyle) lancets TEST EVERY DAY 10/4/20   Hiren Sellers MD   metFORMIN (GLUCOPHAGE) 500 MG tablet Take 500 mg by mouth 2 (Two) Times a Day.    Evelin Winters MD   methocarbamol (ROBAXIN) 750 MG tablet TAKE ONE TABLET BY MOUTH THREE TIMES DAILY 1/27/21   Hiren Sellers MD   metoprolol succinate XL (TOPROL-XL) 25 MG 24 hr tablet TAKE ONE TABLET BY MOUTH EVERY DAY, need appointment for more refills. 4/20/21   Bright Steele MD   midodrine (PROAMATINE) 10 MG tablet TAKE ONE TABLET BY MOUTH THREE TIMES DAILY 11/17/21   Bright Steele MD   NON FORMULARY 1 dose into the nostril(s) as directed by provider Continuous. Oxygen 3 lpm    Evelin Winters MD   OLANZapine (zyPREXA) 5 MG tablet Take 1 tablet by mouth Every Night. 6/25/21   Mayra Jaime MD   omeprazole (priLOSEC) 20 MG capsule Take 20 mg by mouth 2 (two) times a day.    Evelin Winters, MD   Omeprazole 20 MG tablet delayed-release  6/13/21   Provider, MD Evelin   oxyCODONE-acetaminophen (PERCOCET)  MG per tablet Take 1 tablet by mouth 2 (two) times a  day. 6/5/19   Evelin Winters MD   predniSONE (DELTASONE) 10 MG tablet TAKE ONE TABLET BY MOUTH EVERY DAY 1/27/21   Hiren Sellers MD   promethazine (PHENERGAN) 25 MG tablet TAKE ONE TABLET BY MOUTH EVERY 8 HOURS AS NEEDED FOR NAUSEA AND VOMITING 1/8/21   Hiren Sellers MD   sertraline (ZOLOFT) 25 MG tablet Take 1 tablet by mouth every night at bedtime. 11/17/21   Mayra Jaime MD   sucralfate (CARAFATE) 1 g tablet  6/13/21   Evelin Winters MD   SUMAtriptan (IMITREX) 100 MG tablet Take 1 tablet by mouth Take As Directed. For migraines 7/3/20   Evelin Winters MD   tiotropium bromide monohydrate (Spiriva Respimat) 2.5 MCG/ACT aerosol solution inhaler Inhale 2 puffs Daily.    Evelin Winters MD   topiramate (TOPAMAX) 100 MG tablet TAKE ONE TABLET BY MOUTH EVERY MORNING AND ONE AND ONE-HALF (0.5) EVERY NIGHT AT BEDTIME 7/15/21   Seipel, Joseph F, MD   traZODone (DESYREL) 150 MG tablet Take 2 tablets by mouth Every Night. 11/17/21   Mayra Jaime MD   umeclidinium-vilanterol (ANORO ELLIPTA) 62.5-25 MCG/INH aerosol powder  inhaler Inhale 1 puff Daily. 5/18/21   Hiren Sellers MD   vitamin D (ERGOCALCIFEROL) 1.25 MG (96934 UT) capsule capsule TAKE ONE CAPSULE BY MOUTH EVERY WEEK 12/28/20   Hiren Sellers MD           Objective   Physical Exam  56-year-old awake alert no acute distress sats 93%.  HEENT extraocular muscles intact pupils equal and reactive sclera clear mouth clear neck supple no adenopathy no jugular bruits lungs diffuse rhonchi and wheezes throughout no retractions heart regular without murmur abdomen soft without tenderness good bowel sounds no pulsatile mass bruits extremities 1-2+ edema no palpable cords or Homans' sign or evidence of DVT pulses equal throughout the lower extremities.  Skin the patient has some chronic venous stasis to the right lower leg.  Neurologic she is awake alert follows commands motor strength normal without focal  weakness  Procedures           ED Course      Results for orders placed or performed during the hospital encounter of 12/03/21   COVID-19,CEPHEID/FREDO/BDMAX,COR/KAYLEE/PAD/KING IN-HOUSE(OR EMERGENT/ADD-ON),NP SWAB IN TRANSPORT MEDIA 3-4 HR TAT, RT-PCR - Swab, Nasopharynx    Specimen: Nasopharynx; Swab   Result Value Ref Range    COVID19 Not Detected Not Detected - Ref. Range   Comprehensive Metabolic Panel    Specimen: Blood   Result Value Ref Range    Glucose 89 65 - 99 mg/dL    BUN 9 6 - 20 mg/dL    Creatinine 0.75 0.57 - 1.00 mg/dL    Sodium 144 136 - 145 mmol/L    Potassium 4.5 3.5 - 5.2 mmol/L    Chloride 109 (H) 98 - 107 mmol/L    CO2 27.0 22.0 - 29.0 mmol/L    Calcium 8.5 (L) 8.6 - 10.5 mg/dL    Total Protein 6.5 6.0 - 8.5 g/dL    Albumin 3.70 3.50 - 5.20 g/dL    ALT (SGPT) 13 1 - 33 U/L    AST (SGOT) 13 1 - 32 U/L    Alkaline Phosphatase 121 (H) 39 - 117 U/L    Total Bilirubin 0.2 0.0 - 1.2 mg/dL    eGFR Non African Amer 80 >60 mL/min/1.73    Globulin 2.8 gm/dL    A/G Ratio 1.3 g/dL    BUN/Creatinine Ratio 12.0 7.0 - 25.0    Anion Gap 8.0 5.0 - 15.0 mmol/L   BNP    Specimen: Blood   Result Value Ref Range    proBNP 117.7 0.0 - 900.0 pg/mL   Troponin    Specimen: Blood   Result Value Ref Range    Troponin T <0.010 0.000 - 0.030 ng/mL   CBC Auto Differential    Specimen: Blood   Result Value Ref Range    WBC 10.40 3.40 - 10.80 10*3/mm3    RBC 4.53 3.77 - 5.28 10*6/mm3    Hemoglobin 14.3 12.0 - 15.9 g/dL    Hematocrit 42.0 34.0 - 46.6 %    MCV 92.7 79.0 - 97.0 fL    MCH 31.5 26.6 - 33.0 pg    MCHC 34.0 31.5 - 35.7 g/dL    RDW 14.9 12.3 - 15.4 %    RDW-SD 48.6 37.0 - 54.0 fl    MPV 6.3 6.0 - 12.0 fL    Platelets 346 140 - 450 10*3/mm3    Neutrophil % 62.0 42.7 - 76.0 %    Lymphocyte % 30.0 19.6 - 45.3 %    Monocyte % 6.5 5.0 - 12.0 %    Eosinophil % 1.1 0.3 - 6.2 %    Basophil % 0.4 0.0 - 1.5 %    Neutrophils, Absolute 6.50 1.70 - 7.00 10*3/mm3    Lymphocytes, Absolute 3.10 0.70 - 3.10 10*3/mm3    Monocytes,  Absolute 0.70 0.10 - 0.90 10*3/mm3    Eosinophils, Absolute 0.10 0.00 - 0.40 10*3/mm3    Basophils, Absolute 0.00 0.00 - 0.20 10*3/mm3    nRBC 0.1 0.0 - 0.2 /100 WBC   ECG 12 Lead   Result Value Ref Range    QT Interval 432 ms     XR Chest 1 View    Result Date: 12/3/2021  No acute process.  Electronically Signed By-Fab Dominguez MD On:12/3/2021 8:23 PM This report was finalized on [id number] by  Fab Dominguez MD.    Medications   sodium chloride 0.9 % flush 10 mL (has no administration in time range)   albuterol (PROVENTIL) nebulizer solution 0.083% 2.5 mg/3mL (has no administration in time range)   ipratropium-albuterol (DUO-NEB) nebulizer solution 3 mL (has no administration in time range)   acetaminophen (TYLENOL) tablet 1,000 mg (has no administration in time range)   aspirin tablet 325 mg (has no administration in time range)   methylPREDNISolone sodium succinate (SOLU-Medrol) injection 125 mg (has no administration in time range)   ondansetron (ZOFRAN) injection 4 mg (4 mg Intravenous Given 12/3/21 2049)   furosemide (LASIX) injection 40 mg (40 mg Intravenous Given 12/3/21 2052)          EKG my interpretation normal sinus rhythm rate of 78 normal axis hypertrophy QTC of 492 normal EKG                                        MDM  Number of Diagnoses or Management Options  Chest tightness: new and requires workup  Chronic obstructive pulmonary disease with acute exacerbation (HCC): established and worsening  Diagnosis management comments: Medical decision making.  Patient had IV established placed on a monitor and placed on 3 L nasal cannula and had the above exam evaluation EKG was a sinus rhythm without acute findings basic labs reviewed by me CBC electrolytes troponin and proBNP all within normal limits chest x-ray without acute findings reviewed by me over 19 -.  Patient had diffuse scattered wheezes throughout was given additional DuoNeb and albuterol her 25 mg Solu-Medrol IV.  I do not see evidence of  acute cardiac ischemia DVT or pulmonary embolism aortic dissection by clinical exam she is got rhonchi and wheezing throughout she ran out of her oxygen about 3 days ago and I suspect this triggered her COPD we talked about the findings.  She is much improved currently being a gram of Tylenol p.o. and aspirin p.o. as well.  She was admitted to the observation unit for serial troponins stress testing with nebulizer and steroids.  Stable unremarkable ER course.  Improved       Amount and/or Complexity of Data Reviewed  Clinical lab tests: reviewed  Tests in the radiology section of CPT®: reviewed    Risk of Complications, Morbidity, and/or Mortality  Presenting problems: high  Diagnostic procedures: high  Management options: high    Patient Progress  Patient progress: stable      Final diagnoses:   Chest tightness   Chronic obstructive pulmonary disease with acute exacerbation (HCC)       ED Disposition  ED Disposition     ED Disposition Condition Comment    Decision to Admit            No follow-up provider specified.       Medication List      No changes were made to your prescriptions during this visit.          Branden Chapa MD  12/03/21 0592

## 2021-12-04 NOTE — DISCHARGE SUMMARY
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 50 g 1   • sildenafil (REVATIO) 20 MG tablet TAKE 1-5 TABLETS BY MOUTH AS NEEDED FOR ERECTILE DYSFUNCTION 6 tablet 11   • ARIPiprazole (ABILIFY) 10 MG tablet TAKE 1 TABLET BY MOUTH DAILY 90 tablet 1   • acetaminophen (TYLENOL) 325 MG tablet      • hydroCORTisone (HYDROCORTISONE) 2.5 % rectal cream Place 1 application rectally 2 times daily. 30 g 3   • naproxen (NAPROSYN) 500 MG tablet Take 1 tablet by mouth 2 times daily. 30 tablet 0     No current facility-administered medications for this visit.     ALLERGIES:  No Known Allergies    Social History     Tobacco Use   • Smoking status: Former Smoker     Quit date: 2020     Years since quittin.0   • Smokeless tobacco: Never Used   • Tobacco comment: vape   Substance Use Topics   • Alcohol use: Yes     Alcohol/week: 2.0 standard drinks     Types: 2 Cans of beer per week     Comment: socially at college    • Drug use: Not Currently       Review of Systems   Constitutional: Negative for chills, diaphoresis, fatigue and fever.   Gastrointestinal: Negative for abdominal pain, nausea and vomiting.   Genitourinary: Negative for difficulty  [facility]    Nicolasa Guajardo, ANIBAL    CHIEF COMPLAINT:     Cough/dyspnea with chest pain    HISTORY OF PRESENT ILLNESS:    Obtained from ED provider HPI on 12/3/2021:  56-year-old female history of COPD on oxygen 3 L nasal cannula at home complains of about a 3-day history of a cough has been nonproductive tightness in her chest shortness of breath and retaining water.  States she ran out of her oxygen a few days ago.  Denies any fever chills or ill exposures foreign travels.  Symptoms are moderate to severe worse with exertion better with rest.  Denies any recent long car ride plane or immobilization foreign travels or recent hospitalization.  No one in the home with similar illness.  And no other associated symptoms.  There is no neck arm or jaw pain no syncope but some lightheadedness.    12/4/2021: Patient emergency HPI noted above including approximately 3 to 4-day history of a persistent nonproductive cough with some increasing dyspnea and pain that radiates around her chest from her back primarily when coughing.  She notes a significant weight gain over the past year (this is confirmed in patient's record which shows close to 40 pound weight gain in 12 months).  Some generalized edema is also noted along with some nausea without vomiting.  No palpitations, tachycardia, fever, heat or cold intolerance or changes in bowel or bladder habits are noted.  She confirms compliance with all of her outpatient medical therapies.        Past Medical History:   Diagnosis Date   • Anxiety    • Asthma    • Breast cancer (HCC)    • Chest tightness    • COPD (chronic obstructive pulmonary disease) (HCC)    • Degenerative disorder of bone    • Foot pain     S/P multiple foot surguries.   • GERD (gastroesophageal reflux disease)    • Lesion of eye     Lesion behind eye, cancer associated retinopathopthy. Documented from [facility].    • Low back pain    • Migraines    • Neck nodule 2010   •  Pancreatitis    • RA (rheumatoid arthritis) (HCC)    • Seizure disorder (HCC)     Generalized seizure, possible partial complex.   • Skin cancer     Age 18.   • Stroke (HCC)    • Type 2 diabetes mellitus (HCC)      Past Surgical History:   Procedure Laterality Date   • CARDIAC CATHETERIZATION      x2   • CARDIAC CATHETERIZATION  2015   •  SECTION      x2   • ESOPHAGEAL DILATATION     • FOOT SURGERY  2015   • FOOT SURGERY Left 2016   • FOOT SURGERY Right 2017   • MASTECTOMY Bilateral    • OTHER SURGICAL HISTORY  2017    LOOP Recorder   • DC  2016    [facility]   • TOTAL ABDOMINAL HYSTERECTOMY WITH SALPINGO OOPHORECTOMY       Family History   Problem Relation Age of Onset   • Heart disease Mother    • Stroke Mother    • Hyperlipidemia Mother    • Hypertension Mother    • Heart disease Father    • Stroke Father    • Hypertension Father    • Hyperlipidemia Father    • Heart disease Other    • COPD Other    • Cancer Other    • Diabetes Other    • Hypertension Other    • Hyperlipidemia Other    • Stroke Other      Social History     Tobacco Use   • Smoking status: Current Every Day Smoker     Packs/day: 0.50     Types: Cigarettes     Start date: 2011   • Smokeless tobacco: Never Used   Vaping Use   • Vaping Use: Never used   Substance Use Topics   • Alcohol use: No   • Drug use: No     Medications Prior to Admission   Medication Sig Dispense Refill Last Dose   • ALPRAZolam (XANAX) 1 MG tablet 1 tab po at 8 AM and 8 Pm and 0.5 tab po at noon 75 tablet 2 12/3/2021 at 0700   • FREESTYLE LITE test strip test TWICE DAILY 100 each 3 12/3/2021 at 1500   • gabapentin (NEURONTIN) 300 MG capsule Take 1 capsule by mouth 4 (Four) Times a Day. 120 capsule 2 12/3/2021 at 1300   • hydrOXYzine (ATARAX) 50 MG tablet TAKE ONE TABLET BY MOUTH THREE TIMES DAILY 90 tablet 0 12/3/2021 at 0900   • lamoTRIgine (LaMICtal) 200 MG tablet TAKE ONE TABLET BY MOUTH EVERY NIGHT AT BEDTIME 30 tablet 3 Past Month at  urinating.   Musculoskeletal: Positive for arthralgias, back pain and myalgias. Negative for gait problem, joint swelling, neck pain and neck stiffness.   Skin: Negative for rash and wound.   Neurological: Negative for weakness and numbness.   Psychiatric/Behavioral: Negative for sleep disturbance.        Physical Exam  Vitals and nursing note reviewed.   Constitutional:       General: He is not in acute distress.     Appearance: Normal appearance. He is well-developed. He is not ill-appearing or diaphoretic.   HENT:      Mouth/Throat:      Mouth: Mucous membranes are moist.   Musculoskeletal:         General: No deformity.      Lumbar back: No swelling, spasms, tenderness or bony tenderness. Normal range of motion.      Comments: +mild tenderness to palpation of R lumbar paraspinal musculature  Negative MAIKEL  No pain with piriformis stretch  Negative straight leg test on the R   Skin:     General: Skin is warm and dry.      Findings: No erythema or rash.   Neurological:      Mental Status: He is alert.      Sensory: No sensory deficit.      Motor: No weakness or atrophy.      Gait: Gait normal.      Deep Tendon Reflexes:      Reflex Scores:       Patellar reflexes are 2+ on the right side and 2+ on the left side.       Achilles reflexes are 2+ on the right side and 2+ on the left side.         Assessment/Plan:    1. Acute right-sided low back pain without sciatica  Most consistent with paraspinal muscle strain. Likely predisposed because of his caddying and dead lifting at the gym. No bony tenderness, red flag symptoms, or neurological signs on exam, so will start with conservative treatment. He will begin with core exercises at home before formal physical therapy.  - meloxicam (MOBIC) 15 MG tablet; Take 1 tablet by mouth daily.  Dispense: 15 tablet; Refill: 0  -core exercises  -avoid aggravating activity        Dagoberto Garcia MD     Unknown time   • Lancets (freestyle) lancets TEST EVERY  each 3 12/3/2021 at Unknown time   • metFORMIN (GLUCOPHAGE) 500 MG tablet Take 500 mg by mouth 2 (Two) Times a Day.   12/3/2021 at 1100   • methocarbamol (ROBAXIN) 750 MG tablet TAKE ONE TABLET BY MOUTH THREE TIMES DAILY 90 tablet 0 12/3/2021 at 0900   • metoprolol succinate XL (TOPROL-XL) 25 MG 24 hr tablet TAKE ONE TABLET BY MOUTH EVERY DAY, need appointment for more refills. 90 tablet 2 12/3/2021 at 1300   • omeprazole (priLOSEC) 20 MG capsule Take 20 mg by mouth 2 (two) times a day.   12/3/2021 at 1300   • oxyCODONE-acetaminophen (PERCOCET)  MG per tablet Take 1 tablet by mouth 2 (two) times a day.  0 12/3/2021 at 0900   • predniSONE (DELTASONE) 10 MG tablet TAKE ONE TABLET BY MOUTH EVERY DAY 90 tablet 1 12/3/2021 at 1000   • promethazine (PHENERGAN) 25 MG tablet TAKE ONE TABLET BY MOUTH EVERY 8 HOURS AS NEEDED FOR NAUSEA AND VOMITING 30 tablet 0 12/3/2021 at 1100   • sertraline (ZOLOFT) 25 MG tablet Take 1 tablet by mouth every night at bedtime. 30 tablet 3 12/3/2021 at 1100   • tiotropium bromide monohydrate (Spiriva Respimat) 2.5 MCG/ACT aerosol solution inhaler Inhale 2 puffs Daily.   Past Month at Unknown time   • topiramate (TOPAMAX) 100 MG tablet TAKE ONE TABLET BY MOUTH EVERY MORNING AND ONE AND ONE-HALF (0.5) EVERY NIGHT AT BEDTIME 75 tablet 5 12/3/2021 at 1100   • albuterol sulfate  (90 Base) MCG/ACT inhaler INHALE ONE PUFF BY MOUTH EVERY 4 TO 6 HOURS 18 g 3 12/3/2021 at 0900   • benzonatate (TESSALON) 200 MG capsule TAKE ONE CAPSULE BY MOUTH THREE TIMES DAILY AS NEEDED FOR COUGH 20 capsule 0    • Breo Ellipta 200-25 MCG/INH inhaler INHALE ONE PUFFS BY MOUTH DAILY 60 each 3 Unknown at Unknown time   • NON FORMULARY 1 dose into the nostril(s) as directed by provider Continuous. Oxygen 3 lpm   Unknown at Unknown time   • OLANZapine (zyPREXA) 5 MG tablet Take 1 tablet by mouth Every Night. 30 tablet 2 Unknown at Unknown time   •  Omeprazole 20 MG tablet delayed-release       • sucralfate (CARAFATE) 1 g tablet    Unknown at Unknown time   • SUMAtriptan (IMITREX) 100 MG tablet Take 1 tablet by mouth Take As Directed. For migraines      • traZODone (DESYREL) 150 MG tablet Take 2 tablets by mouth Every Night. 60 tablet 3 12/2/2021 at 2200   • umeclidinium-vilanterol (ANORO ELLIPTA) 62.5-25 MCG/INH aerosol powder  inhaler Inhale 1 puff Daily. 60 each 0    • vitamin D (ERGOCALCIFEROL) 1.25 MG (64064 UT) capsule capsule TAKE ONE CAPSULE BY MOUTH EVERY WEEK 12 capsule 4 Unknown at Unknown time   • [DISCONTINUED] cephalexin (KEFLEX) 500 MG capsule Take 1,000 mg by mouth 2 (Two) Times a Day.   Unknown at Unknown time     Allergies:  Aspirin, Codeine, Contrast dye, Erythromycin, Morphine, Penicillin g, Sulfa antibiotics, and Fentanyl      There is no immunization history on file for this patient.        REVIEW OF SYSTEMS:    Review of Systems   Constitutional: Negative.   HENT: Negative.    Eyes: Negative.    Cardiovascular: Positive for chest pain, dyspnea on exertion and leg swelling. Negative for near-syncope, palpitations and syncope.   Respiratory: Positive for cough and shortness of breath. Negative for sputum production.    Endocrine: Negative.    Skin: Negative.    Musculoskeletal: Negative.    Gastrointestinal: Positive for nausea. Negative for abdominal pain, constipation, diarrhea, hematemesis, hematochezia, melena and vomiting.   Genitourinary: Negative.    Neurological: Negative.    Psychiatric/Behavioral: Negative.        Vital Signs  Temp:  [96.8 °F (36 °C)-98.3 °F (36.8 °C)] 97.5 °F (36.4 °C)  Heart Rate:  [62-90] 84  Resp:  [14-20] 17  BP: (100-151)/(67-96) 151/96          Physical Exam:  Physical Exam  Vitals reviewed.   Constitutional:       General: She is not in acute distress.     Appearance: Normal appearance. She is normal weight. She is not ill-appearing, toxic-appearing or diaphoretic.   HENT:      Head: Normocephalic and  atraumatic.      Right Ear: External ear normal.      Left Ear: External ear normal.      Nose: Nose normal.      Mouth/Throat:      Mouth: Mucous membranes are moist.   Eyes:      Extraocular Movements: Extraocular movements intact.   Cardiovascular:      Rate and Rhythm: Normal rate and regular rhythm.      Pulses: Normal pulses.      Heart sounds: Normal heart sounds.   Pulmonary:      Effort: Pulmonary effort is normal.      Breath sounds: Normal breath sounds.   Abdominal:      General: Bowel sounds are normal. There is no distension.      Palpations: Abdomen is soft.      Tenderness: There is no abdominal tenderness.   Musculoskeletal:         General: Normal range of motion.      Cervical back: Normal range of motion.      Right lower leg: No edema.      Left lower leg: No edema.   Skin:     General: Skin is warm and dry.      Capillary Refill: Capillary refill takes less than 2 seconds.   Neurological:      General: No focal deficit present.      Mental Status: She is alert and oriented to person, place, and time.   Psychiatric:         Mood and Affect: Mood normal.         Behavior: Behavior normal.         Thought Content: Thought content normal.         Judgment: Judgment normal.         Emotional Behavior:   Normal   Debilities:  None  Results Review:    I reviewed the patient's new clinical results.  Lab Results (most recent)     Procedure Component Value Units Date/Time    POC Glucose Once [[id number]]  (Normal) Collected: 12/04/21 1126    Specimen: Blood Updated: 12/04/21 1127     Glucose 102 mg/dL      Comment: Serial Number: [id number]:  [id number]       POC Glucose Once [[id number]]  (Abnormal) Collected: 12/04/21 0739    Specimen: Blood Updated: 12/04/21 0741     Glucose 152 mg/dL      Comment: Serial Number: [id number]:  [id number]       Basic Metabolic Panel [[id number]]  (Abnormal) Collected: 12/04/21 0613    Specimen: Blood Updated: 12/04/21 0720     Glucose 150 mg/dL      BUN 9 mg/dL       Creatinine 0.70 mg/dL      Sodium 143 mmol/L      Potassium 3.6 mmol/L      Chloride 108 mmol/L      CO2 25.0 mmol/L      Calcium 8.3 mg/dL      eGFR Non African Amer 87 mL/min/1.73      BUN/Creatinine Ratio 12.9     Anion Gap 10.0 mmol/L     Narrative:      GFR Normal >60  Chronic Kidney Disease <60  Kidney Failure <15      CBC (No Diff) [[id number]]  (Normal) Collected: 12/04/21 0613    Specimen: Blood Updated: 12/04/21 0649     WBC 9.10 10*3/mm3      RBC 4.32 10*6/mm3      Hemoglobin 13.9 g/dL      Hematocrit 40.5 %      MCV 93.7 fL      MCH 32.1 pg      MCHC 34.2 g/dL      RDW 14.5 %      RDW-SD 46.4 fl      MPV 6.7 fL      Platelets 333 10*3/mm3     Troponin [[id number]]  (Normal) Collected: 12/04/21 0047    Specimen: Blood Updated: 12/04/21 0122     Troponin T <0.010 ng/mL     Narrative:      Troponin T Reference Range:  <= 0.03 ng/mL-   Negative for AMI  >0.03 ng/mL-     Abnormal for myocardial necrosis.  Clinicians would have to utilize clinical acumen, EKG, Troponin and serial changes to determine if it is an Acute Myocardial Infarction or myocardial injury due to an underlying chronic condition.       Results may be falsely decreased if patient taking Biotin.      COVID-19,CEPHEID/FREDO/BDMAX,COR/KAYLEE/PAD/KING IN-HOUSE(OR EMERGENT/ADD-ON),NP SWAB IN TRANSPORT MEDIA 3-4 HR TAT, RT-PCR - Swab, Nasopharynx [[id number]]  (Normal) Collected: 12/03/21 2025    Specimen: Swab from Nasopharynx Updated: 12/03/21 2056     COVID19 Not Detected    Narrative:      Fact sheet for providers: https://www.fda.gov/media/789611/download     Fact sheet for patients: https://www.fda.gov/media/550501/download  Fact sheet for providers: https://www.fda.gov/media/529717/download    Fact sheet for patients: https://www.fda.gov/media/512176/download    Test performed by PCR.    Comprehensive Metabolic Panel [[id number]]  (Abnormal) Collected: 12/03/21 2024    Specimen: Blood Updated: 12/03/21 2051     Glucose 89 mg/dL      BUN 9 mg/dL       Creatinine 0.75 mg/dL      Sodium 144 mmol/L      Potassium 4.5 mmol/L      Chloride 109 mmol/L      CO2 27.0 mmol/L      Calcium 8.5 mg/dL      Total Protein 6.5 g/dL      Albumin 3.70 g/dL      ALT (SGPT) 13 U/L      AST (SGOT) 13 U/L      Alkaline Phosphatase 121 U/L      Total Bilirubin 0.2 mg/dL      eGFR Non African Amer 80 mL/min/1.73      Globulin 2.8 gm/dL      A/G Ratio 1.3 g/dL      BUN/Creatinine Ratio 12.0     Anion Gap 8.0 mmol/L     Narrative:      GFR Normal >60  Chronic Kidney Disease <60  Kidney Failure <15      Troponin [[id number]]  (Normal) Collected: 12/03/21 2024    Specimen: Blood Updated: 12/03/21 2051     Troponin T <0.010 ng/mL     Narrative:      Troponin T Reference Range:  <= 0.03 ng/mL-   Negative for AMI  >0.03 ng/mL-     Abnormal for myocardial necrosis.  Clinicians would have to utilize clinical acumen, EKG, Troponin and serial changes to determine if it is an Acute Myocardial Infarction or myocardial injury due to an underlying chronic condition.       Results may be falsely decreased if patient taking Biotin.      BNP [[id number]]  (Normal) Collected: 12/03/21 2024    Specimen: Blood Updated: 12/03/21 2049     proBNP 117.7 pg/mL     Narrative:      Among patients with dyspnea, NT-proBNP is highly sensitive for the detection of acute congestive heart failure. In addition NT-proBNP of <300 pg/ml effectively rules out acute congestive heart failure with 99% negative predictive value.    Results may be falsely decreased if patient taking Biotin.      CBC & Differential [[id number]]  (Normal) Collected: 12/03/21 2024    Specimen: Blood Updated: 12/03/21 2031    Narrative:      The following orders were created for panel order CBC & Differential.  Procedure                               Abnormality         Status                     ---------                               -----------         ------                     CBC Auto Differential[[id number]]        Normal              Final  result                 Please view results for these tests on the individual orders.    CBC Auto Differential [[id number]]  (Normal) Collected: 12/03/21 2024    Specimen: Blood Updated: 12/03/21 2031     WBC 10.40 10*3/mm3      RBC 4.53 10*6/mm3      Hemoglobin 14.3 g/dL      Hematocrit 42.0 %      MCV 92.7 fL      MCH 31.5 pg      MCHC 34.0 g/dL      RDW 14.9 %      RDW-SD 48.6 fl      MPV 6.3 fL      Platelets 346 10*3/mm3      Neutrophil % 62.0 %      Lymphocyte % 30.0 %      Monocyte % 6.5 %      Eosinophil % 1.1 %      Basophil % 0.4 %      Neutrophils, Absolute 6.50 10*3/mm3      Lymphocytes, Absolute 3.10 10*3/mm3      Monocytes, Absolute 0.70 10*3/mm3      Eosinophils, Absolute 0.10 10*3/mm3      Basophils, Absolute 0.00 10*3/mm3      nRBC 0.1 /100 WBC           Imaging Results (Most Recent)     Procedure Component Value Units Date/Time    XR Chest 1 View [[id number]] Collected: 12/03/21 2022     Updated: 12/03/21 2025    Narrative:         DATE OF EXAM:   12/3/2021 8:03 PM     PROCEDURE:   XR CHEST 1 VW-     INDICATIONS:   For breath chest pain short of breath     COMPARISON:  08/26/2019     TECHNIQUE:   Portable chest radiograph.     FINDINGS:    Heart size top normal. Aortic arch atherosclerosis. No focal  consolidation, pneumothorax, or large effusion. The bony thorax appears  intact.       Impression:      No acute process.      Electronically Signed By-Fab Dominguez MD On:12/3/2021 8:23 PM  This report was finalized on [id number] by  Fab Dominguez MD.        reviewed    ECG/EMG Results (most recent)     Procedure Component Value Units Date/Time    ECG 12 Lead [[id number]] Collected: 12/03/21 1714     Updated: 12/03/21 1808     QT Interval 432 ms     Narrative:      HEART RATE= 78  bpm  RR Interval= 772  ms  WV Interval= 140  ms  P Horizontal Axis= -16  deg  P Front Axis= 54  deg  QRSD Interval= 75  ms  QT Interval= 432  ms  QRS Axis= 8  deg  T Wave Axis= 26  deg  - OTHERWISE NORMAL ECG -  Sinus  rhythm  Low voltage, precordial leads  Electronically Signed By:   Date and Time of Study: 2021-12-03 17:14:02    ECG 12 Lead [[id number]] Collected: 12/04/21 0537     Updated: 12/04/21 0538     QT Interval 454 ms     Narrative:      HEART RATE= 73  bpm  RR Interval= 824  ms  MN Interval= 150  ms  P Horizontal Axis= -4  deg  P Front Axis= 42  deg  QRSD Interval= 77  ms  QT Interval= 454  ms  QRS Axis= -3  deg  T Wave Axis= -1  deg  - ABNORMAL ECG -  Sinus rhythm  Nonspecific T abnormalities, anterior leads  When compared with ECG of 03-Dec-2021 17:14:02,  Significant axis, voltage or hypertrophy change  Electronically Signed By:   Date and Time of Study: 2021-12-04 05:37:27    Adult Transthoracic Echo Complete W/ Cont if Necessary Per Protocol [[id number]] Collected: 12/04/21 1013     Updated: 12/04/21 1629     BSA 1.9 m^2      RVIDd 3.3 cm      IVSd 1.0 cm      LVIDd 4.4 cm      LVIDs 2.9 cm      LVPWd 1.2 cm      IVS/LVPW 0.81     FS 32.4 %      EDV(Teich) 85.5 ml      ESV(Teich) 33.4 ml      EF(Teich) 60.9 %      EDV(cubed) 82.5 ml      ESV(cubed) 25.5 ml      EF(cubed) 69.0 %      LV mass(C)d 172.6 grams      LV mass(C)dI 92.4 grams/m^2      SV(Teich) 52.1 ml      SI(Teich) 27.9 ml/m^2      SV(cubed) 56.9 ml      SI(cubed) 30.5 ml/m^2      Ao root diam 3.1 cm      Ao root area 7.4 cm^2      ACS 1.7 cm      LA dimension 4.2 cm      asc Aorta Diam 3.4 cm      LA/Ao 1.4     LVOT diam 1.9 cm      LVOT area 2.9 cm^2      EDV(MOD-sp4) 65.8 ml      ESV(MOD-sp4) 20.3 ml      EF(MOD-sp4) 69.1 %      EDV(MOD-sp2) 71.3 ml      ESV(MOD-sp2) 23.0 ml      EF(MOD-sp2) 67.8 %      SV(MOD-sp4) 45.4 ml      SI(MOD-sp4) 24.3 ml/m^2      SV(MOD-sp2) 48.3 ml      SI(MOD-sp2) 25.9 ml/m^2      Ao root area (BSA corrected) 1.6     LV Palma Vol (BSA corrected) 35.2 ml/m^2      LV Sys Vol (BSA corrected) 10.9 ml/m^2      Aortic R-R 0.86 sec      Aortic HR 69.8 BPM      MV E max anne 75.5 cm/sec      MV A max anne 65.6 cm/sec      MV E/A 1.2      MV V2 max 96.6 cm/sec      MV max PG 3.7 mmHg      MV V2 mean 60.7 cm/sec      MV mean PG 1.7 mmHg      MV V2 VTI 22.7 cm      MVA(VTI) 2.8 cm^2      MV dec slope 325.3 cm/sec^2      MV dec time 0.23 sec      Ao pk luciano 125.7 cm/sec      Ao max PG 6.3 mmHg      Ao max PG (full) 1.9 mmHg      Ao V2 mean 84.6 cm/sec      Ao mean PG 3.3 mmHg      Ao mean PG (full) 1.5 mmHg      Ao V2 VTI 32.4 cm      KIM(I,A) 2.0 cm^2      KIM(I,D) 2.0 cm^2      KIM(V,A) 2.4 cm^2      KIM(V,D) 2.4 cm^2      LV V1 max PG 4.4 mmHg      LV V1 mean PG 1.7 mmHg      LV V1 max 105.3 cm/sec      LV V1 mean 59.2 cm/sec      LV V1 VTI 22.0 cm      CO(Ao) 16.6 l/min      CI(Ao) 8.9 l/min/m^2      SV(Ao) 238.0 ml      SI(Ao) 127.4 ml/m^2      CO(LVOT) 4.5 l/min      CI(LVOT) 2.4 l/min/m^2      SV(LVOT) 63.7 ml      SI(LVOT) 34.1 ml/m^2      PA V2 max 111.2 cm/sec      PA max PG 4.9 mmHg      PA max PG (full) 3.7 mmHg      PA V2 mean 69.4 cm/sec      PA mean PG 2.3 mmHg      PA mean PG (full) 1.6 mmHg      PA V2 VTI 23.2 cm      PA acc time 0.06 sec      RV V1 max PG 1.2 mmHg      RV V1 mean PG 0.74 mmHg      RV V1 max 54.7 cm/sec      RV V1 mean 40.6 cm/sec      RV V1 VTI 12.1 cm      PA pr(Accel) 53.3 mmHg      Pulm Sys Luciano 57.2 cm/sec      Pulm Palma Luciano 53.1 cm/sec      Pulm S/D 1.1     Pulm A Revs Dur 0.1 sec      Pulm A Revs Luciano 33.3 cm/sec       CV ECHO KARLIE - BZI_BMI 26.0 kilograms/m^2      BH CV ECHO KARLIE - BSA(HAYCOCK) 1.9 m^2      BH CV ECHO KARLIE - BZI_METRIC_WEIGHT 75.3 kg      BH CV ECHO KARLIE - BZI_METRIC_HEIGHT 170.2 cm      EF(MOD-bp) 67.0 %      LA dimension(2D) 3.5 cm     Narrative:      · Left ventricular systolic function is normal.  · Left ventricular ejection is 60-65%  · Left ventricular diastolic function was normal.           reviewed        Results for orders placed during the hospital encounter of 12/03/21    Adult Transthoracic Echo Complete W/ Cont if Necessary Per Protocol    Interpretation Summary  · Left  ventricular systolic function is normal.  · Left ventricular ejection is 60-65%  · Left ventricular diastolic function was normal.      Microbiology Results (last 10 days)     Procedure Component Value - Date/Time    COVID-19,CEPHEID/FREDO/BDMAX,COR/KAYLEE/PAD/KING IN-HOUSE(OR EMERGENT/ADD-ON),NP SWAB IN TRANSPORT MEDIA 3-4 HR TAT, RT-PCR - Swab, Nasopharynx [[id number]]  (Normal) Collected: 12/03/21 2025    Lab Status: Final result Specimen: Swab from Nasopharynx Updated: 12/03/21 2056     COVID19 Not Detected    Narrative:      Fact sheet for providers: https://www.fda.gov/media/578589/download     Fact sheet for patients: https://www.fda.gov/media/508077/download  Fact sheet for providers: https://www.fda.gov/media/334458/download    Fact sheet for patients: https://www.[employer].gov/media/406489/download    Test performed by PCR.          Assessment/Plan     Chest tightness       AE COPD  -Chest X-Ray: No acute process  -WBC: 10.40 with 0.10 eosinophils noted on differential  -125 mg Solu-Medrol with breathing treatments as well as Lasix given in the ED  -Patient confirms that she does have an adequate supply of oxygen at home including a working oxygen machine and several full thanks with plans for refill as scheduled  -Patient seems significantly improved and is evaluated on the afternoon of 12/4/2021 speaking in full sentences with normal perfusion, color and respiratory effort while on room air  -Steroid taper ordered at discharge with patient instructed to resume chronic steroid dose of 10 mg/day once taper has completed  -Continue O2, pulse oximetry and telemetry  -Continue home nebulizers and inhalers patient had previously been prescribed Keflex for upper respiratory infection however she was unable to fill this prior to presenting the hospital and her pharmacy is not open on the weekends.  It will be prescribed and filled at this facility prior to discharge    Chest pain  -Serial troponin: Less than 0.010  -proBNP:  117.7  -Chest X-Ray: No acute process  -EKG shows sinus rhythm at 73 without obvious acute ST changes or ectopy with a QTC of 500 ms  -Stress test showed a hyperdynamic EF of 70 with normal myocardial perfusion imaging and no evidence of ischemia along with a normal ECG stress test consistent with a low risk study  -Echocardiogram pending full results but with an EF of 67% noted  -Monitor and log blood pressures to discuss with PCP and cardiology next visit    Weight gain and fluid retention  -proBNP: 117.7  -Echocardiogram noted above  -Recommend patient follow-up with established endocrinologist at discharge to discuss the potential for endocrine causes of weight gain or secondary to long-term steroid use    I discussed the patients findings and my recommendations with patient and nursing staff.     Discharge Diagnosis:      Chest tightness      Hospital Course  Patient is a 56 y.o. female presented with cough, chest pain and HPI noted above.  Serial troponins remain less than 0.010 and proBNP was found to be within normal limits at 117.7.  Chest x-ray showed no acute process and EKG showed sinus rhythm at 73 without obvious acute ST changes or ectopy and a QTC of 500 ms.  She was given 125 mg Solu-Medrol in the ED along with breathing treatments and 1 dose of Lasix.  She reports her breathing has improved significantly following her admission.  She is maintained on telemetry and pulse oximetry without significant events noted and breathing improved to the point that she was in bed talking with no obvious signs of respiratory distress on room air.  Patient notes significant concern regarding her weight gain over the past year as well as some increasing generalized edema which is been evaluated by her primary care provider.  Stress testing was performed on 12/4/2021 which showed a hyperdynamic EF of 70% with normal myocardial perfusion imaging and no evidence of ischemia with a normal ECG stress test consistent  with a low risk study.  Her testing/results and plan were discussed with patient along with concerning/alarm symptoms for which to call 911/return to the ED.  She will be given a steroid taper for acute COPD exacerbation and antibiotics which were recently prescribed but not filled by PCP will be filled at this facility as patient's primary pharmacy is now closed for the weekend.  She confirms that she has an adequate supply of oxygen, breathing treatments and functioning oxygen machine at her residence.  Patient reports that she is anxious for discharge and is feeling close to her baseline.  She will follow-up on an outpatient basis with her PCP as well as endocrinology.  All questions were answered and she verbalizes her understanding and agreement.    Past Medical History:     Past Medical History:   Diagnosis Date   • Anxiety    • Asthma    • Breast cancer ([facility])    • Chest tightness    • COPD (chronic obstructive pulmonary disease) ([facility])    • Degenerative disorder of bone    • Foot pain     S/P multiple foot surguries.   • GERD (gastroesophageal reflux disease)    • Lesion of eye     Lesion behind eye, cancer associated retinopathopthy. [facility].    • Low back pain    • Migraines    • Neck nodule    • Pancreatitis    • RA (rheumatoid arthritis) ([facility])    • Seizure disorder ([facility])     Generalized seizure, possible partial complex.   • Skin cancer     Age 18.   • Stroke (HCC)    • Type 2 diabetes mellitus (HCC)        Past Surgical History:     Past Surgical History:   Procedure Laterality Date   • CARDIAC CATHETERIZATION      x2   • CARDIAC CATHETERIZATION  2015   •  SECTION      x2   • ESOPHAGEAL DILATATION     • FOOT SURGERY  2015   • FOOT SURGERY Left 2016   • FOOT SURGERY Right 2017   • MASTECTOMY Bilateral    • OTHER SURGICAL HISTORY  2017    LOOP Recorder   • DC  2016    [facility]   • TOTAL ABDOMINAL HYSTERECTOMY WITH SALPINGO OOPHORECTOMY         Social  History:   Social History     Socioeconomic History   • Marital status:    Tobacco Use   • Smoking status: Current Every Day Smoker     Packs/day: 0.50     Types: Cigarettes     Start date: 12/4/2011   • Smokeless tobacco: Never Used   Vaping Use   • Vaping Use: Never used   Substance and Sexual Activity   • Alcohol use: No   • Drug use: No   • Sexual activity: Defer       Procedures Performed         Consults:   Consults     No orders found for last 30 day(s).          Condition on Discharge:     Stable    Discharge Disposition  Home or Self Care    Discharge Medications     Discharge Medications      Changes to Medications      Instructions Start Date   cephalexin 500 MG capsule  Commonly known as: KEFLEX  What changed: how much to take   500 mg, Oral, 2 Times Daily      omeprazole 20 MG capsule  Commonly known as: priLOSEC  What changed: Another medication with the same name was removed. Continue taking this medication, and follow the directions you see here.   20 mg, Oral, 2 times daily      predniSONE 10 MG tablet  Commonly known as: DELTASONE  What changed:   · how much to take  · how to take this  · when to take this  · additional instructions   Take 4 tablets by mouth daily x 3 days, 3 tablets daily x 3 days, 2 tablets daily x 3 days, then 1 tablet daily x 3 days         Continue These Medications      Instructions Start Date   albuterol sulfate  (90 Base) MCG/ACT inhaler  Commonly known as: PROVENTIL HFA;VENTOLIN HFA;PROAIR HFA   INHALE ONE PUFF BY MOUTH EVERY 4 TO 6 HOURS      ALPRAZolam 1 MG tablet  Commonly known as: XANAX   1 tab po at 8 AM and 8 Pm and 0.5 tab po at noon      benzonatate 200 MG capsule  Commonly known as: TESSALON   TAKE ONE CAPSULE BY MOUTH THREE TIMES DAILY AS NEEDED FOR COUGH      Breo Ellipta 200-25 MCG/INH inhaler  Generic drug: Fluticasone Furoate-Vilanterol   INHALE ONE PUFFS BY MOUTH DAILY      freestyle lancets   TEST EVERY DAY      FREESTYLE LITE test  strip  Generic drug: glucose blood   test TWICE DAILY      gabapentin 300 MG capsule  Commonly known as: NEURONTIN   300 mg, Oral, 4 Times Daily      hydrOXYzine 50 MG tablet  Commonly known as: ATARAX   TAKE ONE TABLET BY MOUTH THREE TIMES DAILY      lamoTRIgine 200 MG tablet  Commonly known as: LaMICtal   TAKE ONE TABLET BY MOUTH EVERY NIGHT AT BEDTIME      metFORMIN 500 MG tablet  Commonly known as: GLUCOPHAGE   500 mg, Oral, 2 Times Daily      methocarbamol 750 MG tablet  Commonly known as: ROBAXIN   TAKE ONE TABLET BY MOUTH THREE TIMES DAILY      metoprolol succinate XL 25 MG 24 hr tablet  Commonly known as: TOPROL-XL   TAKE ONE TABLET BY MOUTH EVERY DAY, need appointment for more refills.      NON FORMULARY   1 dose, Nasal, Continuous, Oxygen 3 lpm      OLANZapine 5 MG tablet  Commonly known as: zyPREXA   5 mg, Oral, Nightly      oxyCODONE-acetaminophen  MG per tablet  Commonly known as: PERCOCET   1 tablet, Oral, 2 times daily      promethazine 25 MG tablet  Commonly known as: PHENERGAN   TAKE ONE TABLET BY MOUTH EVERY 8 HOURS AS NEEDED FOR NAUSEA AND VOMITING      sertraline 25 MG tablet  Commonly known as: ZOLOFT   25 mg, Oral, Every Night at Bedtime      Spiriva Respimat 2.5 MCG/ACT aerosol solution inhaler  Generic drug: tiotropium bromide monohydrate   2 puffs, Inhalation, Daily - RT      sucralfate 1 g tablet  Commonly known as: CARAFATE   No dose, route, or frequency recorded.      SUMAtriptan 100 MG tablet  Commonly known as: IMITREX   1 tablet, Oral, Take As Directed, For migraines      topiramate 100 MG tablet  Commonly known as: TOPAMAX   TAKE ONE TABLET BY MOUTH EVERY MORNING AND ONE AND ONE-HALF (0.5) EVERY NIGHT AT BEDTIME      traZODone 150 MG tablet  Commonly known as: DESYREL   300 mg, Oral, Nightly      umeclidinium-vilanterol 62.5-25 MCG/INH aerosol powder  inhaler  Commonly known as: ANORO ELLIPTA   1 puff, Inhalation, Daily - RT      vitamin D 1.25 MG (15081 UT) capsule  capsule  Commonly known as: ERGOCALCIFEROL   TAKE ONE CAPSULE BY MOUTH EVERY WEEK             Discharge Diet:     Activity at Discharge:     Follow-up Appointments  Future Appointments   Date Time Provider Department Center   5/18/2022  9:30 AM Mayra Jaime MD [facility]     Additional Instructions for the Follow-ups that You Need to Schedule     Discharge Follow-up with PCP   As directed       Currently Documented PCP:    Nicolasa Guajardo APRN    PCP Phone Number:    [phone]     Follow Up Details: 5 to 7 days         Discharge Follow-up with Specified Provider: Endocrinology; 2 Weeks   As directed      To: Endocrinology    Follow Up: 2 Weeks               Test Results Pending at Discharge       Risk for Readmission (LACE) Score: 4 (12/4/2021  6:01 AM)          Timmy Wolf PA-C  12/04/21  16:34 EST

## 2021-12-04 NOTE — ED NOTES
No answer, attempted to call number she left not in service.       Elisabet Marina, RN  12/03/21 1935

## 2021-12-04 NOTE — PLAN OF CARE
Goal Outcome Evaluation:  Plan of Care Reviewed With: patient           Outcome Summary: discharge

## 2021-12-05 LAB
QT INTERVAL: 432 MS
QT INTERVAL: 454 MS

## 2021-12-14 RX ORDER — MIDODRINE HYDROCHLORIDE 10 MG/1
TABLET ORAL
Qty: 90 TABLET | Refills: 0 | Status: SHIPPED | OUTPATIENT
Start: 2021-12-14 | End: 2022-01-13

## 2021-12-14 NOTE — TELEPHONE ENCOUNTER
Rx Refill Note  Requested Prescriptions     Pending Prescriptions Disp Refills   • midodrine (PROAMATINE) 10 MG tablet [Pharmacy Med Name: midodrine 10 mg tablet] 90 tablet 0     Sig: TAKE ONE TABLET BY MOUTH THREE TIMES DAILY      Last office visit with prescribing clinician: 12/3/2020      Next office visit with prescribing clinician: Visit date not found            Maddi Garnica MA  12/14/21, 09:04 EST

## 2022-01-10 RX ORDER — METOPROLOL SUCCINATE 25 MG/1
TABLET, EXTENDED RELEASE ORAL
Qty: 15 TABLET | Refills: 0 | Status: SHIPPED | OUTPATIENT
Start: 2022-01-10 | End: 2022-01-19 | Stop reason: SDUPTHER

## 2022-01-10 NOTE — TELEPHONE ENCOUNTER
Rx Refill Note  Requested Prescriptions     Pending Prescriptions Disp Refills   • metoprolol succinate XL (TOPROL-XL) 25 MG 24 hr tablet [Pharmacy Med Name: metoprolol succinate ER 25 mg tablet,extended release 24 hr] 30 tablet 2     Sig: TAKE ONE TABLET BY MOUTH DAILY NEED APPT FOR REFILL      Last office visit with prescribing clinician: 12/3/2020      Next office visit with prescribing clinician: Visit date not found            Maddi Garnica MA  01/10/22, 09:32 EST

## 2022-01-13 RX ORDER — MIDODRINE HYDROCHLORIDE 10 MG/1
TABLET ORAL
Qty: 90 TABLET | Refills: 0 | Status: SHIPPED | OUTPATIENT
Start: 2022-01-13 | End: 2022-02-11

## 2022-01-13 RX ORDER — HYDROXYZINE 50 MG/1
TABLET, FILM COATED ORAL
Qty: 90 TABLET | Refills: 0 | Status: SHIPPED | OUTPATIENT
Start: 2022-01-13 | End: 2022-02-11

## 2022-01-13 NOTE — TELEPHONE ENCOUNTER
Rx Refill Note  Requested Prescriptions     Pending Prescriptions Disp Refills   • midodrine (PROAMATINE) 10 MG tablet [Pharmacy Med Name: midodrine 10 mg tablet] 90 tablet 0     Sig: TAKE ONE TABLET BY MOUTH THREE TIMES DAILY      Last office visit with prescribing clinician: 12/3/2020      Next office visit with prescribing clinician: 1/13/2022            Jayleen Riddle MA  01/13/22, 15:14 EST

## 2022-01-13 NOTE — TELEPHONE ENCOUNTER
Rx Refill Note  Requested Prescriptions      No prescriptions requested or ordered in this encounter      Last office visit with prescribing clinician: 12/3/2020      Next office visit with prescribing clinician: 1/25/2022            Jayleen Riddle MA  01/13/22, 15:14 EST

## 2022-01-15 RX ORDER — ERGOCALCIFEROL 1.25 MG/1
CAPSULE ORAL
Qty: 4 CAPSULE | Refills: 4 | Status: ON HOLD | OUTPATIENT
Start: 2022-01-15 | End: 2022-05-18

## 2022-01-19 RX ORDER — METOPROLOL SUCCINATE 25 MG/1
25 TABLET, EXTENDED RELEASE ORAL DAILY
Qty: 30 TABLET | Refills: 0 | Status: SHIPPED | OUTPATIENT
Start: 2022-01-19 | End: 2022-02-16 | Stop reason: SDUPTHER

## 2022-01-19 NOTE — TELEPHONE ENCOUNTER
Rx Refill Note  Requested Prescriptions      No prescriptions requested or ordered in this encounter      Last office visit with prescribing clinician: 12/3/2020      Next office visit with prescribing clinician: 1/25/2022            Maddi Garnica MA  01/19/22, 16:03 EST

## 2022-02-05 DIAGNOSIS — F41.1 GENERALIZED ANXIETY DISORDER: Chronic | ICD-10-CM

## 2022-02-10 RX ORDER — ALPRAZOLAM 1 MG/1
TABLET ORAL
Qty: 75 TABLET | Refills: 2 | Status: SHIPPED | OUTPATIENT
Start: 2022-02-10 | End: 2022-05-03

## 2022-02-11 RX ORDER — HYDROXYZINE 50 MG/1
TABLET, FILM COATED ORAL
Qty: 90 TABLET | Refills: 0 | Status: SHIPPED | OUTPATIENT
Start: 2022-02-11 | End: 2022-03-09

## 2022-02-11 RX ORDER — MIDODRINE HYDROCHLORIDE 10 MG/1
TABLET ORAL
Qty: 45 TABLET | Refills: 0 | Status: SHIPPED | OUTPATIENT
Start: 2022-02-11 | End: 2022-03-09

## 2022-02-11 NOTE — TELEPHONE ENCOUNTER
Rx Refill Note  Requested Prescriptions     Pending Prescriptions Disp Refills   • midodrine (PROAMATINE) 10 MG tablet [Pharmacy Med Name: midodrine 10 mg tablet] 90 tablet 0     Sig: TAKE ONE TABLET BY MOUTH THREE TIMES DAILY      Last office visit with prescribing clinician: 12/3/2020      Next office visit with prescribing clinician: 2/16/2022            Maddi Garnica MA  02/11/22, 11:38 EST

## 2022-02-16 ENCOUNTER — OFFICE VISIT (OUTPATIENT)
Dept: CARDIOLOGY | Facility: CLINIC | Age: 57
End: 2022-02-16

## 2022-02-16 VITALS
BODY MASS INDEX: 26.21 KG/M2 | HEART RATE: 85 BPM | HEIGHT: 67 IN | OXYGEN SATURATION: 93 % | SYSTOLIC BLOOD PRESSURE: 112 MMHG | WEIGHT: 167 LBS | DIASTOLIC BLOOD PRESSURE: 77 MMHG

## 2022-02-16 DIAGNOSIS — R07.89 ATYPICAL CHEST PAIN: Primary | ICD-10-CM

## 2022-02-16 DIAGNOSIS — J43.1 PANLOBULAR EMPHYSEMA: ICD-10-CM

## 2022-02-16 DIAGNOSIS — Z79.4 TYPE 2 DIABETES MELLITUS WITHOUT COMPLICATION, WITH LONG-TERM CURRENT USE OF INSULIN: ICD-10-CM

## 2022-02-16 DIAGNOSIS — E11.9 TYPE 2 DIABETES MELLITUS WITHOUT COMPLICATION, WITH LONG-TERM CURRENT USE OF INSULIN: ICD-10-CM

## 2022-02-16 DIAGNOSIS — I95.89 CHRONIC HYPOTENSION: ICD-10-CM

## 2022-02-16 PROCEDURE — 99214 OFFICE O/P EST MOD 30 MIN: CPT | Performed by: INTERNAL MEDICINE

## 2022-02-16 RX ORDER — POTASSIUM CHLORIDE 20 MEQ/1
20 TABLET, EXTENDED RELEASE ORAL DAILY
COMMUNITY

## 2022-02-16 RX ORDER — METOPROLOL SUCCINATE 25 MG/1
25 TABLET, EXTENDED RELEASE ORAL DAILY
Qty: 90 TABLET | Refills: 3 | Status: SHIPPED | OUTPATIENT
Start: 2022-02-16 | End: 2022-09-07 | Stop reason: SDUPTHER

## 2022-02-16 RX ORDER — FUROSEMIDE 40 MG/1
40 TABLET ORAL DAILY
COMMUNITY
End: 2023-02-01 | Stop reason: SDUPTHER

## 2022-02-16 RX ORDER — OMEPRAZOLE 20 MG/1
TABLET, DELAYED RELEASE ORAL
Status: ON HOLD | COMMUNITY
Start: 2022-02-11 | End: 2022-11-06

## 2022-02-16 RX ORDER — BROMPHENIRAMINE MALEATE, PSEUDOEPHEDRINE HYDROCHLORIDE, AND DEXTROMETHORPHAN HYDROBROMIDE 2; 30; 10 MG/5ML; MG/5ML; MG/5ML
SYRUP ORAL
COMMUNITY
Start: 2022-01-28 | End: 2022-09-07

## 2022-02-16 NOTE — PROGRESS NOTES
Subjective:     Encounter Date:02/16/2022      Patient ID: Melvi Rayo is a 56 y.o. female.    Chief Complaint : Follow-up for chronic atypical chest pain, dizziness, chronic hypotension, implantable loop recorder  History of Present Illness      Ms. Melvi Rayo has PMH of       #. Recurrent syncope status post IL are 05/16/2014, require explantation 09/03/2014 because of patient's insistence and pain,, Orthostatic hypotension possibly due to autonomic insufficiency, on chronic midodrine therapy  #  Recurrent atypical chest pain and negative cath, 3/25/15,  2014,  and 2005  #. Diabetes, COPD, asthma, tobacco abuse  #. Questionable history of CVA and TIA in the past details are not available  #.  cholecystectomy, MARY JANE, next surgery, GERD, Diabetic neuropathy  #Cigarette smoker      here for  followup.  Patient is complaining of recurrent left-sided chest pain with no aggravating or relieving factors.    Patient's arterial blood pressure is 112/77, heart rate 85 bpm, O2 sat of 93% on room air.  Patient has chronic chest pain will schedule for stress test in the past and patient canceled and refused to have stress test.  Patient has reproducible tenderness in midsternal area.  Labs done 12/06/2018 revealed a hemoglobin A1c of 5.5.  CMP, CBC was normal.  Labs from 2/28/2020 revealed glucose of 121 otherwise normal Chem-7 and CBC.  Patient continues to smoke in spite of counseling.    Patient had echocardiogram 12/4/2021 which revealed normal LV function EF of 65%  Patient had Lexiscan Cardiolite 12/4/2021 which revealed normal perfusion EF of 70%         ASSESSMENT:  #Chronic atypical chest pain  #  autonomic insufficiency  #  bradycardia  #. diabetes and  autonomic a insufficiency  #. COPD tobacco abuse      PLAN:  Reviewed echo and stress test results with patient.  Patient's chest pain i is of unclear etiology. patient had a cardiac catheterization in 2015 and 2014 which were normal. but she continues to  smoke and has diabetes counseled on smoking cessation.  Patient's chest pain is noncardiac probably musculoskeletal.  Patient is complaining of weight gain.  Patient's BNP level was normal at 117 during the 2021 admission.  Troponins were negative.  Advised follow-up closely with PMD.  Patient states her diabetes is running well.     Procedures EKG done 2021 reviewed/interpreted by me reveals sinus rhythm with rate of 73 bpm with nonspecific ST-T changes    The following portions of the patient's history were reviewed and updated as appropriate: allergies, current medications, past family history, past medical history, past social history, past surgical history and problem list.    Assessment:         [facility]     Diagnosis Plan   1. Atypical chest pain     2. Chronic hypotension     3. Type 2 diabetes mellitus without complication, with long-term current use of insulin (HCC)     4. Panlobular emphysema (HCC)            Plan:               Past Medical History:  Past Medical History:   Diagnosis Date   • Anxiety    • Asthma    • Breast cancer (HCC)    • Chest tightness    • COPD (chronic obstructive pulmonary disease) (HCC)    • Degenerative disorder of bone    • Foot pain     S/P multiple foot surguries.   • GERD (gastroesophageal reflux disease)    • Lesion of eye     Lesion behind eye, cancer associated retinopathopthy. Documented from [facility].    • Low back pain    • Migraines    • Neck nodule    • Pancreatitis    • RA (rheumatoid arthritis) (HCC)    • Seizure disorder (HCC)     Generalized seizure, possible partial complex.   • Skin cancer     Age 18.   • Stroke (HCC)    • Type 2 diabetes mellitus (HCC)      Past Surgical History:  Past Surgical History:   Procedure Laterality Date   • CARDIAC CATHETERIZATION      x2   • CARDIAC CATHETERIZATION  2015   •  SECTION      x2   • ESOPHAGEAL DILATATION     • FOOT SURGERY  2015   • FOOT SURGERY Left 2016   • FOOT SURGERY Right  12/2017   • MASTECTOMY Bilateral    • OTHER SURGICAL HISTORY  01/25/2017    LOOP Recorder   • DC  06/27/2016    [facility]   • TOTAL ABDOMINAL HYSTERECTOMY WITH SALPINGO OOPHORECTOMY        Allergies:  Allergies   Allergen Reactions   • Aspirin Palpitations   • Codeine Shortness Of Breath   • Contrast Dye Shortness Of Breath   • Erythromycin Hives   • Morphine Unknown (See Comments)   • Penicillin G Itching   • Sulfa Antibiotics Unknown (See Comments)   • Fentanyl Itching   • Levofloxacin Other (See Comments)     Home Meds:  Current Meds:     Current Outpatient Medications:   •  albuterol sulfate  (90 Base) MCG/ACT inhaler, INHALE ONE PUFF BY MOUTH EVERY 4 TO 6 HOURS, Disp: 18 g, Rfl: 3  •  ALPRAZolam (XANAX) 1 MG tablet, TAKE ONE TABLET BY MOUTH EVERY MORNING AT 8AM AND NIGHT AT 8pm AND ONE-HALF TABLET EVERY DAY AT NOON, Disp: 75 tablet, Rfl: 2  •  benzonatate (TESSALON) 200 MG capsule, TAKE ONE CAPSULE BY MOUTH THREE TIMES DAILY AS NEEDED FOR COUGH, Disp: 20 capsule, Rfl: 0  •  Breo Ellipta 200-25 MCG/INH inhaler, INHALE ONE PUFFS BY MOUTH DAILY, Disp: 60 each, Rfl: 3  •  brompheniramine-pseudoephedrine-DM 30-2-10 MG/5ML syrup, TAKE TEN ML BY MOUTH EVERY 4 TO 6 HOURS AS NEEDED, Disp: , Rfl:   •  FREESTYLE LITE test strip, test TWICE DAILY, Disp: 100 each, Rfl: 3  •  furosemide (LASIX) 40 MG tablet, Take 40 mg by mouth Daily., Disp: , Rfl:   •  gabapentin (NEURONTIN) 300 MG capsule, Take 1 capsule by mouth 4 (Four) Times a Day., Disp: 120 capsule, Rfl: 2  •  hydrOXYzine (ATARAX) 50 MG tablet, TAKE ONE TABLET BY MOUTH THREE TIMES DAILY, Disp: 90 tablet, Rfl: 0  •  lamoTRIgine (LaMICtal) 200 MG tablet, TAKE ONE TABLET BY MOUTH EVERY NIGHT AT BEDTIME, Disp: 30 tablet, Rfl: 3  •  Lancets (freestyle) lancets, TEST EVERY DAY, Disp: 100 each, Rfl: 3  •  metFORMIN (GLUCOPHAGE) 500 MG tablet, Take 500 mg by mouth 2 (Two) Times a Day., Disp: , Rfl:   •  methocarbamol (ROBAXIN) 750 MG tablet, TAKE ONE TABLET BY MOUTH THREE  TIMES DAILY, Disp: 90 tablet, Rfl: 0  •  metoprolol succinate XL (TOPROL-XL) 25 MG 24 hr tablet, Take 1 tablet by mouth Daily., Disp: 30 tablet, Rfl: 0  •  midodrine (PROAMATINE) 10 MG tablet, TAKE ONE TABLET BY MOUTH THREE TIMES DAILY, Disp: 45 tablet, Rfl: 0  •  NON FORMULARY, 1 dose into the nostril(s) as directed by provider Continuous. Oxygen 3 lpm, Disp: , Rfl:   •  OLANZapine (zyPREXA) 5 MG tablet, Take 1 tablet by mouth Every Night., Disp: 30 tablet, Rfl: 2  •  omeprazole (priLOSEC) 20 MG capsule, Take 20 mg by mouth 2 (two) times a day., Disp: , Rfl:   •  omeprazole OTC (PriLOSEC OTC) 20 MG EC tablet, , Disp: , Rfl:   •  oxyCODONE-acetaminophen (PERCOCET)  MG per tablet, Take 1 tablet by mouth 2 (two) times a day., Disp: , Rfl: 0  •  potassium chloride (K-DUR,KLOR-CON) 20 MEQ CR tablet, Take 20 mEq by mouth Daily., Disp: , Rfl:   •  predniSONE (DELTASONE) 10 MG tablet, Take 4 tablets by mouth daily x 3 days, 3 tablets daily x 3 days, 2 tablets daily x 3 days, then 1 tablet daily x 3 days, Disp: 30 tablet, Rfl: 0  •  promethazine (PHENERGAN) 25 MG tablet, TAKE ONE TABLET BY MOUTH EVERY 8 HOURS AS NEEDED FOR NAUSEA AND VOMITING, Disp: 30 tablet, Rfl: 0  •  sertraline (ZOLOFT) 25 MG tablet, Take 1 tablet by mouth every night at bedtime., Disp: 30 tablet, Rfl: 3  •  sucralfate (CARAFATE) 1 g tablet, , Disp: , Rfl:   •  SUMAtriptan (IMITREX) 100 MG tablet, Take 1 tablet by mouth Take As Directed. For migraines, Disp: , Rfl:   •  tiotropium bromide monohydrate (Spiriva Respimat) 2.5 MCG/ACT aerosol solution inhaler, Inhale 2 puffs Daily., Disp: , Rfl:   •  topiramate (TOPAMAX) 100 MG tablet, TAKE ONE TABLET BY MOUTH EVERY MORNING AND ONE AND ONE-HALF (0.5) EVERY NIGHT AT BEDTIME, Disp: 75 tablet, Rfl: 5  •  traZODone (DESYREL) 150 MG tablet, Take 2 tablets by mouth Every Night., Disp: 60 tablet, Rfl: 3  •  umeclidinium-vilanterol (ANORO ELLIPTA) 62.5-25 MCG/INH aerosol powder  inhaler, Inhale 1 puff Daily.,  "Disp: 60 each, Rfl: 0  •  vitamin D (ERGOCALCIFEROL) 1.25 MG (67718 UT) capsule capsule, TAKE ONE CAPSULE BY MOUTH EVERY WEEK, Disp: 4 capsule, Rfl: 4  Social History:   Social History     Tobacco Use   • Smoking status: Current Every Day Smoker     Packs/day: 0.50     Types: Cigarettes     Start date: 12/4/2011   • Smokeless tobacco: Never Used   Substance Use Topics   • Alcohol use: No      Family History:  Family History   Problem Relation Age of Onset   • Heart disease Mother    • Stroke Mother    • Hyperlipidemia Mother    • Hypertension Mother    • Heart disease Father    • Stroke Father    • Hypertension Father    • Hyperlipidemia Father    • Heart disease Other    • COPD Other    • Cancer Other    • Diabetes Other    • Hypertension Other    • Hyperlipidemia Other    • Stroke Other               Review of Systems   Constitutional: Negative for fever and malaise/fatigue.   HENT: Negative for ear pain and nosebleeds.    Eyes: Negative for blurred vision and double vision.   Cardiovascular: Positive for chest pain, leg swelling and palpitations. Negative for dyspnea on exertion.   Respiratory: Positive for shortness of breath. Negative for cough.    Skin: Negative for rash.   Musculoskeletal: Negative for joint pain.   Gastrointestinal: Negative for abdominal pain, nausea and vomiting.   Neurological: Positive for dizziness and numbness. Negative for focal weakness and headaches.   Psychiatric/Behavioral: Negative for depression. The patient is not nervous/anxious.    All other systems reviewed and are negative.    All other systems are negative         Objective:     Physical Exam  /77 (BP Location: Left arm, Patient Position: Sitting, Cuff Size: Adult)   Pulse 85   Ht 170.2 cm (67\")   Wt 75.8 kg (167 lb)   SpO2 93%   BMI 26.16 kg/m²   General:  Appears in no acute distress, anxious  Eyes: Sclera is anicteric,  conjunctiva is clear   HEENT:  No JVD.  No carotid bruits  Respiratory: Respirations " regular and unlabored at rest.  Clear to auscultation  Cardiovascular: S1,S2 Regular rate and rhythm. No murmur, rub or gallop auscultated.   Extremities: No digital clubbing or cyanosis, no edema  Skin: Color pink. Skin warm and dry to touch. No rashes  No xanthoma  Neuro: Alert and awake.    Lab Reviewed:         Bright Steele MD  2/16/2022 12:17 EST      Much of the above report is an electronic transcription/translation of the spoken language to printed text using Dragon Software. As such, the subtleties and finesse of the spoken language may permit erroneous, or at times, nonsensical words or phrases to be inadvertently transcribed; thus changes may be made at a later date to rectify these errors.

## 2022-03-08 RX ORDER — TRAZODONE HYDROCHLORIDE 150 MG/1
TABLET ORAL
Qty: 60 TABLET | Refills: 3 | Status: SHIPPED | OUTPATIENT
Start: 2022-03-08 | End: 2022-06-22

## 2022-03-09 RX ORDER — BLOOD-GLUCOSE METER
KIT MISCELLANEOUS
Refills: 3 | OUTPATIENT
Start: 2022-03-09

## 2022-03-09 RX ORDER — HYDROXYZINE 50 MG/1
TABLET, FILM COATED ORAL
Qty: 90 TABLET | Refills: 0 | Status: SHIPPED | OUTPATIENT
Start: 2022-03-09 | End: 2022-06-28 | Stop reason: SDUPTHER

## 2022-03-09 RX ORDER — MIDODRINE HYDROCHLORIDE 10 MG/1
TABLET ORAL
Qty: 90 TABLET | Refills: 1 | Status: SHIPPED | OUTPATIENT
Start: 2022-03-09 | End: 2022-05-03

## 2022-03-09 NOTE — TELEPHONE ENCOUNTER
Rx Refill Note  Requested Prescriptions     Pending Prescriptions Disp Refills   • midodrine (PROAMATINE) 10 MG tablet [Pharmacy Med Name: midodrine 10 mg tablet] 45 tablet 0     Sig: TAKE ONE TABLET BY MOUTH THREE TIMES DAILY      Last office visit with prescribing clinician: 2/16/2022      Next office visit with prescribing clinician: Visit date not found            Maddi Garnica MA  03/09/22, 09:41 EST

## 2022-04-01 DIAGNOSIS — F33.2 SEVERE RECURRENT MAJOR DEPRESSION WITHOUT PSYCHOTIC FEATURES: Chronic | ICD-10-CM

## 2022-04-01 RX ORDER — TOPIRAMATE 100 MG/1
TABLET, FILM COATED ORAL
Qty: 75 TABLET | Refills: 5 | OUTPATIENT
Start: 2022-04-01

## 2022-04-01 RX ORDER — SERTRALINE HYDROCHLORIDE 25 MG/1
TABLET, FILM COATED ORAL
Qty: 30 TABLET | Refills: 3 | Status: SHIPPED | OUTPATIENT
Start: 2022-04-01 | End: 2022-06-28 | Stop reason: SDUPTHER

## 2022-04-06 DIAGNOSIS — F33.2 SEVERE RECURRENT MAJOR DEPRESSION WITHOUT PSYCHOTIC FEATURES: Chronic | ICD-10-CM

## 2022-04-06 RX ORDER — LAMOTRIGINE 200 MG/1
TABLET ORAL
Qty: 30 TABLET | Refills: 3 | Status: SHIPPED | OUTPATIENT
Start: 2022-04-06 | End: 2022-06-28 | Stop reason: SDUPTHER

## 2022-04-29 DIAGNOSIS — F41.1 GENERALIZED ANXIETY DISORDER: Chronic | ICD-10-CM

## 2022-05-03 RX ORDER — ALPRAZOLAM 1 MG/1
TABLET ORAL
Qty: 75 TABLET | Refills: 0 | Status: SHIPPED | OUTPATIENT
Start: 2022-05-03 | End: 2022-06-28 | Stop reason: SDUPTHER

## 2022-05-03 RX ORDER — MIDODRINE HYDROCHLORIDE 10 MG/1
TABLET ORAL
Qty: 270 TABLET | Refills: 1 | Status: SHIPPED | OUTPATIENT
Start: 2022-05-03 | End: 2022-09-07 | Stop reason: SDUPTHER

## 2022-05-03 NOTE — TELEPHONE ENCOUNTER
Rx Refill Note  Requested Prescriptions     Pending Prescriptions Disp Refills   • midodrine (PROAMATINE) 10 MG tablet [Pharmacy Med Name: midodrine 10 mg tablet] 90 tablet 1     Sig: TAKE ONE TABLET BY MOUTH THREE TIMES DAILY      Last office visit with prescribing clinician: 2/16/2022      Next office visit with prescribing clinician: Visit date not found            Maddi Garnica MA  05/03/22, 14:11 EDT

## 2022-05-10 RX ORDER — ALBUTEROL SULFATE 90 UG/1
AEROSOL, METERED RESPIRATORY (INHALATION)
Qty: 18 G | Refills: 3 | Status: SHIPPED | OUTPATIENT
Start: 2022-05-10

## 2022-05-17 ENCOUNTER — APPOINTMENT (OUTPATIENT)
Dept: GENERAL RADIOLOGY | Facility: HOSPITAL | Age: 57
End: 2022-05-17

## 2022-05-17 ENCOUNTER — HOSPITAL ENCOUNTER (INPATIENT)
Facility: HOSPITAL | Age: 57
LOS: 4 days | Discharge: HOME | End: 2022-05-21
Attending: EMERGENCY MEDICINE | Admitting: INTERNAL MEDICINE

## 2022-05-17 DIAGNOSIS — Z20.822 COVID-19 RULED OUT BY LABORATORY TESTING: ICD-10-CM

## 2022-05-17 DIAGNOSIS — J18.9 PNEUMONIA OF LEFT LOWER LOBE DUE TO INFECTIOUS ORGANISM: Primary | ICD-10-CM

## 2022-05-17 DIAGNOSIS — R10.811 RIGHT UPPER QUADRANT ABDOMINAL TENDERNESS, REBOUND TENDERNESS PRESENCE NOT SPECIFIED: ICD-10-CM

## 2022-05-17 DIAGNOSIS — Z86.73 HISTORY OF STROKE: ICD-10-CM

## 2022-05-17 DIAGNOSIS — R09.02 HYPOXIA: ICD-10-CM

## 2022-05-17 DIAGNOSIS — J44.1 COPD WITH ACUTE EXACERBATION: ICD-10-CM

## 2022-05-17 DIAGNOSIS — R53.81 PHYSICAL DEBILITY: ICD-10-CM

## 2022-05-17 DIAGNOSIS — J96.21 ACUTE ON CHRONIC RESPIRATORY FAILURE WITH HYPOXIA: ICD-10-CM

## 2022-05-17 PROBLEM — A41.9 SEPSIS (HCC): Status: ACTIVE | Noted: 2022-05-17

## 2022-05-17 PROBLEM — E11.9 TYPE 2 DIABETES MELLITUS (HCC): Status: ACTIVE | Noted: 2022-05-17

## 2022-05-17 LAB
ALBUMIN SERPL-MCNC: 3.4 G/DL (ref 3.5–5.2)
ALBUMIN/GLOB SERPL: 0.9 G/DL
ALP SERPL-CCNC: 166 U/L (ref 39–117)
ALT SERPL W P-5'-P-CCNC: 15 U/L (ref 1–33)
ANION GAP SERPL CALCULATED.3IONS-SCNC: 15 MMOL/L (ref 5–15)
ARTERIAL PATENCY WRIST A: POSITIVE
AST SERPL-CCNC: 10 U/L (ref 1–32)
ATMOSPHERIC PRESS: ABNORMAL MM[HG]
B PARAPERT DNA SPEC QL NAA+PROBE: NOT DETECTED
B PERT DNA SPEC QL NAA+PROBE: NOT DETECTED
BASE EXCESS BLDA CALC-SCNC: -4.1 MMOL/L (ref 0–3)
BASOPHILS # BLD MANUAL: 0.43 10*3/MM3 (ref 0–0.2)
BASOPHILS NFR BLD MANUAL: 2 % (ref 0–1.5)
BDY SITE: ABNORMAL
BILIRUB SERPL-MCNC: 0.4 MG/DL (ref 0–1.2)
BUN SERPL-MCNC: 14 MG/DL (ref 6–20)
BUN/CREAT SERPL: 16.1 (ref 7–25)
C PNEUM DNA NPH QL NAA+NON-PROBE: NOT DETECTED
CA-I BLDA-SCNC: 1.25 MMOL/L (ref 1.15–1.33)
CALCIUM SPEC-SCNC: 8.7 MG/DL (ref 8.6–10.5)
CHLORIDE SERPL-SCNC: 106 MMOL/L (ref 98–107)
CO2 BLDA-SCNC: 24.3 MMOL/L (ref 22–29)
CO2 SERPL-SCNC: 19 MMOL/L (ref 22–29)
CREAT SERPL-MCNC: 0.87 MG/DL (ref 0.57–1)
D-LACTATE SERPL-SCNC: 1.5 MMOL/L (ref 0.5–2)
D-LACTATE SERPL-SCNC: 1.8 MMOL/L (ref 0.5–2)
D-LACTATE SERPL-SCNC: 2.4 MMOL/L (ref 0.5–2)
DEPRECATED RDW RBC AUTO: 51.2 FL (ref 37–54)
EGFRCR SERPLBLD CKD-EPI 2021: 78.3 ML/MIN/1.73
ERYTHROCYTE [DISTWIDTH] IN BLOOD BY AUTOMATED COUNT: 15.4 % (ref 12.3–15.4)
ETHANOL UR QL: <0.01 %
FLUAV SUBTYP SPEC NAA+PROBE: NOT DETECTED
FLUBV RNA ISLT QL NAA+PROBE: NOT DETECTED
GLOBULIN UR ELPH-MCNC: 4 GM/DL
GLUCOSE BLDC GLUCOMTR-MCNC: 155 MG/DL (ref 74–100)
GLUCOSE BLDC GLUCOMTR-MCNC: 155 MG/DL (ref 74–100)
GLUCOSE BLDC GLUCOMTR-MCNC: 156 MG/DL (ref 70–105)
GLUCOSE SERPL-MCNC: 136 MG/DL (ref 65–99)
HADV DNA SPEC NAA+PROBE: NOT DETECTED
HCO3 BLDA-SCNC: 22.9 MMOL/L (ref 21–28)
HCOV 229E RNA SPEC QL NAA+PROBE: NOT DETECTED
HCOV HKU1 RNA SPEC QL NAA+PROBE: NOT DETECTED
HCOV NL63 RNA SPEC QL NAA+PROBE: NOT DETECTED
HCOV OC43 RNA SPEC QL NAA+PROBE: NOT DETECTED
HCT VFR BLD AUTO: 48.1 % (ref 34–46.6)
HCT VFR BLDA CALC: 51 % (ref 38–51)
HEMODILUTION: NO
HGB BLD-MCNC: 15.1 G/DL (ref 12–15.9)
HGB BLDA-MCNC: 17.2 G/DL (ref 12–17)
HMPV RNA NPH QL NAA+NON-PROBE: NOT DETECTED
HPIV1 RNA ISLT QL NAA+PROBE: NOT DETECTED
HPIV2 RNA SPEC QL NAA+PROBE: NOT DETECTED
HPIV3 RNA NPH QL NAA+PROBE: NOT DETECTED
HPIV4 P GENE NPH QL NAA+PROBE: NOT DETECTED
INHALED O2 CONCENTRATION: 44 %
LYMPHOCYTES # BLD MANUAL: 3.46 10*3/MM3 (ref 0.7–3.1)
LYMPHOCYTES NFR BLD MANUAL: 7 % (ref 5–12)
M PNEUMO IGG SER IA-ACNC: NOT DETECTED
MAGNESIUM SERPL-MCNC: 2 MG/DL (ref 1.6–2.6)
MCH RBC QN AUTO: 29.9 PG (ref 26.6–33)
MCHC RBC AUTO-ENTMCNC: 31.4 G/DL (ref 31.5–35.7)
MCV RBC AUTO: 95.1 FL (ref 79–97)
METAMYELOCYTES NFR BLD MANUAL: 1 % (ref 0–0)
MODALITY: ABNORMAL
MONOCYTES # BLD: 1.51 10*3/MM3 (ref 0.1–0.9)
NEUTROPHILS # BLD AUTO: 15.98 10*3/MM3 (ref 1.7–7)
NEUTROPHILS NFR BLD MANUAL: 70 % (ref 42.7–76)
NEUTS BAND NFR BLD MANUAL: 4 % (ref 0–5)
NEUTS VAC BLD QL SMEAR: ABNORMAL
PCO2 BLDA: 47.2 MM HG (ref 35–48)
PH BLDA: 7.29 PH UNITS (ref 7.35–7.45)
PLAT MORPH BLD: NORMAL
PLATELET # BLD AUTO: 271 10*3/MM3 (ref 140–450)
PMV BLD AUTO: 6.9 FL (ref 6–12)
PO2 BLDA: 66.8 MM HG (ref 83–108)
POTASSIUM BLDA-SCNC: 3.4 MMOL/L (ref 3.5–4.5)
POTASSIUM SERPL-SCNC: 4.1 MMOL/L (ref 3.5–5.2)
PROT SERPL-MCNC: 7.4 G/DL (ref 6–8.5)
RBC # BLD AUTO: 5.06 10*6/MM3 (ref 3.77–5.28)
RBC MORPH BLD: NORMAL
RHINOVIRUS RNA SPEC NAA+PROBE: NOT DETECTED
RSV RNA NPH QL NAA+NON-PROBE: NOT DETECTED
SAO2 % BLDCOA: 90.5 % (ref 94–98)
SARS-COV-2 RNA NPH QL NAA+NON-PROBE: NOT DETECTED
SCAN SLIDE: NORMAL
SODIUM BLD-SCNC: 144 MMOL/L (ref 138–146)
SODIUM SERPL-SCNC: 140 MMOL/L (ref 136–145)
TROPONIN T SERPL-MCNC: <0.01 NG/ML (ref 0–0.03)
TSH SERPL DL<=0.05 MIU/L-ACNC: 0.4 UIU/ML (ref 0.27–4.2)
VARIANT LYMPHS NFR BLD MANUAL: 16 % (ref 19.6–45.3)
WBC NRBC COR # BLD: 21.6 10*3/MM3 (ref 3.4–10.8)

## 2022-05-17 PROCEDURE — 82962 GLUCOSE BLOOD TEST: CPT

## 2022-05-17 PROCEDURE — 83735 ASSAY OF MAGNESIUM: CPT | Performed by: EMERGENCY MEDICINE

## 2022-05-17 PROCEDURE — 85018 HEMOGLOBIN: CPT

## 2022-05-17 PROCEDURE — 36415 COLL VENOUS BLD VENIPUNCTURE: CPT

## 2022-05-17 PROCEDURE — 94664 DEMO&/EVAL PT USE INHALER: CPT

## 2022-05-17 PROCEDURE — 0202U NFCT DS 22 TRGT SARS-COV-2: CPT | Performed by: EMERGENCY MEDICINE

## 2022-05-17 PROCEDURE — 83036 HEMOGLOBIN GLYCOSYLATED A1C: CPT | Performed by: NURSE PRACTITIONER

## 2022-05-17 PROCEDURE — 84484 ASSAY OF TROPONIN QUANT: CPT | Performed by: EMERGENCY MEDICINE

## 2022-05-17 PROCEDURE — 94799 UNLISTED PULMONARY SVC/PX: CPT

## 2022-05-17 PROCEDURE — 25010000002 CEFTRIAXONE PER 250 MG: Performed by: EMERGENCY MEDICINE

## 2022-05-17 PROCEDURE — 80051 ELECTROLYTE PANEL: CPT

## 2022-05-17 PROCEDURE — 83605 ASSAY OF LACTIC ACID: CPT

## 2022-05-17 PROCEDURE — 87185 SC STD ENZYME DETCJ PER NZM: CPT | Performed by: EMERGENCY MEDICINE

## 2022-05-17 PROCEDURE — 87070 CULTURE OTHR SPECIMN AEROBIC: CPT | Performed by: EMERGENCY MEDICINE

## 2022-05-17 PROCEDURE — 82330 ASSAY OF CALCIUM: CPT

## 2022-05-17 PROCEDURE — 87205 SMEAR GRAM STAIN: CPT | Performed by: EMERGENCY MEDICINE

## 2022-05-17 PROCEDURE — 71045 X-RAY EXAM CHEST 1 VIEW: CPT

## 2022-05-17 PROCEDURE — 94640 AIRWAY INHALATION TREATMENT: CPT

## 2022-05-17 PROCEDURE — 82077 ASSAY SPEC XCP UR&BREATH IA: CPT | Performed by: EMERGENCY MEDICINE

## 2022-05-17 PROCEDURE — 85007 BL SMEAR W/DIFF WBC COUNT: CPT | Performed by: EMERGENCY MEDICINE

## 2022-05-17 PROCEDURE — 93005 ELECTROCARDIOGRAM TRACING: CPT | Performed by: EMERGENCY MEDICINE

## 2022-05-17 PROCEDURE — 87040 BLOOD CULTURE FOR BACTERIA: CPT | Performed by: EMERGENCY MEDICINE

## 2022-05-17 PROCEDURE — 99285 EMERGENCY DEPT VISIT HI MDM: CPT

## 2022-05-17 PROCEDURE — 25010000002 METHYLPREDNISOLONE PER 125 MG: Performed by: EMERGENCY MEDICINE

## 2022-05-17 PROCEDURE — 36600 WITHDRAWAL OF ARTERIAL BLOOD: CPT

## 2022-05-17 PROCEDURE — 82803 BLOOD GASES ANY COMBINATION: CPT

## 2022-05-17 PROCEDURE — 36415 COLL VENOUS BLD VENIPUNCTURE: CPT | Performed by: EMERGENCY MEDICINE

## 2022-05-17 PROCEDURE — 87077 CULTURE AEROBIC IDENTIFY: CPT | Performed by: EMERGENCY MEDICINE

## 2022-05-17 PROCEDURE — 80050 GENERAL HEALTH PANEL: CPT | Performed by: EMERGENCY MEDICINE

## 2022-05-17 RX ORDER — INSULIN LISPRO 100 [IU]/ML
0-14 INJECTION, SOLUTION INTRAVENOUS; SUBCUTANEOUS
Status: DISCONTINUED | OUTPATIENT
Start: 2022-05-17 | End: 2022-05-21 | Stop reason: HOSPADM

## 2022-05-17 RX ORDER — IPRATROPIUM BROMIDE AND ALBUTEROL SULFATE 2.5; .5 MG/3ML; MG/3ML
3 SOLUTION RESPIRATORY (INHALATION) ONCE
Status: COMPLETED | OUTPATIENT
Start: 2022-05-17 | End: 2022-05-17

## 2022-05-17 RX ORDER — ONDANSETRON 2 MG/ML
4 INJECTION INTRAMUSCULAR; INTRAVENOUS EVERY 6 HOURS PRN
Status: DISCONTINUED | OUTPATIENT
Start: 2022-05-17 | End: 2022-05-21 | Stop reason: HOSPADM

## 2022-05-17 RX ORDER — BENZONATATE 100 MG/1
100 CAPSULE ORAL 3 TIMES DAILY PRN
Status: DISCONTINUED | OUTPATIENT
Start: 2022-05-17 | End: 2022-05-21 | Stop reason: HOSPADM

## 2022-05-17 RX ORDER — SODIUM CHLORIDE 9 MG/ML
100 INJECTION, SOLUTION INTRAVENOUS CONTINUOUS
Status: DISCONTINUED | OUTPATIENT
Start: 2022-05-17 | End: 2022-05-19

## 2022-05-17 RX ORDER — METHYLPREDNISOLONE SODIUM SUCCINATE 125 MG/2ML
125 INJECTION, POWDER, LYOPHILIZED, FOR SOLUTION INTRAMUSCULAR; INTRAVENOUS ONCE
Status: COMPLETED | OUTPATIENT
Start: 2022-05-17 | End: 2022-05-17

## 2022-05-17 RX ORDER — DEXTROSE MONOHYDRATE 25 G/50ML
25 INJECTION, SOLUTION INTRAVENOUS
Status: DISCONTINUED | OUTPATIENT
Start: 2022-05-17 | End: 2022-05-21 | Stop reason: HOSPADM

## 2022-05-17 RX ORDER — ACETAMINOPHEN 325 MG/1
650 TABLET ORAL EVERY 6 HOURS PRN
Status: DISCONTINUED | OUTPATIENT
Start: 2022-05-17 | End: 2022-05-21 | Stop reason: HOSPADM

## 2022-05-17 RX ORDER — ALBUTEROL SULFATE 2.5 MG/3ML
2.5 SOLUTION RESPIRATORY (INHALATION)
Status: COMPLETED | OUTPATIENT
Start: 2022-05-17 | End: 2022-05-17

## 2022-05-17 RX ORDER — IPRATROPIUM BROMIDE AND ALBUTEROL SULFATE 2.5; .5 MG/3ML; MG/3ML
3 SOLUTION RESPIRATORY (INHALATION) EVERY 4 HOURS PRN
Status: DISCONTINUED | OUTPATIENT
Start: 2022-05-17 | End: 2022-05-21 | Stop reason: HOSPADM

## 2022-05-17 RX ORDER — OLANZAPINE 10 MG/2ML
1 INJECTION, POWDER, LYOPHILIZED, FOR SOLUTION INTRAMUSCULAR AS NEEDED
Status: DISCONTINUED | OUTPATIENT
Start: 2022-05-17 | End: 2022-05-21 | Stop reason: HOSPADM

## 2022-05-17 RX ORDER — SODIUM CHLORIDE 0.9 % (FLUSH) 0.9 %
10 SYRINGE (ML) INJECTION AS NEEDED
Status: DISCONTINUED | OUTPATIENT
Start: 2022-05-17 | End: 2022-05-21 | Stop reason: HOSPADM

## 2022-05-17 RX ORDER — INSULIN LISPRO 100 [IU]/ML
0-14 INJECTION, SOLUTION INTRAVENOUS; SUBCUTANEOUS AS NEEDED
Status: DISCONTINUED | OUTPATIENT
Start: 2022-05-17 | End: 2022-05-21 | Stop reason: HOSPADM

## 2022-05-17 RX ORDER — NICOTINE POLACRILEX 4 MG
15 LOZENGE BUCCAL
Status: DISCONTINUED | OUTPATIENT
Start: 2022-05-17 | End: 2022-05-21 | Stop reason: HOSPADM

## 2022-05-17 RX ADMIN — SODIUM CHLORIDE 500 ML: 9 INJECTION, SOLUTION INTRAVENOUS at 18:59

## 2022-05-17 RX ADMIN — IPRATROPIUM BROMIDE AND ALBUTEROL SULFATE 3 ML: .5; 3 SOLUTION RESPIRATORY (INHALATION) at 16:10

## 2022-05-17 RX ADMIN — ALBUTEROL SULFATE 2.5 MG: 2.5 SOLUTION RESPIRATORY (INHALATION) at 16:01

## 2022-05-17 RX ADMIN — CEFTRIAXONE 2 G: 10 INJECTION, POWDER, FOR SOLUTION INTRAVENOUS at 17:32

## 2022-05-17 RX ADMIN — SODIUM CHLORIDE 100 ML/HR: 9 INJECTION, SOLUTION INTRAVENOUS at 19:59

## 2022-05-17 RX ADMIN — METHYLPREDNISOLONE SODIUM SUCCINATE 125 MG: 125 INJECTION, POWDER, FOR SOLUTION INTRAMUSCULAR; INTRAVENOUS at 16:25

## 2022-05-17 RX ADMIN — ALBUTEROL SULFATE 2.5 MG: 2.5 SOLUTION RESPIRATORY (INHALATION) at 15:51

## 2022-05-17 RX ADMIN — Medication 10 ML: at 17:33

## 2022-05-17 RX ADMIN — DOXYCYCLINE 100 MG: 100 INJECTION, POWDER, LYOPHILIZED, FOR SOLUTION INTRAVENOUS at 17:35

## 2022-05-18 ENCOUNTER — APPOINTMENT (OUTPATIENT)
Dept: CT IMAGING | Facility: HOSPITAL | Age: 57
End: 2022-05-18

## 2022-05-18 PROBLEM — R09.02 HYPOXIA: Status: ACTIVE | Noted: 2022-05-17

## 2022-05-18 PROBLEM — Z86.73 HISTORY OF STROKE: Status: ACTIVE | Noted: 2022-05-18

## 2022-05-18 LAB
ALBUMIN SERPL-MCNC: 3.2 G/DL (ref 3.5–5.2)
ALBUMIN/GLOB SERPL: 1 G/DL
ALP SERPL-CCNC: 134 U/L (ref 39–117)
ALT SERPL W P-5'-P-CCNC: 12 U/L (ref 1–33)
ANION GAP SERPL CALCULATED.3IONS-SCNC: 10 MMOL/L (ref 5–15)
AST SERPL-CCNC: 8 U/L (ref 1–32)
BASOPHILS # BLD AUTO: 0.1 10*3/MM3 (ref 0–0.2)
BASOPHILS NFR BLD AUTO: 0.4 % (ref 0–1.5)
BILIRUB SERPL-MCNC: 0.3 MG/DL (ref 0–1.2)
BUN SERPL-MCNC: 14 MG/DL (ref 6–20)
BUN/CREAT SERPL: 18.9 (ref 7–25)
CALCIUM SPEC-SCNC: 8.7 MG/DL (ref 8.6–10.5)
CHLORIDE SERPL-SCNC: 109 MMOL/L (ref 98–107)
CO2 SERPL-SCNC: 23 MMOL/L (ref 22–29)
CREAT SERPL-MCNC: 0.74 MG/DL (ref 0.57–1)
D-LACTATE SERPL-SCNC: 0.9 MMOL/L (ref 0.5–2)
D-LACTATE SERPL-SCNC: 1.2 MMOL/L (ref 0.5–2)
D-LACTATE SERPL-SCNC: 1.2 MMOL/L (ref 0.5–2)
D-LACTATE SERPL-SCNC: 2.9 MMOL/L (ref 0.5–2)
DEPRECATED RDW RBC AUTO: 50.8 FL (ref 37–54)
EGFRCR SERPLBLD CKD-EPI 2021: 95.1 ML/MIN/1.73
EOSINOPHIL # BLD AUTO: 0 10*3/MM3 (ref 0–0.4)
EOSINOPHIL NFR BLD AUTO: 0 % (ref 0.3–6.2)
ERYTHROCYTE [DISTWIDTH] IN BLOOD BY AUTOMATED COUNT: 15.3 % (ref 12.3–15.4)
GLOBULIN UR ELPH-MCNC: 3.2 GM/DL
GLUCOSE BLDC GLUCOMTR-MCNC: 113 MG/DL (ref 70–105)
GLUCOSE BLDC GLUCOMTR-MCNC: 121 MG/DL (ref 70–105)
GLUCOSE BLDC GLUCOMTR-MCNC: 161 MG/DL (ref 70–105)
GLUCOSE BLDC GLUCOMTR-MCNC: 98 MG/DL (ref 70–105)
GLUCOSE SERPL-MCNC: 197 MG/DL (ref 65–99)
HBA1C MFR BLD: 5.6 % (ref 3.5–5.6)
HCT VFR BLD AUTO: 41.1 % (ref 34–46.6)
HGB BLD-MCNC: 13.7 G/DL (ref 12–15.9)
HOLD SPECIMEN: NORMAL
HOLD SPECIMEN: NORMAL
LYMPHOCYTES # BLD AUTO: 0.9 10*3/MM3 (ref 0.7–3.1)
LYMPHOCYTES NFR BLD AUTO: 4.7 % (ref 19.6–45.3)
MCH RBC QN AUTO: 30.9 PG (ref 26.6–33)
MCHC RBC AUTO-ENTMCNC: 33.3 G/DL (ref 31.5–35.7)
MCV RBC AUTO: 93 FL (ref 79–97)
MONOCYTES # BLD AUTO: 0.6 10*3/MM3 (ref 0.1–0.9)
MONOCYTES NFR BLD AUTO: 3.1 % (ref 5–12)
NEUTROPHILS NFR BLD AUTO: 17.5 10*3/MM3 (ref 1.7–7)
NEUTROPHILS NFR BLD AUTO: 91.8 % (ref 42.7–76)
NRBC BLD AUTO-RTO: 0.1 /100 WBC (ref 0–0.2)
PLATELET # BLD AUTO: 257 10*3/MM3 (ref 140–450)
PMV BLD AUTO: 7 FL (ref 6–12)
POTASSIUM SERPL-SCNC: 4.5 MMOL/L (ref 3.5–5.2)
PROT SERPL-MCNC: 6.4 G/DL (ref 6–8.5)
RBC # BLD AUTO: 4.42 10*6/MM3 (ref 3.77–5.28)
SODIUM SERPL-SCNC: 142 MMOL/L (ref 136–145)
WBC NRBC COR # BLD: 19.1 10*3/MM3 (ref 3.4–10.8)

## 2022-05-18 PROCEDURE — 82962 GLUCOSE BLOOD TEST: CPT

## 2022-05-18 PROCEDURE — 36415 COLL VENOUS BLD VENIPUNCTURE: CPT | Performed by: NURSE PRACTITIONER

## 2022-05-18 PROCEDURE — 25010000002 METHYLPREDNISOLONE PER 40 MG

## 2022-05-18 PROCEDURE — 85025 COMPLETE CBC W/AUTO DIFF WBC: CPT | Performed by: NURSE PRACTITIONER

## 2022-05-18 PROCEDURE — 99233 SBSQ HOSP IP/OBS HIGH 50: CPT | Performed by: INTERNAL MEDICINE

## 2022-05-18 PROCEDURE — 25010000002 ONDANSETRON PER 1 MG: Performed by: NURSE PRACTITIONER

## 2022-05-18 PROCEDURE — 71250 CT THORAX DX C-: CPT

## 2022-05-18 PROCEDURE — 83605 ASSAY OF LACTIC ACID: CPT | Performed by: NURSE PRACTITIONER

## 2022-05-18 PROCEDURE — 63710000001 INSULIN LISPRO (HUMAN) PER 5 UNITS: Performed by: NURSE PRACTITIONER

## 2022-05-18 PROCEDURE — 80053 COMPREHEN METABOLIC PANEL: CPT | Performed by: NURSE PRACTITIONER

## 2022-05-18 PROCEDURE — 25010000002 CEFTRIAXONE PER 250 MG: Performed by: NURSE PRACTITIONER

## 2022-05-18 RX ORDER — PREDNISONE 10 MG/1
10 TABLET ORAL DAILY
COMMUNITY

## 2022-05-18 RX ORDER — ERGOCALCIFEROL 1.25 MG/1
50000 CAPSULE ORAL 2 TIMES WEEKLY
Status: DISCONTINUED | OUTPATIENT
Start: 2022-05-19 | End: 2022-05-21 | Stop reason: HOSPADM

## 2022-05-18 RX ORDER — MONTELUKAST SODIUM 10 MG/1
10 TABLET ORAL NIGHTLY
COMMUNITY

## 2022-05-18 RX ORDER — OXYCODONE HYDROCHLORIDE 5 MG/1
10 TABLET ORAL EVERY 4 HOURS PRN
Status: DISCONTINUED | OUTPATIENT
Start: 2022-05-18 | End: 2022-05-21 | Stop reason: HOSPADM

## 2022-05-18 RX ORDER — HYDROXYZINE HYDROCHLORIDE 25 MG/1
50 TABLET, FILM COATED ORAL 3 TIMES DAILY
Status: DISCONTINUED | OUTPATIENT
Start: 2022-05-18 | End: 2022-05-21 | Stop reason: HOSPADM

## 2022-05-18 RX ORDER — NICOTINE 21 MG/24HR
1 PATCH, TRANSDERMAL 24 HOURS TRANSDERMAL
Status: DISCONTINUED | OUTPATIENT
Start: 2022-05-18 | End: 2022-05-21 | Stop reason: HOSPADM

## 2022-05-18 RX ORDER — SERTRALINE HYDROCHLORIDE 25 MG/1
25 TABLET, FILM COATED ORAL NIGHTLY
Status: DISCONTINUED | OUTPATIENT
Start: 2022-05-18 | End: 2022-05-21 | Stop reason: HOSPADM

## 2022-05-18 RX ORDER — PANTOPRAZOLE SODIUM 40 MG/1
40 TABLET, DELAYED RELEASE ORAL EVERY MORNING
Status: DISCONTINUED | OUTPATIENT
Start: 2022-05-18 | End: 2022-05-21 | Stop reason: HOSPADM

## 2022-05-18 RX ORDER — OXYCODONE HYDROCHLORIDE 5 MG/1
5 TABLET ORAL EVERY 4 HOURS PRN
Status: DISCONTINUED | OUTPATIENT
Start: 2022-05-18 | End: 2022-05-21 | Stop reason: HOSPADM

## 2022-05-18 RX ORDER — ERGOCALCIFEROL 1.25 MG/1
50000 CAPSULE ORAL 2 TIMES WEEKLY
COMMUNITY

## 2022-05-18 RX ORDER — ALPRAZOLAM 0.5 MG/1
0.5 TABLET ORAL 3 TIMES DAILY PRN
Status: DISCONTINUED | OUTPATIENT
Start: 2022-05-18 | End: 2022-05-21 | Stop reason: HOSPADM

## 2022-05-18 RX ORDER — LAMOTRIGINE 100 MG/1
200 TABLET ORAL NIGHTLY
Status: DISCONTINUED | OUTPATIENT
Start: 2022-05-18 | End: 2022-05-21 | Stop reason: HOSPADM

## 2022-05-18 RX ORDER — METHYLPREDNISOLONE SODIUM SUCCINATE 40 MG/ML
40 INJECTION, POWDER, LYOPHILIZED, FOR SOLUTION INTRAMUSCULAR; INTRAVENOUS EVERY 12 HOURS
Status: DISCONTINUED | OUTPATIENT
Start: 2022-05-18 | End: 2022-05-21 | Stop reason: HOSPADM

## 2022-05-18 RX ORDER — MONTELUKAST SODIUM 10 MG/1
10 TABLET ORAL NIGHTLY
Status: DISCONTINUED | OUTPATIENT
Start: 2022-05-18 | End: 2022-05-21 | Stop reason: HOSPADM

## 2022-05-18 RX ORDER — ALBUTEROL SULFATE 2.5 MG/3ML
2.5 SOLUTION RESPIRATORY (INHALATION) EVERY 6 HOURS PRN
Status: DISCONTINUED | OUTPATIENT
Start: 2022-05-18 | End: 2022-05-21 | Stop reason: HOSPADM

## 2022-05-18 RX ORDER — DICYCLOMINE HCL 20 MG
20 TABLET ORAL 3 TIMES DAILY PRN
Status: ON HOLD | COMMUNITY
End: 2022-11-06

## 2022-05-18 RX ORDER — BUDESONIDE AND FORMOTEROL FUMARATE DIHYDRATE 160; 4.5 UG/1; UG/1
2 AEROSOL RESPIRATORY (INHALATION)
Status: DISCONTINUED | OUTPATIENT
Start: 2022-05-18 | End: 2022-05-21 | Stop reason: HOSPADM

## 2022-05-18 RX ORDER — SUCRALFATE 1 G/1
1 TABLET ORAL
Status: DISCONTINUED | OUTPATIENT
Start: 2022-05-18 | End: 2022-05-21 | Stop reason: HOSPADM

## 2022-05-18 RX ORDER — TRAZODONE HYDROCHLORIDE 100 MG/1
300 TABLET ORAL NIGHTLY
Status: DISCONTINUED | OUTPATIENT
Start: 2022-05-18 | End: 2022-05-21 | Stop reason: HOSPADM

## 2022-05-18 RX ORDER — GABAPENTIN 300 MG/1
300 CAPSULE ORAL 4 TIMES DAILY
Status: DISCONTINUED | OUTPATIENT
Start: 2022-05-18 | End: 2022-05-21 | Stop reason: HOSPADM

## 2022-05-18 RX ORDER — MIDODRINE HYDROCHLORIDE 5 MG/1
10 TABLET ORAL
Status: DISCONTINUED | OUTPATIENT
Start: 2022-05-18 | End: 2022-05-21 | Stop reason: HOSPADM

## 2022-05-18 RX ORDER — ALPRAZOLAM 0.25 MG/1
0.25 TABLET ORAL 3 TIMES DAILY PRN
Status: DISCONTINUED | OUTPATIENT
Start: 2022-05-18 | End: 2022-05-18

## 2022-05-18 RX ADMIN — GABAPENTIN 300 MG: 300 CAPSULE ORAL at 21:22

## 2022-05-18 RX ADMIN — HYDROXYZINE HYDROCHLORIDE 50 MG: 25 TABLET, FILM COATED ORAL at 16:07

## 2022-05-18 RX ADMIN — ONDANSETRON 4 MG: 2 INJECTION INTRAMUSCULAR; INTRAVENOUS at 02:01

## 2022-05-18 RX ADMIN — LAMOTRIGINE 200 MG: 100 TABLET ORAL at 21:21

## 2022-05-18 RX ADMIN — NICOTINE 1 PATCH: 21 PATCH, EXTENDED RELEASE TRANSDERMAL at 09:47

## 2022-05-18 RX ADMIN — Medication 10 ML: at 21:22

## 2022-05-18 RX ADMIN — MIDODRINE HYDROCHLORIDE 10 MG: 5 TABLET ORAL at 16:05

## 2022-05-18 RX ADMIN — PANTOPRAZOLE SODIUM 40 MG: 40 TABLET, DELAYED RELEASE ORAL at 16:06

## 2022-05-18 RX ADMIN — METHYLPREDNISOLONE SODIUM SUCCINATE 40 MG: 40 INJECTION, POWDER, FOR SOLUTION INTRAMUSCULAR; INTRAVENOUS at 09:11

## 2022-05-18 RX ADMIN — METHYLPREDNISOLONE SODIUM SUCCINATE 40 MG: 40 INJECTION, POWDER, FOR SOLUTION INTRAMUSCULAR; INTRAVENOUS at 21:21

## 2022-05-18 RX ADMIN — DOXYCYCLINE 100 MG: 100 INJECTION, POWDER, LYOPHILIZED, FOR SOLUTION INTRAVENOUS at 04:32

## 2022-05-18 RX ADMIN — DOXYCYCLINE 100 MG: 100 INJECTION, POWDER, LYOPHILIZED, FOR SOLUTION INTRAVENOUS at 17:27

## 2022-05-18 RX ADMIN — INSULIN LISPRO 3 UNITS: 100 INJECTION, SOLUTION INTRAVENOUS; SUBCUTANEOUS at 09:12

## 2022-05-18 RX ADMIN — HYDROXYZINE HYDROCHLORIDE 50 MG: 25 TABLET, FILM COATED ORAL at 21:22

## 2022-05-18 RX ADMIN — GABAPENTIN 300 MG: 300 CAPSULE ORAL at 16:05

## 2022-05-18 RX ADMIN — CEFTRIAXONE 2 G: 2 INJECTION, POWDER, FOR SOLUTION INTRAMUSCULAR; INTRAVENOUS at 16:07

## 2022-05-18 RX ADMIN — SUCRALFATE 1 G: 1 TABLET ORAL at 21:21

## 2022-05-18 RX ADMIN — OXYCODONE 5 MG: 5 TABLET ORAL at 16:22

## 2022-05-18 RX ADMIN — TRAZODONE HYDROCHLORIDE 150 MG: 100 TABLET ORAL at 21:23

## 2022-05-18 RX ADMIN — SODIUM CHLORIDE 100 ML/HR: 9 INJECTION, SOLUTION INTRAVENOUS at 16:22

## 2022-05-18 RX ADMIN — MONTELUKAST 10 MG: 10 TABLET, FILM COATED ORAL at 21:22

## 2022-05-18 RX ADMIN — ONDANSETRON 4 MG: 2 INJECTION INTRAMUSCULAR; INTRAVENOUS at 09:47

## 2022-05-18 RX ADMIN — SERTRALINE 25 MG: 25 TABLET, FILM COATED ORAL at 21:21

## 2022-05-18 RX ADMIN — ONDANSETRON 4 MG: 2 INJECTION INTRAMUSCULAR; INTRAVENOUS at 16:32

## 2022-05-19 ENCOUNTER — ANESTHESIA EVENT (OUTPATIENT)
Dept: GASTROENTEROLOGY | Facility: HOSPITAL | Age: 57
End: 2022-05-19

## 2022-05-19 LAB
AMPHET+METHAMPHET UR QL: NEGATIVE
ANION GAP SERPL CALCULATED.3IONS-SCNC: 14 MMOL/L (ref 5–15)
BARBITURATES UR QL SCN: NEGATIVE
BASOPHILS # BLD AUTO: 0.1 10*3/MM3 (ref 0–0.2)
BASOPHILS NFR BLD AUTO: 0.3 % (ref 0–1.5)
BENZODIAZ UR QL SCN: POSITIVE
BUN SERPL-MCNC: 20 MG/DL (ref 6–20)
BUN/CREAT SERPL: 31.3 (ref 7–25)
CALCIUM SPEC-SCNC: 8.7 MG/DL (ref 8.6–10.5)
CANNABINOIDS SERPL QL: NEGATIVE
CHLORIDE SERPL-SCNC: 112 MMOL/L (ref 98–107)
CO2 SERPL-SCNC: 18 MMOL/L (ref 22–29)
COCAINE UR QL: NEGATIVE
CREAT SERPL-MCNC: 0.64 MG/DL (ref 0.57–1)
D-LACTATE SERPL-SCNC: 0.7 MMOL/L (ref 0.5–2)
D-LACTATE SERPL-SCNC: 1.2 MMOL/L (ref 0.5–2)
D-LACTATE SERPL-SCNC: 1.3 MMOL/L (ref 0.5–2)
DEPRECATED RDW RBC AUTO: 49 FL (ref 37–54)
EGFRCR SERPLBLD CKD-EPI 2021: 103.9 ML/MIN/1.73
EOSINOPHIL # BLD AUTO: 0 10*3/MM3 (ref 0–0.4)
EOSINOPHIL NFR BLD AUTO: 0 % (ref 0.3–6.2)
ERYTHROCYTE [DISTWIDTH] IN BLOOD BY AUTOMATED COUNT: 15.1 % (ref 12.3–15.4)
GLUCOSE BLDC GLUCOMTR-MCNC: 115 MG/DL (ref 70–105)
GLUCOSE BLDC GLUCOMTR-MCNC: 129 MG/DL (ref 70–105)
GLUCOSE BLDC GLUCOMTR-MCNC: 146 MG/DL (ref 70–105)
GLUCOSE BLDC GLUCOMTR-MCNC: 156 MG/DL (ref 70–105)
GLUCOSE SERPL-MCNC: 121 MG/DL (ref 65–99)
HCT VFR BLD AUTO: 40 % (ref 34–46.6)
HGB BLD-MCNC: 13.2 G/DL (ref 12–15.9)
HOLD SPECIMEN: NORMAL
HOLD SPECIMEN: NORMAL
LYMPHOCYTES # BLD AUTO: 0.5 10*3/MM3 (ref 0.7–3.1)
LYMPHOCYTES NFR BLD AUTO: 3.2 % (ref 19.6–45.3)
MAGNESIUM SERPL-MCNC: 2.2 MG/DL (ref 1.6–2.6)
MCH RBC QN AUTO: 30.4 PG (ref 26.6–33)
MCHC RBC AUTO-ENTMCNC: 32.9 G/DL (ref 31.5–35.7)
MCV RBC AUTO: 92.4 FL (ref 79–97)
METHADONE UR QL SCN: NEGATIVE
MONOCYTES # BLD AUTO: 0.5 10*3/MM3 (ref 0.1–0.9)
MONOCYTES NFR BLD AUTO: 2.7 % (ref 5–12)
NEUTROPHILS NFR BLD AUTO: 16.1 10*3/MM3 (ref 1.7–7)
NEUTROPHILS NFR BLD AUTO: 93.8 % (ref 42.7–76)
NRBC BLD AUTO-RTO: 0.1 /100 WBC (ref 0–0.2)
OPIATES UR QL: NEGATIVE
OXYCODONE UR QL SCN: POSITIVE
PLATELET # BLD AUTO: 310 10*3/MM3 (ref 140–450)
PMV BLD AUTO: 6.8 FL (ref 6–12)
POTASSIUM SERPL-SCNC: 3.7 MMOL/L (ref 3.5–5.2)
RBC # BLD AUTO: 4.33 10*6/MM3 (ref 3.77–5.28)
SODIUM SERPL-SCNC: 144 MMOL/L (ref 136–145)
WBC NRBC COR # BLD: 17.2 10*3/MM3 (ref 3.4–10.8)

## 2022-05-19 PROCEDURE — 80307 DRUG TEST PRSMV CHEM ANLYZR: CPT | Performed by: EMERGENCY MEDICINE

## 2022-05-19 PROCEDURE — 83605 ASSAY OF LACTIC ACID: CPT | Performed by: NURSE PRACTITIONER

## 2022-05-19 PROCEDURE — 94761 N-INVAS EAR/PLS OXIMETRY MLT: CPT

## 2022-05-19 PROCEDURE — 25010000002 METHYLPREDNISOLONE PER 40 MG

## 2022-05-19 PROCEDURE — 94664 DEMO&/EVAL PT USE INHALER: CPT

## 2022-05-19 PROCEDURE — 82962 GLUCOSE BLOOD TEST: CPT

## 2022-05-19 PROCEDURE — 80048 BASIC METABOLIC PNL TOTAL CA: CPT | Performed by: INTERNAL MEDICINE

## 2022-05-19 PROCEDURE — 25010000002 ONDANSETRON PER 1 MG: Performed by: NURSE PRACTITIONER

## 2022-05-19 PROCEDURE — 94799 UNLISTED PULMONARY SVC/PX: CPT

## 2022-05-19 PROCEDURE — 97162 PT EVAL MOD COMPLEX 30 MIN: CPT

## 2022-05-19 PROCEDURE — 97166 OT EVAL MOD COMPLEX 45 MIN: CPT

## 2022-05-19 PROCEDURE — 85025 COMPLETE CBC W/AUTO DIFF WBC: CPT | Performed by: INTERNAL MEDICINE

## 2022-05-19 PROCEDURE — 25010000002 CEFTRIAXONE PER 250 MG: Performed by: NURSE PRACTITIONER

## 2022-05-19 PROCEDURE — 83735 ASSAY OF MAGNESIUM: CPT | Performed by: INTERNAL MEDICINE

## 2022-05-19 PROCEDURE — 99233 SBSQ HOSP IP/OBS HIGH 50: CPT | Performed by: INTERNAL MEDICINE

## 2022-05-19 RX ORDER — DOXYCYCLINE 100 MG/1
100 TABLET ORAL EVERY 12 HOURS SCHEDULED
Status: DISCONTINUED | OUTPATIENT
Start: 2022-05-19 | End: 2022-05-21 | Stop reason: HOSPADM

## 2022-05-19 RX ORDER — SODIUM CHLORIDE, SODIUM LACTATE, POTASSIUM CHLORIDE, CALCIUM CHLORIDE 600; 310; 30; 20 MG/100ML; MG/100ML; MG/100ML; MG/100ML
100 INJECTION, SOLUTION INTRAVENOUS CONTINUOUS
Status: DISCONTINUED | OUTPATIENT
Start: 2022-05-19 | End: 2022-05-21

## 2022-05-19 RX ADMIN — NYSTATIN 500000 UNITS: 100000 SUSPENSION ORAL at 17:38

## 2022-05-19 RX ADMIN — DOXYCYCLINE 100 MG: 100 TABLET ORAL at 20:35

## 2022-05-19 RX ADMIN — ERGOCALCIFEROL 50000 UNITS: 1.25 CAPSULE ORAL at 08:33

## 2022-05-19 RX ADMIN — OXYCODONE 5 MG: 5 TABLET ORAL at 08:40

## 2022-05-19 RX ADMIN — Medication 10 ML: at 20:37

## 2022-05-19 RX ADMIN — DOXYCYCLINE 100 MG: 100 INJECTION, POWDER, LYOPHILIZED, FOR SOLUTION INTRAVENOUS at 05:01

## 2022-05-19 RX ADMIN — CEFTRIAXONE 2 G: 2 INJECTION, POWDER, FOR SOLUTION INTRAMUSCULAR; INTRAVENOUS at 17:37

## 2022-05-19 RX ADMIN — ONDANSETRON 4 MG: 2 INJECTION INTRAMUSCULAR; INTRAVENOUS at 15:28

## 2022-05-19 RX ADMIN — ONDANSETRON 4 MG: 2 INJECTION INTRAMUSCULAR; INTRAVENOUS at 08:57

## 2022-05-19 RX ADMIN — NYSTATIN 500000 UNITS: 100000 SUSPENSION ORAL at 20:35

## 2022-05-19 RX ADMIN — SODIUM CHLORIDE, POTASSIUM CHLORIDE, SODIUM LACTATE AND CALCIUM CHLORIDE 100 ML/HR: 600; 310; 30; 20 INJECTION, SOLUTION INTRAVENOUS at 15:28

## 2022-05-19 RX ADMIN — GABAPENTIN 300 MG: 300 CAPSULE ORAL at 17:38

## 2022-05-19 RX ADMIN — BUDESONIDE AND FORMOTEROL FUMARATE DIHYDRATE 2 PUFF: 160; 4.5 AEROSOL RESPIRATORY (INHALATION) at 06:49

## 2022-05-19 RX ADMIN — PANTOPRAZOLE SODIUM 40 MG: 40 TABLET, DELAYED RELEASE ORAL at 08:33

## 2022-05-19 RX ADMIN — MIDODRINE HYDROCHLORIDE 10 MG: 5 TABLET ORAL at 12:14

## 2022-05-19 RX ADMIN — SUCRALFATE 1 G: 1 TABLET ORAL at 20:35

## 2022-05-19 RX ADMIN — SERTRALINE 25 MG: 25 TABLET, FILM COATED ORAL at 20:36

## 2022-05-19 RX ADMIN — ALPRAZOLAM 0.5 MG: 0.5 TABLET ORAL at 17:48

## 2022-05-19 RX ADMIN — MIDODRINE HYDROCHLORIDE 10 MG: 5 TABLET ORAL at 17:37

## 2022-05-19 RX ADMIN — TRAZODONE HYDROCHLORIDE 300 MG: 100 TABLET ORAL at 20:36

## 2022-05-19 RX ADMIN — METHYLPREDNISOLONE SODIUM SUCCINATE 40 MG: 40 INJECTION, POWDER, FOR SOLUTION INTRAMUSCULAR; INTRAVENOUS at 08:33

## 2022-05-19 RX ADMIN — OXYCODONE 5 MG: 5 TABLET ORAL at 17:48

## 2022-05-19 RX ADMIN — MONTELUKAST 10 MG: 10 TABLET, FILM COATED ORAL at 20:35

## 2022-05-19 RX ADMIN — HYDROXYZINE HYDROCHLORIDE 50 MG: 25 TABLET, FILM COATED ORAL at 08:33

## 2022-05-19 RX ADMIN — MIDODRINE HYDROCHLORIDE 10 MG: 5 TABLET ORAL at 08:33

## 2022-05-19 RX ADMIN — GABAPENTIN 300 MG: 300 CAPSULE ORAL at 20:35

## 2022-05-19 RX ADMIN — METHYLPREDNISOLONE SODIUM SUCCINATE 40 MG: 40 INJECTION, POWDER, FOR SOLUTION INTRAMUSCULAR; INTRAVENOUS at 20:35

## 2022-05-19 RX ADMIN — HYDROXYZINE HYDROCHLORIDE 50 MG: 25 TABLET, FILM COATED ORAL at 15:28

## 2022-05-19 RX ADMIN — LAMOTRIGINE 200 MG: 100 TABLET ORAL at 20:36

## 2022-05-19 RX ADMIN — GABAPENTIN 300 MG: 300 CAPSULE ORAL at 08:34

## 2022-05-19 RX ADMIN — HYDROXYZINE HYDROCHLORIDE 50 MG: 25 TABLET, FILM COATED ORAL at 20:35

## 2022-05-19 RX ADMIN — NICOTINE 1 PATCH: 21 PATCH, EXTENDED RELEASE TRANSDERMAL at 08:35

## 2022-05-20 ENCOUNTER — ANESTHESIA (OUTPATIENT)
Dept: GASTROENTEROLOGY | Facility: HOSPITAL | Age: 57
End: 2022-05-20

## 2022-05-20 PROBLEM — F17.200 TOBACCO USE DISORDER: Status: ACTIVE | Noted: 2022-05-20

## 2022-05-20 LAB
ANION GAP SERPL CALCULATED.3IONS-SCNC: 8 MMOL/L (ref 5–15)
B PARAPERT DNA SPEC QL NAA+PROBE: NOT DETECTED
B PERT DNA SPEC QL NAA+PROBE: NOT DETECTED
BACTERIA SPEC RESP CULT: ABNORMAL
BACTERIA SPEC RESP CULT: ABNORMAL
BASOPHILS # BLD AUTO: 0 10*3/MM3 (ref 0–0.2)
BASOPHILS NFR BLD AUTO: 0.1 % (ref 0–1.5)
BUN SERPL-MCNC: 17 MG/DL (ref 6–20)
BUN/CREAT SERPL: 29.3 (ref 7–25)
C PNEUM DNA NPH QL NAA+NON-PROBE: NOT DETECTED
CALCIUM SPEC-SCNC: 8.3 MG/DL (ref 8.6–10.5)
CHLORIDE SERPL-SCNC: 115 MMOL/L (ref 98–107)
CO2 SERPL-SCNC: 21 MMOL/L (ref 22–29)
CREAT SERPL-MCNC: 0.58 MG/DL (ref 0.57–1)
D-LACTATE SERPL-SCNC: 0.8 MMOL/L (ref 0.5–2)
D-LACTATE SERPL-SCNC: 1.1 MMOL/L (ref 0.5–2)
DEPRECATED RDW RBC AUTO: 48.6 FL (ref 37–54)
EGFRCR SERPLBLD CKD-EPI 2021: 106.4 ML/MIN/1.73
EOSINOPHIL # BLD AUTO: 0 10*3/MM3 (ref 0–0.4)
EOSINOPHIL NFR BLD AUTO: 0 % (ref 0.3–6.2)
ERYTHROCYTE [DISTWIDTH] IN BLOOD BY AUTOMATED COUNT: 15 % (ref 12.3–15.4)
FLUAV SUBTYP SPEC NAA+PROBE: NOT DETECTED
FLUBV RNA ISLT QL NAA+PROBE: NOT DETECTED
GLUCOSE BLDC GLUCOMTR-MCNC: 125 MG/DL (ref 70–105)
GLUCOSE BLDC GLUCOMTR-MCNC: 130 MG/DL (ref 70–105)
GLUCOSE BLDC GLUCOMTR-MCNC: 130 MG/DL (ref 70–105)
GLUCOSE BLDC GLUCOMTR-MCNC: 138 MG/DL (ref 70–105)
GLUCOSE SERPL-MCNC: 139 MG/DL (ref 65–99)
GRAM STN SPEC: ABNORMAL
HADV DNA SPEC NAA+PROBE: NOT DETECTED
HCOV 229E RNA SPEC QL NAA+PROBE: NOT DETECTED
HCOV HKU1 RNA SPEC QL NAA+PROBE: NOT DETECTED
HCOV NL63 RNA SPEC QL NAA+PROBE: NOT DETECTED
HCOV OC43 RNA SPEC QL NAA+PROBE: NOT DETECTED
HCT VFR BLD AUTO: 38.7 % (ref 34–46.6)
HGB BLD-MCNC: 12.8 G/DL (ref 12–15.9)
HMPV RNA NPH QL NAA+NON-PROBE: NOT DETECTED
HOLD SPECIMEN: NORMAL
HPIV1 RNA ISLT QL NAA+PROBE: NOT DETECTED
HPIV2 RNA SPEC QL NAA+PROBE: NOT DETECTED
HPIV3 RNA NPH QL NAA+PROBE: NOT DETECTED
HPIV4 P GENE NPH QL NAA+PROBE: NOT DETECTED
LYMPHOCYTES # BLD AUTO: 0.5 10*3/MM3 (ref 0.7–3.1)
LYMPHOCYTES NFR BLD AUTO: 3.8 % (ref 19.6–45.3)
M PNEUMO IGG SER IA-ACNC: NOT DETECTED
MAGNESIUM SERPL-MCNC: 2.3 MG/DL (ref 1.6–2.6)
MCH RBC QN AUTO: 30.3 PG (ref 26.6–33)
MCHC RBC AUTO-ENTMCNC: 33.1 G/DL (ref 31.5–35.7)
MCV RBC AUTO: 91.5 FL (ref 79–97)
MONOCYTES # BLD AUTO: 0.4 10*3/MM3 (ref 0.1–0.9)
MONOCYTES NFR BLD AUTO: 2.7 % (ref 5–12)
NEUTROPHILS NFR BLD AUTO: 13.2 10*3/MM3 (ref 1.7–7)
NEUTROPHILS NFR BLD AUTO: 93.4 % (ref 42.7–76)
NRBC BLD AUTO-RTO: 0 /100 WBC (ref 0–0.2)
PLATELET # BLD AUTO: 308 10*3/MM3 (ref 140–450)
PMV BLD AUTO: 6.7 FL (ref 6–12)
POTASSIUM SERPL-SCNC: 3.7 MMOL/L (ref 3.5–5.2)
RBC # BLD AUTO: 4.23 10*6/MM3 (ref 3.77–5.28)
RHINOVIRUS RNA SPEC NAA+PROBE: NOT DETECTED
RSV RNA NPH QL NAA+NON-PROBE: NOT DETECTED
SARS-COV-2 RNA NPH QL NAA+NON-PROBE: NOT DETECTED
SODIUM SERPL-SCNC: 144 MMOL/L (ref 136–145)
WBC NRBC COR # BLD: 14.1 10*3/MM3 (ref 3.4–10.8)

## 2022-05-20 PROCEDURE — 87205 SMEAR GRAM STAIN: CPT | Performed by: INTERNAL MEDICINE

## 2022-05-20 PROCEDURE — 87305 ASPERGILLUS AG IA: CPT | Performed by: INTERNAL MEDICINE

## 2022-05-20 PROCEDURE — 99233 SBSQ HOSP IP/OBS HIGH 50: CPT | Performed by: INTERNAL MEDICINE

## 2022-05-20 PROCEDURE — 87798 DETECT AGENT NOS DNA AMP: CPT | Performed by: INTERNAL MEDICINE

## 2022-05-20 PROCEDURE — 25010000002 ONDANSETRON PER 1 MG: Performed by: ANESTHESIOLOGY

## 2022-05-20 PROCEDURE — 83605 ASSAY OF LACTIC ACID: CPT | Performed by: NURSE PRACTITIONER

## 2022-05-20 PROCEDURE — 87116 MYCOBACTERIA CULTURE: CPT | Performed by: INTERNAL MEDICINE

## 2022-05-20 PROCEDURE — 94761 N-INVAS EAR/PLS OXIMETRY MLT: CPT

## 2022-05-20 PROCEDURE — 87206 SMEAR FLUORESCENT/ACID STAI: CPT | Performed by: INTERNAL MEDICINE

## 2022-05-20 PROCEDURE — 94799 UNLISTED PULMONARY SVC/PX: CPT

## 2022-05-20 PROCEDURE — 0202U NFCT DS 22 TRGT SARS-COV-2: CPT | Performed by: INTERNAL MEDICINE

## 2022-05-20 PROCEDURE — 94664 DEMO&/EVAL PT USE INHALER: CPT

## 2022-05-20 PROCEDURE — 0B9J8ZX DRAINAGE OF LEFT LOWER LUNG LOBE, VIA NATURAL OR ARTIFICIAL OPENING ENDOSCOPIC, DIAGNOSTIC: ICD-10-PCS | Performed by: INTERNAL MEDICINE

## 2022-05-20 PROCEDURE — 25010000002 METHYLPREDNISOLONE PER 40 MG

## 2022-05-20 PROCEDURE — 80048 BASIC METABOLIC PNL TOTAL CA: CPT | Performed by: INTERNAL MEDICINE

## 2022-05-20 PROCEDURE — 88108 CYTOPATH CONCENTRATE TECH: CPT | Performed by: INTERNAL MEDICINE

## 2022-05-20 PROCEDURE — 36415 COLL VENOUS BLD VENIPUNCTURE: CPT | Performed by: NURSE PRACTITIONER

## 2022-05-20 PROCEDURE — 25010000002 CEFTRIAXONE PER 250 MG: Performed by: INTERNAL MEDICINE

## 2022-05-20 PROCEDURE — 83735 ASSAY OF MAGNESIUM: CPT | Performed by: INTERNAL MEDICINE

## 2022-05-20 PROCEDURE — 87102 FUNGUS ISOLATION CULTURE: CPT | Performed by: INTERNAL MEDICINE

## 2022-05-20 PROCEDURE — 82962 GLUCOSE BLOOD TEST: CPT

## 2022-05-20 PROCEDURE — 87070 CULTURE OTHR SPECIMN AEROBIC: CPT | Performed by: INTERNAL MEDICINE

## 2022-05-20 PROCEDURE — 85025 COMPLETE CBC W/AUTO DIFF WBC: CPT | Performed by: INTERNAL MEDICINE

## 2022-05-20 PROCEDURE — 25010000002 PROPOFOL 200 MG/20ML EMULSION: Performed by: ANESTHESIOLOGY

## 2022-05-20 PROCEDURE — 25010000002 ONDANSETRON PER 1 MG: Performed by: NURSE PRACTITIONER

## 2022-05-20 PROCEDURE — 0B9B8ZZ DRAINAGE OF LEFT LOWER LOBE BRONCHUS, VIA NATURAL OR ARTIFICIAL OPENING ENDOSCOPIC: ICD-10-PCS | Performed by: INTERNAL MEDICINE

## 2022-05-20 RX ORDER — SODIUM CHLORIDE 0.9 % (FLUSH) 0.9 %
10 SYRINGE (ML) INJECTION AS NEEDED
Status: DISCONTINUED | OUTPATIENT
Start: 2022-05-20 | End: 2022-05-21 | Stop reason: HOSPADM

## 2022-05-20 RX ORDER — LIDOCAINE 50 MG/G
OINTMENT TOPICAL AS NEEDED
Status: DISCONTINUED | OUTPATIENT
Start: 2022-05-20 | End: 2022-05-20 | Stop reason: HOSPADM

## 2022-05-20 RX ORDER — LIDOCAINE HYDROCHLORIDE 20 MG/ML
INJECTION, SOLUTION INFILTRATION; PERINEURAL AS NEEDED
Status: DISCONTINUED | OUTPATIENT
Start: 2022-05-20 | End: 2022-05-20 | Stop reason: HOSPADM

## 2022-05-20 RX ORDER — SODIUM CHLORIDE 9 MG/ML
INJECTION, SOLUTION INTRAVENOUS CONTINUOUS PRN
Status: DISCONTINUED | OUTPATIENT
Start: 2022-05-20 | End: 2022-05-20 | Stop reason: SURG

## 2022-05-20 RX ORDER — ONDANSETRON 2 MG/ML
4 INJECTION INTRAMUSCULAR; INTRAVENOUS ONCE
Status: COMPLETED | OUTPATIENT
Start: 2022-05-20 | End: 2022-05-20

## 2022-05-20 RX ORDER — SODIUM CHLORIDE 9 MG/ML
9 INJECTION, SOLUTION INTRAVENOUS CONTINUOUS PRN
Status: DISCONTINUED | OUTPATIENT
Start: 2022-05-20 | End: 2022-05-21 | Stop reason: HOSPADM

## 2022-05-20 RX ORDER — PROPOFOL 10 MG/ML
INJECTION, EMULSION INTRAVENOUS AS NEEDED
Status: DISCONTINUED | OUTPATIENT
Start: 2022-05-20 | End: 2022-05-20 | Stop reason: SURG

## 2022-05-20 RX ORDER — SODIUM CHLORIDE 0.9 % (FLUSH) 0.9 %
10 SYRINGE (ML) INJECTION EVERY 12 HOURS SCHEDULED
Status: DISCONTINUED | OUTPATIENT
Start: 2022-05-20 | End: 2022-05-21 | Stop reason: HOSPADM

## 2022-05-20 RX ADMIN — SODIUM CHLORIDE: 0.9 INJECTION, SOLUTION INTRAVENOUS at 12:08

## 2022-05-20 RX ADMIN — ONDANSETRON 4 MG: 2 INJECTION INTRAMUSCULAR; INTRAVENOUS at 12:42

## 2022-05-20 RX ADMIN — BUDESONIDE AND FORMOTEROL FUMARATE DIHYDRATE 2 PUFF: 160; 4.5 AEROSOL RESPIRATORY (INHALATION) at 19:49

## 2022-05-20 RX ADMIN — SERTRALINE 25 MG: 25 TABLET, FILM COATED ORAL at 21:38

## 2022-05-20 RX ADMIN — ONDANSETRON 4 MG: 2 INJECTION INTRAMUSCULAR; INTRAVENOUS at 07:32

## 2022-05-20 RX ADMIN — NICOTINE 1 PATCH: 21 PATCH, EXTENDED RELEASE TRANSDERMAL at 07:33

## 2022-05-20 RX ADMIN — OXYCODONE 5 MG: 5 TABLET ORAL at 14:45

## 2022-05-20 RX ADMIN — GABAPENTIN 300 MG: 300 CAPSULE ORAL at 14:05

## 2022-05-20 RX ADMIN — ONDANSETRON 4 MG: 2 INJECTION INTRAMUSCULAR; INTRAVENOUS at 00:06

## 2022-05-20 RX ADMIN — NYSTATIN 500000 UNITS: 100000 SUSPENSION ORAL at 18:14

## 2022-05-20 RX ADMIN — NYSTATIN 500000 UNITS: 100000 SUSPENSION ORAL at 14:05

## 2022-05-20 RX ADMIN — DOXYCYCLINE 100 MG: 100 TABLET ORAL at 21:38

## 2022-05-20 RX ADMIN — OXYCODONE 10 MG: 5 TABLET ORAL at 00:06

## 2022-05-20 RX ADMIN — TRAZODONE HYDROCHLORIDE 300 MG: 100 TABLET ORAL at 21:39

## 2022-05-20 RX ADMIN — OXYCODONE 5 MG: 5 TABLET ORAL at 07:32

## 2022-05-20 RX ADMIN — HYDROXYZINE HYDROCHLORIDE 50 MG: 25 TABLET, FILM COATED ORAL at 21:37

## 2022-05-20 RX ADMIN — SODIUM CHLORIDE, POTASSIUM CHLORIDE, SODIUM LACTATE AND CALCIUM CHLORIDE 100 ML/HR: 600; 310; 30; 20 INJECTION, SOLUTION INTRAVENOUS at 01:52

## 2022-05-20 RX ADMIN — PROPOFOL 80 MG: 10 INJECTION, EMULSION INTRAVENOUS at 12:17

## 2022-05-20 RX ADMIN — GABAPENTIN 300 MG: 300 CAPSULE ORAL at 21:38

## 2022-05-20 RX ADMIN — SUCRALFATE 1 G: 1 TABLET ORAL at 21:38

## 2022-05-20 RX ADMIN — LAMOTRIGINE 200 MG: 100 TABLET ORAL at 21:37

## 2022-05-20 RX ADMIN — MIDODRINE HYDROCHLORIDE 10 MG: 5 TABLET ORAL at 18:14

## 2022-05-20 RX ADMIN — GABAPENTIN 300 MG: 300 CAPSULE ORAL at 18:14

## 2022-05-20 RX ADMIN — CEFTRIAXONE 2 G: 2 INJECTION, POWDER, FOR SOLUTION INTRAMUSCULAR; INTRAVENOUS at 18:13

## 2022-05-20 RX ADMIN — NYSTATIN 500000 UNITS: 100000 SUSPENSION ORAL at 21:37

## 2022-05-20 RX ADMIN — HYDROXYZINE HYDROCHLORIDE 50 MG: 25 TABLET, FILM COATED ORAL at 18:14

## 2022-05-20 RX ADMIN — ONDANSETRON 4 MG: 2 INJECTION INTRAMUSCULAR; INTRAVENOUS at 21:37

## 2022-05-20 RX ADMIN — BUDESONIDE AND FORMOTEROL FUMARATE DIHYDRATE 2 PUFF: 160; 4.5 AEROSOL RESPIRATORY (INHALATION) at 07:24

## 2022-05-20 RX ADMIN — HYDROXYZINE HYDROCHLORIDE 50 MG: 25 TABLET, FILM COATED ORAL at 07:31

## 2022-05-20 RX ADMIN — ALPRAZOLAM 0.5 MG: 0.5 TABLET ORAL at 18:22

## 2022-05-20 RX ADMIN — MONTELUKAST 10 MG: 10 TABLET, FILM COATED ORAL at 21:38

## 2022-05-20 RX ADMIN — NYSTATIN 500000 UNITS: 100000 SUSPENSION ORAL at 07:32

## 2022-05-20 RX ADMIN — DOXYCYCLINE 100 MG: 100 TABLET ORAL at 07:31

## 2022-05-20 RX ADMIN — METHYLPREDNISOLONE SODIUM SUCCINATE 40 MG: 40 INJECTION, POWDER, FOR SOLUTION INTRAMUSCULAR; INTRAVENOUS at 07:32

## 2022-05-20 RX ADMIN — PANTOPRAZOLE SODIUM 40 MG: 40 TABLET, DELAYED RELEASE ORAL at 05:13

## 2022-05-20 RX ADMIN — Medication 10 ML: at 21:51

## 2022-05-20 RX ADMIN — OXYCODONE 10 MG: 5 TABLET ORAL at 21:37

## 2022-05-20 RX ADMIN — GABAPENTIN 300 MG: 300 CAPSULE ORAL at 07:32

## 2022-05-20 RX ADMIN — METHYLPREDNISOLONE SODIUM SUCCINATE 40 MG: 40 INJECTION, POWDER, FOR SOLUTION INTRAMUSCULAR; INTRAVENOUS at 21:37

## 2022-05-20 RX ADMIN — MIDODRINE HYDROCHLORIDE 10 MG: 5 TABLET ORAL at 14:05

## 2022-05-20 RX ADMIN — MIDODRINE HYDROCHLORIDE 10 MG: 5 TABLET ORAL at 07:31

## 2022-05-20 NOTE — ANESTHESIA PREPROCEDURE EVALUATION
Anesthesia Evaluation     Patient summary reviewed and Nursing notes reviewed   NPO Solid Status: > 8 hours  NPO Liquid Status: > 8 hours           Airway   Mallampati: II  TM distance: >3 FB  Neck ROM: full  No difficulty expected  Dental - normal exam     Pulmonary    (+) pneumonia , COPD, asthma,shortness of breath,   Cardiovascular     (+) hypertension,       Neuro/Psych  (+) seizures, CVA, headaches, dizziness/light headedness, syncope,    GI/Hepatic/Renal/Endo    (+)  GERD,  diabetes mellitus,     Musculoskeletal     Abdominal    Substance History      OB/GYN          Other   arthritis,    history of cancer                    Anesthesia Plan    ASA 3     MAC     intravenous induction     Anesthetic plan, all risks, benefits, and alternatives have been provided, discussed and informed consent has been obtained with: patient.        CODE STATUS:    Code Status (Patient has no pulse and is not breathing): CPR (Attempt to Resuscitate)  Medical Interventions (Patient has pulse or is breathing): Full Support

## 2022-05-20 NOTE — ANESTHESIA POSTPROCEDURE EVALUATION
Patient: Melvi Rayo    Procedure Summary     Date: 05/20/22 Room / Location: [facility] ENDOSCOPY 2 / [facility] ENDOSCOPY    Anesthesia Start: 1208 Anesthesia Stop: 1226    Procedure: BRONCHOSCOPY with bronchial washing (N/A Bronchus) Diagnosis:       Pneumonia of left lower lobe due to infectious organism      Hypoxia      (Pneumonia of left lower lobe due to infectious organism [J18.9])      (Hypoxia [R09.02])    Surgeons: Gonzales Mendoza MD Provider: Jaya Dias MD    Anesthesia Type: MAC ASA Status: 3          Anesthesia Type: MAC    Vitals  Vitals Value Taken Time   /55 05/20/22 1243   Temp     Pulse 75 05/20/22 1249   Resp 16 05/20/22 1237   SpO2 91 % 05/20/22 1248   Vitals shown include unvalidated device data.        Post Anesthesia Care and Evaluation    Patient location during evaluation: PACU  Patient participation: complete - patient participated  Level of consciousness: awake  Pain scale: See nurse's notes for pain score.  Pain management: adequate  Airway patency: patent  Anesthetic complications: No anesthetic complications  PONV Status: none  Cardiovascular status: acceptable  Respiratory status: acceptable  Hydration status: acceptable    Comments: Patient seen and examined postoperatively; vital signs stable; SpO2 greater than or equal to 90%; cardiopulmonary status stable; nausea/vomiting adequately controlled; pain adequately controlled; no apparent anesthesia complications; patient discharged from anesthesia care when discharge criteria were met

## 2022-05-21 ENCOUNTER — READMISSION MANAGEMENT (OUTPATIENT)
Dept: CALL CENTER | Facility: HOSPITAL | Age: 57
End: 2022-05-21

## 2022-05-21 VITALS
DIASTOLIC BLOOD PRESSURE: 83 MMHG | HEIGHT: 67 IN | HEART RATE: 54 BPM | RESPIRATION RATE: 18 BRPM | BODY MASS INDEX: 26.54 KG/M2 | TEMPERATURE: 98 F | WEIGHT: 169.09 LBS | OXYGEN SATURATION: 94 % | SYSTOLIC BLOOD PRESSURE: 140 MMHG

## 2022-05-21 LAB
ANION GAP SERPL CALCULATED.3IONS-SCNC: 8 MMOL/L (ref 5–15)
BUN SERPL-MCNC: 17 MG/DL (ref 6–20)
BUN/CREAT SERPL: 28.8 (ref 7–25)
CALCIUM SPEC-SCNC: 8.2 MG/DL (ref 8.6–10.5)
CHLORIDE SERPL-SCNC: 112 MMOL/L (ref 98–107)
CO2 SERPL-SCNC: 24 MMOL/L (ref 22–29)
CREAT SERPL-MCNC: 0.59 MG/DL (ref 0.57–1)
DEPRECATED RDW RBC AUTO: 47.7 FL (ref 37–54)
EGFRCR SERPLBLD CKD-EPI 2021: 105.9 ML/MIN/1.73
ERYTHROCYTE [DISTWIDTH] IN BLOOD BY AUTOMATED COUNT: 14.9 % (ref 12.3–15.4)
GLUCOSE BLDC GLUCOMTR-MCNC: 120 MG/DL (ref 70–105)
GLUCOSE BLDC GLUCOMTR-MCNC: 124 MG/DL (ref 70–105)
GLUCOSE SERPL-MCNC: 142 MG/DL (ref 65–99)
HCT VFR BLD AUTO: 40.5 % (ref 34–46.6)
HGB BLD-MCNC: 13.2 G/DL (ref 12–15.9)
LYMPHOCYTES # BLD MANUAL: 0.58 10*3/MM3 (ref 0.7–3.1)
LYMPHOCYTES NFR BLD MANUAL: 3 % (ref 5–12)
MAGNESIUM SERPL-MCNC: 2.2 MG/DL (ref 1.6–2.6)
MCH RBC QN AUTO: 29.7 PG (ref 26.6–33)
MCHC RBC AUTO-ENTMCNC: 32.7 G/DL (ref 31.5–35.7)
MCV RBC AUTO: 90.8 FL (ref 79–97)
MONOCYTES # BLD: 0.44 10*3/MM3 (ref 0.1–0.9)
NEUTROPHILS # BLD AUTO: 13.49 10*3/MM3 (ref 1.7–7)
NEUTROPHILS NFR BLD MANUAL: 93 % (ref 42.7–76)
PLAT MORPH BLD: NORMAL
PLATELET # BLD AUTO: 342 10*3/MM3 (ref 140–450)
PMV BLD AUTO: 6.8 FL (ref 6–12)
POTASSIUM SERPL-SCNC: 3.7 MMOL/L (ref 3.5–5.2)
RBC # BLD AUTO: 4.46 10*6/MM3 (ref 3.77–5.28)
RBC MORPH BLD: NORMAL
SCAN SLIDE: NORMAL
SODIUM SERPL-SCNC: 144 MMOL/L (ref 136–145)
TOXIC GRANULATION: ABNORMAL
VARIANT LYMPHS NFR BLD MANUAL: 4 % (ref 19.6–45.3)
WBC NRBC COR # BLD: 14.5 10*3/MM3 (ref 3.4–10.8)

## 2022-05-21 PROCEDURE — 80048 BASIC METABOLIC PNL TOTAL CA: CPT | Performed by: INTERNAL MEDICINE

## 2022-05-21 PROCEDURE — 82962 GLUCOSE BLOOD TEST: CPT

## 2022-05-21 PROCEDURE — 85025 COMPLETE CBC W/AUTO DIFF WBC: CPT | Performed by: INTERNAL MEDICINE

## 2022-05-21 PROCEDURE — 25010000002 METHYLPREDNISOLONE PER 40 MG

## 2022-05-21 PROCEDURE — 85007 BL SMEAR W/DIFF WBC COUNT: CPT | Performed by: INTERNAL MEDICINE

## 2022-05-21 PROCEDURE — 99239 HOSP IP/OBS DSCHRG MGMT >30: CPT | Performed by: INTERNAL MEDICINE

## 2022-05-21 PROCEDURE — 25010000002 ONDANSETRON PER 1 MG: Performed by: NURSE PRACTITIONER

## 2022-05-21 PROCEDURE — 83735 ASSAY OF MAGNESIUM: CPT | Performed by: INTERNAL MEDICINE

## 2022-05-21 RX ORDER — OXYCODONE HYDROCHLORIDE 10 MG/1
10 TABLET ORAL EVERY 4 HOURS PRN
Qty: 12 TABLET | Refills: 0 | Status: SHIPPED | OUTPATIENT
Start: 2022-05-21 | End: 2022-05-25

## 2022-05-21 RX ORDER — NICOTINE 21 MG/24HR
1 PATCH, TRANSDERMAL 24 HOURS TRANSDERMAL
Qty: 70 PATCH | Refills: 0 | Status: SHIPPED | OUTPATIENT
Start: 2022-05-22 | End: 2022-07-31

## 2022-05-21 RX ORDER — CEFDINIR 300 MG/1
300 CAPSULE ORAL 2 TIMES DAILY
Qty: 4 CAPSULE | Refills: 0 | Status: SHIPPED | OUTPATIENT
Start: 2022-05-21 | End: 2022-05-23

## 2022-05-21 RX ORDER — PREDNISONE 20 MG/1
TABLET ORAL
Qty: 21 TABLET | Refills: 0 | Status: SHIPPED | OUTPATIENT
Start: 2022-05-21 | End: 2022-08-29

## 2022-05-21 RX ORDER — DOXYCYCLINE 100 MG/1
100 TABLET ORAL EVERY 12 HOURS SCHEDULED
Qty: 1 TABLET | Refills: 0 | Status: SHIPPED | OUTPATIENT
Start: 2022-05-21 | End: 2022-05-22

## 2022-05-21 RX ADMIN — METHYLPREDNISOLONE SODIUM SUCCINATE 40 MG: 40 INJECTION, POWDER, FOR SOLUTION INTRAMUSCULAR; INTRAVENOUS at 09:10

## 2022-05-21 RX ADMIN — HYDROXYZINE HYDROCHLORIDE 50 MG: 25 TABLET, FILM COATED ORAL at 09:10

## 2022-05-21 RX ADMIN — Medication 10 ML: at 09:09

## 2022-05-21 RX ADMIN — SUCRALFATE 1 G: 1 TABLET ORAL at 11:59

## 2022-05-21 RX ADMIN — PANTOPRAZOLE SODIUM 40 MG: 40 TABLET, DELAYED RELEASE ORAL at 09:11

## 2022-05-21 RX ADMIN — GABAPENTIN 300 MG: 300 CAPSULE ORAL at 09:10

## 2022-05-21 RX ADMIN — GABAPENTIN 300 MG: 300 CAPSULE ORAL at 11:59

## 2022-05-21 RX ADMIN — NYSTATIN 500000 UNITS: 100000 SUSPENSION ORAL at 11:59

## 2022-05-21 RX ADMIN — SUCRALFATE 1 G: 1 TABLET ORAL at 09:11

## 2022-05-21 RX ADMIN — OXYCODONE 10 MG: 5 TABLET ORAL at 09:22

## 2022-05-21 RX ADMIN — DOXYCYCLINE 100 MG: 100 TABLET ORAL at 09:11

## 2022-05-21 RX ADMIN — NICOTINE 1 PATCH: 21 PATCH, EXTENDED RELEASE TRANSDERMAL at 09:08

## 2022-05-21 RX ADMIN — SODIUM CHLORIDE, POTASSIUM CHLORIDE, SODIUM LACTATE AND CALCIUM CHLORIDE 100 ML/HR: 600; 310; 30; 20 INJECTION, SOLUTION INTRAVENOUS at 04:48

## 2022-05-21 RX ADMIN — ONDANSETRON 4 MG: 2 INJECTION INTRAMUSCULAR; INTRAVENOUS at 09:22

## 2022-05-21 RX ADMIN — MIDODRINE HYDROCHLORIDE 10 MG: 5 TABLET ORAL at 09:10

## 2022-05-21 RX ADMIN — NYSTATIN 500000 UNITS: 100000 SUSPENSION ORAL at 09:12

## 2022-05-22 LAB
BACTERIA SPEC AEROBE CULT: NORMAL
BACTERIA SPEC AEROBE CULT: NORMAL
BACTERIA SPEC RESP CULT: NORMAL
GRAM STN SPEC: NORMAL
QT INTERVAL: 341 MS

## 2022-05-22 NOTE — OUTREACH NOTE
Prep Survey    Flowsheet Row Responses   [facility]? [facility]   Is LACE score < 7 ? No   Emergency Room discharge w/ pulse ox? No   Eligibility Readm Mgmt   Discharge diagnosis Pneumonia of left lower lobe due to infectious organism    Does the patient have one of the following disease processes/diagnoses(primary or secondary)? COPD/Pneumonia   Does the patient have Home health ordered? No   Is there a DME ordered? Yes   What DME was ordered? Pneumonia of left lower lobe due to infectious organism    Prep survey completed? Yes          CHRISTI BUTLER - Registered Nurse

## 2022-05-23 LAB
LAB AP CASE REPORT: NORMAL
P JIROVECII DNA L RESP QL NAA+NON-PROBE: NEGATIVE
PATH REPORT.FINAL DX SPEC: NORMAL
PATH REPORT.GROSS SPEC: NORMAL
REF LAB TEST METHOD: NORMAL

## 2022-05-24 ENCOUNTER — READMISSION MANAGEMENT (OUTPATIENT)
Dept: CALL CENTER | Facility: HOSPITAL | Age: 57
End: 2022-05-24

## 2022-05-24 LAB — REF LAB TEST METHOD: NORMAL

## 2022-05-24 RX ORDER — TOPIRAMATE 100 MG/1
TABLET, FILM COATED ORAL
Qty: 75 TABLET | Refills: 5 | OUTPATIENT
Start: 2022-05-24

## 2022-05-24 NOTE — OUTREACH NOTE
COPD/PN Week 1 Survey    Flowsheet Row Responses   [facility] facility patient discharged from? [facility]   Does the patient have one of the following disease processes/diagnoses(primary or secondary)? COPD/Pneumonia   Was the primary reason for admission: Pneumonia   Week 1 attempt successful? No   Unsuccessful attempts Attempt 1          TARA MONTERO - Registered Nurse

## 2022-05-26 RX ORDER — TOPIRAMATE 100 MG/1
TABLET, FILM COATED ORAL
Qty: 75 TABLET | Refills: 5 | OUTPATIENT
Start: 2022-05-26

## 2022-05-27 RX ORDER — TOPIRAMATE 100 MG/1
TABLET, FILM COATED ORAL
Qty: 75 TABLET | Refills: 5 | OUTPATIENT
Start: 2022-05-27

## 2022-05-27 RX ORDER — TOPIRAMATE 100 MG/1
TABLET, FILM COATED ORAL
Qty: 75 TABLET | Refills: 0 | Status: SHIPPED | OUTPATIENT
Start: 2022-05-27 | End: 2022-07-25

## 2022-05-27 NOTE — TELEPHONE ENCOUNTER
Provider: SEIPEL  Caller: N/A  Relationship to Patient: N/A  Pharmacy: [facility]  Phone Number: [phone]  Reason for Call:  TELEPHONED PATIENT; SCHEDULED FIRST AVAILABLE APPOINTMENT W/PROVIDER (10/12/22) & PUT ON WAIT LIST.    PATIENT IS STILL IN NEED OF MEDICATION.    CAN PROVIDER WRITE RX TO COVER MEDS UNTIL THIS APPOINTMENT?    PLEASE CALL & ADVISE.    THANK YOU.

## 2022-05-27 NOTE — TELEPHONE ENCOUNTER
Caller: Melvi Rayo    Relationship: Self    Best call back number: [phone]    Requested Prescriptions:   Requested Prescriptions     Pending Prescriptions Disp Refills   • topiramate (TOPAMAX) 100 MG tablet 75 tablet 5        Pharmacy where request should be sent:  [facility]    Additional details provided by patient: PATIENT IS COMPLETELY OUT    Does the patient have less than a 3 day supply:  [x] Yes  [] No    Franki Humphrey Rep   05/27/22 10:17 EDT

## 2022-05-28 DIAGNOSIS — F41.1 GENERALIZED ANXIETY DISORDER: Chronic | ICD-10-CM

## 2022-05-30 ENCOUNTER — READMISSION MANAGEMENT (OUTPATIENT)
Dept: CALL CENTER | Facility: HOSPITAL | Age: 57
End: 2022-05-30

## 2022-05-30 NOTE — OUTREACH NOTE
COPD/PN Week 1 Survey    Flowsheet Row Responses   [facility] patient discharged from? [facility]   Does the patient have one of the following disease processes/diagnoses(primary or secondary)? COPD/Pneumonia   Was the primary reason for admission: Pneumonia   Week 1 attempt successful? No   Unsuccessful attempts Attempt 2          TARA MONTERO - Registered Nurse

## 2022-05-31 ENCOUNTER — TELEPHONE (OUTPATIENT)
Dept: NEUROLOGY | Facility: CLINIC | Age: 57
End: 2022-05-31

## 2022-05-31 ENCOUNTER — TELEPHONE (OUTPATIENT)
Dept: CARDIOLOGY | Facility: CLINIC | Age: 57
End: 2022-05-31

## 2022-05-31 RX ORDER — ALPRAZOLAM 1 MG/1
TABLET ORAL
Qty: 75 TABLET | Refills: 0 | OUTPATIENT
Start: 2022-05-31

## 2022-05-31 NOTE — TELEPHONE ENCOUNTER
Is she supposed take both metadrine and metoprolol?  Was just discharged from [facility] recently.

## 2022-05-31 NOTE — TELEPHONE ENCOUNTER
Caller: Melvi Rayo    Relationship: Self    Best call back number: [phone]  What medications are you currently taking:   Current Outpatient Medications on File Prior to Visit   Medication Sig Dispense Refill   • albuterol sulfate  (90 Base) MCG/ACT inhaler INHALE ONE PUFF BY MOUTH EVERY 4 TO 6 HOURS 18 g 3   • ALPRAZolam (XANAX) 1 MG tablet TAKE ONE TABLET BY MOUTH EVERY MORNING AT 8AM AND NIGHT AT 8pm AND ONE-HALF TABLET EVERY DAY AT NOON 75 tablet 0   • Breo Ellipta 200-25 MCG/INH inhaler INHALE ONE PUFF BY MOUTH DAILY 60 each 3   • brompheniramine-pseudoephedrine-DM 30-2-10 MG/5ML syrup TAKE TEN ML BY MOUTH EVERY 4 TO 6 HOURS AS NEEDED     • dicyclomine (BENTYL) 20 MG tablet Take 20 mg by mouth 3 (Three) Times a Day As Needed.     • FREESTYLE LITE test strip test TWICE DAILY 100 each 3   • furosemide (LASIX) 40 MG tablet Take 40 mg by mouth Daily.     • gabapentin (NEURONTIN) 300 MG capsule Take 1 capsule by mouth 4 (Four) Times a Day. 120 capsule 2   • hydrOXYzine (ATARAX) 50 MG tablet TAKE ONE TABLET BY MOUTH THREE TIMES DAILY 90 tablet 0   • lamoTRIgine (LaMICtal) 200 MG tablet Take 1 tablet by mouth every night at bedtime. 30 tablet 3   • Lancets (freestyle) lancets TEST EVERY  each 3   • metFORMIN (GLUCOPHAGE) 500 MG tablet Take 500 mg by mouth 3 (Three) Times a Day.     • methocarbamol (ROBAXIN) 750 MG tablet TAKE ONE TABLET BY MOUTH THREE TIMES DAILY 90 tablet 0   • metoprolol succinate XL (TOPROL-XL) 25 MG 24 hr tablet Take 1 tablet by mouth Daily. 90 tablet 3   • midodrine (PROAMATINE) 10 MG tablet TAKE ONE TABLET BY MOUTH THREE TIMES DAILY 270 tablet 1   • montelukast (SINGULAIR) 10 MG tablet Take 10 mg by mouth Every Night.     • nicotine (NICODERM CQ) 21 MG/24HR patch Place 1 patch on the skin as directed by provider Daily for 70 days. 70 patch 0   • NON FORMULARY 1 dose into the nostril(s) as directed by provider Continuous. Oxygen 3 lpm     • omeprazole (priLOSEC) 20 MG  capsule Take 20 mg by mouth 2 (two) times a day.     • omeprazole OTC (PriLOSEC OTC) 20 MG EC tablet      • potassium chloride (K-DUR,KLOR-CON) 20 MEQ CR tablet Take 20 mEq by mouth Daily.     • predniSONE (DELTASONE) 10 MG tablet Take 10 mg by mouth Daily.     • predniSONE (DELTASONE) 20 MG tablet Please take 2 tablets daily for 6 days, then take 1 tablet daily for 6 days, then take half a tablet daily for 6 days. 21 tablet 0   • promethazine (PHENERGAN) 25 MG tablet TAKE ONE TABLET BY MOUTH EVERY 8 HOURS AS NEEDED FOR NAUSEA AND VOMITING 30 tablet 0   • sertraline (ZOLOFT) 25 MG tablet TAKE ONE TABLET BY MOUTH EVERY NIGHT AT BEDTIME 30 tablet 3   • sucralfate (CARAFATE) 1 g tablet Take 1 g by mouth 4 (Four) Times a Day.     • topiramate (TOPAMAX) 100 MG tablet Take one tablet by mouth every morning and one half 0.5 qhs 75 tablet 0   • traZODone (DESYREL) 150 MG tablet TAKE TWO TABLETS BY MOUTH EVERY NIGHT AT BEDTIME 60 tablet 3   • vitamin D (ERGOCALCIFEROL) 1.25 MG (95168 UT) capsule capsule Take 50,000 Units by mouth 2 (Two) Times a Week.       No current facility-administered medications on file prior to visit.          When did you start taking these medications: N/A    Which medication are you concerned about: TOPIRAMATE    Who prescribed you this medication: DR. SEIPEL    What are your concerns: PATIENT DOES NOT UNDERSTAND HOW DR. SEIPEL WANTS HER TO TAKE THE MEDICATION.     STATES THE INSTRUCTIONS HAVE HER CONFUSED.    PLEASE CALL AND ADVISE    How long have you had these concerns: N/A

## 2022-06-01 NOTE — PROCEDURES
Bronchoscopy Procedure Note    Timeout was done appropriately by staff    Procedure:  1. Bronchoscopy, Diagnostic  2. Bronchoscopy, Therapeutic  3. Bronchoalveolar lavage, BAL LLL    Preoperative Diagnosis:  LLL pneumonia    Postoperative Diagnosis:   mucopurlant secrtiones suctioned from LLL  LLL BAL    Anesthesia: Moderate Sedation    Procedure Details: Patient was consented for the procedure with all risks and benefits of the procedure explained in detail.  Patient was given the opportunity to ask questions and all concerns were answered.  The bronchocope was inserted into the main airway via the oropharynx. An anatomical survey was done of the main airways and the subsegmental bronchus to at least the first subsegmental level of all five lobes of both lungs.  The findings are reported below.  A bronchoalveolar lavage was performed using aliquots of normal saline instilled into the airways then aspirated back.    Findings:  Bronchoscope passed into oral cavity to the level of the vocal cords.  Lidocaine used for local anesthetic over vocal cords.  Bronchoscope was passed between the vocal cords into the trachea.  All airways were visualized to at least the first subsegment level of all 5 lobes of both lungs.      mucopurlant secrtiones suctioned from LLL  LLL BAL        Patient tolerated procedure well        Estimated Blood Loss:  Minimal           Specimens:  Sent purulent fluid                Complications:  None; patient tolerated the procedure well.           Disposition: PACU - hemodynamically stable.      Patient tolerated the procedure well.    Gonzales Mendoza MD  6/1/2022  17:17 EDT

## 2022-06-02 DIAGNOSIS — F41.1 GENERALIZED ANXIETY DISORDER: Chronic | ICD-10-CM

## 2022-06-02 RX ORDER — ALPRAZOLAM 1 MG/1
TABLET ORAL
Qty: 75 TABLET | Refills: 0 | OUTPATIENT
Start: 2022-06-02

## 2022-06-03 ENCOUNTER — READMISSION MANAGEMENT (OUTPATIENT)
Dept: CALL CENTER | Facility: HOSPITAL | Age: 57
End: 2022-06-03

## 2022-06-03 DIAGNOSIS — F41.1 GENERALIZED ANXIETY DISORDER: Chronic | ICD-10-CM

## 2022-06-03 RX ORDER — ALPRAZOLAM 1 MG/1
TABLET ORAL
Qty: 75 TABLET | Refills: 0 | OUTPATIENT
Start: 2022-06-03

## 2022-06-03 NOTE — OUTREACH NOTE
COPD/PN Week 1 Survey    Flowsheet Row Responses   [facility] discharged [facility]? [facility]   Does the patient have one of the following disease processes/diagnoses(primary or secondary)? COPD/Pneumonia   Was the primary reason for admission: Pneumonia   Week 1 attempt successful? No   Unsuccessful attempts Attempt 3          LUISITO PARRA - Registered Nurse

## 2022-06-13 ENCOUNTER — READMISSION MANAGEMENT (OUTPATIENT)
Dept: CALL CENTER | Facility: HOSPITAL | Age: 57
End: 2022-06-13

## 2022-06-13 NOTE — OUTREACH NOTE
COPD/PN Week 2 Survey    Flowsheet Row Responses   [facility] discharged from? [facility]   Does the patient have one of the following disease processes/diagnoses(primary or secondary)? COPD/Pneumonia   Was the primary reason for admission: Pneumonia   Week 2 attempt successful? No   Unsuccessful attempts Attempt 1          TARA MONTERO - Registered Nurse

## 2022-06-16 ENCOUNTER — READMISSION MANAGEMENT (OUTPATIENT)
Dept: CALL CENTER | Facility: HOSPITAL | Age: 57
End: 2022-06-16

## 2022-06-16 NOTE — OUTREACH NOTE
COPD/PN Week 2 Survey    Flowsheet Row Responses   [facility] from? [facility]   Does the patient have one of the following disease processes/diagnoses(primary or secondary)? COPD/Pneumonia   Was the primary reason for admission: Pneumonia   Week 2 attempt successful? No   Unsuccessful attempts Attempt 2   Rescheduled Revoked  [5th attempt]          KWAN HAGAN - Registered Nurse   Inadequate Oral Intake

## 2022-06-17 LAB — FUNGUS WND CULT: NORMAL

## 2022-06-22 RX ORDER — TRAZODONE HYDROCHLORIDE 150 MG/1
TABLET ORAL
Qty: 60 TABLET | Refills: 1 | Status: SHIPPED | OUTPATIENT
Start: 2022-06-22 | End: 2022-06-28 | Stop reason: SDUPTHER

## 2022-06-28 ENCOUNTER — TELEPHONE (OUTPATIENT)
Dept: PSYCHIATRY | Facility: CLINIC | Age: 57
End: 2022-06-28

## 2022-06-28 DIAGNOSIS — F41.1 GENERALIZED ANXIETY DISORDER: Chronic | ICD-10-CM

## 2022-06-28 DIAGNOSIS — F33.2 SEVERE RECURRENT MAJOR DEPRESSION WITHOUT PSYCHOTIC FEATURES: Chronic | ICD-10-CM

## 2022-06-28 RX ORDER — TRAZODONE HYDROCHLORIDE 150 MG/1
300 TABLET ORAL NIGHTLY
Qty: 60 TABLET | Refills: 0 | Status: SHIPPED | OUTPATIENT
Start: 2022-06-28 | End: 2022-08-19

## 2022-06-28 RX ORDER — LAMOTRIGINE 200 MG/1
200 TABLET ORAL
Qty: 30 TABLET | Refills: 1 | Status: SHIPPED | OUTPATIENT
Start: 2022-06-28 | End: 2022-07-20

## 2022-06-28 RX ORDER — HYDROXYZINE 50 MG/1
50 TABLET, FILM COATED ORAL 3 TIMES DAILY
Qty: 90 TABLET | Refills: 0 | Status: SHIPPED | OUTPATIENT
Start: 2022-06-28 | End: 2022-09-08 | Stop reason: SDUPTHER

## 2022-06-28 RX ORDER — ALPRAZOLAM 1 MG/1
TABLET ORAL
Qty: 75 TABLET | Refills: 0 | Status: SHIPPED | OUTPATIENT
Start: 2022-06-28 | End: 2022-07-22

## 2022-06-28 RX ORDER — SERTRALINE HYDROCHLORIDE 25 MG/1
25 TABLET, FILM COATED ORAL
Qty: 30 TABLET | Refills: 0 | Status: SHIPPED | OUTPATIENT
Start: 2022-06-28 | End: 2022-07-22

## 2022-06-28 NOTE — TELEPHONE ENCOUNTER
Pt says she is out of the hospital now.  She went to [facility] for pneumonia, upper respiratory failure, heart failure, and she lost her ability to walk more than a couple of steps.  She will be getting a wheelchair in a month from her insurance.  She needs refills on all psychiatric medications sent to [facility].

## 2022-07-01 LAB
MYCOBACTERIUM SPEC CULT: NORMAL
NIGHT BLUE STAIN TISS: NORMAL

## 2022-07-20 DIAGNOSIS — F33.2 SEVERE RECURRENT MAJOR DEPRESSION WITHOUT PSYCHOTIC FEATURES: Chronic | ICD-10-CM

## 2022-07-20 RX ORDER — LAMOTRIGINE 200 MG/1
TABLET ORAL
Qty: 30 TABLET | Refills: 3 | Status: SHIPPED | OUTPATIENT
Start: 2022-07-20 | End: 2022-09-08 | Stop reason: SDUPTHER

## 2022-07-22 DIAGNOSIS — F33.2 SEVERE RECURRENT MAJOR DEPRESSION WITHOUT PSYCHOTIC FEATURES: Chronic | ICD-10-CM

## 2022-07-22 DIAGNOSIS — F41.1 GENERALIZED ANXIETY DISORDER: Chronic | ICD-10-CM

## 2022-07-22 RX ORDER — ALPRAZOLAM 1 MG/1
TABLET ORAL
Qty: 75 TABLET | Refills: 1 | Status: SHIPPED | OUTPATIENT
Start: 2022-07-22 | End: 2022-09-08 | Stop reason: SDUPTHER

## 2022-07-22 RX ORDER — SERTRALINE HYDROCHLORIDE 25 MG/1
TABLET, FILM COATED ORAL
Qty: 30 TABLET | Refills: 3 | Status: SHIPPED | OUTPATIENT
Start: 2022-07-22 | End: 2022-09-08 | Stop reason: SDUPTHER

## 2022-07-25 RX ORDER — TOPIRAMATE 100 MG/1
TABLET, FILM COATED ORAL
Qty: 75 TABLET | Refills: 0 | Status: SHIPPED | OUTPATIENT
Start: 2022-07-25

## 2022-08-18 RX ORDER — TOPIRAMATE 100 MG/1
TABLET, FILM COATED ORAL
Qty: 75 TABLET | Refills: 0 | OUTPATIENT
Start: 2022-08-18

## 2022-08-18 NOTE — TELEPHONE ENCOUNTER
Caller: Melvi Rayo    Relationship: Self    Best call back number: [phone]    Requested Prescriptions:   Requested Prescriptions     Pending Prescriptions Disp Refills   • topiramate (TOPAMAX) 100 MG tablet 75 tablet 0     Sig: TAKE ONE TABLET BY MOUTH EVERY MORNING AND ONE-HALF TABLET EVERY NIGHT AT BEDTIME        Pharmacy where request should be sent: [facility]     Additional details provided by patient: 7 DAYS LEFT    Does the patient have less than a 3 day supply:  [] Yes  [x] No    Franki Humphrey Rep   08/18/22 13:14 EDT

## 2022-08-18 NOTE — TELEPHONE ENCOUNTER
PATIENT HAS A FOLLOW UP APPOINTMENT SCHEDULED W/DR. SEIPEL FOR 10/12/22, WHICH IS THE EARLIEST AVAILABLE.    CAN MEDS BE FILLED ENOUGH TO GET PATIENT TO THIS APPOINTMENT?    THANK YOU.

## 2022-08-19 RX ORDER — TOPIRAMATE 100 MG/1
TABLET, FILM COATED ORAL
Qty: 75 TABLET | Refills: 0 | OUTPATIENT
Start: 2022-08-19

## 2022-08-19 RX ORDER — TRAZODONE HYDROCHLORIDE 150 MG/1
TABLET ORAL
Qty: 60 TABLET | Refills: 1 | Status: SHIPPED | OUTPATIENT
Start: 2022-08-19 | End: 2022-09-08 | Stop reason: SDUPTHER

## 2022-08-22 RX ORDER — TOPIRAMATE 100 MG/1
TABLET, FILM COATED ORAL
Qty: 75 TABLET | Refills: 0 | OUTPATIENT
Start: 2022-08-22

## 2022-08-23 RX ORDER — TOPIRAMATE 100 MG/1
TABLET, FILM COATED ORAL
Qty: 75 TABLET | Refills: 0 | OUTPATIENT
Start: 2022-08-23

## 2022-08-23 NOTE — TELEPHONE ENCOUNTER
MEDICATION WILL NOT BE FILLED UNTIL PATIENT IS SEEN IN OFFICE, SHE CAN GET MEDICATION FROM PCP UNTIL SHE IS SEEN

## 2022-08-23 NOTE — TELEPHONE ENCOUNTER
Provider: SEIPEL  Caller: PATIENT  Relationship to Patient: SELF  Pharmacy: [facility] [phone]  Phone Number: [phone]  Reason for Call: PATIENT IS CALLING REQUESTING A NEW SCRIPT FOR TOPIRAMATE (TOPAMAX) 100 MG TABLETS. SHE STATES PHARMACY  DOES NOT HAVE ENOUGH TO FILL RX UNTIL HER APPOINTMENT ON 10/12/22. SHE WOULD LIKE NEW RX SENT TO PHARMACY ON FILE    PLEASE CALL & ADVISE    THANK YOU

## 2022-08-26 ENCOUNTER — OFFICE VISIT (OUTPATIENT)
Dept: CARDIOLOGY | Facility: CLINIC | Age: 57
End: 2022-08-26

## 2022-08-26 VITALS
OXYGEN SATURATION: 96 % | HEART RATE: 73 BPM | SYSTOLIC BLOOD PRESSURE: 113 MMHG | HEIGHT: 67 IN | WEIGHT: 164 LBS | DIASTOLIC BLOOD PRESSURE: 79 MMHG | BODY MASS INDEX: 25.74 KG/M2

## 2022-08-26 DIAGNOSIS — J44.1 COPD WITH ACUTE EXACERBATION: ICD-10-CM

## 2022-08-26 DIAGNOSIS — I10 PRIMARY HYPERTENSION: ICD-10-CM

## 2022-08-26 DIAGNOSIS — R55 SYNCOPE AND COLLAPSE: Primary | ICD-10-CM

## 2022-08-26 DIAGNOSIS — F17.200 TOBACCO USE DISORDER: ICD-10-CM

## 2022-08-26 DIAGNOSIS — R06.02 SOB (SHORTNESS OF BREATH): ICD-10-CM

## 2022-08-26 DIAGNOSIS — R07.89 CHEST TIGHTNESS: ICD-10-CM

## 2022-08-26 PROCEDURE — 93000 ELECTROCARDIOGRAM COMPLETE: CPT | Performed by: NURSE PRACTITIONER

## 2022-08-26 PROCEDURE — 99214 OFFICE O/P EST MOD 30 MIN: CPT | Performed by: NURSE PRACTITIONER

## 2022-08-26 RX ORDER — OXYCODONE AND ACETAMINOPHEN 10; 325 MG/1; MG/1
TABLET ORAL
COMMUNITY
Start: 2022-08-24

## 2022-08-26 RX ORDER — SUMATRIPTAN 100 MG/1
TABLET, FILM COATED ORAL
Status: ON HOLD | COMMUNITY
End: 2022-11-06

## 2022-08-26 RX ORDER — IPRATROPIUM BROMIDE AND ALBUTEROL SULFATE 2.5; .5 MG/3ML; MG/3ML
SOLUTION RESPIRATORY (INHALATION)
COMMUNITY
Start: 2022-08-24

## 2022-08-26 RX ORDER — BLOOD-GLUCOSE METER
KIT MISCELLANEOUS
COMMUNITY
Start: 2022-07-28

## 2022-08-26 RX ORDER — BUTALBITAL, ACETAMINOPHEN AND CAFFEINE 50; 325; 40 MG/1; MG/1; MG/1
TABLET ORAL
Status: ON HOLD | COMMUNITY
Start: 2022-08-25 | End: 2022-11-06

## 2022-08-26 RX ORDER — NICOTINE POLACRILEX 4 MG/1
GUM, CHEWING ORAL
COMMUNITY
Start: 2022-08-25 | End: 2022-09-07 | Stop reason: SDUPTHER

## 2022-08-26 NOTE — PROGRESS NOTES
Subjective:     Encounter Date:08/26/2022      Patient ID: Melvi Rayo is a 57 y.o. female.    Chief Complaint : chest pain, shortness of breath 6 month Follow-up for chronic atypical chest pain, dizziness, chronic hypotension      History of Present Illness           Ms. Melvi Rayo has PMH of        #. Recurrent syncope status post loop recorder are 05/16/2014, require explantation 09/03/2014 because of patient's insistence and pain,, Orthostatic hypotension possibly due to autonomic insufficiency, on chronic midodrine therapy  #  Recurrent atypical chest pain and negative cath, 3/25/15,  2014,  and 2005  #. Diabetes, COPD, asthma, tobacco abuse  #. Questionable history of CVA and TIA in the past details are not available  #.  cholecystectomy, MARY JANE, next surgery, GERD, Diabetic neuropathy  #Cigarette smoker    Here for 6 month follow up.  Patient is complaining of chest tightness, like someone is sitting on her, cough, wheezing, shortness of breath- worse on exertion.  Patient states she passed out three days ago.  She states that she was having chest pain, stood up and went straight down.  She has an abrasion on her right arm that is wrapped in gauze.  She did not seek any medical attention at that time.   Patient states she saw her PCP two days ago and was started on oral antibiotics and she takes inhalers for COPD.  She is on chronic home oxygen as well.  Patient also continues to smoke, although she states she has cut down to 1/2 ppd. She currently lives with her brother whom takes care of her.         Patient is asking for loop recorder to be replaced.  She feels like her heart rate gets low around 45 and high in the 110-120s at times.     Blood pressure today is 113/79 HR 73 oxygen 96% on oxygen (3-4L)       Chart review shows that patient was hospitalized 5/17-5/21/2022 with pneumonia and COPD exacerbation. She underwent bronchoscopy 5/20/2022        Patient had echocardiogram 12/4/2021 which  revealed normal LV function EF of 65%  Patient had Lexiscan Cardiolite 12/4/2021 which revealed normal perfusion EF of 70%         ECG 12 Lead    Date/Time: 8/26/2022 3:45 PM  Performed by: Tanya Berger APRN  Authorized by: Tanya Berger APRN   Comparison: compared with previous ECG from 5/17/2022  Comparison to previous ECG: Previous EKG sinus tachycardia rate 131 with non specific ST abnormalities   Rhythm: sinus rhythm  Rate: normal  BPM: 63  Other findings: non-specific ST-T wave changes    Clinical impression: non-specific ECG            The following portions of the patient's history were reviewed and updated as appropriate: allergies, current medications, past family history, past medical history, past social history, past surgical history and problem list.    Assessment:         [facility]     Diagnosis Plan   1. Syncope and collapse  Holter Monitor - 72 Hour Up To 15 Days   2. Primary hypertension     3. SOB (shortness of breath)     4. Chest tightness     5. Tobacco use disorder     6. COPD with acute exacerbation (HCC)       #Chronic atypical chest pain  #  previous bradycardia, previous tachycardia in setting of pneumonia   #  autonomic insufficiency       Plan:         Patient complaining of multiple symptoms including chest tightness, shortness of breath, cough, wheezing, syncope   Reviewed syncope with patient    EKG done and reviewed with patient, No acute changes from previous EKG     Per chart review- previous normal cardiac caths in 2014 and 2015; normal stress myoview in Dec 2021 with no ischemia noted.     Counseled on smoking cessation - trying to quit    Patient requesting loop recorder to be reinserted.   Discussed with patient that insurance will not approve usually until holter monitor is done   3 day holter ordered.  Will assess for tachybrady syndrome     Continue metoprolol, lasix and midodrine 10mg TID     Advised patient to continue her antibiotics for COPD exacerbation.  As well as follow  up with PCP if no improvement   IF worsening go to the ED.       Past Medical History:  Past Medical History:   Diagnosis Date   • Anxiety    • Asthma    • Breast cancer (HCC)    • Chest tightness    • COPD (chronic obstructive pulmonary disease) ([facility])    • Degenerative disorder of bone    • Foot pain     S/P multiple foot surguries.   • GERD (gastroesophageal reflux disease)    • Lesion of eye     Lesion behind eye, cancer associated retinopathopthy. [facility]    • Low back pain    • Migraines    • Neck nodule    • Pancreatitis    • RA (rheumatoid arthritis) ([facility])    • Seizure disorder ([facility])     Generalized seizure, possible partial complex.   • Skin cancer     Age 18.   • Stroke ([facility])    • Type 2 diabetes mellitus ([facility])      Past Surgical History:  Past Surgical History:   Procedure Laterality Date   • BRONCHOSCOPY N/A 2022    Procedure: BRONCHOSCOPY with bronchial washing;  Surgeon: Gonzales Mendoza MD;  Location: [facility];  Service: Pulmonary;  Laterality: N/A;  post op: pneumonia   • CARDIAC CATHETERIZATION      x2   • CARDIAC CATHETERIZATION  2015   •  SECTION      x2   • ESOPHAGEAL DILATATION     • FOOT SURGERY  2015   • FOOT SURGERY Left 2016   • FOOT SURGERY Right 2017   • MASTECTOMY Bilateral    • OTHER SURGICAL HISTORY  2017    LOOP Recorder   • DC  2016    [facility]   • TOTAL ABDOMINAL HYSTERECTOMY WITH SALPINGO OOPHORECTOMY        Allergies:  Allergies   Allergen Reactions   • Aspirin Palpitations   • Codeine Shortness Of Breath   • Contrast Dye Shortness Of Breath   • Erythromycin Hives   • Morphine Unknown (See Comments)   • Penicillin G Itching   • Sulfa Antibiotics Unknown (See Comments)   • Fentanyl Itching   • Levofloxacin Other (See Comments)     Home Meds:  Current Meds:     Current Outpatient Medications:   •  albuterol sulfate  (90 Base) MCG/ACT inhaler, INHALE ONE PUFF BY MOUTH EVERY 4 TO 6 HOURS, Disp: 18 g, Rfl: 3  •   ALPRAZolam (XANAX) 1 MG tablet, TAKE ONE TABLET BY MOUTH TWICE DAILY AND ONE-HALF AT NOON, Disp: 75 tablet, Rfl: 1  •  Breo Ellipta 200-25 MCG/INH inhaler, INHALE ONE PUFF BY MOUTH DAILY, Disp: 60 each, Rfl: 3  •  brompheniramine-pseudoephedrine-DM 30-2-10 MG/5ML syrup, TAKE TEN ML BY MOUTH EVERY 4 TO 6 HOURS AS NEEDED, Disp: , Rfl:   •  dicyclomine (BENTYL) 20 MG tablet, Take 20 mg by mouth 3 (Three) Times a Day As Needed., Disp: , Rfl:   •  FREESTYLE LITE test strip, test TWICE DAILY, Disp: 100 each, Rfl: 3  •  furosemide (LASIX) 40 MG tablet, Take 40 mg by mouth Daily., Disp: , Rfl:   •  gabapentin (NEURONTIN) 300 MG capsule, Take 1 capsule by mouth 4 (Four) Times a Day., Disp: 120 capsule, Rfl: 2  •  hydrOXYzine (ATARAX) 50 MG tablet, Take 1 tablet by mouth 3 (Three) Times a Day., Disp: 90 tablet, Rfl: 0  •  lamoTRIgine (LaMICtal) 200 MG tablet, TAKE ONE TABLET BY MOUTH EVERY NIGHT AT BEDTIME, Disp: 30 tablet, Rfl: 3  •  Lancets (freestyle) lancets, TEST EVERY DAY, Disp: 100 each, Rfl: 3  •  metFORMIN (GLUCOPHAGE) 500 MG tablet, Take 500 mg by mouth 3 (Three) Times a Day., Disp: , Rfl:   •  methocarbamol (ROBAXIN) 750 MG tablet, TAKE ONE TABLET BY MOUTH THREE TIMES DAILY, Disp: 90 tablet, Rfl: 0  •  metoprolol succinate XL (TOPROL-XL) 25 MG 24 hr tablet, Take 1 tablet by mouth Daily., Disp: 90 tablet, Rfl: 3  •  midodrine (PROAMATINE) 10 MG tablet, TAKE ONE TABLET BY MOUTH THREE TIMES DAILY, Disp: 270 tablet, Rfl: 1  •  montelukast (SINGULAIR) 10 MG tablet, Take 10 mg by mouth Every Night., Disp: , Rfl:   •  NON FORMULARY, 1 dose into the nostril(s) as directed by provider Continuous. Oxygen 3 lpm, Disp: , Rfl:   •  omeprazole (priLOSEC) 20 MG capsule, Take 20 mg by mouth 2 (two) times a day., Disp: , Rfl:   •  omeprazole OTC (PriLOSEC OTC) 20 MG EC tablet, , Disp: , Rfl:   •  potassium chloride (K-DUR,KLOR-CON) 20 MEQ CR tablet, Take 20 mEq by mouth Daily., Disp: , Rfl:   •  predniSONE (DELTASONE) 10 MG tablet,  Take 10 mg by mouth Daily., Disp: , Rfl:   •  promethazine (PHENERGAN) 25 MG tablet, TAKE ONE TABLET BY MOUTH EVERY 8 HOURS AS NEEDED FOR NAUSEA AND VOMITING, Disp: 30 tablet, Rfl: 0  •  sertraline (ZOLOFT) 25 MG tablet, TAKE ONE TABLET BY MOUTH EVERY NIGHT AT BEDTIME, Disp: 30 tablet, Rfl: 3  •  sucralfate (CARAFATE) 1 g tablet, Take 1 g by mouth 4 (Four) Times a Day., Disp: , Rfl:   •  topiramate (TOPAMAX) 100 MG tablet, TAKE ONE TABLET BY MOUTH EVERY MORNING AND ONE-HALF TABLET EVERY NIGHT AT BEDTIME, Disp: 75 tablet, Rfl: 0  •  traZODone (DESYREL) 150 MG tablet, TAKE TWO TABLETS BY MOUTH EVERY NIGHT AT BEDTIME, Disp: 60 tablet, Rfl: 1  •  vitamin D (ERGOCALCIFEROL) 1.25 MG (98320 UT) capsule capsule, Take 50,000 Units by mouth 2 (Two) Times a Week., Disp: , Rfl:   •  Blood Glucose Monitoring Suppl (FreeStyle Lite) w/Device kit, Use to check blood sugars 2-3 times a day, Disp: , Rfl:   •  butalbital-acetaminophen-caffeine (FIORICET, ESGIC) -40 MG per tablet, , Disp: , Rfl:   •  ipratropium-albuterol (DUO-NEB) 0.5-2.5 mg/3 ml nebulizer, , Disp: , Rfl:   •  linaclotide (Linzess) 145 MCG capsule capsule, Linzess 145 mcg capsule, Disp: , Rfl:   •  nystatin (MYCOSTATIN) 100,000 unit/mL suspension, Take 5 mL by mouth 4 (Four) Times a Day for 5 days., Disp: , Rfl:   •  Omeprazole 20 MG tablet delayed-release, , Disp: , Rfl:   •  oxyCODONE-acetaminophen (PERCOCET)  MG per tablet, , Disp: , Rfl:   •  predniSONE (DELTASONE) 20 MG tablet, Please take 2 tablets daily for 6 days, then take 1 tablet daily for 6 days, then take half a tablet daily for 6 days., Disp: 21 tablet, Rfl: 0  •  SUMAtriptan (IMITREX) 100 MG tablet, sumatriptan 100 mg tablet  TAKE ONE TABLET BY MOUTH AT ONSET OF HEADACHE, MAY REPEAT IN TWO HOURS AS NEEDED MAX OF TWO TABLETS PER DAY, Disp: , Rfl:   Social History:   Social History     Tobacco Use   • Smoking status: Current Every Day Smoker     Packs/day: 0.50     Types: Cigarettes     Start  "date: 12/4/2011   • Smokeless tobacco: Never Used   Substance Use Topics   • Alcohol use: No      Family History:  Family History   Problem Relation Age of Onset   • Heart disease Mother    • Stroke Mother    • Hyperlipidemia Mother    • Hypertension Mother    • Heart disease Father    • Stroke Father    • Hypertension Father    • Hyperlipidemia Father    • Heart disease Other    • COPD Other    • Cancer Other    • Diabetes Other    • Hypertension Other    • Hyperlipidemia Other    • Stroke Other               Review of Systems   Constitutional: Negative for fever and malaise/fatigue.   HENT: Negative for ear pain and nosebleeds.    Eyes: Negative for blurred vision and double vision.   Cardiovascular: Positive for chest pain and leg swelling. Negative for dyspnea on exertion and palpitations.   Respiratory: Positive for shortness of breath. Negative for cough.    Skin: Negative for rash.   Musculoskeletal: Negative for joint pain.   Gastrointestinal: Negative for abdominal pain, nausea and vomiting.   Neurological: Positive for dizziness. Negative for focal weakness and headaches.   Psychiatric/Behavioral: Negative for depression. The patient is not nervous/anxious.    All other systems reviewed and are negative.    All other systems are negative         Objective:     Physical Exam  /79 (BP Location: Left arm, Patient Position: Sitting, Cuff Size: Adult)   Pulse 73   Ht 170.2 cm (67\")   Wt 74.4 kg (164 lb)   SpO2 96%   BMI 25.69 kg/m²   General:  Clinching chest, dyspneic with conversation   Eyes: Sclera is anicteric,  conjunctiva is clear   HEENT:  No JVD.   Respiratory: Respirations regular and labored; scattered wheezing noted.  No crackles   Cardiovascular: S1,S2 Regular rate and rhythm. No murmur, rub or gallop auscultated.   Extremities: No digital clubbing or cyanosis, no edema  Skin: Color pink. Skin warm and dry to touch. No rashes  No xanthoma  Neuro: Alert and awake.    Lab Reviewed:     " "    Tanya Berger, APRJUAN MANUEL  8/29/2022 15:56 EDT  Electronically signed by Tanya Berger, ANIBAL, 08/29/22, 3:56 PM EDT.       EMR Dragon/Transcription:   \"Dictated utilizing Dragon dictation\".        "

## 2022-09-07 ENCOUNTER — OFFICE VISIT (OUTPATIENT)
Dept: CARDIOLOGY | Facility: CLINIC | Age: 57
End: 2022-09-07

## 2022-09-07 VITALS
HEIGHT: 67 IN | SYSTOLIC BLOOD PRESSURE: 110 MMHG | WEIGHT: 163 LBS | BODY MASS INDEX: 25.58 KG/M2 | DIASTOLIC BLOOD PRESSURE: 70 MMHG | OXYGEN SATURATION: 97 % | HEART RATE: 81 BPM

## 2022-09-07 DIAGNOSIS — R06.02 SOB (SHORTNESS OF BREATH): ICD-10-CM

## 2022-09-07 DIAGNOSIS — I10 PRIMARY HYPERTENSION: ICD-10-CM

## 2022-09-07 DIAGNOSIS — R55 SYNCOPE, UNSPECIFIED SYNCOPE TYPE: ICD-10-CM

## 2022-09-07 DIAGNOSIS — R42 DIZZINESS: ICD-10-CM

## 2022-09-07 DIAGNOSIS — R55 SYNCOPE AND COLLAPSE: Primary | ICD-10-CM

## 2022-09-07 DIAGNOSIS — E11.65 TYPE 2 DIABETES MELLITUS WITH HYPERGLYCEMIA, WITHOUT LONG-TERM CURRENT USE OF INSULIN: ICD-10-CM

## 2022-09-07 DIAGNOSIS — R07.89 CHEST TIGHTNESS: ICD-10-CM

## 2022-09-07 DIAGNOSIS — F17.200 TOBACCO USE DISORDER: ICD-10-CM

## 2022-09-07 PROCEDURE — 99214 OFFICE O/P EST MOD 30 MIN: CPT | Performed by: NURSE PRACTITIONER

## 2022-09-07 RX ORDER — MIDODRINE HYDROCHLORIDE 10 MG/1
10 TABLET ORAL 3 TIMES DAILY
Qty: 270 TABLET | Refills: 1 | Status: SHIPPED | OUTPATIENT
Start: 2022-09-07 | End: 2023-03-15 | Stop reason: SDUPTHER

## 2022-09-07 RX ORDER — METOPROLOL SUCCINATE 25 MG/1
25 TABLET, EXTENDED RELEASE ORAL DAILY
Qty: 90 TABLET | Refills: 3 | Status: SHIPPED | OUTPATIENT
Start: 2022-09-07

## 2022-09-07 RX ORDER — CEFDINIR 300 MG/1
300 CAPSULE ORAL 2 TIMES DAILY
Status: ON HOLD | COMMUNITY
End: 2022-11-06

## 2022-09-07 NOTE — PROGRESS NOTES
Subjective:     Encounter Date: 09/07/2022      Patient ID: Melvi Rayo is a 57 y.o. female.    Chief Complaint : 2 week follow up chest pain, shortness of breath     Shortness of Breath  Associated symptoms include chest pain and leg swelling. Pertinent negatives include no abdominal pain, ear pain, fever, headaches, rash or vomiting.   Hypertension  Associated symptoms include chest pain and shortness of breath. Pertinent negatives include no blurred vision, headaches, malaise/fatigue or palpitations.              Ms. Melvi Rayo has PMH of        #. Recurrent syncope status post loop recorder are 05/16/2014, require explantation 09/03/2014 because of patient's insistence and pain,, Orthostatic hypotension possibly due to autonomic insufficiency, on chronic midodrine therapy  #  Recurrent atypical chest pain and negative cath, 3/25/15,  2014,  and 2005  #. Diabetes, COPD, asthma, tobacco abuse  #. Questionable history of CVA and TIA in the past details are not available  #.  cholecystectomy, MARY JANE, neck surgery, GERD, Diabetic neuropathy  #Cigarette smoker      Patient was seen 2 weeks ago with complaints of chest tightness, cough, shortness of breath.  Cough and wheezing have seemed to improve.  She states she continues to have chest tightness. She continues to feel dizzy and lightheaded.  She has not had a syncopal episode over the last two weeks.  holter monitor unofficial read shows minimum HR 58 and max 96.    Today patient's blood pressure is  110/70  HR 81, oxygen 97% on oxygen.     Patient states that if she bends her neck forward or backwards she feels like she will pass out.     Blood pressure when sitting 114/78 HR 78  Blood pressure when standing 105/72 HR 82        Chart review shows that patient was hospitalized 5/17-5/21/2022 with pneumonia and COPD exacerbation. She underwent bronchoscopy 5/20/2022        Patient had echocardiogram 12/4/2021 which revealed normal LV function EF of  65%  Patient had Lexiscan Cardiolite 12/4/2021 which revealed normal perfusion EF of 70%       Procedures    The following portions of the patient's history were reviewed and updated as appropriate: allergies, current medications, past family history, past medical history, past social history, past surgical history and problem list.    Assessment:         [facility]     Diagnosis Plan   1. Syncope and collapse  Duplex Carotid Ultrasound CAR   2. Chest tightness     3. Dizziness     4. SOB (shortness of breath)     5. Syncope, unspecified syncope type     6. Primary hypertension     7. Type 2 diabetes mellitus with hyperglycemia, without long-term current use of insulin (HCC)     8. Tobacco use disorder       #Chronic atypical chest pain  #  previous bradycardia, previous tachycardia in setting of pneumonia   #  autonomic insufficiency       Plan:         Patient complaining of multiple symptoms including chest tightness, shortness of breath, cough, wheezing, syncope   Reviewed syncope with patient again     Unofficial holter monitor was unremarkable   Will have Dr. Steele review    Patient is still wanting to replace loop recorder   Will address with DR. Steele     Continue metoprolol, lasix and midodrine 10mg TID         Per chart review- previous normal cardiac caths in 2014 and 2015; normal stress myoview in Dec 2021 with no ischemia noted.     Counseled on smoking cessation - trying to quit        Advised patient that we can schedule another stress test to assess chest pain she does not want to do this.  Also advised cardiac cath if pain continues.  She is continuing to be insistent on wanting a loop recorder. Although I advised her that the loop recorder will not assess her chest pain.     Advised patient the syncope could be related to her neck (had previous surgery).  Also possibly due to carotid stenosis--- carotid duplex ordered     Follow up with Dr. Steele to possibly schedule loop recorder           Past  Medical History:  Past Medical History:   Diagnosis Date   • Anxiety    • Asthma    • Breast cancer ([facility])    • Chest tightness    • COPD (chronic obstructive pulmonary disease) ([facility])    • Degenerative disorder of bone    • Foot pain     S/P multiple foot surguries.   • GERD (gastroesophageal reflux disease)    • Lesion of eye     Lesion behind eye, cancer associated retinopathopthy. [facility].    • Low back pain    • Migraines    • Neck nodule    • Pancreatitis    • RA (rheumatoid arthritis) ([facility])    • Seizure disorder ([facility])     Generalized seizure, possible partial complex.   • Skin cancer     Age 18.   • Stroke (HCC)    • Type 2 diabetes mellitus (HCC)      Past Surgical History:  Past Surgical History:   Procedure Laterality Date   • BRONCHOSCOPY N/A 2022    Procedure: BRONCHOSCOPY with bronchial washing;  Surgeon: Gonzales Mendoza MD;  Location: [facility];  Service: Pulmonary;  Laterality: N/A;  post op: pneumonia   • CARDIAC CATHETERIZATION      x2   • CARDIAC CATHETERIZATION  2015   •  SECTION      x2   • ESOPHAGEAL DILATATION     • FOOT SURGERY  2015   • FOOT SURGERY Left 2016   • FOOT SURGERY Right 2017   • MASTECTOMY Bilateral    • OTHER SURGICAL HISTORY  2017    LOOP Recorder   • DC  2016    [facility]   • TOTAL ABDOMINAL HYSTERECTOMY WITH SALPINGO OOPHORECTOMY        Allergies:  Allergies   Allergen Reactions   • Aspirin Palpitations   • Codeine Shortness Of Breath   • Contrast Dye Shortness Of Breath   • Erythromycin Hives   • Morphine Unknown (See Comments)   • Penicillin G Itching   • Sulfa Antibiotics Unknown (See Comments)   • Doxycycline Other (See Comments)     Rash and SOA   • Fentanyl Itching   • Levofloxacin Other (See Comments)     Home Meds:  Current Meds:     Current Outpatient Medications:   •  albuterol sulfate  (90 Base) MCG/ACT inhaler, INHALE ONE PUFF BY MOUTH EVERY 4 TO 6 HOURS, Disp: 18 g, Rfl: 3  •  Blood Glucose  Monitoring Suppl (FreeStyle Lite) w/Device kit, Use to check blood sugars 2-3 times a day, Disp: , Rfl:   •  Breo Ellipta 200-25 MCG/INH inhaler, INHALE ONE PUFF BY MOUTH DAILY, Disp: 60 each, Rfl: 3  •  butalbital-acetaminophen-caffeine (FIORICET, ESGIC) -40 MG per tablet, , Disp: , Rfl:   •  cefdinir (OMNICEF) 300 MG capsule, Take 300 mg by mouth 2 (Two) Times a Day., Disp: , Rfl:   •  dicyclomine (BENTYL) 20 MG tablet, Take 20 mg by mouth 3 (Three) Times a Day As Needed., Disp: , Rfl:   •  FREESTYLE LITE test strip, test TWICE DAILY, Disp: 100 each, Rfl: 3  •  furosemide (LASIX) 40 MG tablet, Take 40 mg by mouth Daily., Disp: , Rfl:   •  gabapentin (NEURONTIN) 300 MG capsule, Take 1 capsule by mouth 4 (Four) Times a Day., Disp: 120 capsule, Rfl: 2  •  ipratropium-albuterol (DUO-NEB) 0.5-2.5 mg/3 ml nebulizer, , Disp: , Rfl:   •  Lancets (freestyle) lancets, TEST EVERY DAY, Disp: 100 each, Rfl: 3  •  linaclotide (LINZESS) 145 MCG capsule capsule, Linzess 145 mcg capsule, Disp: , Rfl:   •  metFORMIN (GLUCOPHAGE) 500 MG tablet, Take 500 mg by mouth 3 (Three) Times a Day., Disp: , Rfl:   •  methocarbamol (ROBAXIN) 750 MG tablet, TAKE ONE TABLET BY MOUTH THREE TIMES DAILY, Disp: 90 tablet, Rfl: 0  •  metoprolol succinate XL (TOPROL-XL) 25 MG 24 hr tablet, Take 1 tablet by mouth Daily., Disp: 90 tablet, Rfl: 3  •  midodrine (PROAMATINE) 10 MG tablet, Take 1 tablet by mouth 3 (Three) Times a Day., Disp: 270 tablet, Rfl: 1  •  montelukast (SINGULAIR) 10 MG tablet, Take 10 mg by mouth Every Night., Disp: , Rfl:   •  NON FORMULARY, 1 dose into the nostril(s) as directed by provider Continuous. Oxygen 3 lpm, Disp: , Rfl:   •  nystatin (MYCOSTATIN) 100,000 unit/mL suspension, Take 5 mL by mouth 4 (Four) Times a Day for 5 days., Disp: , Rfl:   •  omeprazole (priLOSEC) 20 MG capsule, Take 20 mg by mouth 2 (two) times a day., Disp: , Rfl:   •  omeprazole OTC (PriLOSEC OTC) 20 MG EC tablet, , Disp: , Rfl:   •   oxyCODONE-acetaminophen (PERCOCET)  MG per tablet, , Disp: , Rfl:   •  potassium chloride (K-DUR,KLOR-CON) 20 MEQ CR tablet, Take 20 mEq by mouth Daily., Disp: , Rfl:   •  predniSONE (DELTASONE) 10 MG tablet, Take 10 mg by mouth Daily., Disp: , Rfl:   •  promethazine (PHENERGAN) 25 MG tablet, TAKE ONE TABLET BY MOUTH EVERY 8 HOURS AS NEEDED FOR NAUSEA AND VOMITING, Disp: 30 tablet, Rfl: 0  •  sucralfate (CARAFATE) 1 g tablet, Take 1 g by mouth 4 (Four) Times a Day., Disp: , Rfl:   •  topiramate (TOPAMAX) 100 MG tablet, TAKE ONE TABLET BY MOUTH EVERY MORNING AND ONE-HALF TABLET EVERY NIGHT AT BEDTIME, Disp: 75 tablet, Rfl: 0  •  vitamin D (ERGOCALCIFEROL) 1.25 MG (14113 UT) capsule capsule, Take 50,000 Units by mouth 2 (Two) Times a Week., Disp: , Rfl:   •  ALPRAZolam (XANAX) 1 MG tablet, TAKE ONE TABLET BY MOUTH TWICE DAILY AND ONE-HALF AT NOON, Disp: 75 tablet, Rfl: 3  •  hydrOXYzine (ATARAX) 50 MG tablet, Take 1 tablet by mouth 3 (Three) Times a Day., Disp: 90 tablet, Rfl: 3  •  lamoTRIgine (LaMICtal) 200 MG tablet, Take 1 tablet by mouth every night at bedtime., Disp: 30 tablet, Rfl: 3  •  sertraline (ZOLOFT) 50 MG tablet, Take 1 tablet by mouth every night at bedtime., Disp: 30 tablet, Rfl: 3  •  SUMAtriptan (IMITREX) 100 MG tablet, sumatriptan 100 mg tablet  TAKE ONE TABLET BY MOUTH AT ONSET OF HEADACHE, MAY REPEAT IN TWO HOURS AS NEEDED MAX OF TWO TABLETS PER DAY, Disp: , Rfl:   •  traZODone (DESYREL) 150 MG tablet, Take 2 tablets by mouth Every Night., Disp: 60 tablet, Rfl: 3  Social History:   Social History     Tobacco Use   • Smoking status: Current Every Day Smoker     Packs/day: 0.50     Types: Cigarettes     Start date: 12/4/2011   • Smokeless tobacco: Never Used   Substance Use Topics   • Alcohol use: No      Family History:  Family History   Problem Relation Age of Onset   • Heart disease Mother    • Stroke Mother    • Hyperlipidemia Mother    • Hypertension Mother    • Heart disease Father    •  "Stroke Father    • Hypertension Father    • Hyperlipidemia Father    • Heart disease Other    • COPD Other    • Cancer Other    • Diabetes Other    • Hypertension Other    • Hyperlipidemia Other    • Stroke Other               Review of Systems   Constitutional: Negative for fever and malaise/fatigue.   HENT: Negative for ear pain and nosebleeds.    Eyes: Negative for blurred vision and double vision.   Cardiovascular: Positive for chest pain and leg swelling. Negative for dyspnea on exertion and palpitations.   Respiratory: Positive for shortness of breath. Negative for cough.    Skin: Negative for rash.   Musculoskeletal: Negative for joint pain.   Gastrointestinal: Negative for abdominal pain, nausea and vomiting.   Neurological: Positive for dizziness. Negative for focal weakness and headaches.   Psychiatric/Behavioral: Negative for depression. The patient is not nervous/anxious.    All other systems reviewed and are negative.    All other systems are negative         Objective:     Physical Exam  /70 (BP Location: Left arm, Patient Position: Sitting, Cuff Size: Adult)   Pulse 81   Ht 170.2 cm (67\")   Wt 73.9 kg (163 lb)   SpO2 97%   BMI 25.53 kg/m²   General:  Appears anxious and nervous   Eyes: not much eye contact    HEENT:  No JVD. No carotid bruit  Respiratory: Respirations regular and labored; scattered wheezing noted although improved from previous visit   Cardiovascular: S1,S2 Regular rate and rhythm. No murmur, rub or gallop auscultated.   Extremities: No digital clubbing or cyanosis, no edema  Skin: Color pink. Skin warm and dry to touch. No rashes  No xanthoma  Neuro: Alert and awake.    Lab Reviewed:         ANIBAL Winston  9/9/2022 15:49 EDT  Electronically signed by ANIBAL Winston, 09/09/22, 3:55 PM EDT.         EMR Dragon/Transcription:   \"Dictated utilizing Dragon dictation\".        "

## 2022-09-08 ENCOUNTER — OFFICE VISIT (OUTPATIENT)
Dept: PSYCHIATRY | Facility: CLINIC | Age: 57
End: 2022-09-08

## 2022-09-08 DIAGNOSIS — F33.2 SEVERE RECURRENT MAJOR DEPRESSION WITHOUT PSYCHOTIC FEATURES: Primary | Chronic | ICD-10-CM

## 2022-09-08 DIAGNOSIS — Z79.899 ENCOUNTER FOR LONG-TERM (CURRENT) USE OF OTHER MEDICATIONS: ICD-10-CM

## 2022-09-08 DIAGNOSIS — F41.1 GENERALIZED ANXIETY DISORDER: Chronic | ICD-10-CM

## 2022-09-08 DIAGNOSIS — F43.12 CHRONIC POSTTRAUMATIC STRESS DISORDER: Chronic | ICD-10-CM

## 2022-09-08 PROCEDURE — 99214 OFFICE O/P EST MOD 30 MIN: CPT | Performed by: PSYCHIATRY & NEUROLOGY

## 2022-09-08 RX ORDER — LAMOTRIGINE 200 MG/1
200 TABLET ORAL
Qty: 30 TABLET | Refills: 3 | Status: SHIPPED | OUTPATIENT
Start: 2022-09-08 | End: 2023-01-06

## 2022-09-08 RX ORDER — HYDROXYZINE 50 MG/1
50 TABLET, FILM COATED ORAL 3 TIMES DAILY
Qty: 90 TABLET | Refills: 3 | Status: SHIPPED | OUTPATIENT
Start: 2022-09-08 | End: 2023-01-05

## 2022-09-08 RX ORDER — ALPRAZOLAM 1 MG/1
TABLET ORAL
Qty: 75 TABLET | Refills: 3 | Status: SHIPPED | OUTPATIENT
Start: 2022-09-08 | End: 2023-01-05

## 2022-09-08 RX ORDER — TRAZODONE HYDROCHLORIDE 150 MG/1
300 TABLET ORAL NIGHTLY
Qty: 60 TABLET | Refills: 3 | Status: SHIPPED | OUTPATIENT
Start: 2022-09-08 | End: 2023-01-06
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
## 2022-09-09 ENCOUNTER — TELEPHONE (OUTPATIENT)
Dept: CARDIOLOGY | Facility: CLINIC | Age: 57
End: 2022-09-09

## 2022-09-09 NOTE — TELEPHONE ENCOUNTER
Patient is requesting loop recorder  3 day holter in chart unremarkable.     Please advise--- do  You want to see her first?

## 2022-09-14 ENCOUNTER — APPOINTMENT (OUTPATIENT)
Dept: CARDIOLOGY | Facility: HOSPITAL | Age: 57
End: 2022-09-14

## 2022-09-14 DIAGNOSIS — F41.1 GENERALIZED ANXIETY DISORDER: Chronic | ICD-10-CM

## 2022-09-14 RX ORDER — ALPRAZOLAM 1 MG/1
TABLET ORAL
Qty: 75 TABLET | Refills: 1 | OUTPATIENT
Start: 2022-09-14

## 2022-09-16 ENCOUNTER — TELEPHONE (OUTPATIENT)
Dept: CARDIOLOGY | Facility: CLINIC | Age: 57
End: 2022-09-16

## 2022-09-16 NOTE — TELEPHONE ENCOUNTER
Please let patient know her holter monitor was normal.  No arrhythmias     Dr. Steele does not recommend a loop recorder at this time

## 2022-11-05 ENCOUNTER — APPOINTMENT (OUTPATIENT)
Dept: CT IMAGING | Facility: HOSPITAL | Age: 57
End: 2022-11-05

## 2022-11-05 ENCOUNTER — APPOINTMENT (OUTPATIENT)
Dept: GENERAL RADIOLOGY | Facility: HOSPITAL | Age: 57
End: 2022-11-05

## 2022-11-05 ENCOUNTER — HOSPITAL ENCOUNTER (OUTPATIENT)
Facility: HOSPITAL | Age: 57
Setting detail: OBSERVATION
Discharge: HOME | End: 2022-11-06
Attending: EMERGENCY MEDICINE | Admitting: HOSPITALIST

## 2022-11-05 DIAGNOSIS — R07.89 CHEST WALL PAIN: Primary | ICD-10-CM

## 2022-11-05 DIAGNOSIS — J44.1 COPD EXACERBATION: ICD-10-CM

## 2022-11-05 PROBLEM — J96.20 ACUTE-ON-CHRONIC RESPIRATORY FAILURE (HCC): Status: ACTIVE | Noted: 2022-11-05

## 2022-11-05 PROBLEM — E66.3 OVERWEIGHT (BMI 25.0-29.9): Status: ACTIVE | Noted: 2022-11-05

## 2022-11-05 PROBLEM — E11.9 TYPE 2 DIABETES MELLITUS: Status: ACTIVE | Noted: 2022-11-05

## 2022-11-05 LAB
ALBUMIN SERPL-MCNC: 3.8 G/DL (ref 3.5–5.2)
ALBUMIN/GLOB SERPL: 1.5 G/DL
ALP SERPL-CCNC: 102 U/L (ref 39–117)
ALT SERPL W P-5'-P-CCNC: 14 U/L (ref 1–33)
ANION GAP SERPL CALCULATED.3IONS-SCNC: 11 MMOL/L (ref 5–15)
ARTERIAL PATENCY WRIST A: POSITIVE
AST SERPL-CCNC: 12 U/L (ref 1–32)
ATMOSPHERIC PRESS: ABNORMAL MM[HG]
B PARAPERT DNA SPEC QL NAA+PROBE: NOT DETECTED
B PERT DNA SPEC QL NAA+PROBE: NOT DETECTED
BASE EXCESS BLDA CALC-SCNC: -4.3 MMOL/L (ref 0–3)
BASOPHILS # BLD AUTO: 0.2 10*3/MM3 (ref 0–0.2)
BASOPHILS NFR BLD AUTO: 1.7 % (ref 0–1.5)
BDY SITE: ABNORMAL
BILIRUB SERPL-MCNC: <0.2 MG/DL (ref 0–1.2)
BUN SERPL-MCNC: 13 MG/DL (ref 6–20)
BUN/CREAT SERPL: 17.6 (ref 7–25)
C PNEUM DNA NPH QL NAA+NON-PROBE: NOT DETECTED
CALCIUM SPEC-SCNC: 8.9 MG/DL (ref 8.6–10.5)
CHLORIDE SERPL-SCNC: 110 MMOL/L (ref 98–107)
CO2 BLDA-SCNC: 23.1 MMOL/L (ref 22–29)
CO2 SERPL-SCNC: 22 MMOL/L (ref 22–29)
CREAT SERPL-MCNC: 0.74 MG/DL (ref 0.57–1)
D-LACTATE SERPL-SCNC: 1.3 MMOL/L (ref 0.5–2)
DEPRECATED RDW RBC AUTO: 47.7 FL (ref 37–54)
EGFRCR SERPLBLD CKD-EPI 2021: 95.1 ML/MIN/1.73
EOSINOPHIL # BLD AUTO: 0.2 10*3/MM3 (ref 0–0.4)
EOSINOPHIL NFR BLD AUTO: 1.4 % (ref 0.3–6.2)
ERYTHROCYTE [DISTWIDTH] IN BLOOD BY AUTOMATED COUNT: 14.7 % (ref 12.3–15.4)
FLUAV SUBTYP SPEC NAA+PROBE: NOT DETECTED
FLUBV RNA ISLT QL NAA+PROBE: NOT DETECTED
GLOBULIN UR ELPH-MCNC: 2.5 GM/DL
GLUCOSE BLDC GLUCOMTR-MCNC: 128 MG/DL (ref 70–105)
GLUCOSE BLDC GLUCOMTR-MCNC: 140 MG/DL (ref 70–105)
GLUCOSE BLDC GLUCOMTR-MCNC: 143 MG/DL (ref 70–105)
GLUCOSE BLDC GLUCOMTR-MCNC: 168 MG/DL (ref 70–105)
GLUCOSE SERPL-MCNC: 89 MG/DL (ref 65–99)
HADV DNA SPEC NAA+PROBE: NOT DETECTED
HCO3 BLDA-SCNC: 21.8 MMOL/L (ref 21–28)
HCOV 229E RNA SPEC QL NAA+PROBE: NOT DETECTED
HCOV HKU1 RNA SPEC QL NAA+PROBE: NOT DETECTED
HCOV NL63 RNA SPEC QL NAA+PROBE: NOT DETECTED
HCOV OC43 RNA SPEC QL NAA+PROBE: NOT DETECTED
HCT VFR BLD AUTO: 42.4 % (ref 34–46.6)
HEMODILUTION: NO
HGB BLD-MCNC: 13.5 G/DL (ref 12–15.9)
HMPV RNA NPH QL NAA+NON-PROBE: NOT DETECTED
HOLD SPECIMEN: NORMAL
HPIV1 RNA ISLT QL NAA+PROBE: NOT DETECTED
HPIV2 RNA SPEC QL NAA+PROBE: NOT DETECTED
HPIV3 RNA NPH QL NAA+PROBE: NOT DETECTED
HPIV4 P GENE NPH QL NAA+PROBE: NOT DETECTED
INHALED O2 CONCENTRATION: 36 %
LYMPHOCYTES # BLD AUTO: 3.9 10*3/MM3 (ref 0.7–3.1)
LYMPHOCYTES NFR BLD AUTO: 35.7 % (ref 19.6–45.3)
M PNEUMO IGG SER IA-ACNC: NOT DETECTED
MCH RBC QN AUTO: 29.8 PG (ref 26.6–33)
MCHC RBC AUTO-ENTMCNC: 31.8 G/DL (ref 31.5–35.7)
MCV RBC AUTO: 93.8 FL (ref 79–97)
MODALITY: ABNORMAL
MONOCYTES # BLD AUTO: 0.8 10*3/MM3 (ref 0.1–0.9)
MONOCYTES NFR BLD AUTO: 7.1 % (ref 5–12)
NEUTROPHILS NFR BLD AUTO: 54.1 % (ref 42.7–76)
NEUTROPHILS NFR BLD AUTO: 6 10*3/MM3 (ref 1.7–7)
NRBC BLD AUTO-RTO: 0.1 /100 WBC (ref 0–0.2)
NT-PROBNP SERPL-MCNC: 49.9 PG/ML (ref 0–900)
PCO2 BLDA: 42.8 MM HG (ref 35–48)
PH BLDA: 7.32 PH UNITS (ref 7.35–7.45)
PLATELET # BLD AUTO: 335 10*3/MM3 (ref 140–450)
PMV BLD AUTO: 6.6 FL (ref 6–12)
PO2 BLDA: 108.6 MM HG (ref 83–108)
POTASSIUM SERPL-SCNC: 3.6 MMOL/L (ref 3.5–5.2)
PROCALCITONIN SERPL-MCNC: 0.04 NG/ML (ref 0–0.25)
PROT SERPL-MCNC: 6.3 G/DL (ref 6–8.5)
QT INTERVAL: 434 MS
RBC # BLD AUTO: 4.51 10*6/MM3 (ref 3.77–5.28)
RHINOVIRUS RNA SPEC NAA+PROBE: NOT DETECTED
RSV RNA NPH QL NAA+NON-PROBE: NOT DETECTED
SAO2 % BLDCOA: 97.7 % (ref 94–98)
SARS-COV-2 RNA NPH QL NAA+NON-PROBE: NOT DETECTED
SODIUM SERPL-SCNC: 143 MMOL/L (ref 136–145)
TOTAL RATE: 24 BREATHS/MINUTE
TROPONIN T SERPL-MCNC: <0.01 NG/ML (ref 0–0.03)
TROPONIN T SERPL-MCNC: <0.01 NG/ML (ref 0–0.03)
WBC NRBC COR # BLD: 11.1 10*3/MM3 (ref 3.4–10.8)
WHOLE BLOOD HOLD COAG: NORMAL

## 2022-11-05 PROCEDURE — 0202U NFCT DS 22 TRGT SARS-COV-2: CPT | Performed by: EMERGENCY MEDICINE

## 2022-11-05 PROCEDURE — 84484 ASSAY OF TROPONIN QUANT: CPT | Performed by: HOSPITALIST

## 2022-11-05 PROCEDURE — G0378 HOSPITAL OBSERVATION PER HR: HCPCS

## 2022-11-05 PROCEDURE — 96374 THER/PROPH/DIAG INJ IV PUSH: CPT

## 2022-11-05 PROCEDURE — 71250 CT THORAX DX C-: CPT

## 2022-11-05 PROCEDURE — 84484 ASSAY OF TROPONIN QUANT: CPT | Performed by: EMERGENCY MEDICINE

## 2022-11-05 PROCEDURE — 36415 COLL VENOUS BLD VENIPUNCTURE: CPT

## 2022-11-05 PROCEDURE — 83605 ASSAY OF LACTIC ACID: CPT

## 2022-11-05 PROCEDURE — 94799 UNLISTED PULMONARY SVC/PX: CPT

## 2022-11-05 PROCEDURE — 25010000002 METHYLPREDNISOLONE PER 125 MG: Performed by: EMERGENCY MEDICINE

## 2022-11-05 PROCEDURE — 96375 TX/PRO/DX INJ NEW DRUG ADDON: CPT

## 2022-11-05 PROCEDURE — 71045 X-RAY EXAM CHEST 1 VIEW: CPT

## 2022-11-05 PROCEDURE — 82962 GLUCOSE BLOOD TEST: CPT

## 2022-11-05 PROCEDURE — 93005 ELECTROCARDIOGRAM TRACING: CPT

## 2022-11-05 PROCEDURE — 63710000001 INSULIN LISPRO (HUMAN) PER 5 UNITS: Performed by: NURSE PRACTITIONER

## 2022-11-05 PROCEDURE — 83880 ASSAY OF NATRIURETIC PEPTIDE: CPT | Performed by: EMERGENCY MEDICINE

## 2022-11-05 PROCEDURE — 25010000002 METHYLPREDNISOLONE PER 40 MG: Performed by: NURSE PRACTITIONER

## 2022-11-05 PROCEDURE — 99214 OFFICE O/P EST MOD 30 MIN: CPT | Performed by: INTERNAL MEDICINE

## 2022-11-05 PROCEDURE — 93005 ELECTROCARDIOGRAM TRACING: CPT | Performed by: EMERGENCY MEDICINE

## 2022-11-05 PROCEDURE — 80053 COMPREHEN METABOLIC PANEL: CPT | Performed by: EMERGENCY MEDICINE

## 2022-11-05 PROCEDURE — 94761 N-INVAS EAR/PLS OXIMETRY MLT: CPT

## 2022-11-05 PROCEDURE — 84145 PROCALCITONIN (PCT): CPT | Performed by: EMERGENCY MEDICINE

## 2022-11-05 PROCEDURE — 82803 BLOOD GASES ANY COMBINATION: CPT

## 2022-11-05 PROCEDURE — 85025 COMPLETE CBC W/AUTO DIFF WBC: CPT | Performed by: EMERGENCY MEDICINE

## 2022-11-05 PROCEDURE — 25010000002 HYDROMORPHONE 1 MG/ML SOLUTION: Performed by: EMERGENCY MEDICINE

## 2022-11-05 PROCEDURE — 96376 TX/PRO/DX INJ SAME DRUG ADON: CPT

## 2022-11-05 PROCEDURE — 93010 ELECTROCARDIOGRAM REPORT: CPT | Performed by: INTERNAL MEDICINE

## 2022-11-05 PROCEDURE — 94640 AIRWAY INHALATION TREATMENT: CPT

## 2022-11-05 PROCEDURE — 36600 WITHDRAWAL OF ARTERIAL BLOOD: CPT

## 2022-11-05 PROCEDURE — 93005 ELECTROCARDIOGRAM TRACING: CPT | Performed by: HOSPITALIST

## 2022-11-05 PROCEDURE — 87040 BLOOD CULTURE FOR BACTERIA: CPT | Performed by: EMERGENCY MEDICINE

## 2022-11-05 PROCEDURE — 99285 EMERGENCY DEPT VISIT HI MDM: CPT

## 2022-11-05 PROCEDURE — 25010000002 LORAZEPAM PER 2 MG: Performed by: HOSPITALIST

## 2022-11-05 RX ORDER — AMLODIPINE BESYLATE 5 MG/1
5 TABLET ORAL
Status: DISCONTINUED | OUTPATIENT
Start: 2022-11-05 | End: 2022-11-06 | Stop reason: HOSPADM

## 2022-11-05 RX ORDER — GUAIFENESIN 600 MG/1
1200 TABLET, EXTENDED RELEASE ORAL EVERY 12 HOURS SCHEDULED
Status: DISCONTINUED | OUTPATIENT
Start: 2022-11-05 | End: 2022-11-06 | Stop reason: HOSPADM

## 2022-11-05 RX ORDER — METHYLPREDNISOLONE SODIUM SUCCINATE 40 MG/ML
40 INJECTION, POWDER, LYOPHILIZED, FOR SOLUTION INTRAMUSCULAR; INTRAVENOUS EVERY 6 HOURS
Status: DISCONTINUED | OUTPATIENT
Start: 2022-11-05 | End: 2022-11-06 | Stop reason: HOSPADM

## 2022-11-05 RX ORDER — ACETAMINOPHEN 325 MG/1
650 TABLET ORAL EVERY 6 HOURS PRN
Status: DISCONTINUED | OUTPATIENT
Start: 2022-11-05 | End: 2022-11-06 | Stop reason: HOSPADM

## 2022-11-05 RX ORDER — ALBUTEROL SULFATE 2.5 MG/3ML
2.5 SOLUTION RESPIRATORY (INHALATION) ONCE
Status: COMPLETED | OUTPATIENT
Start: 2022-11-05 | End: 2022-11-05

## 2022-11-05 RX ORDER — DEXTROSE MONOHYDRATE 25 G/50ML
25 INJECTION, SOLUTION INTRAVENOUS
Status: DISCONTINUED | OUTPATIENT
Start: 2022-11-05 | End: 2022-11-06 | Stop reason: HOSPADM

## 2022-11-05 RX ORDER — IPRATROPIUM BROMIDE AND ALBUTEROL SULFATE 2.5; .5 MG/3ML; MG/3ML
3 SOLUTION RESPIRATORY (INHALATION)
Status: DISCONTINUED | OUTPATIENT
Start: 2022-11-05 | End: 2022-11-06 | Stop reason: HOSPADM

## 2022-11-05 RX ORDER — INSULIN LISPRO 100 [IU]/ML
2-9 INJECTION, SOLUTION INTRAVENOUS; SUBCUTANEOUS
Status: DISCONTINUED | OUTPATIENT
Start: 2022-11-05 | End: 2022-11-06 | Stop reason: HOSPADM

## 2022-11-05 RX ORDER — SODIUM CHLORIDE 0.9 % (FLUSH) 0.9 %
10 SYRINGE (ML) INJECTION AS NEEDED
Status: DISCONTINUED | OUTPATIENT
Start: 2022-11-05 | End: 2022-11-06 | Stop reason: HOSPADM

## 2022-11-05 RX ORDER — LORAZEPAM 2 MG/ML
0.5 INJECTION INTRAMUSCULAR EVERY 4 HOURS PRN
Status: DISCONTINUED | OUTPATIENT
Start: 2022-11-05 | End: 2022-11-06 | Stop reason: HOSPADM

## 2022-11-05 RX ORDER — OLANZAPINE 10 MG/2ML
1 INJECTION, POWDER, LYOPHILIZED, FOR SOLUTION INTRAMUSCULAR
Status: DISCONTINUED | OUTPATIENT
Start: 2022-11-05 | End: 2022-11-06 | Stop reason: HOSPADM

## 2022-11-05 RX ORDER — NICOTINE 21 MG/24HR
1 PATCH, TRANSDERMAL 24 HOURS TRANSDERMAL
Status: DISCONTINUED | OUTPATIENT
Start: 2022-11-05 | End: 2022-11-06 | Stop reason: HOSPADM

## 2022-11-05 RX ORDER — METHYLPREDNISOLONE SODIUM SUCCINATE 125 MG/2ML
125 INJECTION, POWDER, LYOPHILIZED, FOR SOLUTION INTRAMUSCULAR; INTRAVENOUS ONCE
Status: COMPLETED | OUTPATIENT
Start: 2022-11-05 | End: 2022-11-05

## 2022-11-05 RX ORDER — NICOTINE POLACRILEX 4 MG
15 LOZENGE BUCCAL
Status: DISCONTINUED | OUTPATIENT
Start: 2022-11-05 | End: 2022-11-06 | Stop reason: HOSPADM

## 2022-11-05 RX ADMIN — NICOTINE 1 PATCH: 21 PATCH, EXTENDED RELEASE TRANSDERMAL at 17:54

## 2022-11-05 RX ADMIN — ALBUTEROL SULFATE 2.5 MG: 2.5 SOLUTION RESPIRATORY (INHALATION) at 04:41

## 2022-11-05 RX ADMIN — LORAZEPAM 0.5 MG: 2 INJECTION INTRAMUSCULAR; INTRAVENOUS at 09:52

## 2022-11-05 RX ADMIN — LORAZEPAM 0.5 MG: 2 INJECTION INTRAMUSCULAR; INTRAVENOUS at 14:36

## 2022-11-05 RX ADMIN — INSULIN LISPRO 2 UNITS: 100 INJECTION, SOLUTION INTRAVENOUS; SUBCUTANEOUS at 09:40

## 2022-11-05 RX ADMIN — ALBUTEROL SULFATE 2.5 MG: 2.5 SOLUTION RESPIRATORY (INHALATION) at 03:01

## 2022-11-05 RX ADMIN — METHYLPREDNISOLONE SODIUM SUCCINATE 40 MG: 40 INJECTION, POWDER, FOR SOLUTION INTRAMUSCULAR; INTRAVENOUS at 16:32

## 2022-11-05 RX ADMIN — METHYLPREDNISOLONE SODIUM SUCCINATE 40 MG: 40 INJECTION, POWDER, FOR SOLUTION INTRAMUSCULAR; INTRAVENOUS at 09:40

## 2022-11-05 RX ADMIN — GUAIFENESIN 1200 MG: 600 TABLET, EXTENDED RELEASE ORAL at 07:31

## 2022-11-05 RX ADMIN — AMLODIPINE BESYLATE 5 MG: 5 TABLET ORAL at 17:59

## 2022-11-05 RX ADMIN — ACETAMINOPHEN 650 MG: 325 TABLET, FILM COATED ORAL at 16:37

## 2022-11-05 RX ADMIN — METHYLPREDNISOLONE SODIUM SUCCINATE 40 MG: 40 INJECTION, POWDER, FOR SOLUTION INTRAMUSCULAR; INTRAVENOUS at 22:06

## 2022-11-05 RX ADMIN — METHYLPREDNISOLONE SODIUM SUCCINATE 125 MG: 125 INJECTION, POWDER, FOR SOLUTION INTRAMUSCULAR; INTRAVENOUS at 02:14

## 2022-11-05 RX ADMIN — HYDROMORPHONE HYDROCHLORIDE 0.25 MG: 1 INJECTION, SOLUTION INTRAMUSCULAR; INTRAVENOUS; SUBCUTANEOUS at 07:31

## 2022-11-05 RX ADMIN — IPRATROPIUM BROMIDE AND ALBUTEROL SULFATE 3 ML: .5; 3 SOLUTION RESPIRATORY (INHALATION) at 15:03

## 2022-11-05 NOTE — ED NOTES
Nursing report ED to floor  Melvi Rayo  57 y.o.  female    HPI:   Chief Complaint   Patient presents with   • Shortness of Breath       Admitting doctor:   Eli Dumont MD    Admitting diagnosis:   The primary encounter diagnosis was Chest wall pain. A diagnosis of COPD exacerbation (HCC) was also pertinent to this visit.    Code status:   Current Code Status     Date Active Code Status Order ID Comments User Context       11/5/2022 0439 CPR (Attempt to Resuscitate) [id number]  Gerda Bowie APRN ED      Question Answer    Code Status (Patient has no pulse and is not breathing) CPR (Attempt to Resuscitate)    Medical Interventions (Patient has pulse or is breathing) Full Support                Allergies:   Aspirin, Codeine, Contrast dye, Erythromycin, Morphine, Penicillin g, Sulfa antibiotics, Doxycycline, Fentanyl, and Levofloxacin    Isolation:  No active isolations     Fall Risk:  Fall Risk Assessment was completed, and patient is at high risk for falls.   Predictive Model Details         2 (Low) Factor Value    Calculated 11/5/2022 02:07 Age 57    Risk of Fall Model Musculoskeletal Assessment WDL     Imaging order in this encounter Present     Respiratory Rate 24     Skin Assessment WDL     Magnesium not on file     Drug Use No     Tobacco Use Current     Diastolic BP 71     Saleem Scale not on file     Peripheral Vascular Assessment WDL     Calcium not on file     Financial Class Medicaid     Gastrointestinal Assessment WDL     Albumin not on file     Cardiac Assessment WDL     Total Bilirubin not on file     Days after Admission 0.069     Chloride not on file     Potassium not on file     Creatinine not on file     ALT not on file         Weight:       11/05/22  0012   Weight: 77.1 kg (170 lb)       Intake and Output  No intake or output data in the 24 hours ending 11/05/22 3172    Diet:   Dietary Orders (From admission, onward)     Start     Ordered    11/05/22 0440  Diet Diabetic/Consistent  Carbs; Diabetic - Consistent Carb  Diet Effective Now        Question Answer Comment   Diet / Texture / Consistency Diabetic/Consistent Carbs    Select Type: Diabetic - Consistent Carb        11/05/22 0439                 Most recent vitals:   Vitals:    11/05/22 0302 11/05/22 0308 11/05/22 0441 11/05/22 0448   BP:    123/74   BP Location:       Patient Position:       Pulse: 75 75 88 84   Resp: 22 20 22 22   Temp:       TempSrc:       SpO2: 98% 100% 94% 99%   Weight:       Height:           Active LDAs/IV Access:   Lines, Drains & Airways     Active LDAs     Name Placement date Placement time Site Days    Peripheral IV 11/05/22 0200 Anterior;Left Hand 11/05/22 0200  Hand  less than 1                Skin Condition:   Skin Assessments (last day)     None           Labs (abnormal labs have a star):   Labs Reviewed   COMPREHENSIVE METABOLIC PANEL - Abnormal; Notable for the following components:       Result Value    Chloride 110 (*)     All other components within normal limits    Narrative:     GFR Normal >60  Chronic Kidney Disease <60  Kidney Failure <15     CBC WITH AUTO DIFFERENTIAL - Abnormal; Notable for the following components:    WBC 11.10 (*)     Basophil % 1.7 (*)     Lymphocytes, Absolute 3.90 (*)     All other components within normal limits   BLOOD GAS, ARTERIAL - Abnormal; Notable for the following components:    pH, Arterial 7.316 (*)     pO2, Arterial 108.6 (*)     Base Excess, Arterial -4.3 (*)     All other components within normal limits   RESPIRATORY PANEL PCR W/ COVID-19 (SARS-COV-2) RONALD/AJ/KAYLEE/PAD/COR/MAD/ASPEN IN-HOUSE, NP SWAB IN [facility], 3-4 HR TAT - Normal    Narrative:     In the setting of a positive respiratory panel with a viral infection PLUS a negative procalcitonin without other underlying concern for bacterial infection, consider observing off antibiotics or discontinuation of antibiotics and continue supportive care. If the respiratory panel is positive for atypical bacterial  "infection (Bordetella pertussis, Chlamydophila pneumoniae, or Mycoplasma pneumoniae), consider antibiotic de-escalation to target atypical bacterial infection.   PROCALCITONIN - Normal    Narrative:     As a Marker for Sepsis (Non-Neonates):    1. <0.5 ng/mL represents a low risk of severe sepsis and/or septic shock.  2. >2 ng/mL represents a high risk of severe sepsis and/or septic shock.    As a Marker for Lower Respiratory Tract Infections that require antibiotic therapy:    PCT on Admission    Antibiotic Therapy       6-12 Hrs later    >0.5                Strongly Recommended  >0.25 - <0.5        Recommended   0.1 - 0.25          Discouraged              Remeasure/reassess PCT  <0.1                Strongly Discouraged     Remeasure/reassess PCT    As 28 day mortality risk marker: \"Change in Procalcitonin Result\" (>80% or <=80%) if Day 0 (or Day 1) and Day 4 values are available. Refer to http://www.[employer]-pct-calculator.com    Change in PCT <=80%  A decrease of PCT levels below or equal to 80% defines a positive change in PCT test result representing a higher risk for 28-day all-cause mortality of patients diagnosed with severe sepsis for septic shock.    Change in PCT >80%  A decrease of PCT levels of more than 80% defines a negative change in PCT result representing a lower risk for 28-day all-cause mortality of patients diagnosed with severe sepsis or septic shock.      BNP (IN-HOUSE) - Normal    Narrative:     Among patients with dyspnea, NT-proBNP is highly sensitive for the detection of acute congestive heart failure. In addition NT-proBNP of <300 pg/ml effectively rules out acute congestive heart failure with 99% negative predictive value.    Results may be falsely decreased if patient taking Biotin.     TROPONIN (IN-HOUSE) - Normal    Narrative:     Troponin T Reference Range:  <= 0.03 ng/mL-   Negative for AMI  >0.03 ng/mL-     Abnormal for myocardial necrosis.  Clinicians would have to utilize clinical " acumen, EKG, Troponin and serial changes to determine if it is an Acute Myocardial Infarction or myocardial injury due to an underlying chronic condition.       Results may be falsely decreased if patient taking Biotin.     POC LACTATE - Normal   BLOOD CULTURE   BLOOD CULTURE   BLOOD GAS, ARTERIAL   RAINBOW DRAW    Narrative:     The following orders were created for panel order [place] Draw.  Procedure                               Abnormality         Status                     ---------                               -----------         ------                     Green Top (Gel)[[id number]]                                  Final result               Lavender Top[[id number]]                                     Final result               Gold Top - [facility][[id number]]                                   Final result               Light Blue Top[[id number]]                                   Final result                 Please view results for these tests on the individual orders.   POC LACTATE   POCT GLUCOSE FINGERSTICK   POCT GLUCOSE FINGERSTICK   POCT GLUCOSE FINGERSTICK   CBC AND DIFFERENTIAL    Narrative:     The following orders were created for panel order CBC & Differential.  Procedure                               Abnormality         Status                     ---------                               -----------         ------                     CBC Auto Differential[[id number]]        Abnormal            Final result                 Please view results for these tests on the individual orders.   GREEN TOP   LAVENDER TOP   GOLD TOP - SST   LIGHT BLUE TOP       LOC: Person, Place, Time and Situation    Telemetry:  Med/Surg    Cardiac Monitoring Ordered: yes    EKG:   ECG 12 Lead Dyspnea   Preliminary Result   HEART RATE= 69  bpm   RR Interval= 872  ms   VT Interval= 164  ms   P Horizontal Axis= -9  deg   P Front Axis= 35  deg   QRSD Interval= 78  ms   QT Interval= 434  ms   QRS Axis= 35  deg   T Wave Axis= 27  deg   -  NORMAL ECG -   Sinus rhythm   When compared with ECG of 17-May-2022 15:42:17,   Significant rate decrease   Significant repolarization change   Electronically Signed By:    Date and Time of Study: 2022-11-05 00:32:13          Medications Given in the ED:   Medications   sodium chloride 0.9 % flush 10 mL (has no administration in time range)   HYDROmorphone (DILAUDID) injection 0.25 mg (has no administration in time range)   ipratropium-albuterol (DUO-NEB) nebulizer solution 3 mL (3 mL Nebulization Not Given 11/5/22 0441)   ipratropium-albuterol (DUO-NEB) nebulizer solution 3 mL (has no administration in time range)   methylPREDNISolone sodium succinate (SOLU-Medrol) injection 40 mg (has no administration in time range)   guaiFENesin (MUCINEX) 12 hr tablet 1,200 mg (has no administration in time range)   dextrose (GLUTOSE) oral gel 15 g (has no administration in time range)   dextrose (D50W) (25 g/50 mL) IV injection 25 g (has no administration in time range)   glucagon (human recombinant) (GLUCAGEN DIAGNOSTIC) 1 mg in sterile water (preservative free) 1 mL injection (has no administration in time range)   insulin lispro (ADMELOG) injection 2-9 Units (has no administration in time range)   albuterol (PROVENTIL) nebulizer solution 0.083% 2.5 mg/3mL (2.5 mg Nebulization Given 11/5/22 0301)   methylPREDNISolone sodium succinate (SOLU-Medrol) injection 125 mg (125 mg Intravenous Given 11/5/22 0214)   albuterol (PROVENTIL) nebulizer solution 0.083% 2.5 mg/3mL (2.5 mg Nebulization Given 11/5/22 0441)       Imaging results:  CT Chest Without Contrast Diagnostic    Result Date: 11/5/2022  1.  No acute cardiopulmonary findings. Resolution of the previously noted airspace disease on the prior study 2.  Status post cholecystectomy and left thyroidectomy 3.  Decreased pericardial effusion. 4.  Stable appearance of the midthoracic vertebral body compression. No acute osseous abnormality. Electronically signed by:  Kelsey Thomas D.O.   11/5/2022 1:20 AM      Social issues:   Social History     Socioeconomic History   • Marital status:    Tobacco Use   • Smoking status: Every Day     Packs/day: 0.50     Types: Cigarettes     Start date: 12/4/2011   • Smokeless tobacco: Never   Vaping Use   • Vaping Use: Never used   Substance and Sexual Activity   • Alcohol use: No   • Drug use: No   • Sexual activity: Defer       NIH Stroke Scale:  Interval: (not recorded)  1a. Level of Consciousness: (not recorded)  1b. LOC Questions: (not recorded)  1c. LOC Commands: (not recorded)  2. Best Gaze: (not recorded)  3. Visual: (not recorded)  4. Facial Palsy: (not recorded)  5a. Motor Arm, Left: (not recorded)  5b. Motor Arm, Right: (not recorded)  6a. Motor Leg, Left: (not recorded)  6b. Motor Leg, Right: (not recorded)  7. Limb Ataxia: (not recorded)  8. Sensory: (not recorded)  9. Best Language: (not recorded)  10. Dysarthria: (not recorded)  11. Extinction and Inattention (formerly Neglect): (not recorded)    Total (NIH Stroke Scale): (not recorded)     Additional notable assessment information:          Nursing report ED to floor:      Cesilia Jett RN   11/05/22 05:45 EDT   Nursing report ED to floor  Melvi MARYAM Rayo  57 y.o.  female    HPI:   Chief Complaint   Patient presents with   • Shortness of Breath       Admitting doctor:   Eli Dumont MD    Admitting diagnosis:   The primary encounter diagnosis was Chest wall pain. A diagnosis of COPD exacerbation (HCC) was also pertinent to this visit.    Code status:   Current Code Status     Date Active Code Status Order ID Comments User Context       11/5/2022 0439 CPR (Attempt to Resuscitate) [id number]  Gerda Bowie APRN ED      Question Answer    Code Status (Patient has no pulse and is not breathing) CPR (Attempt to Resuscitate)    Medical Interventions (Patient has pulse or is breathing) Full Support                Allergies:   Aspirin, Codeine, Contrast dye, Erythromycin, Morphine,  Penicillin g, Sulfa antibiotics, Doxycycline, Fentanyl, and Levofloxacin    Isolation:  No active isolations     Fall Risk:  Fall Risk Assessment was completed, and patient is at high risk for falls.   Predictive Model Details         2 (Low) Factor Value    Calculated 11/5/2022 02:07 Age 57    Risk of Fall Model Musculoskeletal Assessment WDL     Imaging order in this encounter Present     Respiratory Rate 24     Skin Assessment WDL     Magnesium not on file     Drug Use No     Tobacco Use Current     Diastolic BP 71     Saleem Scale not on file     Peripheral Vascular Assessment WDL     Calcium not on file     Financial Class Medicaid     Gastrointestinal Assessment WDL     Albumin not on file     Cardiac Assessment WDL     Total Bilirubin not on file     Days after Admission 0.069     Chloride not on file     Potassium not on file     Creatinine not on file     ALT not on file         Weight:       11/05/22  0012   Weight: 77.1 kg (170 lb)       Intake and Output  No intake or output data in the 24 hours ending 11/05/22 0545    Diet:   Dietary Orders (From admission, onward)     Start     Ordered    11/05/22 0440  Diet Diabetic/Consistent Carbs; Diabetic - Consistent Carb  Diet Effective Now        Question Answer Comment   Diet / Texture / Consistency Diabetic/Consistent Carbs    Select Type: Diabetic - Consistent Carb        11/05/22 0439                 Most recent vitals:   Vitals:    11/05/22 0302 11/05/22 0308 11/05/22 0441 11/05/22 0448   BP:    123/74   BP Location:       Patient Position:       Pulse: 75 75 88 84   Resp: 22 20 22 22   Temp:       TempSrc:       SpO2: 98% 100% 94% 99%   Weight:       Height:           Active LDAs/IV Access:   Lines, Drains & Airways     Active LDAs     Name Placement date Placement time Site Days    Peripheral IV 11/05/22 0200 Anterior;Left Hand 11/05/22  0200  Hand  less than 1                Skin Condition:   Skin Assessments (last day)     None           Labs (abnormal  labs have a star):   Labs Reviewed   COMPREHENSIVE METABOLIC PANEL - Abnormal; Notable for the following components:       Result Value    Chloride 110 (*)     All other components within normal limits    Narrative:     GFR Normal >60  Chronic Kidney Disease <60  Kidney Failure <15     CBC WITH AUTO DIFFERENTIAL - Abnormal; Notable for the following components:    WBC 11.10 (*)     Basophil % 1.7 (*)     Lymphocytes, Absolute 3.90 (*)     All other components within normal limits   BLOOD GAS, ARTERIAL - Abnormal; Notable for the following components:    pH, Arterial 7.316 (*)     pO2, Arterial 108.6 (*)     Base Excess, Arterial -4.3 (*)     All other components within normal limits   RESPIRATORY PANEL PCR W/ COVID-19 (SARS-COV-2) RONALD/AJ/KAYLEE/PAD/COR/MAD/[facility] IN-HOUSE, NP SWAB IN [facility], 3-4 HR TAT - Normal    Narrative:     In the setting of a positive respiratory panel with a viral infection PLUS a negative procalcitonin without other underlying concern for bacterial infection, consider observing off antibiotics or discontinuation of antibiotics and continue supportive care. If the respiratory panel is positive for atypical bacterial infection (Bordetella pertussis, Chlamydophila pneumoniae, or Mycoplasma pneumoniae), consider antibiotic de-escalation to target atypical bacterial infection.   PROCALCITONIN - Normal    Narrative:     As a Marker for Sepsis (Non-Neonates):    1. <0.5 ng/mL represents a low risk of severe sepsis and/or septic shock.  2. >2 ng/mL represents a high risk of severe sepsis and/or septic shock.    As a Marker for Lower Respiratory Tract Infections that require antibiotic therapy:    PCT on Admission    Antibiotic Therapy       6-12 Hrs later    >0.5                Strongly Recommended  >0.25 - <0.5        Recommended   0.1 - 0.25          Discouraged              Remeasure/reassess PCT  <0.1                Strongly Discouraged     Remeasure/reassess PCT    As 28 day mortality risk marker:  "\"Change in Procalcitonin Result\" (>80% or <=80%) if Day 0 (or Day 1) and Day 4 values are available. Refer to http://www.[facility]-pct-calculator.com    Change in PCT <=80%  A decrease of PCT levels below or equal to 80% defines a positive change in PCT test result representing a higher risk for 28-day all-cause mortality of patients diagnosed with severe sepsis for septic shock.    Change in PCT >80%  A decrease of PCT levels of more than 80% defines a negative change in PCT result representing a lower risk for 28-day all-cause mortality of patients diagnosed with severe sepsis or septic shock.      BNP (IN-HOUSE) - Normal    Narrative:     Among patients with dyspnea, NT-proBNP is highly sensitive for the detection of acute congestive heart failure. In addition NT-proBNP of <300 pg/ml effectively rules out acute congestive heart failure with 99% negative predictive value.    Results may be falsely decreased if patient taking Biotin.     TROPONIN (IN-HOUSE) - Normal    Narrative:     Troponin T Reference Range:  <= 0.03 ng/mL-   Negative for AMI  >0.03 ng/mL-     Abnormal for myocardial necrosis.  Clinicians would have to utilize clinical acumen, EKG, Troponin and serial changes to determine if it is an Acute Myocardial Infarction or myocardial injury due to an underlying chronic condition.       Results may be falsely decreased if patient taking Biotin.     POC LACTATE - Normal   BLOOD CULTURE   BLOOD CULTURE   BLOOD GAS, ARTERIAL   RAINBOW DRAW    Narrative:     The following orders were created for panel order [place] Draw.  Procedure                               Abnormality         Status                     ---------                               -----------         ------                     Green Top (Gel)[[id number]]                                  Final result               Lavender Top[[id number]]                                     Final result               Gold Top - [facility][[id number]]                         "           Final result               Light Blue Top[[id number]]                                   Final result                 Please view results for these tests on the individual orders.   POC LACTATE   POCT GLUCOSE FINGERSTICK   POCT GLUCOSE FINGERSTICK   POCT GLUCOSE FINGERSTICK   CBC AND DIFFERENTIAL    Narrative:     The following orders were created for panel order CBC & Differential.  Procedure                               Abnormality         Status                     ---------                               -----------         ------                     CBC Auto Differential[[id number]]        Abnormal            Final result                 Please view results for these tests on the individual orders.   GREEN TOP   LAVENDER TOP   GOLD TOP - [facility]   LIGHT BLUE TOP       LOC: Person, Place, Time and Situation    Telemetry:  Med/Surg    Cardiac Monitoring Ordered: yes    EKG:   ECG 12 Lead Dyspnea   Preliminary Result   HEART RATE= 69  bpm   RR Interval= 872  ms   KS Interval= 164  ms   P Horizontal Axis= -9  deg   P Front Axis= 35  deg   QRSD Interval= 78  ms   QT Interval= 434  ms   QRS Axis= 35  deg   T Wave Axis= 27  deg   - NORMAL ECG -   Sinus rhythm   When compared with ECG of 17-May-2022 15:42:17,   Significant rate decrease   Significant repolarization change   Electronically Signed By:    Date and Time of Study: 2022-11-05 00:32:13          Medications Given in the ED:   Medications   sodium chloride 0.9 % flush 10 mL (has no administration in time range)   HYDROmorphone (DILAUDID) injection 0.25 mg (has no administration in time range)   ipratropium-albuterol (DUO-NEB) nebulizer solution 3 mL (3 mL Nebulization Not Given 11/5/22 0441)   ipratropium-albuterol (DUO-NEB) nebulizer solution 3 mL (has no administration in time range)   methylPREDNISolone sodium succinate (SOLU-Medrol) injection 40 mg (has no administration in time range)   guaiFENesin (MUCINEX) 12 hr tablet 1,200 mg (has no administration  in time range)   dextrose (GLUTOSE) oral gel 15 g (has no administration in time range)   dextrose (D50W) (25 g/50 mL) IV injection 25 g (has no administration in time range)   glucagon (human recombinant) (GLUCAGEN DIAGNOSTIC) 1 mg in sterile water (preservative free) 1 mL injection (has no administration in time range)   insulin lispro (ADMELOG) injection 2-9 Units (has no administration in time range)   albuterol (PROVENTIL) nebulizer solution 0.083% 2.5 mg/3mL (2.5 mg Nebulization Given 11/5/22 0301)   methylPREDNISolone sodium succinate (SOLU-Medrol) injection 125 mg (125 mg Intravenous Given 11/5/22 0214)   albuterol (PROVENTIL) nebulizer solution 0.083% 2.5 mg/3mL (2.5 mg Nebulization Given 11/5/22 0441)       Imaging results:  CT Chest Without Contrast Diagnostic    Result Date: 11/5/2022  1.  No acute cardiopulmonary findings. Resolution of the previously noted airspace disease on the prior study 2.  Status post cholecystectomy and left thyroidectomy 3.  Decreased pericardial effusion. 4.  Stable appearance of the midthoracic vertebral body compression. No acute osseous abnormality. Electronically signed by:  Kelsey Thomas D.O.  11/5/2022 1:20 AM      Social issues:   Social History     Socioeconomic History   • Marital status:    Tobacco Use   • Smoking status: Every Day     Packs/day: 0.50     Types: Cigarettes     Start date: 12/4/2011   • Smokeless tobacco: Never   Vaping Use   • Vaping Use: Never used   Substance and Sexual Activity   • Alcohol use: No   • Drug use: No   • Sexual activity: Defer       NIH Stroke Scale:  Interval: (not recorded)  1a. Level of Consciousness: (not recorded)  1b. LOC Questions: (not recorded)  1c. LOC Commands: (not recorded)  2. Best Gaze: (not recorded)  3. Visual: (not recorded)  4. Facial Palsy: (not recorded)  5a. Motor Arm, Left: (not recorded)  5b. Motor Arm, Right: (not recorded)  6a. Motor Leg, Left: (not recorded)  6b. Motor Leg, Right: (not recorded)  7.  Limb Ataxia: (not recorded)  8. Sensory: (not recorded)  9. Best Language: (not recorded)  10. Dysarthria: (not recorded)  11. Extinction and Inattention (formerly Neglect): (not recorded)    Total (NIH Stroke Scale): (not recorded)     Additional notable assessment information:    Nursing report ED to floor:    Cesilia Jett RN   11/05/22 05:45 EDT

## 2022-11-05 NOTE — H&P
"    [facility]      Patient Name: Melvi Rayo  : 1965  MRN: [id number]  Primary Care Physician:  Bharti Norton APRN  Date of admission: 2022      Subjective      Chief Complaint: shortness of air     History of Present Illness: Melvi Rayo is a 57 y.o. female who presented to [facility] on 2022 complaining of increased shortness of air, nonproductive cough for the past 5 days.  She denies any fever or chest pain.  She reports right-sided rib pain after a coughing episode and said she felt a \"pop\".  She has a past medical history of severe COPD on home oxygen at 5 L via nasal cannula.  She also has a past medical history of depression with psychotic features, generalized anxiety disorder PTSD, essential hypertension seizure disorder type 2 diabetes mellitus not on insulin.  Review of records shows she was admitted here in May for about a week for pneumonia and and sepsis.  Labs today show a negative respiratory panel, ABG pH 7.316 PO2 108.6, glucose of 89 troponin less than 0.010 proBNP 49.9 WBC 11.1 lactate 1.3 procalcitonin 0.04, EKG shows sinus rhythm heart rate 69, CT chest per radiology showed no acute cardiopulmonary findings and resolution of the previous noted airspace disease in the prior study.  Radiology also said stable appearance of the mid thoracic vertebrae body compression no acute osseous abnormality and a decreased pleural effusion.  She is stable on 5 L oxygen via nasal cannula.  She was given an an albuterol treatment 125 mg of IV Solu-Medrol.  She will be admitted for an acute exacerbation of COPD.        Review of Systems   HENT: Negative.    Eyes: Negative.    Cardiovascular: Positive for dyspnea on exertion.   Respiratory: Positive for cough, shortness of breath and wheezing.    Endocrine: Negative.    Hematologic/Lymphatic: Negative.    Skin: Negative.    Musculoskeletal: Positive for back pain.   Gastrointestinal: " Negative.    Genitourinary: Negative.    Neurological: Positive for seizures.   Psychiatric/Behavioral: Positive for depression. The patient is nervous/anxious.    Allergic/Immunologic: Negative.    All other systems reviewed and are negative.       Personal History     Past Medical History:   Diagnosis Date   • Anxiety    • Asthma    • Breast cancer (HCC)    • Chest tightness    • COPD (chronic obstructive pulmonary disease) (HCC)    • Degenerative disorder of bone    • Foot pain     S/P multiple foot surguries.   • GERD (gastroesophageal reflux disease)    • Lesion of eye     Lesion behind eye, cancer associated retinopathopthy. Documented from [facility].    • Low back pain    • Migraines    • Neck nodule    • Pancreatitis    • RA (rheumatoid arthritis) (HCC)    • Seizure disorder (HCC)     Generalized seizure, possible partial complex.   • Skin cancer     Age 18.   • Stroke (HCC)    • Type 2 diabetes mellitus (HCC)        Past Surgical History:   Procedure Laterality Date   • BRONCHOSCOPY N/A 2022    Procedure: BRONCHOSCOPY with bronchial washing;  Surgeon: Gonzales Mendoza MD;  Location: [facility] ENDOSCOPY;  Service: Pulmonary;  Laterality: N/A;  post op: pneumonia   • CARDIAC CATHETERIZATION      x2   • CARDIAC CATHETERIZATION  2015   •  SECTION      x2   • ESOPHAGEAL DILATATION     • FOOT SURGERY  2015   • FOOT SURGERY Left 2016   • FOOT SURGERY Right 2017   • MASTECTOMY Bilateral    • OTHER SURGICAL HISTORY  2017    LOOP Recorder   • DC  2016    [facility]   • TOTAL ABDOMINAL HYSTERECTOMY WITH SALPINGO OOPHORECTOMY         Family History: family history includes COPD in an other family member; Cancer in an other family member; Diabetes in an other family member; Heart disease in her father, mother, and another family member; Hyperlipidemia in her father, mother, and another family member; Hypertension in her father, mother, and another family member; Stroke in her father,  mother, and another family member. Otherwise pertinent FHx was reviewed and not pertinent to current issue.    Social History:  reports that she has been smoking cigarettes. She started smoking about 10 years ago. She has been smoking an average of .5 packs per day. She has never used smokeless tobacco. She reports that she does not drink alcohol and does not use drugs.    Home Medications:  Prior to Admission Medications     Prescriptions Last Dose Informant Patient Reported? Taking?    albuterol sulfate  (90 Base) MCG/ACT inhaler   No No    INHALE ONE PUFF BY MOUTH EVERY 4 TO 6 HOURS    ALPRAZolam (XANAX) 1 MG tablet   No No    TAKE ONE TABLET BY MOUTH TWICE DAILY AND ONE-HALF AT NOON    Blood Glucose Monitoring Suppl (FreeStyle Lite) w/Device kit   Yes No    Use to check blood sugars 2-3 times a day    Breo Ellipta 200-25 MCG/INH inhaler   No No    INHALE ONE PUFF BY MOUTH DAILY    butalbital-acetaminophen-caffeine (FIORICET, ESGIC) -40 MG per tablet   Yes No    cefdinir (OMNICEF) 300 MG capsule   Yes No    Take 300 mg by mouth 2 (Two) Times a Day.    dicyclomine (BENTYL) 20 MG tablet   Yes No    Take 20 mg by mouth 3 (Three) Times a Day As Needed.    FREESTYLE LITE test strip   No No    test TWICE DAILY    furosemide (LASIX) 40 MG tablet   Yes No    Take 40 mg by mouth Daily.    gabapentin (NEURONTIN) 300 MG capsule   No No    Take 1 capsule by mouth 4 (Four) Times a Day.    hydrOXYzine (ATARAX) 50 MG tablet   No No    Take 1 tablet by mouth 3 (Three) Times a Day.    ipratropium-albuterol (DUO-NEB) 0.5-2.5 mg/3 ml nebulizer   Yes No    lamoTRIgine (LaMICtal) 200 MG tablet   No No    Take 1 tablet by mouth every night at bedtime.    Lancets (freestyle) lancets   No No    TEST EVERY DAY    linaclotide (LINZESS) 145 MCG capsule capsule   Yes No    Linzess 145 mcg capsule    metFORMIN (GLUCOPHAGE) 500 MG tablet   Yes No    Take 500 mg by mouth 3 (Three) Times a Day.    methocarbamol (ROBAXIN) 750 MG  tablet   No No    TAKE ONE TABLET BY MOUTH THREE TIMES DAILY    metoprolol succinate XL (TOPROL-XL) 25 MG 24 hr tablet   No No    Take 1 tablet by mouth Daily.    midodrine (PROAMATINE) 10 MG tablet   No No    Take 1 tablet by mouth 3 (Three) Times a Day.    montelukast (SINGULAIR) 10 MG tablet   Yes No    Take 10 mg by mouth Every Night.    NON FORMULARY   Yes No    1 dose into the nostril(s) as directed by provider Continuous. Oxygen 3 lpm    nystatin (MYCOSTATIN) 100,000 unit/mL suspension   Yes No    Take 5 mL by mouth 4 (Four) Times a Day for 5 days.    omeprazole (priLOSEC) 20 MG capsule   Yes No    Take 20 mg by mouth 2 (two) times a day.    omeprazole OTC (PriLOSEC OTC) 20 MG EC tablet   Yes No    oxyCODONE-acetaminophen (PERCOCET)  MG per tablet   Yes No    potassium chloride (K-DUR,KLOR-CON) 20 MEQ CR tablet   Yes No    Take 20 mEq by mouth Daily.    predniSONE (DELTASONE) 10 MG tablet   Yes No    Take 10 mg by mouth Daily.    promethazine (PHENERGAN) 25 MG tablet   No No    TAKE ONE TABLET BY MOUTH EVERY 8 HOURS AS NEEDED FOR NAUSEA AND VOMITING    sertraline (ZOLOFT) 50 MG tablet   No No    Take 1 tablet by mouth every night at bedtime.    sucralfate (CARAFATE) 1 g tablet   Yes No    Take 1 g by mouth 4 (Four) Times a Day.    SUMAtriptan (IMITREX) 100 MG tablet   Yes No    sumatriptan 100 mg tablet   TAKE ONE TABLET BY MOUTH AT ONSET OF HEADACHE, MAY REPEAT IN TWO HOURS AS NEEDED MAX OF TWO TABLETS PER DAY    topiramate (TOPAMAX) 100 MG tablet   No No    TAKE ONE TABLET BY MOUTH EVERY MORNING AND ONE-HALF TABLET EVERY NIGHT AT BEDTIME    traZODone (DESYREL) 150 MG tablet   No No    Take 2 tablets by mouth Every Night.    vitamin D (ERGOCALCIFEROL) 1.25 MG (51352 UT) capsule capsule   Yes No    Take 50,000 Units by mouth 2 (Two) Times a Week.            Allergies:  Allergies   Allergen Reactions   • Aspirin Palpitations   • Codeine Shortness Of Breath   • Contrast Dye Shortness Of Breath   •  Erythromycin Hives   • Morphine Unknown (See Comments)   • Penicillin G Itching   • Sulfa Antibiotics Unknown (See Comments)   • Doxycycline Other (See Comments)     Rash and SOA   • Fentanyl Itching   • Levofloxacin Other (See Comments)       Objective      Vitals:   Temp:  [97.7 °F (36.5 °C)] 97.7 °F (36.5 °C)  Heart Rate:  [60-88] 84  Resp:  [20-24] 22  BP: (104-129)/(64-86) 123/74  Flow (L/min):  [4] 4    Physical Exam  Vitals reviewed.   Constitutional:       Appearance: She is obese. She is ill-appearing.   HENT:      Head: Normocephalic and atraumatic.      Right Ear: External ear normal.      Left Ear: External ear normal.      Nose: Nose normal.      Mouth/Throat:      Mouth: Mucous membranes are moist.   Eyes:      Extraocular Movements: Extraocular movements intact.   Cardiovascular:      Rate and Rhythm: Normal rate and regular rhythm.      Pulses: Normal pulses.      Heart sounds: Normal heart sounds.   Pulmonary:      Breath sounds: Wheezing present.   Abdominal:      Palpations: Abdomen is soft.   Genitourinary:     Comments: deferred  Musculoskeletal:         General: Normal range of motion.      Cervical back: Normal range of motion and neck supple.   Skin:     General: Skin is warm and dry.   Neurological:      General: No focal deficit present.      Mental Status: She is alert and oriented to person, place, and time.   Psychiatric:         Mood and Affect: Mood normal.         Behavior: Behavior normal.         Thought Content: Thought content normal.         Judgment: Judgment normal.         Result Review    Result Review:  I have personally reviewed the results from the time of this admission to 11/5/2022 05:19 EDT and agree with these findings:  [x]  Laboratory  []  Microbiology  [x]  Radiology  []  EKG/Telemetry   []  Cardiology/Vascular   []  Pathology  [x]  Old records  []  Other:  Most notable findings include: Respiratory panel negative, ABG with pH 7.316 PO2 108.6 glucose 89, troponin  "less than 0.010 proBNP 49.9, WBC 11.10 lactate 1.3, procalcitonin 0.04, EKG sinus rhythm heart rate 69,    CT chest per radiology:  1.  No acute cardiopulmonary findings. Resolution of the previously noted airspace disease on the prior study   2.  Status post cholecystectomy and left thyroidectomy   3.  Decreased pericardial effusion.   4.  Stable appearance of the midthoracic vertebral body compression. No acute osseous abnormality.\"         Assessment & Plan        Active Hospital Problems:  Active Hospital Problems    Diagnosis    • **COPD with acute exacerbation (HCC)    • Acute-on-chronic respiratory failure (HCC)    • Overweight (BMI 25.0-29.9)    • Type 2 diabetes mellitus (HCC)    • Severe recurrent major depression without psychotic features (HCC)    • Seizure disorder (HCC)    • Generalized anxiety disorder    • Primary hypertension      Plan:    Acute on chronic respiratory failure now stable on home 5 L oxygen via nasal cannula likely secondary to COPD with acute exacerbation, afebrile WBC mildly elevated no antibiotics indicated per CT blood cultures pending, 125 mg IV Solu-Medrol and albuterol treatment in ED, continue DuoNebs every 4 hours as needed, albuterol, 40 mg IV Solu-Medrol every 6 hours Home meds unverified at this time reorder pending verification pharmacy    Type 2 diabetes mellitus controlled on home meds, unverified at this time reorder pending verification pharmacy add SSI as needed with Accu-Cheks AC at bedtime low concentrated sweet diet    Severe recurrent major depressive disorder without psychotic features, home meds unverified at this time reorder pending verification pharmacy    Seizure disorder, stable per patient, home meds unverified at this time reorder pending verification pharmacy    Generalized anxiety disorder, home meds unverified at this time reorder pending verification pharmacy Lorazepam per INSPECT    Primary hypertension, home meds unverified at this time reorder " pending verification pharmacy monitor BP Will add as needed antihypertensives if needed    Chronic pain, on Home Norco, Gabapentin and Fiorecet INSPECT verified       DVT prophylaxis:  Mechanical DVT prophylaxis orders are present.    CODE STATUS:    Code Status (Patient has no pulse and is not breathing): CPR (Attempt to Resuscitate)  Medical Interventions (Patient has pulse or is breathing): Full Support    Admission Status:  I believe this patient meets observation  status.    I discussed the patient's findings and my recommendations with patient.    This patient has been examined wearing appropriate Personal Protective Equipment  11/05/22      Signature: Electronically signed by ANIBAL Ramirez, 11/05/22, 5:27 AM EDT.

## 2022-11-05 NOTE — PLAN OF CARE
I was called to bedside to evaluate the patient following a rapid response.  Upon my examination, she tells me that she is experiencing a pressure-like sensation in the middle of her chest.  Ordered aspirin, stat EKG, chest x-ray and troponin.  EKG showing some possible lateral depressions compared with the previous one.  No obvious ST elevations.  Chest x-ray was also largely unremarkable and troponin was within normal limits.  I have also consulted cardiology and ordered telemetry.  Reevaluated the patient and she reports that her chest pain has completely subsided.  She is on her baseline O2.  We will continue to monitor.

## 2022-11-05 NOTE — ED PROVIDER NOTES
Subjective   History of Present Illness  58 yo female with hx of copd referred to hospital to be admitted for possible pneumonia. Patient reports worsening shortness of air with forceful cough for the past 5 days.  Patient denies any fever, no history of DVT or PE, no recent trips or travel, no calf pain or swelling.  Patient complains of moderate left anterior inferior chest wall pain with movement and cough.        Review of Systems   Constitutional: Negative.    HENT: Positive for congestion.    Respiratory: Positive for cough, shortness of breath and wheezing.    Cardiovascular: Positive for chest pain.   Gastrointestinal: Negative.    Genitourinary: Negative.    Musculoskeletal: Negative.    Skin: Negative.    Neurological: Negative.    Psychiatric/Behavioral: Negative.    All other systems reviewed and are negative.      Past Medical History:   Diagnosis Date   • Anxiety    • Asthma    • Breast cancer ([facility])    • Chest tightness    • COPD (chronic obstructive pulmonary disease) ([facility])    • Degenerative disorder of bone    • Foot pain     S/P multiple foot surguries.   • GERD (gastroesophageal reflux disease)    • Lesion of eye     Lesion behind eye, cancer associated retinopathopthy. Documented from [facility].    • Low back pain    • Migraines    • Neck nodule 2010   • Pancreatitis    • RA (rheumatoid arthritis) ([facility])    • Seizure disorder ([facility])     Generalized seizure, possible partial complex.   • Skin cancer     Age 18.   • Stroke ([facility])    • Type 2 diabetes mellitus ([facility])        Allergies   Allergen Reactions   • Aspirin Palpitations   • Codeine Shortness Of Breath   • Contrast Dye Shortness Of Breath   • Erythromycin Hives   • Morphine Unknown (See Comments)   • Penicillin G Itching   • Sulfa Antibiotics Unknown (See Comments)   • Doxycycline Other (See Comments)     Rash and SOA   • Fentanyl Itching   • Levofloxacin Other (See Comments)       Past Surgical History:   Procedure Laterality Date   •  BRONCHOSCOPY N/A 2022    Procedure: BRONCHOSCOPY with bronchial washing;  Surgeon: Gonzales Mendoza MD;  Location: [facility] ENDOSCOPY;  Service: Pulmonary;  Laterality: N/A;  post op: pneumonia   • CARDIAC CATHETERIZATION      x2   • CARDIAC CATHETERIZATION  2015   •  SECTION      x2   • ESOPHAGEAL DILATATION     • FOOT SURGERY  2015   • FOOT SURGERY Left 2016   • FOOT SURGERY Right 2017   • MASTECTOMY Bilateral    • OTHER SURGICAL HISTORY  2017    LOOP Recorder   • DC  2016    [facility]   • TOTAL ABDOMINAL HYSTERECTOMY WITH SALPINGO OOPHORECTOMY         Family History   Problem Relation Age of Onset   • Heart disease Mother    • Stroke Mother    • Hyperlipidemia Mother    • Hypertension Mother    • Heart disease Father    • Stroke Father    • Hypertension Father    • Hyperlipidemia Father    • Heart disease Other    • COPD Other    • Cancer Other    • Diabetes Other    • Hypertension Other    • Hyperlipidemia Other    • Stroke Other        Social History     Socioeconomic History   • Marital status:    Tobacco Use   • Smoking status: Every Day     Packs/day: 0.50     Types: Cigarettes     Start date: 2011   • Smokeless tobacco: Never   Vaping Use   • Vaping Use: Never used   Substance and Sexual Activity   • Alcohol use: No   • Drug use: No   • Sexual activity: Defer           Objective   Physical Exam  Constitutional:       Appearance: She is well-developed.   HENT:      Head: Normocephalic and atraumatic.      Mouth/Throat:      Mouth: Mucous membranes are moist.      Pharynx: Oropharynx is clear.   Eyes:      Conjunctiva/sclera: Conjunctivae normal.      Pupils: Pupils are equal, round, and reactive to light.   Cardiovascular:      Rate and Rhythm: Normal rate and regular rhythm.      Heart sounds: Normal heart sounds.   Pulmonary:      Effort: Pulmonary effort is normal.      Comments: Wheezing bilaterally, chest wall ttp left anterior inferior  Abdominal:       General: Bowel sounds are normal. There is no distension.      Palpations: Abdomen is soft.      Tenderness: There is no abdominal tenderness.   Musculoskeletal:         General: No swelling or tenderness. Normal range of motion.   Skin:     General: Skin is warm and dry.      Capillary Refill: Capillary refill takes less than 2 seconds.   Neurological:      General: No focal deficit present.      Mental Status: She is alert and oriented to person, place, and time.   Psychiatric:         Mood and Affect: Mood normal.         Behavior: Behavior normal.         Procedures           ED Course                                           MDM  Number of Diagnoses or Management Options  Chest wall pain  COPD exacerbation (HCC)  Diagnosis management comments: Results for orders placed or performed during the hospital encounter of 11/05/22  -Respiratory Panel PCR w/COVID-19(SARS-CoV-2) RONALD/AJ/KAYLEE/PAD/COR/MAD/ASPEN In-House, NP Swab in UTM/VTM, 3-4 HR TAT - Swab, Nasopharynx:   Specimen: Nasopharynx; Swab       Result                      Value             Ref Range           ADENOVIRUS, PCR             Not Detected      Not Detected        Coronavirus 229E            Not Detected      Not Detected        Coronavirus HKU1            Not Detected      Not Detected        Coronavirus NL63            Not Detected      Not Detected        Coronavirus OC43            Not Detected      Not Detected        COVID19                     Not Detected      Not Detected*       Human Metapneumovirus       Not Detected      Not Detected        Human Rhinovirus/Enter*     Not Detected      Not Detected        Influenza A PCR             Not Detected      Not Detected        Influenza B PCR             Not Detected      Not Detected        Parainfluenza Virus 1       Not Detected      Not Detected        Parainfluenza Virus 2       Not Detected      Not Detected        Parainfluenza Virus 3       Not Detected      Not Detected         Parainfluenza Virus 4       Not Detected      Not Detected        RSV, PCR                    Not Detected      Not Detected        Bordetella pertussis p*     Not Detected      Not Detected        Bordetella parapertuss*     Not Detected      Not Detected        Chlamydophila pneumoni*     Not Detected      Not Detected        Mycoplasma pneumo by P*     Not Detected      Not Detected   -Procalcitonin:   Specimen: Blood       Result                      Value             Ref Range           Procalcitonin               0.04              0.00 - 0.25 *  -Comprehensive Metabolic Panel:   Specimen: Blood       Result                      Value             Ref Range           Glucose                     89                65 - 99 mg/dL       BUN                         13                6 - 20 mg/dL        Creatinine                  0.74              0.57 - 1.00 *       Sodium                      143               136 - 145 mm*       Potassium                   3.6               3.5 - 5.2 mm*       Chloride                    110 (H)           98 - 107 mmo*       CO2                         22.0              22.0 - 29.0 *       Calcium                     8.9               8.6 - 10.5 m*       Total Protein               6.3               6.0 - 8.5 g/*       Albumin                     3.80              3.50 - 5.20 *       ALT (SGPT)                  14                1 - 33 U/L          AST (SGOT)                  12                1 - 32 U/L          Alkaline Phosphatase        102               39 - 117 U/L        Total Bilirubin             <0.2              0.0 - 1.2 mg*       Globulin                    2.5               gm/dL               A/G Ratio                   1.5               g/dL                BUN/Creatinine Ratio        17.6              7.0 - 25.0          Anion Gap                   11.0              5.0 - 15.0 m*       eGFR                        95.1              >60.0 mL/min*  -BNP:   Specimen:  Blood       Result                      Value             Ref Range           proBNP                      49.9              0.0 - 900.0 *  -Troponin:   Specimen: Blood       Result                      Value             Ref Range           Troponin T                  <0.010            0.000 - 0.03*  -CBC Auto Differential:   Specimen: Blood       Result                      Value             Ref Range           WBC                         11.10 (H)         3.40 - 10.80*       RBC                         4.51              3.77 - 5.28 *       Hemoglobin                  13.5              12.0 - 15.9 *       Hematocrit                  42.4              34.0 - 46.6 %       MCV                         93.8              79.0 - 97.0 *       MCH                         29.8              26.6 - 33.0 *       MCHC                        31.8              31.5 - 35.7 *       RDW                         14.7              12.3 - 15.4 %       RDW-SD                      47.7              37.0 - 54.0 *       MPV                         6.6               6.0 - 12.0 fL       Platelets                   335               140 - 450 10*       Neutrophil %                54.1              42.7 - 76.0 %       Lymphocyte %                35.7              19.6 - 45.3 %       Monocyte %                  7.1               5.0 - 12.0 %        Eosinophil %                1.4               0.3 - 6.2 %         Basophil %                  1.7 (H)           0.0 - 1.5 %         Neutrophils, Absolute       6.00              1.70 - 7.00 *       Lymphocytes, Absolute       3.90 (H)          0.70 - 3.10 *       Monocytes, Absolute         0.80              0.10 - 0.90 *       Eosinophils, Absolute       0.20              0.00 - 0.40 *       Basophils, Absolute         0.20              0.00 - 0.20 *       nRBC                        0.1               0.0 - 0.2 /1*  -Blood Gas, Arterial -:   Specimen: Arterial Blood       Result                       Value             Ref Range           Site                        Left Radial                           Hair's Test                Positive                              pH, Arterial                7.316 (L)         7.350 - 7.45*       pCO2, Arterial              42.8              35.0 - 48.0 *       pO2, Arterial               108.6 (H)         83.0 - 108.0*       HCO3, Arterial              21.8              21.0 - 28.0 *       Base Excess, Arterial       -4.3 (L)          0.0 - 3.0 mm*       O2 Saturation, Arterial     97.7              94.0 - 98.0 %       CO2 Content                 23.1              22 - 29 mmol*       Barometric Pressure fo*                                           Modality                    Cannula                               FIO2                        36                %                   Rate                        24.0000           Breaths/ascencion*       Hemodilution                No                               -POC Lactate:   Specimen: Blood       Result                      Value             Ref Range           Lactate                     1.3               0.5 - 2.0 mm*  -ECG 12 Lead Dyspnea:        Result                      Value             Ref Range           QT Interval                 434               ms             -Green Top (Gel):        Result                      Value             Ref Range           Extra Tube                                                    Hold for add-ons.  -Gold Top - SST:        Result                      Value             Ref Range           Extra Tube                                                    Hold for add-ons.  -Light Blue Top:        Result                      Value             Ref Range           Extra Tube                                                    Hold for add-ons.  CT Chest Without Contrast Diagnostic   Final Result        1.  No acute cardiopulmonary findings. Resolution of the previously noted airspace disease on the  prior study        2.  Status post cholecystectomy and left thyroidectomy        3.  Decreased pericardial effusion.        4.  Stable appearance of the midthoracic vertebral body compression. No acute osseous abnormality.            Electronically signed by:  Kelsey Thomas D.O.      11/5/2022 1:20 AM       Patient well, still with moderate pain, mild acidosis on gas, will observe.       Amount and/or Complexity of Data Reviewed  Clinical lab tests: ordered and reviewed  Tests in the radiology section of CPT®: ordered and reviewed  Tests in the medicine section of CPT®: reviewed and ordered  Decide to obtain previous medical records or to obtain history from someone other than the patient: yes  Discuss the patient with other providers: yes        Final diagnoses:   Chest wall pain   COPD exacerbation (HCC)       ED Disposition  ED Disposition     ED Disposition   Decision to Admit    Condition   --    Comment   Level of Care: Med/Surg [1]   Diagnosis: COPD with acute exacerbation (HCC) [207188]   Admitting Physician: ARIC HILL [[id number]]   Attending Physician: ARIC HILL [[id number]]   Bed Request Comments: cardiac monitor               No follow-up provider specified.       Medication List      No changes were made to your prescriptions during this visit.          Branden Arauz MD  11/05/22 [id number]

## 2022-11-05 NOTE — CODE DOCUMENTATION
A  rapid response was called. Upon my arrival the patient was resting in the bed complaining of improving left sided chest pain. The primary nurse at bedside, RT was obtaining an EKG and Dr. Bronson was at bedside giving the primary RN verbal orders. The EKG was obtained and a chest Xray was completed. Patient remained in room 202. The RN was encouraged to call ICU if further assistance was needed.

## 2022-11-05 NOTE — CONSULTS
Referring Provider: Ian Bronson MD    Reason for Consultation: chest pain    Patient Care Team:  Bharti Norton APRN as PCP - General (Nurse Practitioner)  Hiren Sellers MD Sandella, Surender Kumar, MD as Consulting Physician (Cardiology)  Tanya Berger APRN as Nurse Practitioner (Nurse Practitioner)      SUBJECTIVE     Chief Complaint:  Chest pain, soa    History of present illness:  Melvi Rayo is a 57 y.o. female patient of Dr. Steele with a history of recurrent syncope and orthostatic hypotension due to autonomic insufficiency on chronic midodrine, recurrent atypical chest pain with a negative cardiac cath in 2014 and 2015, diabetes, COPD, and tobacco abuse who presented to [facility] with complaint of shortness of breath with cough.  Of note she is on chronic oxygen therapy of 5 L via nasal cannula at home.  She initially did not have any chest pain but today complained of an episode of chest pressure.  She felt like something was sitting on her chest.  She said she has had the same chest pain for years.  It relieved on its own pretty quickly.  She has noticed that sometimes it takes her breath away.  It is mildly reproducible on palpation.  She has been on metoprolol and midodrine chronically.      Troponins were negative x2 during this episode  EKG showed sinus rhythm with a heart rate of 91 bpm and less than 1 mm ST depressions in the anterior leads.    Previous cardiac work-up  • Stress test 12/4/2021: Left ventricular ejection fraction is hyperdynamic (Calculated EF > 70%). .  • Myocardial perfusion imaging indicates a normal myocardial perfusion study with no evidence of ischemia.  • Impressions are consistent with a low risk study.  • This is normal Cardiolite imaging stress test with no evidence of ischemia or myocardial infarction. Left ventricle size and function is normal on gated SPECT imaging. No wall motion abnormality was noted. Clinical correlation recommended.  Further recommendation as per ordering physician..  • Findings consistent with a normal ECG stress test.    • Echo 12/4/2021: Left ventricular systolic function is normal.  • Left ventricular ejection is 60-65%  • Left ventricular diastolic function was normal.      Review of systems:    Constitutional: No weakness, fatigue, fever, rigors, chills   Eyes: No vision changes, eye pain   ENT/oropharynx: No difficulty swallowing, sore throat, epistaxis, changes in hearing   Cardiovascular: No chest pain, chest tightness, palpitations, paroxysmal nocturnal dyspnea, orthopnea, diaphoresis, dizziness / syncopal episode   Respiratory: No shortness of breath, dyspnea on exertion, cough, wheezing, hemoptysis   Gastrointestinal: No abdominal pain, nausea, vomiting, diarrhea, bloody stools   Genitourinary: No hematuria, dysuria   Neurological: No headache, tremors, numbness, one-sided weakness    Musculoskeletal: No cramps, myalgias, joint pain, joint swelling   Integument: No rash, edema        Personal History:      Past Medical History:   Diagnosis Date   • Anxiety    • Asthma    • Breast cancer (HCC)    • Chest tightness    • COPD (chronic obstructive pulmonary disease) (HCC)    • Degenerative disorder of bone    • Foot pain     S/P multiple foot surguries.   • GERD (gastroesophageal reflux disease)    • Lesion of eye     Lesion behind eye, cancer associated retinopathopthy. [facility].    • Low back pain    • Migraines    • Neck nodule 2010   • Pancreatitis    • RA (rheumatoid arthritis) (HCC)    • Seizure disorder (HCC)     Generalized seizure, possible partial complex.   • Skin cancer     Age 18.   • Stroke (HCC)    • Type 2 diabetes mellitus (HCC)        Past Surgical History:   Procedure Laterality Date   • BRONCHOSCOPY N/A 5/20/2022    Procedure: BRONCHOSCOPY with bronchial washing;  Surgeon: Gonzales Mendoza MD;  Location: [facility] ENDOSCOPY;  Service: Pulmonary;  Laterality: N/A;  post op: pneumonia   •  CARDIAC CATHETERIZATION      x2   • CARDIAC CATHETERIZATION  2015   •  SECTION      x2   • ESOPHAGEAL DILATATION     • FOOT SURGERY  2015   • FOOT SURGERY Left 2016   • FOOT SURGERY Right 2017   • MASTECTOMY Bilateral    • OTHER SURGICAL HISTORY  2017    LOOP Recorder   • DC  2016    [facility]   • TOTAL ABDOMINAL HYSTERECTOMY WITH SALPINGO OOPHORECTOMY         Family History   Problem Relation Age of Onset   • Heart disease Mother    • Stroke Mother    • Hyperlipidemia Mother    • Hypertension Mother    • Heart disease Father    • Stroke Father    • Hypertension Father    • Hyperlipidemia Father    • Heart disease Other    • COPD Other    • Cancer Other    • Diabetes Other    • Hypertension Other    • Hyperlipidemia Other    • Stroke Other        Social History     Tobacco Use   • Smoking status: Every Day     Packs/day: 0.50     Types: Cigarettes     Start date: 2011   • Smokeless tobacco: Never   Vaping Use   • Vaping Use: Never used   Substance Use Topics   • Alcohol use: No   • Drug use: No        Medications Prior to Admission   Medication Sig Dispense Refill Last Dose   • albuterol sulfate  (90 Base) MCG/ACT inhaler INHALE ONE PUFF BY MOUTH EVERY 4 TO 6 HOURS 18 g 3 2022   • ALPRAZolam (XANAX) 1 MG tablet TAKE ONE TABLET BY MOUTH TWICE DAILY AND ONE-HALF AT NOON 75 tablet 3 2022   • furosemide (LASIX) 40 MG tablet Take 40 mg by mouth Daily.   2022   • gabapentin (NEURONTIN) 300 MG capsule Take 1 capsule by mouth 4 (Four) Times a Day. 120 capsule 2 2022   • hydrOXYzine (ATARAX) 50 MG tablet Take 1 tablet by mouth 3 (Three) Times a Day. 90 tablet 3 2022   • lamoTRIgine (LaMICtal) 200 MG tablet Take 1 tablet by mouth every night at bedtime. 30 tablet 3 2022   • Lancets (freestyle) lancets TEST EVERY DAY (Patient taking differently: As Needed.) 100 each 3 Patient Taking Differently   • metFORMIN (GLUCOPHAGE) 500 MG tablet Take 500 mg by  mouth 3 (Three) Times a Day.   11/4/2022   • methocarbamol (ROBAXIN) 750 MG tablet TAKE ONE TABLET BY MOUTH THREE TIMES DAILY 90 tablet 0 11/4/2022   • metoprolol succinate XL (TOPROL-XL) 25 MG 24 hr tablet Take 1 tablet by mouth Daily. 90 tablet 3 11/4/2022   • midodrine (PROAMATINE) 10 MG tablet Take 1 tablet by mouth 3 (Three) Times a Day. 270 tablet 1 11/4/2022   • montelukast (SINGULAIR) 10 MG tablet Take 10 mg by mouth Every Night.   11/4/2022   • omeprazole (priLOSEC) 20 MG capsule Take 20 mg by mouth 2 (two) times a day.   11/4/2022   • oxyCODONE-acetaminophen (PERCOCET)  MG per tablet    11/4/2022   • potassium chloride (K-DUR,KLOR-CON) 20 MEQ CR tablet Take 20 mEq by mouth Daily.   11/4/2022   • predniSONE (DELTASONE) 10 MG tablet Take 10 mg by mouth Daily.   11/4/2022   • promethazine (PHENERGAN) 25 MG tablet TAKE ONE TABLET BY MOUTH EVERY 8 HOURS AS NEEDED FOR NAUSEA AND VOMITING 30 tablet 0 11/4/2022   • sertraline (ZOLOFT) 50 MG tablet Take 1 tablet by mouth every night at bedtime. 30 tablet 3 11/4/2022   • topiramate (TOPAMAX) 100 MG tablet TAKE ONE TABLET BY MOUTH EVERY MORNING AND ONE-HALF TABLET EVERY NIGHT AT BEDTIME 75 tablet 0 11/4/2022   • traZODone (DESYREL) 150 MG tablet Take 2 tablets by mouth Every Night. 60 tablet 3 11/4/2022   • vitamin D (ERGOCALCIFEROL) 1.25 MG (68212 UT) capsule capsule Take 50,000 Units by mouth 2 (Two) Times a Week.   11/4/2022   • Blood Glucose Monitoring Suppl (FreeStyle Lite) w/Device kit Use to check blood sugars 2-3 times a day      • Breo Ellipta 200-25 MCG/INH inhaler INHALE ONE PUFF BY MOUTH DAILY 60 each 3    • butalbital-acetaminophen-caffeine (FIORICET, ESGIC) -40 MG per tablet       • cefdinir (OMNICEF) 300 MG capsule Take 300 mg by mouth 2 (Two) Times a Day.      • dicyclomine (BENTYL) 20 MG tablet Take 20 mg by mouth 3 (Three) Times a Day As Needed.      • FREESTYLE LITE test strip test TWICE DAILY 100 each 3    • ipratropium-albuterol  "(DUO-NEB) 0.5-2.5 mg/3 ml nebulizer       • linaclotide (LINZESS) 145 MCG capsule capsule Linzess 145 mcg capsule      • NON FORMULARY 1 dose into the nostril(s) as directed by provider Continuous. Oxygen 3 lpm      • nystatin (MYCOSTATIN) 100,000 unit/mL suspension Take 5 mL by mouth 4 (Four) Times a Day for 5 days.      • omeprazole OTC (PriLOSEC OTC) 20 MG EC tablet       • sucralfate (CARAFATE) 1 g tablet Take 1 g by mouth 4 (Four) Times a Day.      • SUMAtriptan (IMITREX) 100 MG tablet sumatriptan 100 mg tablet   TAKE ONE TABLET BY MOUTH AT ONSET OF HEADACHE, MAY REPEAT IN TWO HOURS AS NEEDED MAX OF TWO TABLETS PER DAY           Allergies:     Aspirin, Codeine, Contrast dye, Erythromycin, Morphine, Penicillin g, Sulfa antibiotics, Doxycycline, Fentanyl, and Levofloxacin    Scheduled Meds:guaiFENesin, 1,200 mg, Oral, Q12H  insulin lispro, 2-9 Units, Subcutaneous, TID With Meals  ipratropium-albuterol, 3 mL, Nebulization, Q4H - RT  methylPREDNISolone sodium succinate, 40 mg, Intravenous, Q6H  nicotine, 1 patch, Transdermal, Q24H      Continuous Infusions:   PRN Meds:•  acetaminophen  •  dextrose  •  dextrose  •  glucagon (human recombinant)  •  ipratropium-albuterol  •  LORazepam  •  sodium chloride      OBJECTIVE    Vital Signs  Vitals:    11/05/22 0738 11/05/22 1200 11/05/22 1503 11/05/22 1508   BP: 135/87 140/45     BP Location:  Left arm     Patient Position:  Lying     Pulse: 94 89 88 88   Resp:  19 18 18   Temp:  97.9 °F (36.6 °C)     TempSrc:  Oral     SpO2: 97%  98% 98%   Weight:       Height:           Flowsheet Rows    Flowsheet Row First Filed Value   Admission Height 170.2 cm (67\") Documented at 11/05/2022 0012   Admission Weight 77.1 kg (170 lb) Documented at 11/05/2022 0012            Intake/Output Summary (Last 24 hours) at 11/5/2022 1634  Last data filed at 11/5/2022 1300  Gross per 24 hour   Intake 0 ml   Output --   Net 0 ml        Telemetry: Normal sinus rhythm    Physical Exam:  The patient is " alert, oriented and in no distress.  Vital signs as noted above.  Head and neck revealed no carotid bruits or jugular venous distention.  No thyromegaly or lymph adenopathy is present  Lungs with wheezing.  Decreased breath sounds bilaterally  Heart normal first and second heart sounds. No murmur.  No precordial rub is present.  No gallop is present.  Mild tenderness on the anterior chest  Abdomen soft and nontender.  No organomegaly is present.  Extremities with good peripheral pulses without any pedal edema.  Skin warm and dry.  Multiple excoriations in bilateral upper and lower extremities  Musculoskeletal system is grossly normal.  CNS grossly normal.       Results Review:  I have personally reviewed the results from the time of this admission to 11/5/2022 16:34 EDT and agree with these findings:  [x]  Laboratory  []  Microbiology  [x]  Radiology  [x]  EKG/Telemetry   [x]  Cardiology/Vascular   []  Pathology  [x]  Old records  []  Other:    Most notable findings include:     Lab Results (last 24 hours)     Procedure Component Value Units Date/Time    POC Glucose Once [[id number]]  (Abnormal) Collected: 11/05/22 1114    Specimen: Blood Updated: 11/05/22 1115     Glucose 143 mg/dL      Comment: Serial Number: [id number]:  [id number]       POC Glucose Once [[id number]]  (Abnormal) Collected: 11/05/22 0854    Specimen: Blood Updated: 11/05/22 0855     Glucose 168 mg/dL      Comment: Serial Number: [id number]:  [id number]       Extra Tubes [[id number]] Collected: 11/05/22 0744    Specimen: Blood, Venous Line Updated: 11/05/22 0847    Narrative:      The following orders were created for panel order Extra Tubes.  Procedure                               Abnormality         Status                     ---------                               -----------         ------                     Gold Top - [facility][[id number]]                                   Final result                 Please view results for these tests on  the individual orders.    Gold Top - SST [[id number]] Collected: 11/05/22 0744    Specimen: Blood Updated: 11/05/22 0847     Extra Tube Hold for add-ons.     Comment: Auto resulted.       Troponin [[id number]]  (Normal) Collected: 11/05/22 0744    Specimen: Blood Updated: 11/05/22 0812     Troponin T <0.010 ng/mL     Narrative:      Troponin T Reference Range:  <= 0.03 ng/mL-   Negative for AMI  >0.03 ng/mL-     Abnormal for myocardial necrosis.  Clinicians would have to utilize clinical acumen, EKG, Troponin and serial changes to determine if it is an Acute Myocardial Infarction or myocardial injury due to an underlying chronic condition.       Results may be falsely decreased if patient taking Biotin.      Blood Gas, Arterial - [[id number]]  (Abnormal) Collected: 11/05/22 0304    Specimen: Arterial Blood Updated: 11/05/22 0306     Site Left Radial     Hair's Test Positive     pH, Arterial 7.316 pH units      pCO2, Arterial 42.8 mm Hg      pO2, Arterial 108.6 mm Hg      HCO3, Arterial 21.8 mmol/L      Base Excess, Arterial -4.3 mmol/L      Comment: Serial Number: [id number]:  [id number]        O2 Saturation, Arterial 97.7 %      CO2 Content 23.1 mmol/L      Barometric Pressure for Blood Gas --     Comment: N/A        Modality Cannula     FIO2 36 %      Rate 24.0000 Breaths/minute      Hemodilution No    [place] Draw [[id number]] Collected: 11/05/22 0131    Specimen: Blood Updated: 11/05/22 0246    Narrative:      The following orders were created for panel order [place] Draw.  Procedure                               Abnormality         Status                     ---------                               -----------         ------                     Green Top (Gel)[[id number]]                                  Final result               Lavender Top[[id number]]                                     Final result               Gold Top - SST[[id number]]                                   Final result               Light Blue  "Top[[id number]]                                   Final result                 Please view results for these tests on the individual orders.    Green Top (Gel) [[id number]] Collected: 11/05/22 0131    Specimen: Blood Updated: 11/05/22 0246     Extra Tube Hold for add-ons.     Comment: Auto resulted.       Light Blue Top [[id number]] Collected: 11/05/22 0131    Specimen: Blood Updated: 11/05/22 0246     Extra Tube Hold for add-ons.     Comment: Auto resulted       Gold Top - SST [[id number]] Collected: 11/05/22 0131    Specimen: Blood Updated: 11/05/22 0246     Extra Tube Hold for add-ons.     Comment: Auto resulted.       Procalcitonin [[id number]]  (Normal) Collected: 11/05/22 0131    Specimen: Blood Updated: 11/05/22 0226     Procalcitonin 0.04 ng/mL     Narrative:      As a Marker for Sepsis (Non-Neonates):    1. <0.5 ng/mL represents a low risk of severe sepsis and/or septic shock.  2. >2 ng/mL represents a high risk of severe sepsis and/or septic shock.    As a Marker for Lower Respiratory Tract Infections that require antibiotic therapy:    PCT on Admission    Antibiotic Therapy       6-12 Hrs later    >0.5                Strongly Recommended  >0.25 - <0.5        Recommended   0.1 - 0.25          Discouraged              Remeasure/reassess PCT  <0.1                Strongly Discouraged     Remeasure/reassess PCT    As 28 day mortality risk marker: \"Change in Procalcitonin Result\" (>80% or <=80%) if Day 0 (or Day 1) and Day 4 values are available. Refer to http://www.[facility]-pct-calculator.com    Change in PCT <=80%  A decrease of PCT levels below or equal to 80% defines a positive change in PCT test result representing a higher risk for 28-day all-cause mortality of patients diagnosed with severe sepsis for septic shock.    Change in PCT >80%  A decrease of PCT levels of more than 80% defines a negative change in PCT result representing a lower risk for 28-day all-cause mortality of patients diagnosed with severe " sepsis or septic shock.       POC Lactate [[id number]]  (Normal) Collected: 11/05/22 0222    Specimen: Blood Updated: 11/05/22 0224     Lactate 1.3 mmol/L      Comment: Serial Number: [id number]:  [id number]       Comprehensive Metabolic Panel [[id number]]  (Abnormal) Collected: 11/05/22 0131    Specimen: Blood Updated: 11/05/22 0222     Glucose 89 mg/dL      BUN 13 mg/dL      Creatinine 0.74 mg/dL      Sodium 143 mmol/L      Potassium 3.6 mmol/L      Chloride 110 mmol/L      CO2 22.0 mmol/L      Calcium 8.9 mg/dL      Total Protein 6.3 g/dL      Albumin 3.80 g/dL      ALT (SGPT) 14 U/L      AST (SGOT) 12 U/L      Alkaline Phosphatase 102 U/L      Total Bilirubin <0.2 mg/dL      Globulin 2.5 gm/dL      A/G Ratio 1.5 g/dL      BUN/Creatinine Ratio 17.6     Anion Gap 11.0 mmol/L      eGFR 95.1 mL/min/1.73      Comment: National Kidney Foundation and American Society of Nephrology (ASN) Task Force recommended calculation based on the Chronic Kidney Disease Epidemiology Collaboration (CKD-EPI) equation refit without adjustment for race.       Narrative:      GFR Normal >60  Chronic Kidney Disease <60  Kidney Failure <15      Troponin [[id number]]  (Normal) Collected: 11/05/22 0131    Specimen: Blood Updated: 11/05/22 0222     Troponin T <0.010 ng/mL     Narrative:      Troponin T Reference Range:  <= 0.03 ng/mL-   Negative for AMI  >0.03 ng/mL-     Abnormal for myocardial necrosis.  Clinicians would have to utilize clinical acumen, EKG, Troponin and serial changes to determine if it is an Acute Myocardial Infarction or myocardial injury due to an underlying chronic condition.       Results may be falsely decreased if patient taking Biotin.      Blood Culture - Blood, Arm, Left [[id number]] Collected: 11/05/22 0216    Specimen: Blood from Arm, Left Updated: 11/05/22 0221    BNP [[id number]]  (Normal) Collected: 11/05/22 0131    Specimen: Blood Updated: 11/05/22 0219     proBNP 49.9 pg/mL     Narrative:      Among  patients with dyspnea, NT-proBNP is highly sensitive for the detection of acute congestive heart failure. In addition NT-proBNP of <300 pg/ml effectively rules out acute congestive heart failure with 99% negative predictive value.    Results may be falsely decreased if patient taking Biotin.      Alison Top [[id number]] Collected: 11/05/22 0131    Specimen: Blood Updated: 11/05/22 0156    CBC & Differential [[id number]]  (Abnormal) Collected: 11/05/22 0131    Specimen: Blood Updated: 11/05/22 0156    Narrative:      The following orders were created for panel order CBC & Differential.  Procedure                               Abnormality         Status                     ---------                               -----------         ------                     CBC Auto Differential[[id number]]        Abnormal            Final result                 Please view results for these tests on the individual orders.    CBC Auto Differential [[id number]]  (Abnormal) Collected: 11/05/22 0131    Specimen: Blood Updated: 11/05/22 0156     WBC 11.10 10*3/mm3      RBC 4.51 10*6/mm3      Hemoglobin 13.5 g/dL      Hematocrit 42.4 %      MCV 93.8 fL      MCH 29.8 pg      MCHC 31.8 g/dL      RDW 14.7 %      RDW-SD 47.7 fl      MPV 6.6 fL      Platelets 335 10*3/mm3      Neutrophil % 54.1 %      Lymphocyte % 35.7 %      Monocyte % 7.1 %      Eosinophil % 1.4 %      Basophil % 1.7 %      Neutrophils, Absolute 6.00 10*3/mm3      Lymphocytes, Absolute 3.90 10*3/mm3      Monocytes, Absolute 0.80 10*3/mm3      Eosinophils, Absolute 0.20 10*3/mm3      Basophils, Absolute 0.20 10*3/mm3      nRBC 0.1 /100 WBC     Blood Culture - Blood, Arm, Left [[id number]] Collected: 11/05/22 0131    Specimen: Blood from Arm, Left Updated: 11/05/22 0149    Respiratory Panel PCR w/COVID-19(SARS-CoV-2) RONALD/AJ/KAYLEE/PAD/COR/MAD/ASPEN In-House, NP Swab in UTM/VTM, 3-4 HR TAT - Swab, Nasopharynx [[id number]]  (Normal) Collected: 11/05/22 0018    Specimen: Swab from  Nasopharynx Updated: 11/05/22 0134     ADENOVIRUS, PCR Not Detected     Coronavirus 229E Not Detected     Coronavirus HKU1 Not Detected     Coronavirus NL63 Not Detected     Coronavirus OC43 Not Detected     COVID19 Not Detected     Human Metapneumovirus Not Detected     Human Rhinovirus/Enterovirus Not Detected     Influenza A PCR Not Detected     Influenza B PCR Not Detected     Parainfluenza Virus 1 Not Detected     Parainfluenza Virus 2 Not Detected     Parainfluenza Virus 3 Not Detected     Parainfluenza Virus 4 Not Detected     RSV, PCR Not Detected     Bordetella pertussis pcr Not Detected     Bordetella parapertussis PCR Not Detected     Chlamydophila pneumoniae PCR Not Detected     Mycoplasma pneumo by PCR Not Detected    Narrative:      In the setting of a positive respiratory panel with a viral infection PLUS a negative procalcitonin without other underlying concern for bacterial infection, consider observing off antibiotics or discontinuation of antibiotics and continue supportive care. If the respiratory panel is positive for atypical bacterial infection (Bordetella pertussis, Chlamydophila pneumoniae, or Mycoplasma pneumoniae), consider antibiotic de-escalation to target atypical bacterial infection.          Imaging Results (Last 24 Hours)     Procedure Component Value Units Date/Time    XR Chest 1 View [[id number]] Collected: 11/05/22 0813     Updated: 11/05/22 0816    Narrative:      Examination: XR CHEST 1 VW-     Date of Exam: 11/5/2022 7:45 AM     Indication: Chest pain; R07.89-Other chest pain; J44.1-Chronic  obstructive pulmonary disease with (acute) exacerbation.     Comparison: Radiograph 05/17/2022, 12/03/2021     Technique: 1 view of the chest      Findings:  Scarring present within the left lung base. There is a small left-sided  pleural effusion.. No pneumothorax. The heart size is normal. The  pulmonary vasculature appears within normal limits. No acute osseous  abnormality identified.        Impression:      Scarring present within the left lung base with suspected small  left-sided pleural effusion which may partially chronic given similar  appearance on previous examinations.     Electronically Signed By-Suni Rodriguez MD On:11/5/2022 8:14 AM  This report was finalized on [id number] by  Suni Rodriguez MD.    CT Chest Without Contrast Diagnostic [[id number]] Collected: 11/05/22 0113     Updated: 11/05/22 0322    Narrative:      EXAMINATION: CT SCAN OF THE CHEST WITHOUT INTRAVENOUS CONTRAST    DATE OF EXAM: 11/5/2022 2:48 AM    HISTORY: Cough, left chest wall pain    COMPARISON: May 18, 2022.    TECHNIQUE: CT examination of the chest was performed without intravenous contrast. CT dose lowering techniques were used, to include: automated exposure control, adjustment for patient size, and/or use of iterative reconstruction.    Note: The exam is limited because some types of pathology may not be adequately demonstrated due to lack of contrast enhancement.    FINDINGS:    CHEST:    Lungs:  Resolution of the previously seen areas of consolidation in the lung bases. No new areas of consolidation are identified. Improvement of the bronchial wall thickening seen on the prior study.      Pleura: No effusion or pneumothorax.     Mediastinum And [place]: Status post resection of the left thyroid lobe. No mediastinal or hilar lymphadenopathy. Normal course and caliber the mediastinal vessels.    Cardiovascular: Heart size is normal. Trace pericardial effusion. Is decreased from prior.     Chest Wall:  No acute abnormality..     Upper Abdomen: Status post cholecystectomy. Granulomatous calcifications in the spleen.     MUSCULOSKELETAL: Stable appearance of T5 and T6.        Impression:        1.  No acute cardiopulmonary findings. Resolution of the previously noted airspace disease on the prior study    2.  Status post cholecystectomy and left thyroidectomy    3.  Decreased pericardial effusion.    4.  Stable  appearance of the midthoracic vertebral body compression. No acute osseous abnormality.      Electronically signed by:  Kelsey Thomas D.O.    11/5/2022 1:20 AM          LAB RESULTS (LAST 7 DAYS)    CBC  Results from last 7 days   Lab Units 11/05/22  0131   WBC 10*3/mm3 11.10*   RBC 10*6/mm3 4.51   HEMOGLOBIN g/dL 13.5   HEMATOCRIT % 42.4   MCV fL 93.8   PLATELETS 10*3/mm3 335       BMP  Results from last 7 days   Lab Units 11/05/22  0131   SODIUM mmol/L 143   POTASSIUM mmol/L 3.6   CHLORIDE mmol/L 110*   CO2 mmol/L 22.0   BUN mg/dL 13   CREATININE mg/dL 0.74   GLUCOSE mg/dL 89       CMP   Results from last 7 days   Lab Units 11/05/22  0131   SODIUM mmol/L 143   POTASSIUM mmol/L 3.6   CHLORIDE mmol/L 110*   CO2 mmol/L 22.0   BUN mg/dL 13   CREATININE mg/dL 0.74   GLUCOSE mg/dL 89   ALBUMIN g/dL 3.80   BILIRUBIN mg/dL <0.2   ALK PHOS U/L 102   AST (SGOT) U/L 12   ALT (SGPT) U/L 14       BNP        TROPONIN  Results from last 7 days   Lab Units 11/05/22  0744   TROPONIN T ng/mL <0.010       CoAg        Creatinine Clearance  Estimated Creatinine Clearance: 90.9 mL/min (by C-G formula based on SCr of 0.74 mg/dL).    ABG  Results from last 7 days   Lab Units 11/05/22  0304   PH, ARTERIAL pH units 7.316*   PCO2, ARTERIAL mm Hg 42.8   PO2 ART mm Hg 108.6*   O2 SATURATION ART % 97.7   BASE EXCESS ART mmol/L -4.3*         Radiology  CT Chest Without Contrast Diagnostic    Result Date: 11/5/2022  1.  No acute cardiopulmonary findings. Resolution of the previously noted airspace disease on the prior study 2.  Status post cholecystectomy and left thyroidectomy 3.  Decreased pericardial effusion. 4.  Stable appearance of the midthoracic vertebral body compression. No acute osseous abnormality. Electronically signed by:  Kelsey Thomas D.O.  11/5/2022 1:20 AM    XR Chest 1 View    Result Date: 11/5/2022  Scarring present within the left lung base with suspected small left-sided pleural effusion which may partially chronic given similar  appearance on previous examinations.  Electronically Signed By-Suni Rodriguez MD On:11/5/2022 8:14 AM This report was finalized on [id number] by  Suni Rodriguez MD.        EKG  I personally viewed and interpreted the patient's EKG/Telemetry data:  ECG 12 Lead Chest Pain   Preliminary Result   HEART RATE= 91  bpm   RR Interval= 660  ms   AR Interval= 166  ms   P Horizontal Axis=   deg   P Front Axis= 60  deg   QRSD Interval= 84  ms   QT Interval= 401  ms   QRS Axis= 37  deg   T Wave Axis= 50  deg   - BORDERLINE ECG -   Sinus rhythm   Borderline ST depression, anterior leads   When compared with ECG of 05-Nov-2022 0:32:13,   Significant rate increase   Electronically Signed By:    Date and Time of Study: 2022-11-05 07:46:52      ECG 12 Lead Dyspnea   Preliminary Result   HEART RATE= 69  bpm   RR Interval= 872  ms   AR Interval= 164  ms   P Horizontal Axis= -9  deg   P Front Axis= 35  deg   QRSD Interval= 78  ms   QT Interval= 434  ms   QRS Axis= 35  deg   T Wave Axis= 27  deg   - NORMAL ECG -   Sinus rhythm   When compared with ECG of 17-May-2022 15:42:17,   Significant rate decrease   Significant repolarization change   Electronically Signed By:    Date and Time of Study: 2022-11-05 00:32:13            Echocardiogram:    Results for orders placed during the hospital encounter of 12/03/21    Adult Transthoracic Echo Complete W/ Cont if Necessary Per Protocol    Interpretation Summary  · Left ventricular systolic function is normal.  · Left ventricular ejection is 60-65%  · Left ventricular diastolic function was normal.        Stress Test:  Results for orders placed during the hospital encounter of 12/03/21    Stress Test With Myocardial Perfusion One Day    Interpretation Summary  · Left ventricular ejection fraction is hyperdynamic (Calculated EF > 70%). .  · Myocardial perfusion imaging indicates a normal myocardial perfusion study with no evidence of ischemia.  · Impressions are consistent with a low risk  study.  · This is normal Cardiolite imaging stress test with no evidence of ischemia or myocardial infarction. Left ventricle size and function is normal on gated SPECT imaging. No wall motion abnormality was noted. Clinical correlation recommended. Further recommendation as per ordering physician..  · Findings consistent with a normal ECG stress test.        Cardiac Catheterization:  No results found for this or any previous visit.        Other:      ASSESSMENT & PLAN:    Principal Problem:    COPD with acute exacerbation (HCC)  Active Problems:    Generalized anxiety disorder    Primary hypertension    Seizure disorder (HCC)    Severe recurrent major depression without psychotic features (HCC)    Acute-on-chronic respiratory failure (HCC)    Overweight (BMI 25.0-29.9)    Type 2 diabetes mellitus (HCC)    Chest pain  This appears to be her chronic atypical chest pain  Enzymes and EKG are all within normal limits  I would like to start her on some amlodipine for her chronic angina  Previous stress tests and echoes have been normal  She will continue on metoprolol    Orthostatic hypotension  On chronic midodrine    COPD exacerbation  Being treated with steroids and nebulizers  On 5 L oxygen via nasal cannula at home    Type 2 diabetes  Sliding scale insulin    Thank you for this consult  Danette Tay MD  11/05/22  16:34 EDT

## 2022-11-06 VITALS
RESPIRATION RATE: 23 BRPM | HEIGHT: 67 IN | DIASTOLIC BLOOD PRESSURE: 65 MMHG | WEIGHT: 170 LBS | SYSTOLIC BLOOD PRESSURE: 114 MMHG | HEART RATE: 90 BPM | TEMPERATURE: 97.9 F | BODY MASS INDEX: 26.68 KG/M2 | OXYGEN SATURATION: 98 %

## 2022-11-06 LAB
GLUCOSE BLDC GLUCOMTR-MCNC: 138 MG/DL (ref 70–105)
GLUCOSE BLDC GLUCOMTR-MCNC: 151 MG/DL (ref 70–105)
TROPONIN T SERPL-MCNC: <0.01 NG/ML (ref 0–0.03)
TROPONIN T SERPL-MCNC: <0.01 NG/ML (ref 0–0.03)

## 2022-11-06 PROCEDURE — 84484 ASSAY OF TROPONIN QUANT: CPT | Performed by: HOSPITALIST

## 2022-11-06 PROCEDURE — 25010000002 METHYLPREDNISOLONE PER 40 MG: Performed by: NURSE PRACTITIONER

## 2022-11-06 PROCEDURE — 96376 TX/PRO/DX INJ SAME DRUG ADON: CPT

## 2022-11-06 PROCEDURE — 94761 N-INVAS EAR/PLS OXIMETRY MLT: CPT

## 2022-11-06 PROCEDURE — 99214 OFFICE O/P EST MOD 30 MIN: CPT | Performed by: INTERNAL MEDICINE

## 2022-11-06 PROCEDURE — G0378 HOSPITAL OBSERVATION PER HR: HCPCS

## 2022-11-06 PROCEDURE — 94664 DEMO&/EVAL PT USE INHALER: CPT

## 2022-11-06 PROCEDURE — 94799 UNLISTED PULMONARY SVC/PX: CPT

## 2022-11-06 PROCEDURE — 82962 GLUCOSE BLOOD TEST: CPT

## 2022-11-06 PROCEDURE — 25010000002 LORAZEPAM PER 2 MG: Performed by: HOSPITALIST

## 2022-11-06 PROCEDURE — 63710000001 INSULIN LISPRO (HUMAN) PER 5 UNITS: Performed by: NURSE PRACTITIONER

## 2022-11-06 RX ORDER — HYDROCODONE BITARTRATE AND ACETAMINOPHEN 10; 325 MG/1; MG/1
1 TABLET ORAL EVERY 6 HOURS PRN
Status: DISCONTINUED | OUTPATIENT
Start: 2022-11-06 | End: 2022-11-06 | Stop reason: HOSPADM

## 2022-11-06 RX ORDER — FUROSEMIDE 40 MG/1
40 TABLET ORAL DAILY
Status: DISCONTINUED | OUTPATIENT
Start: 2022-11-06 | End: 2022-11-06 | Stop reason: HOSPADM

## 2022-11-06 RX ORDER — MIDODRINE HYDROCHLORIDE 5 MG/1
10 TABLET ORAL
Status: DISCONTINUED | OUTPATIENT
Start: 2022-11-06 | End: 2022-11-06 | Stop reason: HOSPADM

## 2022-11-06 RX ORDER — PREDNISONE 20 MG/1
40 TABLET ORAL DAILY
Qty: 14 TABLET | Refills: 0 | Status: SHIPPED | OUTPATIENT
Start: 2022-11-06 | End: 2022-11-13

## 2022-11-06 RX ORDER — HYDROXYZINE HYDROCHLORIDE 25 MG/1
50 TABLET, FILM COATED ORAL 3 TIMES DAILY
Status: DISCONTINUED | OUTPATIENT
Start: 2022-11-06 | End: 2022-11-06 | Stop reason: HOSPADM

## 2022-11-06 RX ORDER — PANTOPRAZOLE SODIUM 40 MG/1
40 TABLET, DELAYED RELEASE ORAL DAILY
Status: DISCONTINUED | OUTPATIENT
Start: 2022-11-06 | End: 2022-11-06 | Stop reason: HOSPADM

## 2022-11-06 RX ORDER — LAMOTRIGINE 100 MG/1
200 TABLET ORAL NIGHTLY
Status: DISCONTINUED | OUTPATIENT
Start: 2022-11-06 | End: 2022-11-06 | Stop reason: HOSPADM

## 2022-11-06 RX ORDER — PREDNISONE 10 MG/1
10 TABLET ORAL DAILY
Status: DISCONTINUED | OUTPATIENT
Start: 2022-11-06 | End: 2022-11-06

## 2022-11-06 RX ORDER — TRAZODONE HYDROCHLORIDE 100 MG/1
300 TABLET ORAL NIGHTLY
Status: DISCONTINUED | OUTPATIENT
Start: 2022-11-06 | End: 2022-11-06 | Stop reason: HOSPADM

## 2022-11-06 RX ORDER — TOPIRAMATE 100 MG/1
100 TABLET, FILM COATED ORAL DAILY
Status: DISCONTINUED | OUTPATIENT
Start: 2022-11-06 | End: 2022-11-06 | Stop reason: HOSPADM

## 2022-11-06 RX ORDER — ALPRAZOLAM 1 MG/1
1 TABLET ORAL 2 TIMES DAILY PRN
Status: DISCONTINUED | OUTPATIENT
Start: 2022-11-06 | End: 2022-11-06 | Stop reason: HOSPADM

## 2022-11-06 RX ORDER — GABAPENTIN 300 MG/1
300 CAPSULE ORAL 4 TIMES DAILY
Status: DISCONTINUED | OUTPATIENT
Start: 2022-11-06 | End: 2022-11-06 | Stop reason: HOSPADM

## 2022-11-06 RX ORDER — METHOCARBAMOL 500 MG/1
750 TABLET, FILM COATED ORAL 3 TIMES DAILY
Status: DISCONTINUED | OUTPATIENT
Start: 2022-11-06 | End: 2022-11-06 | Stop reason: HOSPADM

## 2022-11-06 RX ORDER — METOPROLOL SUCCINATE 25 MG/1
25 TABLET, EXTENDED RELEASE ORAL DAILY
Status: DISCONTINUED | OUTPATIENT
Start: 2022-11-06 | End: 2022-11-06 | Stop reason: HOSPADM

## 2022-11-06 RX ADMIN — IPRATROPIUM BROMIDE AND ALBUTEROL SULFATE 3 ML: .5; 3 SOLUTION RESPIRATORY (INHALATION) at 07:39

## 2022-11-06 RX ADMIN — ALPRAZOLAM 1 MG: 1 TABLET ORAL at 07:55

## 2022-11-06 RX ADMIN — INSULIN LISPRO 2 UNITS: 100 INJECTION, SOLUTION INTRAVENOUS; SUBCUTANEOUS at 12:42

## 2022-11-06 RX ADMIN — HYDROCODONE BITARTRATE AND ACETAMINOPHEN 1 TABLET: 10; 325 TABLET ORAL at 12:12

## 2022-11-06 RX ADMIN — LORAZEPAM 0.5 MG: 2 INJECTION INTRAMUSCULAR; INTRAVENOUS at 03:10

## 2022-11-06 RX ADMIN — METHYLPREDNISOLONE SODIUM SUCCINATE 40 MG: 40 INJECTION, POWDER, FOR SOLUTION INTRAMUSCULAR; INTRAVENOUS at 15:32

## 2022-11-06 RX ADMIN — METHYLPREDNISOLONE SODIUM SUCCINATE 40 MG: 40 INJECTION, POWDER, FOR SOLUTION INTRAMUSCULAR; INTRAVENOUS at 03:09

## 2022-11-06 RX ADMIN — FUROSEMIDE 40 MG: 40 TABLET ORAL at 08:03

## 2022-11-06 RX ADMIN — METHOCARBAMOL TABLETS 750 MG: 500 TABLET, COATED ORAL at 08:03

## 2022-11-06 RX ADMIN — GABAPENTIN 300 MG: 300 CAPSULE ORAL at 07:56

## 2022-11-06 RX ADMIN — METHYLPREDNISOLONE SODIUM SUCCINATE 40 MG: 40 INJECTION, POWDER, FOR SOLUTION INTRAMUSCULAR; INTRAVENOUS at 12:13

## 2022-11-06 RX ADMIN — GABAPENTIN 300 MG: 300 CAPSULE ORAL at 12:12

## 2022-11-06 RX ADMIN — GUAIFENESIN 1200 MG: 600 TABLET, EXTENDED RELEASE ORAL at 08:04

## 2022-11-06 RX ADMIN — HYDROXYZINE HYDROCHLORIDE 50 MG: 25 TABLET ORAL at 08:03

## 2022-11-06 RX ADMIN — AMLODIPINE BESYLATE 5 MG: 5 TABLET ORAL at 08:04

## 2022-11-06 RX ADMIN — TOPIRAMATE 100 MG: 100 TABLET, FILM COATED ORAL at 07:58

## 2022-11-06 RX ADMIN — PANTOPRAZOLE SODIUM 40 MG: 40 TABLET, DELAYED RELEASE ORAL at 08:03

## 2022-11-06 RX ADMIN — IPRATROPIUM BROMIDE AND ALBUTEROL SULFATE 3 ML: .5; 3 SOLUTION RESPIRATORY (INHALATION) at 14:26

## 2022-11-06 RX ADMIN — METOPROLOL SUCCINATE 25 MG: 25 TABLET, EXTENDED RELEASE ORAL at 08:02

## 2022-11-06 RX ADMIN — IPRATROPIUM BROMIDE AND ALBUTEROL SULFATE 3 ML: .5; 3 SOLUTION RESPIRATORY (INHALATION) at 11:07

## 2022-11-06 RX ADMIN — TOPIRAMATE 100 MG: 100 TABLET, FILM COATED ORAL at 08:03

## 2022-11-06 RX ADMIN — MIDODRINE HYDROCHLORIDE 10 MG: 5 TABLET ORAL at 07:56

## 2022-11-06 RX ADMIN — Medication 10 ML: at 08:08

## 2022-11-06 RX ADMIN — ACETAMINOPHEN 650 MG: 325 TABLET, FILM COATED ORAL at 02:00

## 2022-11-06 NOTE — DISCHARGE SUMMARY
"             [facility] Medicine Services  DISCHARGE SUMMARY    Patient Name: Melvi Rayo  : 1965  MRN: [id number]    Date of Admission: 2022  Discharge Diagnosis: COPD exacerbation  Date of Discharge: 2022  Primary Care Physician: Bharti Norton APRN      Presenting Problem:   Chest wall pain [R07.89]  COPD exacerbation (HCC) [J44.1]  COPD with acute exacerbation (HCC) [J44.1]    Active and Resolved Hospital Problems:  Active Hospital Problems    Diagnosis POA   • **COPD with acute exacerbation (HCC) [J44.1] Yes   • Acute-on-chronic respiratory failure (HCC) [J96.20] Yes   • Overweight (BMI 25.0-29.9) [E66.3] Yes   • Type 2 diabetes mellitus (HCC) [E11.9] Yes   • Severe recurrent major depression without psychotic features (HCC) [F33.2] Yes   • Seizure disorder (HCC) [G40.909] Yes   • Generalized anxiety disorder [F41.1] Yes   • Primary hypertension [I10] Yes      Resolved Hospital Problems   No resolved problems to display.         Hospital Course     Hospital Course:  \"57 y.o. female who presented to [facility] on 2022 complaining of increased shortness of air, nonproductive cough for the past 5 days.  She denies any fever or chest pain.  She reports right-sided rib pain after a coughing episode and said she felt a \"pop\".  She has a past medical history of severe COPD on home oxygen at 5 L via nasal cannula.  She also has a past medical history of depression with psychotic features, generalized anxiety disorder PTSD, essential hypertension seizure disorder type 2 diabetes mellitus not on insulin.  Review of records shows she was admitted here in May for about a week for pneumonia and and sepsis.  Labs today show a negative respiratory panel, ABG pH 7.316 PO2 108.6, glucose of 89 troponin less than 0.010 proBNP 49.9 WBC 11.1 lactate 1.3 procalcitonin 0.04, EKG shows sinus rhythm heart rate 69, CT chest per radiology showed no acute cardiopulmonary " "findings and resolution of the previous noted airspace disease in the prior study.  Radiology also said stable appearance of the mid thoracic vertebrae body compression no acute osseous abnormality and a decreased pleural effusion.  She is stable on 5 L oxygen via nasal cannula.  She was given an an albuterol treatment 125 mg of IV Solu-Medrol.  She will be admitted for an acute exacerbation of COPD.\"    Patient was treated for COPD exacerbation.  Weaned to her home dose of O2 and is asking to be discharged.  Of note, rapid response was called on the day prior to discharge.  The patient was complaining of chest pain.  Please see previous notes.  Case discussed with cardiology and the patient is cleared for discharge.  No additional work-up recommended from a cardiac perspective.  She should follow-up with her pulmonologist on discharge.  I have also strongly recommended that she stop smoking cigarettes.    Day of Discharge     Vital Signs:  Temp:  [97.9 °F (36.6 °C)-98.3 °F (36.8 °C)] 97.9 °F (36.6 °C)  Heart Rate:  [78-92] 90  Resp:  [13-23] 23  BP: (110-117)/(61-74) 114/65  Flow (L/min):  [2-4] 4    Physical Exam:  Constitutional:       Appearance: Normal appearance.   HENT:      Head: Normocephalic and atraumatic.   Eyes:      Extraocular Movements: Extraocular movements intact.      Pupils: Pupils are equal, round, and reactive to light.   Cardiovascular:      Rate and Rhythm: Normal rate and regular rhythm.      Pulses: Normal pulses.      Heart sounds: Normal heart sounds.   Pulmonary:   Faint wheezes; fairly good air movement  Abdominal:      General: Abdomen is flat. Bowel sounds are normal. There is no distension.   Musculoskeletal:      Cervical back: Normal range of motion and neck supple.      Right lower leg: No edema.      Left lower leg: No edema.   Skin:     General: Skin is warm.   Neurological:      General: No focal deficit present.      Mental Status: He is alert and oriented to person, place, " and time.   Psychiatric:         Mood and Affect: Mood normal.         Behavior: Behavior normal.         Pertinent  and/or Most Recent Results     LAB RESULTS:      Lab 11/05/22  0222 11/05/22 0131   WBC  --  11.10*   HEMOGLOBIN  --  13.5   HEMATOCRIT  --  42.4   PLATELETS  --  335   NEUTROS ABS  --  6.00   LYMPHS ABS  --  3.90*   MONOS ABS  --  0.80   EOS ABS  --  0.20   MCV  --  93.8   PROCALCITONIN  --  0.04   LACTATE 1.3  --          Lab 11/05/22 0131   SODIUM 143   POTASSIUM 3.6   CHLORIDE 110*   CO2 22.0   ANION GAP 11.0   BUN 13   CREATININE 0.74   EGFR 95.1   GLUCOSE 89   CALCIUM 8.9         Lab 11/05/22 0131   TOTAL PROTEIN 6.3   ALBUMIN 3.80   GLOBULIN 2.5   ALT (SGPT) 14   AST (SGOT) 12   BILIRUBIN <0.2   ALK PHOS 102         Lab 11/06/22  1027 11/06/22  0746 11/05/22  0744 11/05/22 0131   PROBNP  --   --   --  49.9   TROPONIN T <0.010 <0.010 <0.010 <0.010                 Lab 11/05/22  0304   PH, ARTERIAL 7.316*   PCO2, ARTERIAL 42.8   PO2 .6*   O2 SATURATION ART 97.7   FIO2 36   HCO3 ART 21.8   BASE EXCESS ART -4.3*     Brief Urine Lab Results     None        Microbiology Results (last 10 days)     Procedure Component Value - Date/Time    Blood Culture - Blood, Arm, Left [[id number]]  (Normal) Collected: 11/05/22 0216    Lab Status: Preliminary result Specimen: Blood from Arm, Left Updated: 11/06/22 0201     Blood Culture No growth at 24 hours    Blood Culture - Blood, Arm, Left [[id number]]  (Normal) Collected: 11/05/22 0131    Lab Status: Preliminary result Specimen: Blood from Arm, Left Updated: 11/06/22 0201     Blood Culture No growth at 24 hours    Respiratory Panel PCR w/COVID-19(SARS-CoV-2) [facility] In-House, NP Swab in UTM/VTM, 3-4 HR TAT - Swab, Nasopharynx [[id number]]  (Normal) Collected: 11/05/22 0018    Lab Status: Final result Specimen: Swab from Nasopharynx Updated: 11/05/22 0134     ADENOVIRUS, PCR Not Detected     Coronavirus 229E Not Detected     Coronavirus  HKU1 Not Detected     Coronavirus NL63 Not Detected     Coronavirus OC43 Not Detected     COVID19 Not Detected     Human Metapneumovirus Not Detected     Human Rhinovirus/Enterovirus Not Detected     Influenza A PCR Not Detected     Influenza B PCR Not Detected     Parainfluenza Virus 1 Not Detected     Parainfluenza Virus 2 Not Detected     Parainfluenza Virus 3 Not Detected     Parainfluenza Virus 4 Not Detected     RSV, PCR Not Detected     Bordetella pertussis pcr Not Detected     Bordetella parapertussis PCR Not Detected     Chlamydophila pneumoniae PCR Not Detected     Mycoplasma pneumo by PCR Not Detected    Narrative:      In the setting of a positive respiratory panel with a viral infection PLUS a negative procalcitonin without other underlying concern for bacterial infection, consider observing off antibiotics or discontinuation of antibiotics and continue supportive care. If the respiratory panel is positive for atypical bacterial infection (Bordetella pertussis, Chlamydophila pneumoniae, or Mycoplasma pneumoniae), consider antibiotic de-escalation to target atypical bacterial infection.          CT Chest Without Contrast Diagnostic    Result Date: 11/5/2022  Impression: 1.  No acute cardiopulmonary findings. Resolution of the previously noted airspace disease on the prior study 2.  Status post cholecystectomy and left thyroidectomy 3.  Decreased pericardial effusion. 4.  Stable appearance of the midthoracic vertebral body compression. No acute osseous abnormality. Electronically signed by:  Kelsey Thomas D.O.  11/5/2022 1:20 AM    XR Chest 1 View    Result Date: 11/5/2022  Impression: Scarring present within the left lung base with suspected small left-sided pleural effusion which may partially chronic given similar appearance on previous examinations.  Electronically Signed By-Suni Rodriguez MD On:11/5/2022 8:14 AM This report was finalized on [id number] by  Suni Rodriguez MD.              Results for  orders placed during the hospital encounter of 12/03/21    Adult Transthoracic Echo Complete W/ Cont if Necessary Per Protocol    Interpretation Summary  · Left ventricular systolic function is normal.  · Left ventricular ejection is 60-65%  · Left ventricular diastolic function was normal.      Labs Pending at Discharge:  Pending Labs     Order Current Status    Blood Culture - Blood, Arm, Left Preliminary result    Blood Culture - Blood, Arm, Left Preliminary result          Procedures Performed           Consults:   Consults     Date and Time Order Name Status Description    11/5/2022  7:47 AM Inpatient Cardiology Consult Completed     11/5/2022  4:29 AM Inpatient Hospitalist Consult              Discharge Details        Discharge Medications      Changes to Medications      Instructions Start Date   freestyle lancets  What changed: See the new instructions.   TEST EVERY DAY      predniSONE 10 MG tablet  Commonly known as: DELTASONE  What changed: Another medication with the same name was added. Make sure you understand how and when to take each.   10 mg, Oral, Daily      predniSONE 20 MG tablet  Commonly known as: DELTASONE  What changed: You were already taking a medication with the same name, and this prescription was added. Make sure you understand how and when to take each.   40 mg, Oral, Daily         Continue These Medications      Instructions Start Date   albuterol sulfate  (90 Base) MCG/ACT inhaler  Commonly known as: PROVENTIL HFA;VENTOLIN HFA;PROAIR HFA   INHALE ONE PUFF BY MOUTH EVERY 4 TO 6 HOURS      ALPRAZolam 1 MG tablet  Commonly known as: XANAX   TAKE ONE TABLET BY MOUTH TWICE DAILY AND ONE-HALF AT NOON      FREESTYLE LITE test strip  Generic drug: glucose blood   test TWICE DAILY      FreeStyle Lite w/Device kit   Use to check blood sugars 2-3 times a day      furosemide 40 MG tablet  Commonly known as: LASIX   40 mg, Oral, Daily      gabapentin 300 MG capsule  Commonly known as:  NEURONTIN   300 mg, Oral, 4 Times Daily      hydrOXYzine 50 MG tablet  Commonly known as: ATARAX   50 mg, Oral, 3 Times Daily      ipratropium-albuterol 0.5-2.5 mg/3 ml nebulizer  Commonly known as: DUO-NEB   No dose, route, or frequency recorded.      lamoTRIgine 200 MG tablet  Commonly known as: LaMICtal   200 mg, Oral, Every Night at Bedtime      linaclotide 145 MCG capsule capsule  Commonly known as: LINZESS   Linzess 145 mcg capsule      metFORMIN 500 MG tablet  Commonly known as: GLUCOPHAGE   500 mg, Oral, 3 Times Daily      methocarbamol 750 MG tablet  Commonly known as: ROBAXIN   TAKE ONE TABLET BY MOUTH THREE TIMES DAILY      metoprolol succinate XL 25 MG 24 hr tablet  Commonly known as: TOPROL-XL   25 mg, Oral, Daily      midodrine 10 MG tablet  Commonly known as: PROAMATINE   10 mg, Oral, 3 Times Daily      montelukast 10 MG tablet  Commonly known as: SINGULAIR   10 mg, Oral, Nightly      NON FORMULARY   1 dose, Nasal, Continuous, Oxygen 3 lpm      omeprazole 20 MG capsule  Commonly known as: priLOSEC   20 mg, Oral, 2 times daily      oxyCODONE-acetaminophen  MG per tablet  Commonly known as: PERCOCET   No dose, route, or frequency recorded.      potassium chloride 20 MEQ CR tablet  Commonly known as: K-DUR,KLOR-CON   20 mEq, Oral, Daily      promethazine 25 MG tablet  Commonly known as: PHENERGAN   TAKE ONE TABLET BY MOUTH EVERY 8 HOURS AS NEEDED FOR NAUSEA AND VOMITING      sertraline 50 MG tablet  Commonly known as: ZOLOFT   50 mg, Oral, Every Night at Bedtime      sucralfate 1 g tablet  Commonly known as: CARAFATE   1 g, Oral, 4 Times Daily      topiramate 100 MG tablet  Commonly known as: TOPAMAX   TAKE ONE TABLET BY MOUTH EVERY MORNING AND ONE-HALF TABLET EVERY NIGHT AT BEDTIME      traZODone 150 MG tablet  Commonly known as: DESYREL   300 mg, Oral, Nightly      vitamin D 1.25 MG (33388 UT) capsule capsule  Commonly known as: ERGOCALCIFEROL   50,000 Units, Oral, 2 Times Weekly              Allergies   Allergen Reactions   • Aspirin Palpitations   • Codeine Shortness Of Breath   • Contrast Dye Shortness Of Breath   • Erythromycin Hives   • Morphine Unknown (See Comments)   • Penicillin G Itching   • Sulfa Antibiotics Unknown (See Comments)   • Doxycycline Other (See Comments)     Rash and SOA   • Fentanyl Itching   • Levofloxacin Other (See Comments)         Discharge Disposition:   Home or Self Care    Diet:  Hospital:  Diet Order   Procedures   • Diet Diabetic/Consistent Carbs; Diabetic - Consistent Carb         Discharge Activity:         CODE STATUS:  Code Status and Medical Interventions:   Ordered at: 11/05/22 0439     Code Status (Patient has no pulse and is not breathing):    CPR (Attempt to Resuscitate)     Medical Interventions (Patient has pulse or is breathing):    Full Support         Future Appointments   Date Time Provider Department Center   3/9/2023 11:00 AM Mayra Jaime MD [facility]           Time spent on Discharge including face to face service:  35 minutes    Signature: Electronically signed by Ian Bronson MD, 11/06/22, 3:08 PM EST.

## 2022-11-06 NOTE — PLAN OF CARE
Goal Outcome Evaluation:            Pt calm this shftt,resting with eyes closed, working to dc home

## 2022-11-06 NOTE — PROGRESS NOTES
Cardiology Progress Note    Patient Identification:  Name: Melvi Rayo  Age: 57 y.o.  Sex: female  :  1965  MRN: [id number]                 Follow Up / Chief Complaint: Chest pain  Chief Complaint   Patient presents with   • Shortness of Breath       Interval History: Patient presented with cough shortness of breath has a lot of wheezing was being treated for COPD exacerbation complaint of chest pain is feeling better now.       Subjective: Patient seen and examined.  Chart reviewed.  Labs reviewed.  Discussed with RN taking care of patient.  Patient denies any chest pain today.      Objective:  2022: Troponin x2 negative.  Glucose elevated, last A1c was normal at 5.6    History of Present Illness  :     Ms. Melvi Rayo has PMH of        #. Recurrent syncope status post IL are 2014, require explantation 2014 because of patient's insistence and pain,, Orthostatic hypotension possibly due to autonomic insufficiency, on chronic midodrine therapy  #  Recurrent atypical chest pain and negative cath, ,  and 3/25/15.  #. Diabetes, COPD, asthma, tobacco abuse  #. Questionable history of CVA and TIA in the past details are not available  #.  cholecystectomy, MARY JANE, thyroidectomy, GERD, Diabetic neuropathy  #Cigarette smoker     Presented through emergency room 2022 with complaint of shortness of breath with cough was being treated for COPD exacerbation and complaint of chest pain with coughing yesterday therefore cardiology was consulted.         Patient had echocardiogram 2021 which revealed normal LV function EF of 65%  Patient had Lexiscan Cardiolite 2021 which revealed normal perfusion EF of 70%          ASSESSMENT:  #Chronic atypical chest pain  #  autonomic insufficiency  #  bradycardia  #. diabetes and  autonomic a insufficiency  #. COPD tobacco abuse      PLAN:  Telemetry is revealing sinus rhythm  EKG reviewed/interpreted by me from 2020 reveals sinus rhythm  with rate of 91 bpm with nonspecific ST depression in anterior leads which is chronic  Troponin serial are negative.  Patient's chest pain appears noncardiac.  Would not recommend any further work-up at the current time.    Reviewed EKG, lab, echo and stress test results with patient.  Thank you for letting me participate in the care of this lady please call me back if I can be of any further assistance.         Copied text in this portion of the note has been reviewed and is accurate as of 2022    Past Medical History:  Past Medical History:   Diagnosis Date   • Anxiety    • Asthma    • Breast cancer (HCC)    • Chest tightness    • COPD (chronic obstructive pulmonary disease) (HCC)    • Degenerative disorder of bone    • Foot pain     S/P multiple foot surguries.   • GERD (gastroesophageal reflux disease)    • Lesion of eye     Lesion behind eye, cancer associated retinopathopthy. [facility]    • Low back pain    • Migraines    • Neck nodule    • Pancreatitis    • RA (rheumatoid arthritis) (HCC)    • Seizure disorder (HCC)     Generalized seizure, possible partial complex.   • Skin cancer     Age 18.   • Stroke (HCC)    • Type 2 diabetes mellitus (HCC)      Past Surgical History:  Past Surgical History:   Procedure Laterality Date   • BRONCHOSCOPY N/A 2022    Procedure: BRONCHOSCOPY with bronchial washing;  Surgeon: Gonzales Mendoza MD;  Location: [facility] ENDOSCOPY;  Service: Pulmonary;  Laterality: N/A;  post op: pneumonia   • CARDIAC CATHETERIZATION      x2   • CARDIAC CATHETERIZATION  2015   •  SECTION      x2   • ESOPHAGEAL DILATATION     • FOOT SURGERY  2015   • FOOT SURGERY Left 2016   • FOOT SURGERY Right 2017   • MASTECTOMY Bilateral    • OTHER SURGICAL HISTORY  2017    LOOP Recorder   • DC  2016    [facility]   • TOTAL ABDOMINAL HYSTERECTOMY WITH SALPINGO OOPHORECTOMY          Social History:   Social History     Tobacco Use   • Smoking status: Every  Day     Packs/day: 0.50     Types: Cigarettes     Start date: 12/4/2011   • Smokeless tobacco: Never   Substance Use Topics   • Alcohol use: No      Family History:  Family History   Problem Relation Age of Onset   • Heart disease Mother    • Stroke Mother    • Hyperlipidemia Mother    • Hypertension Mother    • Heart disease Father    • Stroke Father    • Hypertension Father    • Hyperlipidemia Father    • Heart disease Other    • COPD Other    • Cancer Other    • Diabetes Other    • Hypertension Other    • Hyperlipidemia Other    • Stroke Other           Allergies:  Allergies   Allergen Reactions   • Aspirin Palpitations   • Codeine Shortness Of Breath   • Contrast Dye Shortness Of Breath   • Erythromycin Hives   • Morphine Unknown (See Comments)   • Penicillin G Itching   • Sulfa Antibiotics Unknown (See Comments)   • Doxycycline Other (See Comments)     Rash and SOA   • Fentanyl Itching   • Levofloxacin Other (See Comments)     Scheduled Meds:  amLODIPine, 5 mg, Q24H  furosemide, 40 mg, Daily  gabapentin, 300 mg, 4x Daily  guaiFENesin, 1,200 mg, Q12H  hydrOXYzine, 50 mg, TID  insulin lispro, 2-9 Units, TID With Meals  ipratropium-albuterol, 3 mL, Q4H - RT  lamoTRIgine, 200 mg, Nightly  methocarbamol, 750 mg, TID  methylPREDNISolone sodium succinate, 40 mg, Q6H  metoprolol succinate XL, 25 mg, Daily  midodrine, 10 mg, TID AC  nicotine, 1 patch, Q24H  pantoprazole, 40 mg, Daily  sertraline, 50 mg, Nightly  topiramate, 100 mg, Daily  topiramate, 150 mg, Nightly  traZODone, 300 mg, Nightly          Review of Systems:   ROS  Review of Systems   Constitution: Negative for chills and fever.   Cardiovascular: Negative for chest pain and palpitations.   Respiratory: Negative for cough and hemoptysis.    Gastrointestinal: Negative for nausea.        Constitutional:  Temp:  [97.9 °F (36.6 °C)-98.3 °F (36.8 °C)] 97.9 °F (36.6 °C)  Heart Rate:  [78-92] 79  Resp:  [14-22] 14  BP: (110-117)/(61-74) 114/65    Physical Exam   BP  "114/65 (BP Location: Right arm, Patient Position: Lying)   Pulse 79   Temp 97.9 °F (36.6 °C) (Oral)   Resp 14   Ht 170.2 cm (67\")   Wt 77.1 kg (170 lb)   SpO2 98%   BMI 26.63 kg/m²   General:  Appears in no acute distress  Eyes: Sclera is anicteric,  conjunctiva is clear   HEENT:  No JVD. Thyroid not visibly enlarged. No mucosal pallor or cyanosis  Respiratory: Nonlabored breathing, scattered rhonchi bilaterally  Cardiovascular: S1,S2 Regular rate and rhythm. No murmur, rub or gallop auscultated.  . No pretibial pitting edema  Gastrointestinal: Abdomen nondistended, soft  Musculoskeletal:  No abnormal movements  Extremities: No digital clubbing or cyanosis  Skin: Color pink. Skin warm and dry to touch. No rashes  No xanthoma  Neuro: Alert and awake, no lateralizing deficits appreciated    INTAKE AND OUTPUT:    Intake/Output Summary (Last 24 hours) at 11/6/2022 1251  Last data filed at 11/6/2022 0800  Gross per 24 hour   Intake 240 ml   Output --   Net 240 ml       Cardiographics  Telemetry: Sinus rhythm    ECG:   ECG 12 Lead Chest Pain   Preliminary Result   HEART RATE= 91  bpm   RR Interval= 660  ms   RI Interval= 166  ms   P Horizontal Axis=   deg   P Front Axis= 60  deg   QRSD Interval= 84  ms   QT Interval= 401  ms   QRS Axis= 37  deg   T Wave Axis= 50  deg   - BORDERLINE ECG -   Sinus rhythm   Borderline ST depression, anterior leads   When compared with ECG of 05-Nov-2022 0:32:13,   Significant rate increase   Electronically Signed By:    Date and Time of Study: 2022-11-05 07:46:52      ECG 12 Lead Dyspnea   Preliminary Result   HEART RATE= 69  bpm   RR Interval= 872  ms   RI Interval= 164  ms   P Horizontal Axis= -9  deg   P Front Axis= 35  deg   QRSD Interval= 78  ms   QT Interval= 434  ms   QRS Axis= 35  deg   T Wave Axis= 27  deg   - NORMAL ECG -   Sinus rhythm   When compared with ECG of 17-May-2022 15:42:17,   Significant rate decrease   Significant repolarization change   Electronically Signed " "By:    Date and Time of Study: 2022-11-05 00:32:13        I have personally reviewed EKG    Echocardiogram: Results for orders placed during the hospital encounter of 12/03/21    Adult Transthoracic Echo Complete W/ Cont if Necessary Per Protocol    Interpretation Summary  · Left ventricular systolic function is normal.  · Left ventricular ejection is 60-65%  · Left ventricular diastolic function was normal.      Lab Review   I have reviewed the labs  Results from last 7 days   Lab Units 11/06/22  1027 11/06/22  0746 11/05/22  0744   TROPONIN T ng/mL <0.010 <0.010 <0.010         Results from last 7 days   Lab Units 11/05/22  0131   SODIUM mmol/L 143   POTASSIUM mmol/L 3.6   BUN mg/dL 13   CREATININE mg/dL 0.74   CALCIUM mg/dL 8.9         Results from last 7 days   Lab Units 11/05/22  0131   WBC 10*3/mm3 11.10*   HEMOGLOBIN g/dL 13.5   HEMATOCRIT % 42.4   PLATELETS 10*3/mm3 335           RADIOLOGY:  Imaging Results (Last 24 Hours)     ** No results found for the last 24 hours. **                )11/6/2022  MD MAMI Steinberg/Transcription:   \"Dictated utilizing Dragon dictation\".   "

## 2022-11-07 NOTE — CASE MANAGEMENT/SOCIAL WORK
Case Management Discharge Note      Final Note: Routine home                  Transportation Services  Private: Car    Final Discharge Disposition Code: 01 - home or self-care

## 2022-11-08 LAB — QT INTERVAL: 401 MS

## 2022-11-09 DIAGNOSIS — F33.2 SEVERE RECURRENT MAJOR DEPRESSION WITHOUT PSYCHOTIC FEATURES: Chronic | ICD-10-CM

## 2022-11-09 RX ORDER — SERTRALINE HYDROCHLORIDE 25 MG/1
TABLET, FILM COATED ORAL
Qty: 30 TABLET | Refills: 3 | Status: SHIPPED | OUTPATIENT
Start: 2022-11-09 | End: 2023-03-04

## 2022-11-09 NOTE — TELEPHONE ENCOUNTER
Rx Refill Note  Requested Prescriptions     Pending Prescriptions Disp Refills   • sertraline (ZOLOFT) 25 MG tablet [Pharmacy Med Name: sertraline 25 mg tablet] 30 tablet 3     Sig: TAKE ONE TABLET BY MOUTH EVERY NIGHT AT BEDTIME      Last office visit with prescribing clinician: 9/8/2022      Next office visit with prescribing clinician: 3/9/2023   Office Visit with Mayra Jaime MD (09/08/2022)       SCANNED - LABS (09/08/2022)      Harmony Knott MA  11/09/22, 08:06 EST   38

## 2022-11-10 LAB
BACTERIA SPEC AEROBE CULT: NORMAL
BACTERIA SPEC AEROBE CULT: NORMAL

## 2022-11-21 ENCOUNTER — OFFICE VISIT (OUTPATIENT)
Dept: CARDIOLOGY | Facility: CLINIC | Age: 57
End: 2022-11-21

## 2022-11-21 VITALS
DIASTOLIC BLOOD PRESSURE: 84 MMHG | HEART RATE: 97 BPM | SYSTOLIC BLOOD PRESSURE: 122 MMHG | WEIGHT: 175 LBS | BODY MASS INDEX: 27.47 KG/M2 | HEIGHT: 67 IN | OXYGEN SATURATION: 90 %

## 2022-11-21 DIAGNOSIS — R06.09 DYSPNEA ON EXERTION: ICD-10-CM

## 2022-11-21 DIAGNOSIS — J43.1 PANLOBULAR EMPHYSEMA: ICD-10-CM

## 2022-11-21 DIAGNOSIS — R07.89 ATYPICAL CHEST PAIN: ICD-10-CM

## 2022-11-21 DIAGNOSIS — R55 NEAR SYNCOPE: ICD-10-CM

## 2022-11-21 DIAGNOSIS — I10 ESSENTIAL HYPERTENSION: ICD-10-CM

## 2022-11-21 DIAGNOSIS — R60.0 EDEMA LEG: Primary | ICD-10-CM

## 2022-11-21 DIAGNOSIS — I73.9 PAD (PERIPHERAL ARTERY DISEASE): ICD-10-CM

## 2022-11-21 PROCEDURE — 99214 OFFICE O/P EST MOD 30 MIN: CPT | Performed by: INTERNAL MEDICINE

## 2022-11-21 NOTE — PROGRESS NOTES
Subjective:     Encounter Date:11/21/2022      Patient ID: Melvi Rayo is a 57 y.o. female.    Chief Complaint : Follow-up for chronic atypical chest pain, orthostatic hypotension, diabetes, cigarette smoker, COPD    History of Present Illness         Ms. Melvi Rayo has PMH of       #. Recurrent syncope status post IL are 05/16/2014, require explantation 09/03/2014 because of patient's insistence and pain,, Orthostatic hypotension possibly due to autonomic insufficiency, on chronic midodrine therapy  #  Recurrent atypical chest pain and negative cath, 3/25/15,  2014,  and 2005  #. Diabetes, COPD, asthma, tobacco abuse  #. Questionable history of CVA and TIA in the past details are not available  #.  cholecystectomy, MARY JANE, next surgery, GERD, Diabetic neuropathy  #Cigarette smoker      here for  followup.  Patient is complaining of recurrent left-sided chest pain with no aggravating or relieving factors.  Has been having dyspnea on exertion weight gain, leg edema and erythema.    Patient's arterial blood pressure is 122/84, heart rate 97, O2 sat of 90% on room air.      Patient has chronic chest pain will schedule for stress test in the past and patient canceled and refused to have stress test.  Patient has reproducible tenderness in midsternal area.    Labs done 12/06/2018 revealed a hemoglobin A1c of 5.5.  CMP, CBC was normal.  Labs from 2/28/2020 revealed glucose of 121 otherwise normal Chem-7 and CBC..  Patient's recent admission 11/5/2022 and 11/6/2022 had troponin x3 negative proBNP normal at 49.  Patient continues to smoke in spite of counseling.    Patient had echocardiogram 12/4/2021 which revealed normal LV function EF of 65%  Patient had Lexiscan Cardiolite 12/4/2021 which revealed normal perfusion EF of 70%         ASSESSMENT:    #Chronic atypical chest pain  #  autonomic insufficiency  #  bradycardia  #. diabetes and  autonomic a insufficiency  #. COPD tobacco abuse      PLAN:  Patient is  complaining of chronic chest pain which is like a weight sitting on her chest.  Advised her to have a stress test.  Patient is refusing.  Understands risk benefits alternatives.  Lower extremity has erythema and some edema.  We will check ABIs and venous Dopplers.  We will check an echocardiogram to assess LV function.  Counseled on smoking cessation.    Patient's chest pain i is of unclear etiology. patient had a cardiac catheterization in 2015 and 2014 which were normal. but she continues to smoke and has diabetes counseled on smoking cessation.  Patient's chest pain is noncardiac probably musculoskeletal.  Patient is complaining of weight gain.  Patient's BNP level was normal at 117 during the December 2021 admission.  Troponins were negative.  Advised follow-up closely with PMD.  Patient states her diabetes is running well.     Procedures EKG done 12/4/2021 reviewed/interpreted by me reveals sinus rhythm with rate of 73 bpm with nonspecific ST-T changes        Procedures   Event monitor 8/26/2022 was negative for any events.    Copied text in this portion of the note has been reviewed and is accurate as of 11/25/2022  The following portions of the patient's history were reviewed and updated as appropriate: allergies, current medications, past family history, past medical history, past social history, past surgical history and problem list.    Assessment:         MDM     Diagnosis Plan   1. Edema leg  Adult Transthoracic Echo Complete W/ Cont if Necessary Per Protocol    Duplex Venous Lower Extremity - Bilateral CAR    Doppler Ankle Brachial Index Single Level CAR      2. Dyspnea on exertion  Adult Transthoracic Echo Complete W/ Cont if Necessary Per Protocol    Duplex Venous Lower Extremity - Bilateral CAR    Doppler Ankle Brachial Index Single Level CAR      3. Atypical chest pain  Adult Transthoracic Echo Complete W/ Cont if Necessary Per Protocol    Duplex Venous Lower Extremity - Bilateral CAR    Doppler  Ankle Brachial Index Single Level CAR      4. Essential hypertension  Adult Transthoracic Echo Complete W/ Cont if Necessary Per Protocol    Duplex Venous Lower Extremity - Bilateral CAR    Doppler Ankle Brachial Index Single Level CAR      5. Panlobular emphysema (HCC)  Adult Transthoracic Echo Complete W/ Cont if Necessary Per Protocol    Duplex Venous Lower Extremity - Bilateral CAR    Doppler Ankle Brachial Index Single Level CAR      6. Near syncope  Adult Transthoracic Echo Complete W/ Cont if Necessary Per Protocol    Duplex Venous Lower Extremity - Bilateral CAR    Doppler Ankle Brachial Index Single Level CAR      7. PAD (peripheral artery disease) (HCC)  Adult Transthoracic Echo Complete W/ Cont if Necessary Per Protocol    Duplex Venous Lower Extremity - Bilateral CAR    Doppler Ankle Brachial Index Single Level CAR             Plan:               Past Medical History:  Past Medical History:   Diagnosis Date   • Anxiety    • Asthma    • Breast cancer (HCC)    • Chest tightness    • COPD (chronic obstructive pulmonary disease) (HCC)    • Degenerative disorder of bone    • Foot pain     S/P multiple foot surguries.   • GERD (gastroesophageal reflux disease)    • Lesion of eye     Lesion behind eye, cancer associated retinopathopthy. [facility]    • Low back pain    • Migraines    • Neck nodule    • Pancreatitis    • RA (rheumatoid arthritis) (HCC)    • Seizure disorder (HCC)     Generalized seizure, possible partial complex.   • Skin cancer     Age 18.   • Stroke (HCC)    • Type 2 diabetes mellitus (HCC)      Past Surgical History:  Past Surgical History:   Procedure Laterality Date   • BRONCHOSCOPY N/A 2022    Procedure: BRONCHOSCOPY with bronchial washing;  Surgeon: Gonzales Mendoza MD;  Location: [facility] ENDOSCOPY;  Service: Pulmonary;  Laterality: N/A;  post op: pneumonia   • CARDIAC CATHETERIZATION      x2   • CARDIAC CATHETERIZATION  2015   •  SECTION      x2   •  ESOPHAGEAL DILATATION     • FOOT SURGERY  01/11/2015   • FOOT SURGERY Left 04/2016   • FOOT SURGERY Right 12/2017   • MASTECTOMY Bilateral    • OTHER SURGICAL HISTORY  01/25/2017    LOOP Recorder   • DC  06/27/2016    [facility]   • TOTAL ABDOMINAL HYSTERECTOMY WITH SALPINGO OOPHORECTOMY        Allergies:  Allergies   Allergen Reactions   • Aspirin Palpitations   • Codeine Shortness Of Breath   • Contrast Dye Shortness Of Breath   • Erythromycin Hives   • Morphine Unknown (See Comments)   • Penicillin G Itching   • Sulfa Antibiotics Unknown (See Comments)   • Doxycycline Other (See Comments)     Rash and SOA   • Fentanyl Itching   • Levofloxacin Other (See Comments)     Home Meds:  Current Meds:     Current Outpatient Medications:   •  albuterol sulfate  (90 Base) MCG/ACT inhaler, INHALE ONE PUFF BY MOUTH EVERY 4 TO 6 HOURS, Disp: 18 g, Rfl: 3  •  ALPRAZolam (XANAX) 1 MG tablet, TAKE ONE TABLET BY MOUTH TWICE DAILY AND ONE-HALF AT NOON, Disp: 75 tablet, Rfl: 3  •  Blood Glucose Monitoring Suppl (FreeStyle Lite) w/Device kit, Use to check blood sugars 2-3 times a day, Disp: , Rfl:   •  FREESTYLE LITE test strip, test TWICE DAILY, Disp: 100 each, Rfl: 3  •  furosemide (LASIX) 40 MG tablet, Take 40 mg by mouth Daily., Disp: , Rfl:   •  gabapentin (NEURONTIN) 300 MG capsule, Take 1 capsule by mouth 4 (Four) Times a Day., Disp: 120 capsule, Rfl: 2  •  hydrOXYzine (ATARAX) 50 MG tablet, Take 1 tablet by mouth 3 (Three) Times a Day., Disp: 90 tablet, Rfl: 3  •  ipratropium-albuterol (DUO-NEB) 0.5-2.5 mg/3 ml nebulizer, , Disp: , Rfl:   •  lamoTRIgine (LaMICtal) 200 MG tablet, Take 1 tablet by mouth every night at bedtime., Disp: 30 tablet, Rfl: 3  •  Lancets (freestyle) lancets, TEST EVERY DAY (Patient taking differently: As Needed.), Disp: 100 each, Rfl: 3  •  linaclotide (LINZESS) 145 MCG capsule capsule, Linzess 145 mcg capsule, Disp: , Rfl:   •  metFORMIN (GLUCOPHAGE) 500 MG tablet, Take 500 mg by mouth 3 (Three)  Times a Day., Disp: , Rfl:   •  methocarbamol (ROBAXIN) 750 MG tablet, TAKE ONE TABLET BY MOUTH THREE TIMES DAILY, Disp: 90 tablet, Rfl: 0  •  metoprolol succinate XL (TOPROL-XL) 25 MG 24 hr tablet, Take 1 tablet by mouth Daily., Disp: 90 tablet, Rfl: 3  •  midodrine (PROAMATINE) 10 MG tablet, Take 1 tablet by mouth 3 (Three) Times a Day., Disp: 270 tablet, Rfl: 1  •  montelukast (SINGULAIR) 10 MG tablet, Take 10 mg by mouth Every Night., Disp: , Rfl:   •  NON FORMULARY, 1 dose into the nostril(s) as directed by provider Continuous. Oxygen 3 lpm, Disp: , Rfl:   •  omeprazole (priLOSEC) 20 MG capsule, Take 20 mg by mouth 2 (two) times a day., Disp: , Rfl:   •  oxyCODONE-acetaminophen (PERCOCET)  MG per tablet, , Disp: , Rfl:   •  potassium chloride (K-DUR,KLOR-CON) 20 MEQ CR tablet, Take 20 mEq by mouth Daily., Disp: , Rfl:   •  predniSONE (DELTASONE) 10 MG tablet, Take 10 mg by mouth Daily., Disp: , Rfl:   •  promethazine (PHENERGAN) 25 MG tablet, TAKE ONE TABLET BY MOUTH EVERY 8 HOURS AS NEEDED FOR NAUSEA AND VOMITING, Disp: 30 tablet, Rfl: 0  •  sertraline (ZOLOFT) 25 MG tablet, TAKE ONE TABLET BY MOUTH EVERY NIGHT AT BEDTIME, Disp: 30 tablet, Rfl: 3  •  sertraline (ZOLOFT) 50 MG tablet, Take 1 tablet by mouth every night at bedtime., Disp: 30 tablet, Rfl: 3  •  sucralfate (CARAFATE) 1 g tablet, Take 1 g by mouth 4 (Four) Times a Day., Disp: , Rfl:   •  topiramate (TOPAMAX) 100 MG tablet, TAKE ONE TABLET BY MOUTH EVERY MORNING AND ONE-HALF TABLET EVERY NIGHT AT BEDTIME, Disp: 75 tablet, Rfl: 0  •  traZODone (DESYREL) 150 MG tablet, Take 2 tablets by mouth Every Night., Disp: 60 tablet, Rfl: 3  •  vitamin D (ERGOCALCIFEROL) 1.25 MG (26911 UT) capsule capsule, Take 50,000 Units by mouth 2 (Two) Times a Week., Disp: , Rfl:   Social History:   Social History     Tobacco Use   • Smoking status: Every Day     Packs/day: 0.50     Types: Cigarettes     Start date: 12/4/2011   • Smokeless tobacco: Never   Substance  "Use Topics   • Alcohol use: No      Family History:  Family History   Problem Relation Age of Onset   • Heart disease Mother    • Stroke Mother    • Hyperlipidemia Mother    • Hypertension Mother    • Heart disease Father    • Stroke Father    • Hypertension Father    • Hyperlipidemia Father    • Heart disease Other    • COPD Other    • Cancer Other    • Diabetes Other    • Hypertension Other    • Hyperlipidemia Other    • Stroke Other               Review of Systems   Constitutional: Positive for malaise/fatigue.   Cardiovascular: Positive for chest pain, leg swelling and palpitations.   Respiratory: Positive for shortness of breath.    Gastrointestinal: Positive for nausea. Negative for vomiting.   Neurological: Negative for dizziness, light-headedness and numbness.   All other systems reviewed and are negative.    All other systems are negative         Objective:     Physical Exam  /84 (BP Location: Left arm, Patient Position: Sitting, Cuff Size: Adult)   Pulse 97   Ht 170.2 cm (67\")   Wt 79.4 kg (175 lb)   SpO2 90%   BMI 27.41 kg/m²   General:  Appears in no acute distress  Eyes: Sclera is anicteric,  conjunctiva is clear   HEENT:  No JVD.  No carotid bruits  Respiratory: Respirations regular and unlabored at rest.  Clear to auscultation  Cardiovascular: S1,S2 Regular rate and rhythm. No murmur, rub or gallop auscultated.   Extremities: No digital clubbing or cyanosis, no edema  Skin: Color pink. Skin warm and dry to touch. No rashes  No xanthoma  Neuro: Alert and awake.    Lab Reviewed:         Bright Steele MD  11/25/2022 21:55 EST      EMR Dragon/Transcription:   \"Dictated utilizing Dragon dictation\".        "

## 2022-12-02 ENCOUNTER — HOSPITAL ENCOUNTER (OUTPATIENT)
Dept: CARDIOLOGY | Facility: HOSPITAL | Age: 57
Discharge: HOME | End: 2022-12-02
Admitting: INTERNAL MEDICINE

## 2022-12-02 DIAGNOSIS — R07.89 ATYPICAL CHEST PAIN: ICD-10-CM

## 2022-12-02 DIAGNOSIS — R60.0 EDEMA LEG: ICD-10-CM

## 2022-12-02 DIAGNOSIS — J43.1 PANLOBULAR EMPHYSEMA: ICD-10-CM

## 2022-12-02 DIAGNOSIS — I10 ESSENTIAL HYPERTENSION: ICD-10-CM

## 2022-12-02 DIAGNOSIS — R06.09 DYSPNEA ON EXERTION: ICD-10-CM

## 2022-12-02 DIAGNOSIS — I73.9 PAD (PERIPHERAL ARTERY DISEASE): ICD-10-CM

## 2022-12-02 DIAGNOSIS — R55 NEAR SYNCOPE: ICD-10-CM

## 2022-12-02 PROCEDURE — 93306 TTE W/DOPPLER COMPLETE: CPT

## 2022-12-02 PROCEDURE — 93306 TTE W/DOPPLER COMPLETE: CPT | Performed by: INTERNAL MEDICINE

## 2022-12-03 LAB
BH CV ECHO MEAS - AO MAX PG: 4.2 MMHG
BH CV ECHO MEAS - AO MEAN PG: 2.35 MMHG
BH CV ECHO MEAS - AO ROOT DIAM: 3.1 CM
BH CV ECHO MEAS - AO V2 MAX: 102.5 CM/SEC
BH CV ECHO MEAS - AO V2 VTI: 20.8 CM
BH CV ECHO MEAS - AVA(I,D): 1.89 CM2
BH CV ECHO MEAS - EDV(CUBED): 50.2 ML
BH CV ECHO MEAS - EDV(MOD-SP4): 50.3 ML
BH CV ECHO MEAS - EF(MOD-BP): 62 %
BH CV ECHO MEAS - EF(MOD-SP4): 61.7 %
BH CV ECHO MEAS - ESV(CUBED): 17.8 ML
BH CV ECHO MEAS - ESV(MOD-SP4): 19.3 ML
BH CV ECHO MEAS - FS: 29.2 %
BH CV ECHO MEAS - IVS/LVPW: 0.99 CM
BH CV ECHO MEAS - IVSD: 0.98 CM
BH CV ECHO MEAS - LA DIMENSION: 4.1 CM
BH CV ECHO MEAS - LV DIASTOLIC VOL/BSA (35-75): 26.4 CM2
BH CV ECHO MEAS - LV MASS(C)D: 109.5 GRAMS
BH CV ECHO MEAS - LV MAX PG: 2.31 MMHG
BH CV ECHO MEAS - LV MEAN PG: 1.23 MMHG
BH CV ECHO MEAS - LV SYSTOLIC VOL/BSA (12-30): 10.1 CM2
BH CV ECHO MEAS - LV V1 MAX: 75.9 CM/SEC
BH CV ECHO MEAS - LV V1 VTI: 15.1 CM
BH CV ECHO MEAS - LVIDD: 3.7 CM
BH CV ECHO MEAS - LVIDS: 2.6 CM
BH CV ECHO MEAS - LVOT AREA: 2.6 CM2
BH CV ECHO MEAS - LVOT DIAM: 1.82 CM
BH CV ECHO MEAS - LVPWD: 0.99 CM
BH CV ECHO MEAS - MV A MAX VEL: 70.7 CM/SEC
BH CV ECHO MEAS - MV DEC SLOPE: 407.7 CM/SEC2
BH CV ECHO MEAS - MV DEC TIME: 0.2 MSEC
BH CV ECHO MEAS - MV E MAX VEL: 81.5 CM/SEC
BH CV ECHO MEAS - MV E/A: 1.15
BH CV ECHO MEAS - MV MAX PG: 2.8 MMHG
BH CV ECHO MEAS - MV MEAN PG: 1.53 MMHG
BH CV ECHO MEAS - MV V2 VTI: 19.6 CM
BH CV ECHO MEAS - MVA(VTI): 2 CM2
BH CV ECHO MEAS - PA ACC TIME: 0.09 SEC
BH CV ECHO MEAS - PA PR(ACCEL): 37 MMHG
BH CV ECHO MEAS - PULM A REVS DUR: 0.1 SEC
BH CV ECHO MEAS - PULM A REVS VEL: 33.9 CM/SEC
BH CV ECHO MEAS - PULM DIAS VEL: 52.7 CM/SEC
BH CV ECHO MEAS - PULM S/D: 1.08
BH CV ECHO MEAS - PULM SYS VEL: 56.8 CM/SEC
BH CV ECHO MEAS - RAP SYSTOLE: 3 MMHG
BH CV ECHO MEAS - RV MAX PG: 1.43 MMHG
BH CV ECHO MEAS - RV V1 MAX: 59.7 CM/SEC
BH CV ECHO MEAS - RV V1 VTI: 10.9 CM
BH CV ECHO MEAS - RVDD: 2.8 CM
BH CV ECHO MEAS - SI(MOD-SP4): 16.3 ML/M2
BH CV ECHO MEAS - SV(LVOT): 39.2 ML
BH CV ECHO MEAS - SV(MOD-SP4): 31.1 ML
MAXIMAL PREDICTED HEART RATE: 164 BPM
STRESS TARGET HR: 139 BPM

## 2022-12-13 ENCOUNTER — HOSPITAL ENCOUNTER (OUTPATIENT)
Dept: CARDIOLOGY | Facility: HOSPITAL | Age: 57
Discharge: HOME | End: 2022-12-13

## 2022-12-13 DIAGNOSIS — R06.09 DYSPNEA ON EXERTION: ICD-10-CM

## 2022-12-13 DIAGNOSIS — I10 ESSENTIAL HYPERTENSION: ICD-10-CM

## 2022-12-13 DIAGNOSIS — R60.0 EDEMA LEG: ICD-10-CM

## 2022-12-13 DIAGNOSIS — R07.89 ATYPICAL CHEST PAIN: ICD-10-CM

## 2022-12-13 DIAGNOSIS — J43.1 PANLOBULAR EMPHYSEMA: ICD-10-CM

## 2022-12-13 DIAGNOSIS — R55 NEAR SYNCOPE: ICD-10-CM

## 2022-12-13 DIAGNOSIS — I73.9 PAD (PERIPHERAL ARTERY DISEASE): ICD-10-CM

## 2022-12-13 LAB
BH CV LOWER ARTERIAL LEFT ABI RATIO: 1.33
BH CV LOWER ARTERIAL LEFT DORSALIS PEDIS SYS MAX: 118
BH CV LOWER ARTERIAL LEFT GREAT TOE SYS MAX: 157
BH CV LOWER ARTERIAL LEFT POST TIBIAL SYS MAX: 144
BH CV LOWER ARTERIAL LEFT TBI RATIO: 1.45
BH CV LOWER ARTERIAL RIGHT ABI RATIO: 1.11
BH CV LOWER ARTERIAL RIGHT DORSALIS PEDIS SYS MAX: 119
BH CV LOWER ARTERIAL RIGHT GREAT TOE SYS MAX: 95
BH CV LOWER ARTERIAL RIGHT POST TIBIAL SYS MAX: 120
BH CV LOWER ARTERIAL RIGHT TBI RATIO: 0.88
BH CV LOWER VASCULAR LEFT COMMON FEMORAL AUGMENT: NORMAL
BH CV LOWER VASCULAR LEFT COMMON FEMORAL COMPETENT: NORMAL
BH CV LOWER VASCULAR LEFT COMMON FEMORAL COMPRESS: NORMAL
BH CV LOWER VASCULAR LEFT COMMON FEMORAL PHASIC: NORMAL
BH CV LOWER VASCULAR LEFT COMMON FEMORAL SPONT: NORMAL
BH CV LOWER VASCULAR LEFT DISTAL FEMORAL COMPRESS: NORMAL
BH CV LOWER VASCULAR LEFT GASTRONEMIUS COMPRESS: NORMAL
BH CV LOWER VASCULAR LEFT GREATER SAPH AK COMPRESS: NORMAL
BH CV LOWER VASCULAR LEFT GREATER SAPH BK COMPRESS: NORMAL
BH CV LOWER VASCULAR LEFT LESSER SAPH COMPRESS: NORMAL
BH CV LOWER VASCULAR LEFT MID FEMORAL AUGMENT: NORMAL
BH CV LOWER VASCULAR LEFT MID FEMORAL COMPETENT: NORMAL
BH CV LOWER VASCULAR LEFT MID FEMORAL COMPRESS: NORMAL
BH CV LOWER VASCULAR LEFT MID FEMORAL PHASIC: NORMAL
BH CV LOWER VASCULAR LEFT MID FEMORAL SPONT: NORMAL
BH CV LOWER VASCULAR LEFT PERONEAL COMPRESS: NORMAL
BH CV LOWER VASCULAR LEFT POPLITEAL AUGMENT: NORMAL
BH CV LOWER VASCULAR LEFT POPLITEAL COMPETENT: NORMAL
BH CV LOWER VASCULAR LEFT POPLITEAL COMPRESS: NORMAL
BH CV LOWER VASCULAR LEFT POPLITEAL PHASIC: NORMAL
BH CV LOWER VASCULAR LEFT POPLITEAL SPONT: NORMAL
BH CV LOWER VASCULAR LEFT POSTERIOR TIBIAL COMPRESS: NORMAL
BH CV LOWER VASCULAR LEFT PROXIMAL FEMORAL COMPRESS: NORMAL
BH CV LOWER VASCULAR LEFT SAPHENOFEMORAL JUNCTION COMPRESS: NORMAL
BH CV LOWER VASCULAR RIGHT COMMON FEMORAL AUGMENT: NORMAL
BH CV LOWER VASCULAR RIGHT COMMON FEMORAL COMPETENT: NORMAL
BH CV LOWER VASCULAR RIGHT COMMON FEMORAL COMPRESS: NORMAL
BH CV LOWER VASCULAR RIGHT COMMON FEMORAL PHASIC: NORMAL
BH CV LOWER VASCULAR RIGHT COMMON FEMORAL SPONT: NORMAL
BH CV LOWER VASCULAR RIGHT DISTAL FEMORAL COMPRESS: NORMAL
BH CV LOWER VASCULAR RIGHT GASTRONEMIUS COMPRESS: NORMAL
BH CV LOWER VASCULAR RIGHT GREATER SAPH AK COMPRESS: NORMAL
BH CV LOWER VASCULAR RIGHT GREATER SAPH BK COMPRESS: NORMAL
BH CV LOWER VASCULAR RIGHT LESSER SAPH COMPRESS: NORMAL
BH CV LOWER VASCULAR RIGHT MID FEMORAL AUGMENT: NORMAL
BH CV LOWER VASCULAR RIGHT MID FEMORAL COMPETENT: NORMAL
BH CV LOWER VASCULAR RIGHT MID FEMORAL COMPRESS: NORMAL
BH CV LOWER VASCULAR RIGHT MID FEMORAL PHASIC: NORMAL
BH CV LOWER VASCULAR RIGHT MID FEMORAL SPONT: NORMAL
BH CV LOWER VASCULAR RIGHT PERONEAL COMPRESS: NORMAL
BH CV LOWER VASCULAR RIGHT POPLITEAL AUGMENT: NORMAL
BH CV LOWER VASCULAR RIGHT POPLITEAL COMPETENT: NORMAL
BH CV LOWER VASCULAR RIGHT POPLITEAL COMPRESS: NORMAL
BH CV LOWER VASCULAR RIGHT POPLITEAL PHASIC: NORMAL
BH CV LOWER VASCULAR RIGHT POPLITEAL SPONT: NORMAL
BH CV LOWER VASCULAR RIGHT POSTERIOR TIBIAL COMPRESS: NORMAL
BH CV LOWER VASCULAR RIGHT PROXIMAL FEMORAL COMPRESS: NORMAL
BH CV LOWER VASCULAR RIGHT SAPHENOFEMORAL JUNCTION COMPRESS: NORMAL
MAXIMAL PREDICTED HEART RATE: 164 BPM
MAXIMAL PREDICTED HEART RATE: 164 BPM
STRESS TARGET HR: 139 BPM
STRESS TARGET HR: 139 BPM
UPPER ARTERIAL RIGHT ARM BRACHIAL SYS MAX: 108 MMHG

## 2022-12-13 PROCEDURE — 93922 UPR/L XTREMITY ART 2 LEVELS: CPT

## 2022-12-13 PROCEDURE — 93970 EXTREMITY STUDY: CPT

## 2022-12-28 ENCOUNTER — OFFICE VISIT (OUTPATIENT)
Dept: PULMONOLOGY | Facility: HOSPITAL | Age: 57
End: 2022-12-28
Payer: MEDICAID

## 2023-01-04 DIAGNOSIS — F41.1 GENERALIZED ANXIETY DISORDER: Chronic | ICD-10-CM

## 2023-01-05 RX ORDER — ALPRAZOLAM 1 MG/1
TABLET ORAL
Qty: 75 TABLET | Refills: 2 | Status: SHIPPED | OUTPATIENT
Start: 2023-01-05 | End: 2023-04-07

## 2023-01-05 RX ORDER — HYDROXYZINE 50 MG/1
TABLET, FILM COATED ORAL
Qty: 90 TABLET | Refills: 3 | Status: SHIPPED | OUTPATIENT
Start: 2023-01-05

## 2023-01-05 NOTE — TELEPHONE ENCOUNTER
Rx Refill Note  Requested Prescriptions     Pending Prescriptions Disp Refills   • ALPRAZolam (XANAX) 1 MG tablet [Pharmacy Med Name: alprazolam 1 mg tablet] 75 tablet 3     Sig: TAKE ONE TABLET BY MOUTH TWICE DAILY AND ONE-HALF AT NOON      Last office visit with prescribing clinician: 9/8/2022   Last telemedicine visit with prescribing clinician: 1/5/2023   Next office visit with prescribing clinician: 1/5/2023   Office Visit with Mayra Jaime MD (09/08/2022)       SCANNED - LABS (09/08/2022)                   Would you like a call back once the refill request has been completed: [] Yes [] No    If the office needs to give you a call back, can they leave a voicemail: [] Yes [] No    Harmony Knott MA  01/05/23, 13:22 EST     Inspect printed; last fill 12-13

## 2023-01-06 DIAGNOSIS — F33.2 SEVERE RECURRENT MAJOR DEPRESSION WITHOUT PSYCHOTIC FEATURES: Chronic | ICD-10-CM

## 2023-01-06 RX ORDER — TRAZODONE HYDROCHLORIDE 150 MG/1
TABLET ORAL
Qty: 60 TABLET | Refills: 3 | Status: SHIPPED | OUTPATIENT
Start: 2023-01-06

## 2023-01-06 RX ORDER — LAMOTRIGINE 200 MG/1
TABLET ORAL
Qty: 30 TABLET | Refills: 3 | Status: SHIPPED | OUTPATIENT
Start: 2023-01-06

## 2023-01-06 NOTE — PROGRESS NOTES
This encounter was created in error - please disregard.    Patient's visit was pre-charted, but she cancelled day of appointment.

## 2023-01-18 ENCOUNTER — TELEPHONE (OUTPATIENT)
Dept: CARDIOLOGY | Facility: CLINIC | Age: 58
End: 2023-01-18
Payer: MEDICAID

## 2023-02-01 ENCOUNTER — OFFICE VISIT (OUTPATIENT)
Dept: CARDIOLOGY | Facility: CLINIC | Age: 58
End: 2023-02-01
Payer: MEDICAID

## 2023-02-01 VITALS
HEIGHT: 67 IN | DIASTOLIC BLOOD PRESSURE: 73 MMHG | BODY MASS INDEX: 29.03 KG/M2 | HEART RATE: 101 BPM | SYSTOLIC BLOOD PRESSURE: 102 MMHG | WEIGHT: 185 LBS | OXYGEN SATURATION: 98 %

## 2023-02-01 DIAGNOSIS — R00.2 PALPITATIONS: Primary | ICD-10-CM

## 2023-02-01 DIAGNOSIS — R07.89 CHEST TIGHTNESS: ICD-10-CM

## 2023-02-01 DIAGNOSIS — L03.90 WOUND CELLULITIS: ICD-10-CM

## 2023-02-01 DIAGNOSIS — R42 DIZZINESS: ICD-10-CM

## 2023-02-01 DIAGNOSIS — F17.200 TOBACCO USE DISORDER: ICD-10-CM

## 2023-02-01 DIAGNOSIS — R55 SYNCOPE, UNSPECIFIED SYNCOPE TYPE: ICD-10-CM

## 2023-02-01 DIAGNOSIS — R09.02 HYPOXIA: ICD-10-CM

## 2023-02-01 PROBLEM — Z82.3 FAMILY HISTORY OF CEREBROVASCULAR ACCIDENT (CVA): Status: ACTIVE | Noted: 2023-02-01

## 2023-02-01 PROBLEM — K21.9 GASTROESOPHAGEAL REFLUX DISEASE: Status: ACTIVE | Noted: 2023-02-01

## 2023-02-01 PROBLEM — J44.9 CHRONIC OBSTRUCTIVE LUNG DISEASE (HCC): Status: ACTIVE | Noted: 2021-06-15

## 2023-02-01 PROBLEM — E55.9 VITAMIN D DEFICIENCY: Status: ACTIVE | Noted: 2021-06-15

## 2023-02-01 PROBLEM — K22.2 STRICTURE OF ESOPHAGUS: Status: ACTIVE | Noted: 2018-10-29

## 2023-02-01 PROBLEM — J18.9 PNEUMONIA: Status: ACTIVE | Noted: 2022-06-01

## 2023-02-01 PROBLEM — L29.9 PRURITIC DISORDER: Status: ACTIVE | Noted: 2021-06-15

## 2023-02-01 PROBLEM — J44.1 ACUTE EXACERBATION OF CHRONIC OBSTRUCTIVE AIRWAYS DISEASE: Status: ACTIVE | Noted: 2022-06-01

## 2023-02-01 PROBLEM — M48.061 SPINAL STENOSIS OF LUMBAR REGION: Status: ACTIVE | Noted: 2021-06-15

## 2023-02-01 PROBLEM — R10.31 RIGHT LOWER QUADRANT PAIN: Status: ACTIVE | Noted: 2021-06-15

## 2023-02-01 PROBLEM — R51.9 HEADACHE: Status: ACTIVE | Noted: 2022-06-29

## 2023-02-01 PROBLEM — R79.89 ELEVATED LFTS: Status: ACTIVE | Noted: 2019-03-08

## 2023-02-01 PROBLEM — E11.40 DIABETIC NEUROPATHY: Status: ACTIVE | Noted: 2021-06-15

## 2023-02-01 PROBLEM — I77.89 LUPUS VASCULITIS: Status: ACTIVE | Noted: 2021-08-10

## 2023-02-01 PROBLEM — E04.1 THYROID NODULE: Status: ACTIVE | Noted: 2021-06-15

## 2023-02-01 PROBLEM — G47.00 PERSISTENT INSOMNIA: Status: ACTIVE | Noted: 2021-06-15

## 2023-02-01 PROBLEM — B35.4 TINEA CORPORIS: Status: ACTIVE | Noted: 2022-11-18

## 2023-02-01 PROBLEM — K29.70 GASTRITIS: Status: ACTIVE | Noted: 2021-06-15

## 2023-02-01 PROBLEM — M54.6 THORACIC BACK PAIN: Status: ACTIVE | Noted: 2021-06-15

## 2023-02-01 PROBLEM — K21.9 GASTROESOPHAGEAL REFLUX DISEASE WITHOUT ESOPHAGITIS: Status: ACTIVE | Noted: 2018-10-29

## 2023-02-01 PROBLEM — I95.9 LOW BLOOD PRESSURE: Status: ACTIVE | Noted: 2021-06-15

## 2023-02-01 PROBLEM — F41.8 MIXED ANXIETY AND DEPRESSIVE DISORDER: Status: ACTIVE | Noted: 2021-06-15

## 2023-02-01 PROBLEM — I10 BENIGN ESSENTIAL HYPERTENSION: Status: ACTIVE | Noted: 2018-10-29

## 2023-02-01 PROBLEM — J30.2 SEASONAL ALLERGIC RHINITIS: Status: ACTIVE | Noted: 2022-06-01

## 2023-02-01 PROBLEM — F41.9 ANXIETY DISORDER: Status: ACTIVE | Noted: 2023-02-01

## 2023-02-01 PROBLEM — L03.119 CELLULITIS OF LOWER LIMB: Status: ACTIVE | Noted: 2022-12-16

## 2023-02-01 PROBLEM — R30.0 DIFFICULT OR PAINFUL URINATION: Status: ACTIVE | Noted: 2018-12-06

## 2023-02-01 PROBLEM — M32.19 LUPUS VASCULITIS (HCC): Status: ACTIVE | Noted: 2021-08-10

## 2023-02-01 PROBLEM — R11.0 NAUSEA: Status: ACTIVE | Noted: 2021-07-07

## 2023-02-01 PROBLEM — E78.2 MIXED HYPERLIPIDEMIA: Status: ACTIVE | Noted: 2021-06-15

## 2023-02-01 PROBLEM — L08.9 SUPERFICIAL BACTERIAL INFECTION OF SKIN: Status: ACTIVE | Noted: 2022-08-24

## 2023-02-01 PROBLEM — J44.9 MODERATE CHRONIC OBSTRUCTIVE PULMONARY DISEASE: Status: ACTIVE | Noted: 2018-10-29

## 2023-02-01 PROBLEM — IMO0002 UNCONTROLLED TYPE 2 DIABETES MELLITUS: Status: ACTIVE | Noted: 2021-06-15

## 2023-02-01 PROBLEM — R06.00 DYSPNEA: Status: ACTIVE | Noted: 2022-06-01

## 2023-02-01 PROBLEM — B96.89 SUPERFICIAL BACTERIAL INFECTION OF SKIN: Status: ACTIVE | Noted: 2022-08-24

## 2023-02-01 PROBLEM — E11.9 TYPE 2 DIABETES MELLITUS: Status: ACTIVE | Noted: 2021-05-19

## 2023-02-01 PROBLEM — M54.50 LOW BACK PAIN: Status: ACTIVE | Noted: 2021-06-15

## 2023-02-01 PROBLEM — N39.0 URINARY TRACT INFECTION: Status: ACTIVE | Noted: 2019-04-30

## 2023-02-01 PROBLEM — M79.605 PAIN OF LEFT LOWER EXTREMITY: Status: ACTIVE | Noted: 2022-12-16

## 2023-02-01 PROBLEM — B37.0 CANDIDIASIS OF MOUTH: Status: ACTIVE | Noted: 2022-09-23

## 2023-02-01 PROBLEM — M17.9 OSTEOARTHRITIS OF KNEE: Status: ACTIVE | Noted: 2018-10-29

## 2023-02-01 PROBLEM — Z95.818 PRESENCE OF OTHER CARDIAC IMPLANTS AND GRAFTS: Status: ACTIVE | Noted: 2017-01-25

## 2023-02-01 PROBLEM — J96.20 ACUTE-ON-CHRONIC RESPIRATORY FAILURE: Status: ACTIVE | Noted: 2022-06-06

## 2023-02-01 PROCEDURE — 99214 OFFICE O/P EST MOD 30 MIN: CPT | Performed by: NURSE PRACTITIONER

## 2023-02-01 RX ORDER — FUROSEMIDE 40 MG/1
40 TABLET ORAL DAILY
Qty: 30 TABLET | Refills: 11 | Status: SHIPPED | OUTPATIENT
Start: 2023-02-01
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
## 2023-02-02 ENCOUNTER — TELEPHONE (OUTPATIENT)
Dept: CARDIOLOGY | Facility: CLINIC | Age: 58
End: 2023-02-02
Payer: MEDICAID

## 2023-02-02 RX ORDER — MUPIROCIN CALCIUM 20 MG/G
1 CREAM TOPICAL 3 TIMES DAILY
Qty: 30 G | Refills: 0 | Status: SHIPPED | OUTPATIENT
Start: 2023-02-02

## 2023-02-02 NOTE — TELEPHONE ENCOUNTER
RECEIVED URGENT NOTIFICATION    HOLTER MONITOR - SCAN - URGENT MD NOTIFICATION 2/1/2023 (02/02/2023)

## 2023-03-04 DIAGNOSIS — F33.2 SEVERE RECURRENT MAJOR DEPRESSION WITHOUT PSYCHOTIC FEATURES: Chronic | ICD-10-CM

## 2023-03-04 RX ORDER — SERTRALINE HYDROCHLORIDE 25 MG/1
TABLET, FILM COATED ORAL
Qty: 30 TABLET | Refills: 0 | Status: SHIPPED | OUTPATIENT
Start: 2023-03-04

## 2023-03-08 ENCOUNTER — TELEPHONE (OUTPATIENT)
Dept: CARDIOLOGY | Facility: CLINIC | Age: 58
End: 2023-03-08
Payer: MEDICAID

## 2023-03-08 NOTE — TELEPHONE ENCOUNTER
Please let patient know her monitor did not show anything during her dizzy spells or fainting spells.  Likely all related to hypoxia

## 2023-03-15 RX ORDER — MIDODRINE HYDROCHLORIDE 10 MG/1
10 TABLET ORAL 3 TIMES DAILY
Qty: 270 TABLET | Refills: 1 | Status: SHIPPED | OUTPATIENT
Start: 2023-03-15

## 2023-03-15 NOTE — TELEPHONE ENCOUNTER
Caller: Melvi Rayo    Relationship: Self    Best call back number:[phone]    Requested Prescriptions:   Requested Prescriptions     Pending Prescriptions Disp Refills   • midodrine (PROAMATINE) 10 MG tablet 270 tablet 1     Sig: Take 1 tablet by mouth 3 (Three) Times a Day.        Pharmacy where request should be sent: [facility], [place] -[id number] [facility], [place] - [address] [phone] [place] [phone] FX       Does the patient have less than a 3 day supply:  [] Yes  [x] No    Would you like a call back once the refill request has been completed: [x] Yes [] No    If the office needs to give you a call back, can they leave a voicemail: [x] Yes [] No    Franki Moura Rep   03/15/23 15:35 EDT

## 2023-03-15 NOTE — TELEPHONE ENCOUNTER
Rx Refill Note  Requested Prescriptions     Pending Prescriptions Disp Refills   • midodrine (PROAMATINE) 10 MG tablet 270 tablet 1     Sig: Take 1 tablet by mouth 3 (Three) Times a Day.      Last office visit with prescribing clinician: 2/1/2023   Last telemedicine visit with prescribing clinician: Visit date not found   Next office visit with prescribing clinician: Visit date not found                         Would you like a call back once the refill request has been completed: [] Yes [] No    If the office needs to give you a call back, can they leave a voicemail: [] Yes [] No    Page ADEBAYO Valencia  03/15/23, 16:14 EDT

## 2023-04-06 DIAGNOSIS — F41.1 GENERALIZED ANXIETY DISORDER: Chronic | ICD-10-CM

## 2023-04-07 RX ORDER — ALPRAZOLAM 1 MG/1
TABLET ORAL
Qty: 75 TABLET | Refills: 1 | Status: SHIPPED | OUTPATIENT
Start: 2023-04-07

## 2023-05-04 DIAGNOSIS — F33.2 SEVERE RECURRENT MAJOR DEPRESSION WITHOUT PSYCHOTIC FEATURES: Chronic | ICD-10-CM

## 2023-05-04 RX ORDER — HYDROXYZINE 50 MG/1
TABLET, FILM COATED ORAL
Qty: 90 TABLET | Refills: 0 | Status: SHIPPED | OUTPATIENT
Start: 2023-05-04

## 2023-05-04 RX ORDER — TRAZODONE HYDROCHLORIDE 150 MG/1
TABLET ORAL
Qty: 60 TABLET | Refills: 0 | Status: SHIPPED | OUTPATIENT
Start: 2023-05-04

## 2023-05-04 RX ORDER — LAMOTRIGINE 200 MG/1
TABLET ORAL
Qty: 30 TABLET | Refills: 0 | Status: SHIPPED | OUTPATIENT
Start: 2023-05-04

## 2023-05-25 ENCOUNTER — OFFICE VISIT (OUTPATIENT)
Dept: PSYCHIATRY | Facility: CLINIC | Age: 58
End: 2023-05-25
Payer: MEDICAID

## 2023-05-25 DIAGNOSIS — F41.1 GENERALIZED ANXIETY DISORDER: Chronic | ICD-10-CM

## 2023-05-25 DIAGNOSIS — F33.2 SEVERE RECURRENT MAJOR DEPRESSION WITHOUT PSYCHOTIC FEATURES: Primary | Chronic | ICD-10-CM

## 2023-05-25 DIAGNOSIS — F43.12 CHRONIC POSTTRAUMATIC STRESS DISORDER: Chronic | ICD-10-CM

## 2023-05-25 RX ORDER — ALPRAZOLAM 1 MG/1
TABLET ORAL
Qty: 75 TABLET | Refills: 5 | Status: SHIPPED | OUTPATIENT
Start: 2023-05-25

## 2023-05-25 RX ORDER — TRAZODONE HYDROCHLORIDE 150 MG/1
150 TABLET ORAL
Qty: 30 TABLET | Refills: 5 | Status: SHIPPED | OUTPATIENT
Start: 2023-05-25

## 2023-05-25 RX ORDER — LAMOTRIGINE 200 MG/1
200 TABLET ORAL
Qty: 30 TABLET | Refills: 5 | Status: SHIPPED | OUTPATIENT
Start: 2023-05-25

## 2023-05-25 RX ORDER — SERTRALINE HYDROCHLORIDE 25 MG/1
25 TABLET, FILM COATED ORAL
Qty: 30 TABLET | Refills: 5 | Status: SHIPPED | OUTPATIENT
Start: 2023-05-25
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
## 2023-05-26 ENCOUNTER — TELEPHONE (OUTPATIENT)
Dept: CARDIOLOGY | Facility: CLINIC | Age: 58
End: 2023-05-26
Payer: MEDICAID

## 2023-05-26 NOTE — TELEPHONE ENCOUNTER
Referral has been sent back to [facility] to have them contact pt to schedule.

## 2023-05-26 NOTE — TELEPHONE ENCOUNTER
Tanya placed referral for pt to see the [facility] on 2/1/23. The wound clinic was not able to reach pt to schedule. Does pt still need this referral? Please advise.

## 2023-06-03 DIAGNOSIS — F33.2 SEVERE RECURRENT MAJOR DEPRESSION WITHOUT PSYCHOTIC FEATURES: Chronic | ICD-10-CM

## 2023-08-03 RX ORDER — METOPROLOL SUCCINATE 25 MG/1
TABLET, EXTENDED RELEASE ORAL
Qty: 180 TABLET | Refills: 1 | Status: SHIPPED | OUTPATIENT
Start: 2023-08-03

## 2023-09-05 RX ORDER — MIDODRINE HYDROCHLORIDE 10 MG/1
TABLET ORAL
Qty: 270 TABLET | Refills: 1 | Status: SHIPPED | OUTPATIENT
Start: 2023-09-05

## 2023-11-20 ENCOUNTER — OFFICE VISIT (OUTPATIENT)
Dept: CARDIOLOGY | Facility: CLINIC | Age: 58
End: 2023-11-20
Payer: MEDICAID

## 2023-11-20 VITALS
WEIGHT: 180 LBS | OXYGEN SATURATION: 96 % | DIASTOLIC BLOOD PRESSURE: 75 MMHG | SYSTOLIC BLOOD PRESSURE: 109 MMHG | BODY MASS INDEX: 28.25 KG/M2 | HEART RATE: 90 BPM | HEIGHT: 67 IN

## 2023-11-20 DIAGNOSIS — I25.10 CORONARY ARTERY CALCIFICATION SEEN ON CT SCAN: ICD-10-CM

## 2023-11-20 DIAGNOSIS — E11.9 TYPE 2 DIABETES MELLITUS WITHOUT COMPLICATION, WITHOUT LONG-TERM CURRENT USE OF INSULIN: ICD-10-CM

## 2023-11-20 DIAGNOSIS — F17.200 TOBACCO USE DISORDER: ICD-10-CM

## 2023-11-20 DIAGNOSIS — J43.1 PANLOBULAR EMPHYSEMA: ICD-10-CM

## 2023-11-20 DIAGNOSIS — R07.2 PRECORDIAL PAIN: Primary | ICD-10-CM

## 2023-11-20 DIAGNOSIS — R55 RECURRENT SYNCOPE: ICD-10-CM

## 2023-11-20 DIAGNOSIS — I95.89 CHRONIC HYPOTENSION: ICD-10-CM

## 2023-11-20 RX ORDER — PRAVASTATIN SODIUM 10 MG
10 TABLET ORAL DAILY
Qty: 90 TABLET | Refills: 3 | Status: SHIPPED | OUTPATIENT
Start: 2023-11-20

## 2023-11-20 RX ORDER — ASPIRIN 81 MG/1
81 TABLET ORAL DAILY
Qty: 90 TABLET | Refills: 3 | Status: SHIPPED | OUTPATIENT
Start: 2023-11-20

## 2023-11-20 RX ORDER — TIOTROPIUM BROMIDE AND OLODATEROL 3.124; 2.736 UG/1; UG/1
2 SPRAY, METERED RESPIRATORY (INHALATION) DAILY
COMMUNITY
Start: 2023-11-15

## 2023-11-20 RX ORDER — POTASSIUM CHLORIDE 750 MG/1
20 TABLET, FILM COATED, EXTENDED RELEASE ORAL 2 TIMES DAILY
COMMUNITY

## 2023-11-20 RX ORDER — LANOLIN ALCOHOL/MO/W.PET/CERES
1 CREAM (GRAM) TOPICAL DAILY
COMMUNITY
Start: 2023-11-13

## 2023-11-20 NOTE — PROGRESS NOTES
Subjective:     Encounter Date:11/20/2023      Patient ID: Melvi Rayo is a 58 y.o. female.    Chief Complaint and history of present illness:     Follow-up for chronic atypical chest pain, orthostatic hypotension, diabetes, cigarette smoker, COPD     History of Present Illness          Ms. Melvi Rayo has PMH of        #. Recurrent syncope status post IL are 05/16/2014, require explantation 09/03/2014 because of patient's insistence and pain,, Orthostatic hypotension possibly due to autonomic insufficiency, on chronic midodrine therapy  #.  CAD by CT calcium score  #  Recurrent atypical chest pain and negative cath, 3/25/15,  2014,  and 2005, negative Lexiscan 2021  #. Diabetes,  #.COPD, asthma, tobacco abuse, on home O2  #. Questionable history of CVA and TIA in the past details are not available  #.  cholecystectomy, M[facility]ANE, next surgery, GERD, Diabetic neuropathy  #Cigarette smoker      here for  followup.  Patient is complaining of recurrent left-sided chest pain with no aggravating or relieving factors.  Has been having dyspnea on exertion weight gain, leg edema and erythema.     Patient's arterial blood pressure is 109/75, heart rate 90, O2 sat of 96% on room air.     Patient has chronic chest pain will schedule for stress test in the past and patient canceled and refused to have stress test.  Patient has reproducible tenderness in midsternal area.     Labs done 12/06/2018 revealed a hemoglobin A1c of 5.5.  CMP, CBC was normal.  Labs from 2/28/2020 revealed glucose of 121 otherwise normal Chem-7 and CBC..  Patient's recent admission 11/5/2022 and 11/6/2022 had troponin x3 negative proBNP normal at 49.  Patient continues to smoke in spite of counseling.     Patient had echocardiogram 12/4/2021 which revealed normal LV function EF of 65%  Patient had Lexiscan Cardiolite 12/4/2021 which revealed normal perfusion EF of 70%          ASSESSMENT:     #.  Recurrent substernal chest pain  #CAD by CT calcium  score  #Recurrent syncope    #  autonomic insufficiency  #  bradycardia  #. diabetes and  autonomic a insufficiency  #. COPD tobacco abuse      PLAN:    Reviewed EKG results with patient.  Patient is complaining of chronic chest pain which is like a weight sitting on her chest.  Advised her to have a stress test.    Patient's CT showing CT calcium score we will start her on aspirin and statins.  Counseled on smoking cessation.     Patient's chest pain i is of unclear etiology. patient had a cardiac catheterization in 2015 and 2014 which were normal. but she continues to smoke and has diabetes counseled on smoking cessation.  Patient's chest pain is noncardiac probably musculoskeletal.  Patient is complaining of weight gain.  Patient's BNP level was normal at 117 during the December 2021 admission.  Troponins were negative.  Advised follow-up closely with PMD.  Follow-up with PMD for diabetes and autonomic insufficiency.            Procedures   Event monitor 8/26/2022 was negative for any events.  Event monitor 201-3/6/2023 multiple symptoms with normal rhythm.    Echocardiogram 11/21/2022 EF of 60%    ABIs 12/13/2022 normal    Lexiscan Cardiolite 12/4/2021 negative for ischemia EF of 70%    CT chest 10/13/2023 at [facility] revealed COPD and coronary artery calcification  Chest x-ray 10/12/2023 [facility] no acute cardiopulmonary finding        ECG 12 Lead    Date/Time: 11/20/2023 1:08 PM  Performed by: Bright Steele MD    Authorized by: Bright Steele MD  Comparison: compared with previous ECG from 12/4/2021  Comparison to previous ECG: EKG done today reviewed/elevated by me reveals sinus rhythm with rate of 85 bpm, no new change compared EKG from 12/4/2021          Copied text in this portion of the note has been reviewed and is accurate as of 11/20/2023  The following portions of the patient's history were reviewed and updated as appropriate: allergies, current medications,  past family history, past medical history, past social history, past surgical history and problem list.    Assessment:         MDM       Diagnosis Plan   1. Precordial pain  Stress Test With Myocardial Perfusion One Day      2. Coronary artery calcification seen on CT scan  Stress Test With Myocardial Perfusion One Day      3. Recurrent syncope  Stress Test With Myocardial Perfusion One Day      4. Tobacco use disorder  Stress Test With Myocardial Perfusion One Day      5. Chronic hypotension  Stress Test With Myocardial Perfusion One Day      6. Panlobular emphysema        7. Type 2 diabetes mellitus without complication, without long-term current use of insulin               Plan:               Past Medical History:  Past Medical History:   Diagnosis Date    Anxiety     Asthma     Breast cancer     Chest tightness     COPD (chronic obstructive pulmonary disease)     Degenerative disorder of bone     Foot pain     S/P multiple foot surguries.    GERD (gastroesophageal reflux disease)     Lesion of eye     Lesion behind eye, cancer associated retinopathopthy. Documented from [facility].     Low back pain     Migraines     Neck nodule     Pancreatitis     RA (rheumatoid arthritis)     Seizure disorder     Generalized seizure, possible partial complex.    Skin cancer     Age 18.    Stroke     Type 2 diabetes mellitus      Past Surgical History:  Past Surgical History:   Procedure Laterality Date    BRONCHOSCOPY N/A 2022    Procedure: BRONCHOSCOPY with bronchial washing;  Surgeon: Gonzales Mendoza MD;  Location: [facility] ENDOSCOPY;  Service: Pulmonary;  Laterality: N/A;  post op: pneumonia    CARDIAC CATHETERIZATION      x2    CARDIAC CATHETERIZATION  2015     SECTION      x2    ESOPHAGEAL DILATATION      FOOT SURGERY  2015    FOOT SURGERY Left 2016    FOOT SURGERY Right 2017    MASTECTOMY Bilateral     OTHER SURGICAL HISTORY  2017    LOOP Recorder    DC  2016    [facility]    TOTAL  ABDOMINAL HYSTERECTOMY WITH SALPINGO OOPHORECTOMY        Allergies:  Allergies   Allergen Reactions    Aspirin Palpitations    Codeine Shortness Of Breath    Contrast Dye (Echo Or Unknown Ct/Mr) Shortness Of Breath    Erythromycin Hives    Morphine Unknown (See Comments)    Penicillin G Itching    Sulfa Antibiotics Unknown (See Comments)    Doxycycline Other (See Comments)     Rash and SOA    Fentanyl Itching    Levofloxacin Other (See Comments)     Home Meds:  Current Meds:     Current Outpatient Medications:     albuterol sulfate  (90 Base) MCG/ACT inhaler, INHALE ONE PUFF BY MOUTH EVERY 4 TO 6 HOURS, Disp: 18 g, Rfl: 3    ALPRAZolam (XANAX) 1 MG tablet, 1 tab po BID and 0.5 tab po at noon, Disp: 75 tablet, Rfl: 5    Blood Glucose Monitoring Suppl (FreeStyle Lite) w/Device kit, Use to check blood sugars 2-3 times a day, Disp: , Rfl:     FREESTYLE LITE test strip, test TWICE DAILY, Disp: 100 each, Rfl: 3    furosemide (LASIX) 40 MG tablet, Take 1 tablet by mouth Daily., Disp: 30 tablet, Rfl: 11    gabapentin (NEURONTIN) 300 MG capsule, Take 1 capsule by mouth 4 (Four) Times a Day., Disp: 120 capsule, Rfl: 2    hydrOXYzine (ATARAX) 50 MG tablet, TAKE ONE TABLET BY MOUTH THREE TIMES DAILY, Disp: 90 tablet, Rfl: 0    ipratropium-albuterol (DUO-NEB) 0.5-2.5 mg/3 ml nebulizer, , Disp: , Rfl:     lamoTRIgine (LaMICtal) 200 MG tablet, Take 1 tablet by mouth every night at bedtime., Disp: 30 tablet, Rfl: 5    Lancets (freestyle) lancets, TEST EVERY DAY (Patient taking differently: As Needed.), Disp: 100 each, Rfl: 3    linaclotide (LINZESS) 145 MCG capsule capsule, Linzess 145 mcg capsule, Disp: , Rfl:     Magnesium Oxide -Mg Supplement 400 (240 Mg) MG tablet, Take 1 tablet by mouth Daily., Disp: , Rfl:     metFORMIN (GLUCOPHAGE) 500 MG tablet, Take 1 tablet by mouth 3 (Three) Times a Day., Disp: , Rfl:     methocarbamol (ROBAXIN) 750 MG tablet, TAKE ONE TABLET BY MOUTH THREE TIMES DAILY, Disp: 90 tablet, Rfl: 0     metoprolol succinate XL (TOPROL-XL) 25 MG 24 hr tablet, TAKE 1 TABLET BY MOUTH EVERY DAY, Disp: 180 tablet, Rfl: 1    midodrine (PROAMATINE) 10 MG tablet, TAKE 1 TABLET BY MOUTH 3 TIMES A DAY, Disp: 270 tablet, Rfl: 1    montelukast (SINGULAIR) 10 MG tablet, Take 1 tablet by mouth Every Night., Disp: , Rfl:     mupirocin (BACTROBAN) 2 % cream, Apply 1 application topically to the appropriate area as directed 3 (Three) Times a Day. Apply to wound, Disp: 30 g, Rfl: 0    NON FORMULARY, 1 dose into the nostril(s) as directed by provider Continuous. Oxygen 3 lpm, Disp: , Rfl:     omeprazole (priLOSEC) 20 MG capsule, Take 1 capsule by mouth 2 (two) times a day., Disp: , Rfl:     oxyCODONE-acetaminophen (PERCOCET)  MG per tablet, , Disp: , Rfl:     potassium chloride (K-DUR,KLOR-CON) 20 MEQ CR tablet, Take 1 tablet by mouth Daily., Disp: , Rfl:     potassium chloride 10 MEQ CR tablet, Take 2 tablets by mouth 2 (Two) Times a Day., Disp: , Rfl:     predniSONE (DELTASONE) 10 MG tablet, Take 1 tablet by mouth Daily., Disp: , Rfl:     promethazine (PHENERGAN) 25 MG tablet, TAKE ONE TABLET BY MOUTH EVERY 8 HOURS AS NEEDED FOR NAUSEA AND VOMITING, Disp: 30 tablet, Rfl: 0    sertraline (ZOLOFT) 25 MG tablet, Take 1 tablet by mouth every night at bedtime., Disp: 30 tablet, Rfl: 5    Stiolto Respimat 2.5-2.5 MCG/ACT aerosol solution inhaler, Inhale 2 puffs Daily., Disp: , Rfl:     sucralfate (CARAFATE) 1 g tablet, Take 1 tablet by mouth 4 (Four) Times a Day., Disp: , Rfl:     topiramate (TOPAMAX) 100 MG tablet, TAKE ONE TABLET BY MOUTH EVERY MORNING AND ONE-HALF TABLET EVERY NIGHT AT BEDTIME, Disp: 75 tablet, Rfl: 0    traZODone (DESYREL) 150 MG tablet, Take 1 tablet by mouth every night at bedtime., Disp: 30 tablet, Rfl: 5    vitamin D (ERGOCALCIFEROL) 1.25 MG (34666 UT) capsule capsule, Take 1 capsule by mouth 2 (Two) Times a Week., Disp: , Rfl:     aspirin 81 MG EC tablet, Take 1 tablet by mouth Daily., Disp: 90 tablet,  "Rfl: 3    pravastatin (PRAVACHOL) 10 MG tablet, Take 1 tablet by mouth Daily., Disp: 90 tablet, Rfl: 3  Social History:   Social History     Tobacco Use    Smoking status: Every Day     Packs/day: .5     Types: Cigarettes     Start date: 12/4/2011    Smokeless tobacco: Never   Substance Use Topics    Alcohol use: No      Family History:  Family History   Problem Relation Age of Onset    Heart disease Mother     Stroke Mother     Hyperlipidemia Mother     Hypertension Mother     Heart disease Father     Stroke Father     Hypertension Father     Hyperlipidemia Father     Heart disease Other     COPD Other     Cancer Other     Diabetes Other     Hypertension Other     Hyperlipidemia Other     Stroke Other               Review of Systems   Cardiovascular:  Positive for chest pain and palpitations. Negative for leg swelling.   Respiratory:  Positive for shortness of breath.    Neurological:  Positive for dizziness. Negative for numbness.     All other systems are negative         Objective:     Physical Exam  /75 (BP Location: Left arm, Patient Position: Sitting, Cuff Size: Adult)   Pulse 90   Ht 170.2 cm (67\")   Wt 81.6 kg (180 lb)   SpO2 96%   BMI 28.19 kg/m²   General:  Appears in no acute distress  Eyes: Sclera is anicteric,  conjunctiva is clear   HEENT:  No JVD.  No carotid bruits  Respiratory: Respirations regular and unlabored at rest.  Clear to auscultation  Cardiovascular: S1,S2 Regular rate and rhythm. No murmur, rub or gallop auscultated.   Extremities: No digital clubbing or cyanosis, no edema  Skin: Color pink. Skin warm and dry to touch. No rashes  No xanthoma  Neuro: Alert and awake.    Lab Reviewed:         Bright Steele MD  11/20/2023 13:13 EST      EMR Dragon/Transcription:   \"Dictated utilizing Dragon dictation\".        "

## 2023-11-21 ENCOUNTER — OFFICE VISIT (OUTPATIENT)
Dept: PSYCHIATRY | Facility: CLINIC | Age: 58
End: 2023-11-21
Payer: MEDICAID

## 2023-11-21 DIAGNOSIS — F41.1 GENERALIZED ANXIETY DISORDER: Chronic | ICD-10-CM

## 2023-11-21 DIAGNOSIS — F43.12 CHRONIC POSTTRAUMATIC STRESS DISORDER: Chronic | ICD-10-CM

## 2023-11-21 DIAGNOSIS — F33.2 SEVERE RECURRENT MAJOR DEPRESSION WITHOUT PSYCHOTIC FEATURES: Primary | Chronic | ICD-10-CM

## 2023-11-21 PROBLEM — F41.8 MIXED ANXIETY AND DEPRESSIVE DISORDER: Status: RESOLVED | Noted: 2021-06-15 | Resolved: 2023-11-21

## 2023-11-21 PROBLEM — F41.9 ANXIETY DISORDER: Status: RESOLVED | Noted: 2023-02-01 | Resolved: 2023-11-21

## 2023-11-21 RX ORDER — TRAZODONE HYDROCHLORIDE 150 MG/1
150 TABLET ORAL
Qty: 30 TABLET | Refills: 5 | Status: SHIPPED | OUTPATIENT
Start: 2023-11-21

## 2023-11-21 RX ORDER — GABAPENTIN 300 MG/1
300 CAPSULE ORAL 4 TIMES DAILY
Qty: 120 CAPSULE | Refills: 5 | Status: SHIPPED | OUTPATIENT
Start: 2023-11-21

## 2023-11-21 RX ORDER — ALPRAZOLAM 1 MG/1
TABLET ORAL
Qty: 75 TABLET | Refills: 5 | Status: SHIPPED | OUTPATIENT
Start: 2023-11-21

## 2023-11-21 RX ORDER — LAMOTRIGINE 200 MG/1
200 TABLET ORAL
Qty: 30 TABLET | Refills: 5 | Status: SHIPPED | OUTPATIENT
Start: 2023-11-21
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
## 2024-03-01 RX ORDER — MIDODRINE HYDROCHLORIDE 10 MG/1
TABLET ORAL
Qty: 270 TABLET | Refills: 0 | Status: SHIPPED | OUTPATIENT
Start: 2024-03-01

## 2024-03-01 NOTE — TELEPHONE ENCOUNTER
Rx Refill Note  Requested Prescriptions     Pending Prescriptions Disp Refills    midodrine (PROAMATINE) 10 MG tablet [Pharmacy Med Name: Midodrine HCl Oral Tablet 10 MG] 270 tablet 0     Sig: TAKE 1 TABLET BY MOUTH 3 TIMES A DAY      Last office visit with prescribing clinician: 2/1/2023   Last telemedicine visit with prescribing clinician: Visit date not found   Next office visit with prescribing clinician: Visit date not found                         Would you like a call back once the refill request has been completed: [] Yes [] No    If the office needs to give you a call back, can they leave a voicemail: [] Yes [] No    Elisabet Mckeon MA  03/01/24, 14:56 EST

## 2024-05-14 DIAGNOSIS — F33.2 SEVERE RECURRENT MAJOR DEPRESSION WITHOUT PSYCHOTIC FEATURES: Chronic | ICD-10-CM

## 2024-05-15 RX ORDER — TRAZODONE HYDROCHLORIDE 150 MG/1
150 TABLET ORAL
Qty: 30 TABLET | Refills: 0 | Status: SHIPPED | OUTPATIENT
Start: 2024-05-15

## 2024-05-22 ENCOUNTER — OFFICE VISIT (OUTPATIENT)
Dept: PSYCHIATRY | Facility: CLINIC | Age: 59
End: 2024-05-22
Payer: MEDICAID

## 2024-05-22 DIAGNOSIS — F33.2 SEVERE RECURRENT MAJOR DEPRESSION WITHOUT PSYCHOTIC FEATURES: Primary | Chronic | ICD-10-CM

## 2024-05-22 DIAGNOSIS — F41.1 GENERALIZED ANXIETY DISORDER: Chronic | ICD-10-CM

## 2024-05-22 DIAGNOSIS — F43.12 CHRONIC POSTTRAUMATIC STRESS DISORDER: Chronic | ICD-10-CM

## 2024-05-22 RX ORDER — ALPRAZOLAM 1 MG/1
TABLET ORAL
Qty: 75 TABLET | Refills: 2 | Status: SHIPPED | OUTPATIENT
Start: 2024-05-22

## 2024-05-22 RX ORDER — LAMOTRIGINE 150 MG/1
150 TABLET ORAL
Qty: 30 TABLET | Refills: 5 | Status: SHIPPED | OUTPATIENT
Start: 2024-05-22

## 2024-05-22 RX ORDER — HYDROXYZINE 50 MG/1
50 TABLET, FILM COATED ORAL 3 TIMES DAILY
Qty: 90 TABLET | Refills: 0 | Status: SHIPPED | OUTPATIENT
Start: 2024-05-22

## 2024-05-22 RX ORDER — GABAPENTIN 300 MG/1
300 CAPSULE ORAL 4 TIMES DAILY
Qty: 120 CAPSULE | Refills: 5 | Status: SHIPPED | OUTPATIENT
Start: 2024-05-22

## 2024-05-22 RX ORDER — TRAZODONE HYDROCHLORIDE 150 MG/1
150 TABLET ORAL
Qty: 30 TABLET | Refills: 5 | Status: SHIPPED | OUTPATIENT
Start: 2024-05-22
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
## 2024-06-27 ENCOUNTER — TELEPHONE (OUTPATIENT)
Dept: CARDIOLOGY | Facility: CLINIC | Age: 59
End: 2024-06-27
Payer: MEDICAID

## 2024-06-27 RX ORDER — MIDODRINE HYDROCHLORIDE 10 MG/1
10 TABLET ORAL 3 TIMES DAILY
Qty: 90 TABLET | Refills: 4 | Status: SHIPPED | OUTPATIENT
Start: 2024-06-27

## 2024-06-27 NOTE — TELEPHONE ENCOUNTER
Called patient and left a voice message. Patient does not currently have an upcoming appointment/future appointment scheduled. Per notes in chart Patient should be seen 11/2024. Left message requesting a call back for scheduling. Will refill medication till November.

## 2024-06-27 NOTE — TELEPHONE ENCOUNTER
Rx Refill Note  Requested Prescriptions     Pending Prescriptions Disp Refills    midodrine (PROAMATINE) 10 MG tablet [Pharmacy Med Name: Midodrine HCl Oral Tablet 10 MG] 90 tablet 4     Sig: Take 1 tablet by mouth 3 (Three) Times a Day.      Last office visit with prescribing clinician:Dr. Steele 11/20/2023  Last telemedicine visit with prescribing clinician: Visit date not found   Next office visit with prescribing clinician: Currently no upcoming appointment.                         Would you like a call back once the refill request has been completed: [] Yes [] No    If the office needs to give you a call back, can they leave a voicemail: [] Yes [] No    Barb Verduzco MA  06/27/24, 13:32 EDT

## 2024-06-27 NOTE — TELEPHONE ENCOUNTER
Caller: Melvi Rayo    Relationship to patient: Self    Best call back number:  [phone]    Patient is needing: PATIENT WAS TOLD SHE NEEDED TO BE SEEN BEFORE HER MIDODRINE 10 MG WOULD BE REFILLED. PATIENT IS SCHEDULED FOR THE 12TH. PATIENT IS OUT OF MEDICATION.

## 2024-06-27 NOTE — TELEPHONE ENCOUNTER
Pt seen in Nov, stress test canceled, refills sent in     LMOM   does pt want to reschedule the stress test ?   If not we can see the pt in Nov for one year.   Pt has an appointment scheduled in July now, is she having issues?

## 2024-07-07 RX ORDER — HYDROXYZINE 50 MG/1
50 TABLET, FILM COATED ORAL 3 TIMES DAILY
Qty: 90 TABLET | Refills: 0 | Status: SHIPPED | OUTPATIENT
Start: 2024-07-07

## 2024-08-06 NOTE — TELEPHONE ENCOUNTER
Rx Refill Note  Requested Prescriptions     Pending Prescriptions Disp Refills    hydrOXYzine (ATARAX) 50 MG tablet [Pharmacy Med Name: hydrOXYzine HCl Oral Tablet 50 MG] 90 tablet 0     Sig: TAKE 1 TABLET BY MOUTH 3 TIMES A DAY      Last office visit with prescribing clinician: 5/22/2024   Last telemedicine visit with prescribing clinician: Visit date not found   Next office visit with prescribing clinician: 11/20/2024   Office Visit with Mayra Jaime MD (05/22/2024)                       Would you like a call back once the refill request has been completed: [] Yes [] No    If the office needs to give you a call back, can they leave a voicemail: [] Yes [] No    Harmony Knott MA  08/06/24, 12:07 EDT

## 2024-08-07 RX ORDER — HYDROXYZINE 50 MG/1
50 TABLET, FILM COATED ORAL 3 TIMES DAILY
Qty: 90 TABLET | Refills: 1 | Status: SHIPPED | OUTPATIENT
Start: 2024-08-07

## 2024-08-29 RX ORDER — METOPROLOL SUCCINATE 25 MG/1
25 TABLET, EXTENDED RELEASE ORAL DAILY
Qty: 90 TABLET | Refills: 0 | Status: SHIPPED | OUTPATIENT
Start: 2024-08-29

## 2024-08-29 NOTE — TELEPHONE ENCOUNTER
Rx Refill Note  Requested Prescriptions     Pending Prescriptions Disp Refills    metoprolol succinate XL (TOPROL-XL) 25 MG 24 hr tablet [Pharmacy Med Name: Metoprolol Succinate ER Oral Tablet Extended Release 24 Hour 25 MG] 90 tablet 0     Sig: Take 1 tablet by mouth Daily.      Last office visit with prescribing clinician: Dr. Steele 11/20/2023  Last telemedicine visit with prescribing clinician: Visit date not found   Next office visit with prescribing clinician: Dr. Steele 11/19/2024                        Would you like a call back once the refill request has been completed: [] Yes [] No    If the office needs to give you a call back, can they leave a voicemail: [] Yes [] No    Barb Verduzco MA  08/29/24, 13:48 EDT

## 2024-09-11 DIAGNOSIS — F41.1 GENERALIZED ANXIETY DISORDER: Chronic | ICD-10-CM

## 2024-09-11 NOTE — TELEPHONE ENCOUNTER
Rx Refill Note  Requested Prescriptions     Pending Prescriptions Disp Refills    ALPRAZolam (XANAX) 1 MG tablet [Pharmacy Med Name: ALPRAZolam Oral Tablet 1 MG] 75 tablet 0     Sig: TAKE 1 TABLET BY MOUTH TWO TIMES DAILY AND 1/2 TABLET AT NOON      Last office visit with prescribing clinician: 5/22/2024   Last telemedicine visit with prescribing clinician: Visit date not found   Next office visit with prescribing clinician: 11/20/2024   Office Visit with Mayra Jaime MD (05/22/2024)   SCANNED - LABS (09/08/2022)                     Would you like a call back once the refill request has been completed: [] Yes [] No    If the office needs to give you a call back, can they leave a voicemail: [] Yes [] No    Harmony Knott MA  09/11/24, 12:23 EDT    UPLOADED

## 2024-09-12 ENCOUNTER — TELEPHONE (OUTPATIENT)
Dept: PSYCHIATRY | Facility: CLINIC | Age: 59
End: 2024-09-12
Payer: MEDICAID

## 2024-09-12 RX ORDER — ALPRAZOLAM 1 MG
TABLET ORAL
Qty: 75 TABLET | Refills: 0 | Status: SHIPPED | OUTPATIENT
Start: 2024-09-12

## 2024-09-23 ENCOUNTER — OFFICE (AMBULATORY)
Age: 59
End: 2024-09-23
Payer: COMMERCIAL

## 2024-09-23 ENCOUNTER — OFFICE (AMBULATORY)
Dept: URBAN - METROPOLITAN AREA CLINIC 64 | Facility: CLINIC | Age: 59
End: 2024-09-23
Payer: COMMERCIAL

## 2024-09-23 VITALS
HEART RATE: 88 BPM | DIASTOLIC BLOOD PRESSURE: 88 MMHG | SYSTOLIC BLOOD PRESSURE: 125 MMHG | SYSTOLIC BLOOD PRESSURE: 125 MMHG | DIASTOLIC BLOOD PRESSURE: 88 MMHG | HEART RATE: 88 BPM | WEIGHT: 164 LBS | DIASTOLIC BLOOD PRESSURE: 88 MMHG | HEART RATE: 88 BPM | HEIGHT: 67 IN | HEIGHT: 67 IN | HEIGHT: 67 IN | SYSTOLIC BLOOD PRESSURE: 125 MMHG | HEART RATE: 88 BPM | DIASTOLIC BLOOD PRESSURE: 88 MMHG | HEART RATE: 88 BPM | HEIGHT: 67 IN | HEART RATE: 88 BPM | SYSTOLIC BLOOD PRESSURE: 125 MMHG | SYSTOLIC BLOOD PRESSURE: 125 MMHG | HEIGHT: 67 IN | WEIGHT: 164 LBS | SYSTOLIC BLOOD PRESSURE: 125 MMHG | WEIGHT: 164 LBS | WEIGHT: 164 LBS | DIASTOLIC BLOOD PRESSURE: 88 MMHG | DIASTOLIC BLOOD PRESSURE: 88 MMHG | WEIGHT: 164 LBS | HEART RATE: 88 BPM | WEIGHT: 164 LBS | HEIGHT: 67 IN | SYSTOLIC BLOOD PRESSURE: 125 MMHG | DIASTOLIC BLOOD PRESSURE: 88 MMHG | HEIGHT: 67 IN | WEIGHT: 164 LBS

## 2024-09-23 DIAGNOSIS — R10.13 EPIGASTRIC PAIN: ICD-10-CM

## 2024-09-23 DIAGNOSIS — R14.0 ABDOMINAL DISTENSION (GASEOUS): ICD-10-CM

## 2024-09-23 DIAGNOSIS — Z12.11 ENCOUNTER FOR SCREENING FOR MALIGNANT NEOPLASM OF COLON: ICD-10-CM

## 2024-09-23 DIAGNOSIS — K52.89 OTHER SPECIFIED NONINFECTIVE GASTROENTERITIS AND COLITIS: ICD-10-CM

## 2024-09-23 DIAGNOSIS — Z80.0 FAMILY HISTORY OF MALIGNANT NEOPLASM OF DIGESTIVE ORGANS: ICD-10-CM

## 2024-09-23 DIAGNOSIS — R94.5 ABNORMAL RESULTS OF LIVER FUNCTION STUDIES: ICD-10-CM

## 2024-09-23 PROCEDURE — 99204 OFFICE O/P NEW MOD 45 MIN: CPT | Performed by: INTERNAL MEDICINE

## 2024-09-23 RX ORDER — ONDANSETRON 4 MG/1
12 TABLET, ORALLY DISINTEGRATING ORAL
Qty: 45 | Refills: 6 | Status: ACTIVE
Start: 2024-09-23

## 2024-10-11 DIAGNOSIS — F41.1 GENERALIZED ANXIETY DISORDER: Chronic | ICD-10-CM

## 2024-10-11 RX ORDER — ALPRAZOLAM 1 MG
TABLET ORAL
Qty: 75 TABLET | Refills: 0 | Status: SHIPPED | OUTPATIENT
Start: 2024-10-11

## 2024-10-11 NOTE — TELEPHONE ENCOUNTER
Rx Refill Note  Requested Prescriptions     Pending Prescriptions Disp Refills    ALPRAZolam (XANAX) 1 MG tablet [Pharmacy Med Name: ALPRAZolam Oral Tablet 1 MG] 75 tablet 0     Sig: TAKE 1 TABLET BY MOUTH 2 TIMES A DAY AND 1/2 TABLET AT NOON      Last office visit with prescribing clinician: 5/22/2024   Last telemedicine visit with prescribing clinician: Visit date not found   Next office visit with prescribing clinician: 11/20/2024   Office Visit with Mayra Jaime MD (05/22/2024)   SCANNED - LABS (09/08/2022)                     Would you like a call back once the refill request has been completed: [] Yes [] No    If the office needs to give you a call back, can they leave a voicemail: [] Yes [] No    Harmony Knott MA  10/11/24, 13:13 EDT    UPLOADED

## 2024-11-11 DIAGNOSIS — F41.1 GENERALIZED ANXIETY DISORDER: Chronic | ICD-10-CM

## 2024-11-11 DIAGNOSIS — F33.2 SEVERE RECURRENT MAJOR DEPRESSION WITHOUT PSYCHOTIC FEATURES: Chronic | ICD-10-CM

## 2024-11-11 NOTE — TELEPHONE ENCOUNTER
Rx Refill Note  Requested Prescriptions     Pending Prescriptions Disp Refills    lamoTRIgine (LaMICtal) 150 MG tablet [Pharmacy Med Name: lamoTRIgine Oral Tablet 150 MG] 30 tablet 0     Sig: TAKE 1 TABLET BY MOUTH EVERY NIGHT AT BEDTIME    ALPRAZolam (XANAX) 1 MG tablet [Pharmacy Med Name: ALPRAZolam Oral Tablet 1 MG] 75 tablet 0     Sig: TAKE 1 TABLET BY MOUTH 2 TIMES A DAY AND 1/2 TABLET AT NOON      Last office visit with prescribing clinician: 5/22/2024   Last telemedicine visit with prescribing clinician: Visit date not found   Next office visit with prescribing clinician: 11/20/2024   Office Visit with Mayra Jaime MD (05/22/2024)   SCANNED - LABS (09/08/2022)                     Would you like a call back once the refill request has been completed: [] Yes [] No    If the office needs to give you a call back, can they leave a voicemail: [] Yes [] No    Harmony Knott MA  11/11/24, 11:27 EST  UPLOADED

## 2024-11-12 DIAGNOSIS — F41.1 GENERALIZED ANXIETY DISORDER: Chronic | ICD-10-CM

## 2024-11-12 RX ORDER — ALPRAZOLAM 1 MG/1
TABLET ORAL
Qty: 75 TABLET | Refills: 0 | OUTPATIENT
Start: 2024-11-12

## 2024-11-12 RX ORDER — LAMOTRIGINE 150 MG/1
150 TABLET ORAL
Qty: 30 TABLET | Refills: 0 | Status: SHIPPED | OUTPATIENT
Start: 2024-11-12

## 2024-11-12 RX ORDER — ALPRAZOLAM 1 MG/1
TABLET ORAL
Qty: 75 TABLET | Refills: 0 | Status: SHIPPED | OUTPATIENT
Start: 2024-11-12

## 2024-11-18 RX ORDER — METOPROLOL SUCCINATE 25 MG/1
25 TABLET, EXTENDED RELEASE ORAL DAILY
Qty: 90 TABLET | Refills: 0 | OUTPATIENT
Start: 2024-11-18

## 2024-11-18 NOTE — TELEPHONE ENCOUNTER
Patient refill refused due to no appointment in a year. Patient cancelled 1/8/2024 appointment, 7/11/2024 appointment had appointment for tomorrow 11/19/24 and has cancelled.

## 2024-11-18 NOTE — TELEPHONE ENCOUNTER
Rx Refill Note  Requested Prescriptions     Refused Prescriptions Disp Refills    metoprolol succinate XL (TOPROL-XL) 25 MG 24 hr tablet [Pharmacy Med Name: Metoprolol Succinate ER Oral Tablet Extended Release 24 Hour 25 MG] 90 tablet 0     Sig: TAKE 1 TABLET BY MOUTH EVERY DAY      Last office visit with prescribing clinician: 2/1/2023   Last telemedicine visit with prescribing clinician: Visit date not found   Next office visit with prescribing clinician: Visit date not found                         Would you like a call back once the refill request has been completed: [] Yes [] No    If the office needs to give you a call back, can they leave a voicemail: [] Yes [] No    Barb Verduzco MA  11/18/24, 13:09 EST

## 2024-11-20 ENCOUNTER — OFFICE VISIT (OUTPATIENT)
Dept: PSYCHIATRY | Facility: CLINIC | Age: 59
End: 2024-11-20
Payer: MEDICAID

## 2024-11-20 DIAGNOSIS — F33.2 SEVERE RECURRENT MAJOR DEPRESSION WITHOUT PSYCHOTIC FEATURES: Primary | Chronic | ICD-10-CM

## 2024-11-20 DIAGNOSIS — F43.12 CHRONIC POSTTRAUMATIC STRESS DISORDER: Chronic | ICD-10-CM

## 2024-11-20 DIAGNOSIS — F41.1 GENERALIZED ANXIETY DISORDER: Chronic | ICD-10-CM

## 2024-11-20 RX ORDER — ALPRAZOLAM 1 MG/1
TABLET ORAL
Qty: 75 TABLET | Refills: 2 | Status: SHIPPED | OUTPATIENT
Start: 2024-11-20

## 2024-11-20 RX ORDER — LAMOTRIGINE 150 MG/1
150 TABLET ORAL
Qty: 30 TABLET | Refills: 5 | Status: SHIPPED | OUTPATIENT
Start: 2024-11-20

## 2024-11-20 RX ORDER — GABAPENTIN 300 MG/1
300 CAPSULE ORAL 4 TIMES DAILY
Qty: 120 CAPSULE | Refills: 5 | Status: SHIPPED | OUTPATIENT
Start: 2024-11-20

## 2024-11-20 RX ORDER — TRAZODONE HYDROCHLORIDE 150 MG/1
150 TABLET ORAL
Qty: 30 TABLET | Refills: 5 | Status: SHIPPED | OUTPATIENT
Start: 2024-11-20

## 2024-11-20 RX ORDER — HYDROXYZINE HYDROCHLORIDE 50 MG/1
50 TABLET, FILM COATED ORAL 3 TIMES DAILY
Qty: 90 TABLET | Refills: 5 | Status: SHIPPED | OUTPATIENT
Start: 2024-11-20
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
## 2024-11-21 ENCOUNTER — TELEPHONE (OUTPATIENT)
Dept: CARDIOLOGY | Facility: CLINIC | Age: 59
End: 2024-11-21
Payer: MEDICAID

## 2024-11-21 RX ORDER — MIDODRINE HYDROCHLORIDE 10 MG/1
10 TABLET ORAL 3 TIMES DAILY
Qty: 90 TABLET | Refills: 0 | Status: SHIPPED | OUTPATIENT
Start: 2024-11-21

## 2024-11-21 RX ORDER — MIDODRINE HYDROCHLORIDE 10 MG/1
10 TABLET ORAL 3 TIMES DAILY
Qty: 90 TABLET | Refills: 0 | OUTPATIENT
Start: 2024-11-21

## 2024-11-21 RX ORDER — METOPROLOL SUCCINATE 25 MG/1
25 TABLET, EXTENDED RELEASE ORAL DAILY
Qty: 90 TABLET | Refills: 0 | OUTPATIENT
Start: 2024-11-21

## 2024-11-21 RX ORDER — METOPROLOL SUCCINATE 25 MG/1
25 TABLET, EXTENDED RELEASE ORAL DAILY
Qty: 90 TABLET | Refills: 0 | Status: SHIPPED | OUTPATIENT
Start: 2024-11-21

## 2024-11-21 NOTE — TELEPHONE ENCOUNTER
Caller: Melvi Rayo    Relationship: Self    Best call back number: [phone]    Requested Prescriptions:   Requested Prescriptions     Pending Prescriptions Disp Refills    midodrine (PROAMATINE) 10 MG tablet 90 tablet 4     Sig: Take 1 tablet by mouth 3 (Three) Times a Day.    metoprolol succinate XL (TOPROL-XL) 25 MG 24 hr tablet 90 tablet 0     Sig: Take 1 tablet by mouth Daily.        Pharmacy where request should be sent: [id number] [place] [id number] [facility] - [phone] [facility] [phone]      Last office visit with prescribing clinician: 11/20/2023   Last telemedicine visit with prescribing clinician: Visit date not found   Next office visit with prescribing clinician: 12/19/2024     Additional details provided by patient: PT HAS 2 DAYS LEFT    Does the patient have less than a 3 day supply:  [x] Yes  [] No    Would you like a call back once the refill request has been completed: [] Yes [x] No    If the office needs to give you a call back, can they leave a voicemail: [] Yes [x] No    Franki Madera Rep   11/21/24 12:27 EST

## 2024-11-21 NOTE — TELEPHONE ENCOUNTER
Rx Refill Note  Requested Prescriptions     Refused Prescriptions Disp Refills    metoprolol succinate XL (TOPROL-XL) 25 MG 24 hr tablet [Pharmacy Med Name: Metoprolol Succinate ER Oral Tablet Extended Release 24 Hour 25 MG] 90 tablet 0     Sig: TAKE 1 TABLET BY MOUTH EVERY DAY    midodrine (PROAMATINE) 10 MG tablet [Pharmacy Med Name: Midodrine HCl Oral Tablet 10 MG] 90 tablet 0     Sig: TAKE 1 TABLET BY MOUTH 3 TIMES A DAY      Last office visit with prescribing clinician: 2/1/2023   Last telemedicine visit with prescribing clinician: Visit date not found   Next office visit with prescribing clinician: Visit date not found                         Would you like a call back once the refill request has been completed: [] Yes [] No    If the office needs to give you a call back, can they leave a voicemail: [] Yes [] No    Barb Verduzco MA  11/21/24, 12:11 EST

## 2024-11-21 NOTE — TELEPHONE ENCOUNTER
Rx Refill Note  Requested Prescriptions     Pending Prescriptions Disp Refills    midodrine (PROAMATINE) 10 MG tablet 90 tablet 4     Sig: Take 1 tablet by mouth 3 (Three) Times a Day.    metoprolol succinate XL (TOPROL-XL) 25 MG 24 hr tablet 90 tablet 0     Sig: Take 1 tablet by mouth Daily.      Last office visit with prescribing clinician: 11/20/2023   Last telemedicine visit with prescribing clinician: Visit date not found   Next office visit with prescribing clinician: 11/21/2024                         Would you like a call back once the refill request has been completed: [] Yes [] No    If the office needs to give you a call back, can they leave a voicemail: [] Yes [] No    Maddi Garnica MA  11/21/24, 12:57 EST
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
## 2024-11-21 NOTE — TELEPHONE ENCOUNTER
Caller: Mevli Rayo     Relationship: PATIENT    Best call back number: [phone]    What is your medical concern? PT IS CALLING TO SPEAK WITH A NURSE BECAUSE SHE HAS BEEN HAVING PAIN IN HER NECK. PT WOULD LIKE A CALL BACK    How long has this issue been going on? A FEW DAYS    Is your provider already aware of this issue? NO    Have you been treated for this issue? NO

## 2024-12-17 RX ORDER — METOPROLOL SUCCINATE 25 MG/1
25 TABLET, EXTENDED RELEASE ORAL DAILY
Qty: 30 TABLET | Refills: 0 | Status: SHIPPED | OUTPATIENT
Start: 2024-12-17

## 2024-12-17 RX ORDER — MIDODRINE HYDROCHLORIDE 10 MG/1
10 TABLET ORAL 3 TIMES DAILY
Qty: 90 TABLET | Refills: 0 | Status: SHIPPED | OUTPATIENT
Start: 2024-12-17

## 2024-12-17 NOTE — TELEPHONE ENCOUNTER
Rx Refill Note  Requested Prescriptions     Pending Prescriptions Disp Refills    midodrine (PROAMATINE) 10 MG tablet [Pharmacy Med Name: Midodrine HCl Oral Tablet 10 MG] 90 tablet 0     Sig: TAKE 1 TABLET BY MOUTH 3 TIMES A DAY    metoprolol succinate XL (TOPROL-XL) 25 MG 24 hr tablet [Pharmacy Med Name: Metoprolol Succinate ER Oral Tablet Extended Release 24 Hour 25 MG] 90 tablet 0     Sig: Take 1 tablet by mouth Daily.      Last office visit with prescribing clinician: 11/20/2023   Last telemedicine visit with prescribing clinician: Visit date not found   Next office visit with prescribing clinician: 1/16/2025                         Would you like a call back once the refill request has been completed: [] Yes [] No    If the office needs to give you a call back, can they leave a voicemail: [] Yes [] No    Maddi Garnica MA  12/17/24, 13:37 EST

## 2024-12-24 ENCOUNTER — APPOINTMENT (OUTPATIENT)
Dept: CT IMAGING | Facility: HOSPITAL | Age: 59
End: 2024-12-24
Payer: MEDICAID

## 2024-12-24 ENCOUNTER — APPOINTMENT (OUTPATIENT)
Dept: CARDIOLOGY | Facility: HOSPITAL | Age: 59
End: 2024-12-24
Payer: MEDICAID

## 2024-12-24 ENCOUNTER — APPOINTMENT (OUTPATIENT)
Dept: GENERAL RADIOLOGY | Facility: HOSPITAL | Age: 59
End: 2024-12-24
Payer: MEDICAID

## 2024-12-24 ENCOUNTER — HOSPITAL ENCOUNTER (INPATIENT)
Facility: HOSPITAL | Age: 59
LOS: 3 days | Discharge: HOME HEALTH | End: 2024-12-27
Attending: EMERGENCY MEDICINE | Admitting: EMERGENCY MEDICINE
Payer: MEDICAID

## 2024-12-24 DIAGNOSIS — J96.02 ACUTE RESPIRATORY FAILURE WITH HYPOXIA AND HYPERCAPNIA: Primary | ICD-10-CM

## 2024-12-24 DIAGNOSIS — J96.22 ACUTE ON CHRONIC RESPIRATORY FAILURE WITH HYPERCAPNIA: ICD-10-CM

## 2024-12-24 DIAGNOSIS — I25.9 MYOCARDIAL ISCHEMIA: ICD-10-CM

## 2024-12-24 DIAGNOSIS — J18.9 MULTIFOCAL PNEUMONIA: ICD-10-CM

## 2024-12-24 DIAGNOSIS — J96.01 ACUTE RESPIRATORY FAILURE WITH HYPOXIA AND HYPERCAPNIA: Primary | ICD-10-CM

## 2024-12-24 PROBLEM — J96.20 ACUTE ON CHRONIC RESPIRATORY FAILURE: Status: ACTIVE | Noted: 2024-12-24

## 2024-12-24 LAB
ALBUMIN SERPL-MCNC: 4.3 G/DL (ref 3.5–5.2)
ALBUMIN/GLOB SERPL: 1.4 G/DL
ALP SERPL-CCNC: 170 U/L (ref 39–117)
ALT SERPL W P-5'-P-CCNC: 11 U/L (ref 1–33)
ANION GAP SERPL CALCULATED.3IONS-SCNC: 15.8 MMOL/L (ref 5–15)
AORTIC DIMENSIONLESS INDEX: 0.7 (DI)
ARTERIAL PATENCY WRIST A: POSITIVE
ARTERIAL PATENCY WRIST A: POSITIVE
AST SERPL-CCNC: 17 U/L (ref 1–32)
ATMOSPHERIC PRESS: ABNORMAL MM[HG]
ATMOSPHERIC PRESS: ABNORMAL MM[HG]
AV MEAN PRESS GRAD SYS DOP V1V2: 3 MMHG
AV VMAX SYS DOP: 122 CM/SEC
B PARAPERT DNA SPEC QL NAA+PROBE: NOT DETECTED
B PERT DNA SPEC QL NAA+PROBE: NOT DETECTED
BACTERIA UR QL AUTO: ABNORMAL /HPF
BASE EXCESS BLDA CALC-SCNC: -1.3 MMOL/L (ref 0–3)
BASE EXCESS BLDA CALC-SCNC: -1.6 MMOL/L (ref 0–3)
BASOPHILS # BLD AUTO: 0.08 10*3/MM3 (ref 0–0.2)
BASOPHILS NFR BLD AUTO: 0.4 % (ref 0–1.5)
BDY SITE: ABNORMAL
BDY SITE: ABNORMAL
BH CV ECHO MEAS - ACS: 1.9 CM
BH CV ECHO MEAS - AO MAX PG: 6 MMHG
BH CV ECHO MEAS - AO V2 VTI: 22.3 CM
BH CV ECHO MEAS - AVA(I,D): 2.6 CM2
BH CV ECHO MEAS - EDV(CUBED): 68.9 ML
BH CV ECHO MEAS - EDV(MOD-SP4): 46 ML
BH CV ECHO MEAS - EF(MOD-SP4): 69.1 %
BH CV ECHO MEAS - ESV(CUBED): 19.7 ML
BH CV ECHO MEAS - ESV(MOD-SP4): 14.2 ML
BH CV ECHO MEAS - FS: 34.1 %
BH CV ECHO MEAS - IVS/LVPW: 0.89 CM
BH CV ECHO MEAS - IVSD: 0.8 CM
BH CV ECHO MEAS - LA DIMENSION: 3 CM
BH CV ECHO MEAS - LAT PEAK E' VEL: 7.9 CM/SEC
BH CV ECHO MEAS - LV DIASTOLIC VOL/BSA (35-75): 24.5 CM2
BH CV ECHO MEAS - LV MASS(C)D: 105.6 GRAMS
BH CV ECHO MEAS - LV MAX PG: 2.9 MMHG
BH CV ECHO MEAS - LV MEAN PG: 2 MMHG
BH CV ECHO MEAS - LV SYSTOLIC VOL/BSA (12-30): 7.6 CM2
BH CV ECHO MEAS - LV V1 MAX: 85 CM/SEC
BH CV ECHO MEAS - LV V1 VTI: 16.5 CM
BH CV ECHO MEAS - LVIDD: 4.1 CM
BH CV ECHO MEAS - LVIDS: 2.7 CM
BH CV ECHO MEAS - LVOT AREA: 3.5 CM2
BH CV ECHO MEAS - LVOT DIAM: 2.1 CM
BH CV ECHO MEAS - LVPWD: 0.9 CM
BH CV ECHO MEAS - MED PEAK E' VEL: 7.7 CM/SEC
BH CV ECHO MEAS - MV A MAX VEL: 75.5 CM/SEC
BH CV ECHO MEAS - MV DEC SLOPE: 332 CM/SEC2
BH CV ECHO MEAS - MV DEC TIME: 0.22 SEC
BH CV ECHO MEAS - MV E MAX VEL: 51.9 CM/SEC
BH CV ECHO MEAS - MV E/A: 0.69
BH CV ECHO MEAS - MV MAX PG: 3.5 MMHG
BH CV ECHO MEAS - MV MEAN PG: 2 MMHG
BH CV ECHO MEAS - MV P1/2T: 64.4 MSEC
BH CV ECHO MEAS - MV V2 VTI: 21 CM
BH CV ECHO MEAS - MVA(P1/2T): 3.4 CM2
BH CV ECHO MEAS - MVA(VTI): 2.7 CM2
BH CV ECHO MEAS - PA ACC TIME: 0.11 SEC
BH CV ECHO MEAS - PA V2 MAX: 108 CM/SEC
BH CV ECHO MEAS - RAP SYSTOLE: 3 MMHG
BH CV ECHO MEAS - RV MAX PG: 2.5 MMHG
BH CV ECHO MEAS - RV V1 MAX: 79 CM/SEC
BH CV ECHO MEAS - RV V1 VTI: 15.5 CM
BH CV ECHO MEAS - RVDD: 2 CM
BH CV ECHO MEAS - RVSP: 20.3 MMHG
BH CV ECHO MEAS - SV(LVOT): 57.1 ML
BH CV ECHO MEAS - SV(MOD-SP4): 31.8 ML
BH CV ECHO MEAS - SVI(LVOT): 30.4 ML/M2
BH CV ECHO MEAS - SVI(MOD-SP4): 16.9 ML/M2
BH CV ECHO MEAS - TAPSE (>1.6): 1.72 CM
BH CV ECHO MEAS - TR MAX PG: 17.3 MMHG
BH CV ECHO MEAS - TR MAX VEL: 208 CM/SEC
BH CV ECHO MEASUREMENTS AVERAGE E/E' RATIO: 6.65
BH CV LOWER VASCULAR LEFT COMMON FEMORAL AUGMENT: NORMAL
BH CV LOWER VASCULAR LEFT COMMON FEMORAL COMPETENT: NORMAL
BH CV LOWER VASCULAR LEFT COMMON FEMORAL COMPRESS: NORMAL
BH CV LOWER VASCULAR LEFT COMMON FEMORAL PHASIC: NORMAL
BH CV LOWER VASCULAR LEFT COMMON FEMORAL SPONT: NORMAL
BH CV LOWER VASCULAR LEFT DISTAL FEMORAL COMPRESS: NORMAL
BH CV LOWER VASCULAR LEFT GASTRONEMIUS COMPRESS: NORMAL
BH CV LOWER VASCULAR LEFT GREATER SAPH AK COMPRESS: NORMAL
BH CV LOWER VASCULAR LEFT GREATER SAPH BK COMPRESS: NORMAL
BH CV LOWER VASCULAR LEFT LESSER SAPH COMPRESS: NORMAL
BH CV LOWER VASCULAR LEFT MID FEMORAL AUGMENT: NORMAL
BH CV LOWER VASCULAR LEFT MID FEMORAL COMPETENT: NORMAL
BH CV LOWER VASCULAR LEFT MID FEMORAL COMPRESS: NORMAL
BH CV LOWER VASCULAR LEFT MID FEMORAL PHASIC: NORMAL
BH CV LOWER VASCULAR LEFT MID FEMORAL SPONT: NORMAL
BH CV LOWER VASCULAR LEFT PERONEAL COMPRESS: NORMAL
BH CV LOWER VASCULAR LEFT POPLITEAL AUGMENT: NORMAL
BH CV LOWER VASCULAR LEFT POPLITEAL COMPETENT: NORMAL
BH CV LOWER VASCULAR LEFT POPLITEAL COMPRESS: NORMAL
BH CV LOWER VASCULAR LEFT POPLITEAL PHASIC: NORMAL
BH CV LOWER VASCULAR LEFT POPLITEAL SPONT: NORMAL
BH CV LOWER VASCULAR LEFT POSTERIOR TIBIAL COMPRESS: NORMAL
BH CV LOWER VASCULAR LEFT PROXIMAL FEMORAL COMPRESS: NORMAL
BH CV LOWER VASCULAR LEFT SAPHENOFEMORAL JUNCTION COMPRESS: NORMAL
BH CV LOWER VASCULAR RIGHT COMMON FEMORAL AUGMENT: NORMAL
BH CV LOWER VASCULAR RIGHT COMMON FEMORAL COMPETENT: NORMAL
BH CV LOWER VASCULAR RIGHT COMMON FEMORAL COMPRESS: NORMAL
BH CV LOWER VASCULAR RIGHT COMMON FEMORAL PHASIC: NORMAL
BH CV LOWER VASCULAR RIGHT COMMON FEMORAL SPONT: NORMAL
BH CV LOWER VASCULAR RIGHT DISTAL FEMORAL COMPRESS: NORMAL
BH CV LOWER VASCULAR RIGHT GASTRONEMIUS COMPRESS: NORMAL
BH CV LOWER VASCULAR RIGHT GREATER SAPH AK COMPRESS: NORMAL
BH CV LOWER VASCULAR RIGHT GREATER SAPH BK COMPRESS: NORMAL
BH CV LOWER VASCULAR RIGHT LESSER SAPH COMPRESS: NORMAL
BH CV LOWER VASCULAR RIGHT MID FEMORAL AUGMENT: NORMAL
BH CV LOWER VASCULAR RIGHT MID FEMORAL COMPETENT: NORMAL
BH CV LOWER VASCULAR RIGHT MID FEMORAL COMPRESS: NORMAL
BH CV LOWER VASCULAR RIGHT MID FEMORAL PHASIC: NORMAL
BH CV LOWER VASCULAR RIGHT MID FEMORAL SPONT: NORMAL
BH CV LOWER VASCULAR RIGHT PERONEAL COMPRESS: NORMAL
BH CV LOWER VASCULAR RIGHT POPLITEAL AUGMENT: NORMAL
BH CV LOWER VASCULAR RIGHT POPLITEAL COMPETENT: NORMAL
BH CV LOWER VASCULAR RIGHT POPLITEAL COMPRESS: NORMAL
BH CV LOWER VASCULAR RIGHT POPLITEAL PHASIC: NORMAL
BH CV LOWER VASCULAR RIGHT POPLITEAL SPONT: NORMAL
BH CV LOWER VASCULAR RIGHT POSTERIOR TIBIAL COMPRESS: NORMAL
BH CV LOWER VASCULAR RIGHT PROXIMAL FEMORAL COMPRESS: NORMAL
BH CV LOWER VASCULAR RIGHT SAPHENOFEMORAL JUNCTION COMPRESS: NORMAL
BH CV XLRA - TDI S': 15 CM/SEC
BILIRUB SERPL-MCNC: 0.3 MG/DL (ref 0–1.2)
BILIRUB UR QL STRIP: NEGATIVE
BUN SERPL-MCNC: 12 MG/DL (ref 6–20)
BUN/CREAT SERPL: 13.3 (ref 7–25)
C PNEUM DNA NPH QL NAA+NON-PROBE: NOT DETECTED
CALCIUM SPEC-SCNC: 9.3 MG/DL (ref 8.6–10.5)
CHLORIDE SERPL-SCNC: 104 MMOL/L (ref 98–107)
CHOLEST SERPL-MCNC: 214 MG/DL (ref 0–200)
CLARITY UR: CLEAR
CO2 BLDA-SCNC: 25.8 MMOL/L (ref 22–29)
CO2 BLDA-SCNC: 27.4 MMOL/L (ref 22–29)
CO2 SERPL-SCNC: 23.2 MMOL/L (ref 22–29)
COLOR UR: YELLOW
CREAT SERPL-MCNC: 0.9 MG/DL (ref 0.57–1)
D-LACTATE SERPL-SCNC: 1.8 MMOL/L (ref 0.3–2)
DEPRECATED RDW RBC AUTO: 50.2 FL (ref 37–54)
EGFRCR SERPLBLD CKD-EPI 2021: 74.3 ML/MIN/1.73
EOSINOPHIL # BLD AUTO: 0.08 10*3/MM3 (ref 0–0.4)
EOSINOPHIL NFR BLD AUTO: 0.4 % (ref 0.3–6.2)
ERYTHROCYTE [DISTWIDTH] IN BLOOD BY AUTOMATED COUNT: 14.1 % (ref 12.3–15.4)
FLUAV SUBTYP SPEC NAA+PROBE: NOT DETECTED
FLUBV RNA ISLT QL NAA+PROBE: NOT DETECTED
GEN 5 1HR TROPONIN T REFLEX: 8 NG/L
GLOBULIN UR ELPH-MCNC: 3.1 GM/DL
GLUCOSE BLDC GLUCOMTR-MCNC: 215 MG/DL (ref 70–105)
GLUCOSE BLDC GLUCOMTR-MCNC: 227 MG/DL (ref 70–105)
GLUCOSE BLDC GLUCOMTR-MCNC: 251 MG/DL (ref 70–105)
GLUCOSE SERPL-MCNC: 126 MG/DL (ref 65–99)
GLUCOSE UR STRIP-MCNC: NEGATIVE MG/DL
HADV DNA SPEC NAA+PROBE: NOT DETECTED
HBA1C MFR BLD: 6.24 % (ref 4.8–5.6)
HCO3 BLDA-SCNC: 24.4 MMOL/L (ref 21–28)
HCO3 BLDA-SCNC: 25.8 MMOL/L (ref 21–28)
HCOV 229E RNA SPEC QL NAA+PROBE: NOT DETECTED
HCOV HKU1 RNA SPEC QL NAA+PROBE: NOT DETECTED
HCOV NL63 RNA SPEC QL NAA+PROBE: NOT DETECTED
HCOV OC43 RNA SPEC QL NAA+PROBE: NOT DETECTED
HCT VFR BLD AUTO: 48.3 % (ref 34–46.6)
HDLC SERPL-MCNC: 32 MG/DL (ref 40–60)
HEMODILUTION: NO
HEMODILUTION: NO
HGB BLD-MCNC: 15.3 G/DL (ref 12–15.9)
HGB UR QL STRIP.AUTO: ABNORMAL
HMPV RNA NPH QL NAA+NON-PROBE: NOT DETECTED
HOLD SPECIMEN: NORMAL
HPIV1 RNA ISLT QL NAA+PROBE: NOT DETECTED
HPIV2 RNA SPEC QL NAA+PROBE: NOT DETECTED
HPIV3 RNA NPH QL NAA+PROBE: NOT DETECTED
HPIV4 P GENE NPH QL NAA+PROBE: NOT DETECTED
HYALINE CASTS UR QL AUTO: ABNORMAL /LPF
IMM GRANULOCYTES # BLD AUTO: 0.1 10*3/MM3 (ref 0–0.05)
IMM GRANULOCYTES NFR BLD AUTO: 0.6 % (ref 0–0.5)
INHALED O2 CONCENTRATION: 100 %
INHALED O2 CONCENTRATION: 40 %
KETONES UR QL STRIP: NEGATIVE
L PNEUMO1 AG UR QL IA: NEGATIVE
LDLC SERPL CALC-MCNC: 131 MG/DL (ref 0–100)
LDLC/HDLC SERPL: 3.92 {RATIO}
LEFT ATRIUM VOLUME INDEX: 7.7 ML/M2
LEUKOCYTE ESTERASE UR QL STRIP.AUTO: ABNORMAL
LV EF BIPLANE MOD: 69 %
LYMPHOCYTES # BLD AUTO: 4.08 10*3/MM3 (ref 0.7–3.1)
LYMPHOCYTES NFR BLD AUTO: 22.9 % (ref 19.6–45.3)
M PNEUMO IGG SER IA-ACNC: NOT DETECTED
MAGNESIUM SERPL-MCNC: 2.2 MG/DL (ref 1.6–2.6)
MCH RBC QN AUTO: 30.4 PG (ref 26.6–33)
MCHC RBC AUTO-ENTMCNC: 31.7 G/DL (ref 31.5–35.7)
MCV RBC AUTO: 96 FL (ref 79–97)
MODALITY: ABNORMAL
MODALITY: ABNORMAL
MONOCYTES # BLD AUTO: 1.58 10*3/MM3 (ref 0.1–0.9)
MONOCYTES NFR BLD AUTO: 8.9 % (ref 5–12)
MRSA DNA SPEC QL NAA+PROBE: NORMAL
MUCOUS THREADS URNS QL MICRO: ABNORMAL /HPF
NEUTROPHILS NFR BLD AUTO: 11.87 10*3/MM3 (ref 1.7–7)
NEUTROPHILS NFR BLD AUTO: 66.8 % (ref 42.7–76)
NITRITE UR QL STRIP: POSITIVE
NRBC BLD AUTO-RTO: 0 /100 WBC (ref 0–0.2)
NT-PROBNP SERPL-MCNC: 336 PG/ML (ref 0–900)
PCO2 BLDA: 43.8 MM HG (ref 35–48)
PCO2 BLDA: 51.9 MM HG (ref 35–48)
PEEP RESPIRATORY: 5 CM[H2O]
PEEP RESPIRATORY: 5 CM[H2O]
PH BLDA: 7.3 PH UNITS (ref 7.35–7.45)
PH BLDA: 7.36 PH UNITS (ref 7.35–7.45)
PH UR STRIP.AUTO: <=5 [PH] (ref 5–8)
PLATELET # BLD AUTO: 299 10*3/MM3 (ref 140–450)
PMV BLD AUTO: 8.7 FL (ref 6–12)
PO2 BLD: 188 MM[HG] (ref 0–500)
PO2 BLD: 538 MM[HG] (ref 0–500)
PO2 BLDA: 538.1 MM HG (ref 83–108)
PO2 BLDA: 75.2 MM HG (ref 83–108)
POTASSIUM SERPL-SCNC: 3.3 MMOL/L (ref 3.5–5.2)
PROT SERPL-MCNC: 7.4 G/DL (ref 6–8.5)
PROT UR QL STRIP: NEGATIVE
QT INTERVAL: 375 MS
QTC INTERVAL: 528 MS
RBC # BLD AUTO: 5.03 10*6/MM3 (ref 3.77–5.28)
RBC # UR STRIP: ABNORMAL /HPF
REF LAB TEST METHOD: ABNORMAL
RESPIRATORY RATE: 20
RESPIRATORY RATE: 24
RHINOVIRUS RNA SPEC NAA+PROBE: NOT DETECTED
RSV RNA NPH QL NAA+NON-PROBE: DETECTED
S PNEUM AG SPEC QL LA: NEGATIVE
SAO2 % BLDCOA: 100 % (ref 94–98)
SAO2 % BLDCOA: 94.2 % (ref 94–98)
SARS-COV-2 RNA RESP QL NAA+PROBE: NOT DETECTED
SINUS: 3 CM
SODIUM SERPL-SCNC: 143 MMOL/L (ref 136–145)
SP GR UR STRIP: 1.01 (ref 1–1.03)
SQUAMOUS #/AREA URNS HPF: ABNORMAL /HPF
TRIGL SERPL-MCNC: 283 MG/DL (ref 0–150)
TROPONIN T NUMERIC DELTA: 2 NG/L
TROPONIN T SERPL HS-MCNC: 6 NG/L
UROBILINOGEN UR QL STRIP: ABNORMAL
VENTILATOR MODE: AC
VENTILATOR MODE: AC
VLDLC SERPL-MCNC: 51 MG/DL (ref 5–40)
VT ON VENT VENT: 450 ML
VT ON VENT VENT: 450 ML
WBC # UR STRIP: ABNORMAL /HPF
WBC NRBC COR # BLD AUTO: 17.79 10*3/MM3 (ref 3.4–10.8)
WHOLE BLOOD HOLD COAG: NORMAL

## 2024-12-24 PROCEDURE — 25010000002 PROPOFOL 10 MG/ML EMULSION: Performed by: EMERGENCY MEDICINE

## 2024-12-24 PROCEDURE — 93005 ELECTROCARDIOGRAM TRACING: CPT

## 2024-12-24 PROCEDURE — 36600 WITHDRAWAL OF ARTERIAL BLOOD: CPT

## 2024-12-24 PROCEDURE — 87186 SC STD MICRODIL/AGAR DIL: CPT | Performed by: EMERGENCY MEDICINE

## 2024-12-24 PROCEDURE — 94799 UNLISTED PULMONARY SVC/PX: CPT

## 2024-12-24 PROCEDURE — P9612 CATHETERIZE FOR URINE SPEC: HCPCS

## 2024-12-24 PROCEDURE — 0202U NFCT DS 22 TRGT SARS-COV-2: CPT | Performed by: EMERGENCY MEDICINE

## 2024-12-24 PROCEDURE — 25010000002 CEFEPIME PER 500 MG: Performed by: EMERGENCY MEDICINE

## 2024-12-24 PROCEDURE — 93005 ELECTROCARDIOGRAM TRACING: CPT | Performed by: NURSE PRACTITIONER

## 2024-12-24 PROCEDURE — 93306 TTE W/DOPPLER COMPLETE: CPT | Performed by: INTERNAL MEDICINE

## 2024-12-24 PROCEDURE — 25010000002 ENOXAPARIN PER 10 MG: Performed by: NURSE PRACTITIONER

## 2024-12-24 PROCEDURE — 25010000002 METHYLPREDNISOLONE PER 40 MG: Performed by: EMERGENCY MEDICINE

## 2024-12-24 PROCEDURE — 94002 VENT MGMT INPAT INIT DAY: CPT

## 2024-12-24 PROCEDURE — 93306 TTE W/DOPPLER COMPLETE: CPT

## 2024-12-24 PROCEDURE — 84484 ASSAY OF TROPONIN QUANT: CPT | Performed by: EMERGENCY MEDICINE

## 2024-12-24 PROCEDURE — 25010000002 DIPHENHYDRAMINE PER 50 MG: Performed by: EMERGENCY MEDICINE

## 2024-12-24 PROCEDURE — 25010000002 PROPOFOL 10 MG/ML EMULSION: Performed by: NURSE PRACTITIONER

## 2024-12-24 PROCEDURE — 85025 COMPLETE CBC W/AUTO DIFF WBC: CPT | Performed by: EMERGENCY MEDICINE

## 2024-12-24 PROCEDURE — 25010000002 CEFTRIAXONE PER 250 MG: Performed by: NURSE PRACTITIONER

## 2024-12-24 PROCEDURE — 81001 URINALYSIS AUTO W/SCOPE: CPT | Performed by: EMERGENCY MEDICINE

## 2024-12-24 PROCEDURE — 25010000002 HYDROCORTISONE SOD SUC (PF) 100 MG RECONSTITUTED SOLUTION: Performed by: NURSE PRACTITIONER

## 2024-12-24 PROCEDURE — 83605 ASSAY OF LACTIC ACID: CPT | Performed by: EMERGENCY MEDICINE

## 2024-12-24 PROCEDURE — 82803 BLOOD GASES ANY COMBINATION: CPT | Performed by: NURSE PRACTITIONER

## 2024-12-24 PROCEDURE — 5A1935Z RESPIRATORY VENTILATION, LESS THAN 24 CONSECUTIVE HOURS: ICD-10-PCS | Performed by: EMERGENCY MEDICINE

## 2024-12-24 PROCEDURE — 94660 CPAP INITIATION&MGMT: CPT

## 2024-12-24 PROCEDURE — 94640 AIRWAY INHALATION TREATMENT: CPT

## 2024-12-24 PROCEDURE — 25010000002 LORAZEPAM PER 2 MG

## 2024-12-24 PROCEDURE — 25010000002 POTASSIUM CHLORIDE 10 MEQ/100ML SOLUTION: Performed by: NURSE PRACTITIONER

## 2024-12-24 PROCEDURE — 82803 BLOOD GASES ANY COMBINATION: CPT

## 2024-12-24 PROCEDURE — 94664 DEMO&/EVAL PT USE INHALER: CPT

## 2024-12-24 PROCEDURE — 82948 REAGENT STRIP/BLOOD GLUCOSE: CPT

## 2024-12-24 PROCEDURE — 25010000002 PROPOFOL 10 MG/ML EMULSION

## 2024-12-24 PROCEDURE — 87086 URINE CULTURE/COLONY COUNT: CPT | Performed by: EMERGENCY MEDICINE

## 2024-12-24 PROCEDURE — 36415 COLL VENOUS BLD VENIPUNCTURE: CPT

## 2024-12-24 PROCEDURE — 80053 COMPREHEN METABOLIC PANEL: CPT | Performed by: EMERGENCY MEDICINE

## 2024-12-24 PROCEDURE — 0BH17EZ INSERTION OF ENDOTRACHEAL AIRWAY INTO TRACHEA, VIA NATURAL OR ARTIFICIAL OPENING: ICD-10-PCS | Performed by: EMERGENCY MEDICINE

## 2024-12-24 PROCEDURE — 25010000002 NITROGLYCERIN 5 MG/ML SOLUTION: Performed by: EMERGENCY MEDICINE

## 2024-12-24 PROCEDURE — 71275 CT ANGIOGRAPHY CHEST: CPT

## 2024-12-24 PROCEDURE — 93970 EXTREMITY STUDY: CPT

## 2024-12-24 PROCEDURE — 25510000001 IOPAMIDOL PER 1 ML: Performed by: EMERGENCY MEDICINE

## 2024-12-24 PROCEDURE — 84484 ASSAY OF TROPONIN QUANT: CPT | Performed by: NURSE PRACTITIONER

## 2024-12-24 PROCEDURE — 94761 N-INVAS EAR/PLS OXIMETRY MLT: CPT

## 2024-12-24 PROCEDURE — 93005 ELECTROCARDIOGRAM TRACING: CPT | Performed by: EMERGENCY MEDICINE

## 2024-12-24 PROCEDURE — 93970 EXTREMITY STUDY: CPT | Performed by: SURGERY

## 2024-12-24 PROCEDURE — 93010 ELECTROCARDIOGRAM REPORT: CPT | Performed by: INTERNAL MEDICINE

## 2024-12-24 PROCEDURE — 87449 NOS EACH ORGANISM AG IA: CPT | Performed by: NURSE PRACTITIONER

## 2024-12-24 PROCEDURE — 80061 LIPID PANEL: CPT | Performed by: NURSE PRACTITIONER

## 2024-12-24 PROCEDURE — 83880 ASSAY OF NATRIURETIC PEPTIDE: CPT | Performed by: EMERGENCY MEDICINE

## 2024-12-24 PROCEDURE — 36600 WITHDRAWAL OF ARTERIAL BLOOD: CPT | Performed by: NURSE PRACTITIONER

## 2024-12-24 PROCEDURE — 25010000003 DEXTROSE 5 % SOLUTION: Performed by: EMERGENCY MEDICINE

## 2024-12-24 PROCEDURE — 71045 X-RAY EXAM CHEST 1 VIEW: CPT

## 2024-12-24 PROCEDURE — 63710000001 INSULIN LISPRO (HUMAN) PER 5 UNITS: Performed by: NURSE PRACTITIONER

## 2024-12-24 PROCEDURE — 83735 ASSAY OF MAGNESIUM: CPT | Performed by: NURSE PRACTITIONER

## 2024-12-24 PROCEDURE — 83036 HEMOGLOBIN GLYCOSYLATED A1C: CPT | Performed by: NURSE PRACTITIONER

## 2024-12-24 PROCEDURE — 99285 EMERGENCY DEPT VISIT HI MDM: CPT

## 2024-12-24 PROCEDURE — 87040 BLOOD CULTURE FOR BACTERIA: CPT | Performed by: EMERGENCY MEDICINE

## 2024-12-24 PROCEDURE — 87077 CULTURE AEROBIC IDENTIFY: CPT | Performed by: EMERGENCY MEDICINE

## 2024-12-24 PROCEDURE — 25010000002 MIDAZOLAM PER 1 MG: Performed by: EMERGENCY MEDICINE

## 2024-12-24 PROCEDURE — 87641 MR-STAPH DNA AMP PROBE: CPT | Performed by: NURSE PRACTITIONER

## 2024-12-24 RX ORDER — MIDODRINE HYDROCHLORIDE 5 MG/1
10 TABLET ORAL
Status: DISCONTINUED | OUTPATIENT
Start: 2024-12-24 | End: 2024-12-27 | Stop reason: HOSPADM

## 2024-12-24 RX ORDER — MIDAZOLAM HYDROCHLORIDE 1 MG/ML
2 INJECTION, SOLUTION INTRAMUSCULAR; INTRAVENOUS ONCE
Status: COMPLETED | OUTPATIENT
Start: 2024-12-24 | End: 2024-12-24

## 2024-12-24 RX ORDER — PROPOFOL 10 MG/ML
VIAL (ML) INTRAVENOUS
Status: ACTIVE
Start: 2024-12-24 | End: 2024-12-24

## 2024-12-24 RX ORDER — SODIUM CHLORIDE 0.9 % (FLUSH) 0.9 %
10 SYRINGE (ML) INJECTION AS NEEDED
Status: DISCONTINUED | OUTPATIENT
Start: 2024-12-24 | End: 2024-12-27 | Stop reason: HOSPADM

## 2024-12-24 RX ORDER — BISACODYL 10 MG
10 SUPPOSITORY, RECTAL RECTAL DAILY PRN
Status: DISCONTINUED | OUTPATIENT
Start: 2024-12-24 | End: 2024-12-27 | Stop reason: HOSPADM

## 2024-12-24 RX ORDER — SODIUM CHLORIDE 9 MG/ML
40 INJECTION, SOLUTION INTRAVENOUS AS NEEDED
Status: DISCONTINUED | OUTPATIENT
Start: 2024-12-24 | End: 2024-12-27 | Stop reason: HOSPADM

## 2024-12-24 RX ORDER — METHYLPREDNISOLONE SODIUM SUCCINATE 40 MG/ML
40 INJECTION, POWDER, LYOPHILIZED, FOR SOLUTION INTRAMUSCULAR; INTRAVENOUS EVERY 4 HOURS
Status: DISCONTINUED | OUTPATIENT
Start: 2024-12-24 | End: 2024-12-24

## 2024-12-24 RX ORDER — PANTOPRAZOLE SODIUM 40 MG/10ML
40 INJECTION, POWDER, LYOPHILIZED, FOR SOLUTION INTRAVENOUS
Status: DISCONTINUED | OUTPATIENT
Start: 2024-12-24 | End: 2024-12-24

## 2024-12-24 RX ORDER — MIDAZOLAM HYDROCHLORIDE 1 MG/ML
INJECTION, SOLUTION INTRAMUSCULAR; INTRAVENOUS
Status: COMPLETED | OUTPATIENT
Start: 2024-12-24 | End: 2024-12-24

## 2024-12-24 RX ORDER — ACETAMINOPHEN 650 MG/1
650 SUPPOSITORY RECTAL EVERY 4 HOURS PRN
Status: DISCONTINUED | OUTPATIENT
Start: 2024-12-24 | End: 2024-12-27 | Stop reason: HOSPADM

## 2024-12-24 RX ORDER — METHYLPREDNISOLONE SODIUM SUCCINATE 40 MG/ML
40 INJECTION, POWDER, LYOPHILIZED, FOR SOLUTION INTRAMUSCULAR; INTRAVENOUS EVERY 12 HOURS
Status: DISCONTINUED | OUTPATIENT
Start: 2024-12-24 | End: 2024-12-25

## 2024-12-24 RX ORDER — INSULIN LISPRO 100 [IU]/ML
2-9 INJECTION, SOLUTION INTRAVENOUS; SUBCUTANEOUS EVERY 6 HOURS SCHEDULED
Status: DISCONTINUED | OUTPATIENT
Start: 2024-12-24 | End: 2024-12-26

## 2024-12-24 RX ORDER — HYDROCORTISONE SODIUM SUCCINATE 100 MG/2ML
50 INJECTION INTRAMUSCULAR; INTRAVENOUS EVERY 6 HOURS
Status: DISCONTINUED | OUTPATIENT
Start: 2024-12-24 | End: 2024-12-24

## 2024-12-24 RX ORDER — ENOXAPARIN SODIUM 100 MG/ML
1 INJECTION SUBCUTANEOUS ONCE
Status: COMPLETED | OUTPATIENT
Start: 2024-12-24 | End: 2024-12-24

## 2024-12-24 RX ORDER — IOPAMIDOL 755 MG/ML
100 INJECTION, SOLUTION INTRAVASCULAR
Status: COMPLETED | OUTPATIENT
Start: 2024-12-24 | End: 2024-12-24

## 2024-12-24 RX ORDER — IPRATROPIUM BROMIDE AND ALBUTEROL SULFATE 2.5; .5 MG/3ML; MG/3ML
3 SOLUTION RESPIRATORY (INHALATION) EVERY 6 HOURS PRN
Status: DISCONTINUED | OUTPATIENT
Start: 2024-12-24 | End: 2024-12-25

## 2024-12-24 RX ORDER — PANTOPRAZOLE SODIUM 40 MG/10ML
40 INJECTION, POWDER, LYOPHILIZED, FOR SOLUTION INTRAVENOUS
Status: DISCONTINUED | OUTPATIENT
Start: 2024-12-25 | End: 2024-12-26

## 2024-12-24 RX ORDER — POTASSIUM CHLORIDE 7.45 MG/ML
10 INJECTION INTRAVENOUS
Status: COMPLETED | OUTPATIENT
Start: 2024-12-24 | End: 2024-12-24

## 2024-12-24 RX ORDER — NICOTINE 21 MG/24HR
1 PATCH, TRANSDERMAL 24 HOURS TRANSDERMAL
Status: DISCONTINUED | OUTPATIENT
Start: 2024-12-24 | End: 2024-12-27 | Stop reason: HOSPADM

## 2024-12-24 RX ORDER — NITROGLYCERIN 20 MG/100ML
10-50 INJECTION INTRAVENOUS
Status: DISCONTINUED | OUTPATIENT
Start: 2024-12-24 | End: 2024-12-24

## 2024-12-24 RX ORDER — ACETAMINOPHEN 325 MG/1
650 TABLET ORAL EVERY 4 HOURS PRN
Status: DISCONTINUED | OUTPATIENT
Start: 2024-12-24 | End: 2024-12-27 | Stop reason: HOSPADM

## 2024-12-24 RX ORDER — LORAZEPAM 2 MG/ML
0.25 INJECTION INTRAMUSCULAR EVERY 4 HOURS PRN
Status: DISCONTINUED | OUTPATIENT
Start: 2024-12-24 | End: 2024-12-25

## 2024-12-24 RX ORDER — GABAPENTIN 300 MG/1
300 CAPSULE ORAL 4 TIMES DAILY
Status: DISCONTINUED | OUTPATIENT
Start: 2024-12-24 | End: 2024-12-27 | Stop reason: HOSPADM

## 2024-12-24 RX ORDER — DEXTROSE MONOHYDRATE 25 G/50ML
25 INJECTION, SOLUTION INTRAVENOUS
Status: DISCONTINUED | OUTPATIENT
Start: 2024-12-24 | End: 2024-12-27 | Stop reason: HOSPADM

## 2024-12-24 RX ORDER — NITROGLYCERIN 0.4 MG/1
0.4 TABLET SUBLINGUAL
Status: DISCONTINUED | OUTPATIENT
Start: 2024-12-24 | End: 2024-12-27 | Stop reason: HOSPADM

## 2024-12-24 RX ORDER — MIDAZOLAM HYDROCHLORIDE 1 MG/ML
INJECTION, SOLUTION INTRAMUSCULAR; INTRAVENOUS
Status: ACTIVE
Start: 2024-12-24 | End: 2024-12-24

## 2024-12-24 RX ORDER — METHOCARBAMOL 750 MG/1
750 TABLET, FILM COATED ORAL 3 TIMES DAILY
Status: DISCONTINUED | OUTPATIENT
Start: 2024-12-24 | End: 2024-12-27 | Stop reason: HOSPADM

## 2024-12-24 RX ORDER — NICOTINE POLACRILEX 4 MG
15 LOZENGE BUCCAL
Status: DISCONTINUED | OUTPATIENT
Start: 2024-12-24 | End: 2024-12-27 | Stop reason: HOSPADM

## 2024-12-24 RX ORDER — DIPHENHYDRAMINE HYDROCHLORIDE 50 MG/ML
50 INJECTION INTRAMUSCULAR; INTRAVENOUS
Status: COMPLETED | OUTPATIENT
Start: 2024-12-24 | End: 2024-12-24

## 2024-12-24 RX ORDER — ETOMIDATE 2 MG/ML
20 INJECTION INTRAVENOUS ONCE
Status: COMPLETED | OUTPATIENT
Start: 2024-12-24 | End: 2024-12-24

## 2024-12-24 RX ORDER — ALUMINA, MAGNESIA, AND SIMETHICONE 2400; 2400; 240 MG/30ML; MG/30ML; MG/30ML
15 SUSPENSION ORAL EVERY 6 HOURS PRN
Status: DISCONTINUED | OUTPATIENT
Start: 2024-12-24 | End: 2024-12-27 | Stop reason: HOSPADM

## 2024-12-24 RX ORDER — DEXMEDETOMIDINE HYDROCHLORIDE 4 UG/ML
.2-1.5 INJECTION, SOLUTION INTRAVENOUS
Status: DISCONTINUED | OUTPATIENT
Start: 2024-12-24 | End: 2024-12-24

## 2024-12-24 RX ORDER — MONTELUKAST SODIUM 10 MG/1
10 TABLET ORAL NIGHTLY
Status: DISCONTINUED | OUTPATIENT
Start: 2024-12-24 | End: 2024-12-27 | Stop reason: HOSPADM

## 2024-12-24 RX ORDER — FUROSEMIDE 40 MG/1
40 TABLET ORAL
Status: DISCONTINUED | OUTPATIENT
Start: 2024-12-24 | End: 2024-12-27 | Stop reason: HOSPADM

## 2024-12-24 RX ORDER — CHLORHEXIDINE GLUCONATE ORAL RINSE 1.2 MG/ML
15 SOLUTION DENTAL EVERY 12 HOURS SCHEDULED
Status: DISCONTINUED | OUTPATIENT
Start: 2024-12-24 | End: 2024-12-24

## 2024-12-24 RX ORDER — ONDANSETRON 4 MG/1
4 TABLET, ORALLY DISINTEGRATING ORAL EVERY 6 HOURS PRN
Status: DISCONTINUED | OUTPATIENT
Start: 2024-12-24 | End: 2024-12-27 | Stop reason: HOSPADM

## 2024-12-24 RX ORDER — ETOMIDATE 2 MG/ML
INJECTION INTRAVENOUS
Status: COMPLETED
Start: 2024-12-24 | End: 2024-12-24

## 2024-12-24 RX ORDER — POLYETHYLENE GLYCOL 3350 17 G/17G
17 POWDER, FOR SOLUTION ORAL DAILY PRN
Status: DISCONTINUED | OUTPATIENT
Start: 2024-12-24 | End: 2024-12-27 | Stop reason: HOSPADM

## 2024-12-24 RX ORDER — HYDROXYZINE HYDROCHLORIDE 25 MG/1
50 TABLET, FILM COATED ORAL 3 TIMES DAILY
Status: DISCONTINUED | OUTPATIENT
Start: 2024-12-24 | End: 2024-12-27 | Stop reason: HOSPADM

## 2024-12-24 RX ORDER — PROPOFOL 10 MG/ML
VIAL (ML) INTRAVENOUS
Status: COMPLETED | OUTPATIENT
Start: 2024-12-24 | End: 2024-12-24

## 2024-12-24 RX ORDER — METOPROLOL SUCCINATE 25 MG/1
25 TABLET, EXTENDED RELEASE ORAL DAILY
Status: DISCONTINUED | OUTPATIENT
Start: 2024-12-24 | End: 2024-12-27 | Stop reason: HOSPADM

## 2024-12-24 RX ORDER — HYDROMORPHONE HYDROCHLORIDE 1 MG/ML
0.5 INJECTION, SOLUTION INTRAMUSCULAR; INTRAVENOUS; SUBCUTANEOUS
Status: DISCONTINUED | OUTPATIENT
Start: 2024-12-24 | End: 2024-12-27 | Stop reason: HOSPADM

## 2024-12-24 RX ORDER — SODIUM CHLORIDE 0.9 % (FLUSH) 0.9 %
10 SYRINGE (ML) INJECTION EVERY 12 HOURS SCHEDULED
Status: DISCONTINUED | OUTPATIENT
Start: 2024-12-24 | End: 2024-12-27 | Stop reason: HOSPADM

## 2024-12-24 RX ORDER — BUDESONIDE 0.5 MG/2ML
0.5 INHALANT ORAL
Status: DISCONTINUED | OUTPATIENT
Start: 2024-12-24 | End: 2024-12-27 | Stop reason: HOSPADM

## 2024-12-24 RX ORDER — ENOXAPARIN SODIUM 100 MG/ML
40 INJECTION SUBCUTANEOUS EVERY 24 HOURS
Status: DISCONTINUED | OUTPATIENT
Start: 2024-12-25 | End: 2024-12-27 | Stop reason: HOSPADM

## 2024-12-24 RX ORDER — BISACODYL 5 MG/1
5 TABLET, DELAYED RELEASE ORAL DAILY PRN
Status: DISCONTINUED | OUTPATIENT
Start: 2024-12-24 | End: 2024-12-27 | Stop reason: HOSPADM

## 2024-12-24 RX ORDER — ATORVASTATIN CALCIUM 10 MG/1
10 TABLET, FILM COATED ORAL DAILY
Status: DISCONTINUED | OUTPATIENT
Start: 2024-12-24 | End: 2024-12-27 | Stop reason: HOSPADM

## 2024-12-24 RX ORDER — ETOMIDATE 2 MG/ML
INJECTION INTRAVENOUS
Status: COMPLETED | OUTPATIENT
Start: 2024-12-24 | End: 2024-12-24

## 2024-12-24 RX ORDER — IPRATROPIUM BROMIDE AND ALBUTEROL SULFATE 2.5; .5 MG/3ML; MG/3ML
3 SOLUTION RESPIRATORY (INHALATION)
Status: DISCONTINUED | OUTPATIENT
Start: 2024-12-24 | End: 2024-12-25

## 2024-12-24 RX ORDER — AMOXICILLIN 250 MG
2 CAPSULE ORAL 2 TIMES DAILY PRN
Status: DISCONTINUED | OUTPATIENT
Start: 2024-12-24 | End: 2024-12-27 | Stop reason: HOSPADM

## 2024-12-24 RX ORDER — IPRATROPIUM BROMIDE AND ALBUTEROL SULFATE 2.5; .5 MG/3ML; MG/3ML
3 SOLUTION RESPIRATORY (INHALATION) ONCE
Status: COMPLETED | OUTPATIENT
Start: 2024-12-24 | End: 2024-12-24

## 2024-12-24 RX ORDER — ONDANSETRON 2 MG/ML
4 INJECTION INTRAMUSCULAR; INTRAVENOUS EVERY 6 HOURS PRN
Status: DISCONTINUED | OUTPATIENT
Start: 2024-12-24 | End: 2024-12-27 | Stop reason: HOSPADM

## 2024-12-24 RX ORDER — ACETAMINOPHEN 650 MG/1
975 SUPPOSITORY RECTAL ONCE
Status: COMPLETED | OUTPATIENT
Start: 2024-12-24 | End: 2024-12-24

## 2024-12-24 RX ORDER — IBUPROFEN 600 MG/1
1 TABLET ORAL
Status: DISCONTINUED | OUTPATIENT
Start: 2024-12-24 | End: 2024-12-27 | Stop reason: HOSPADM

## 2024-12-24 RX ADMIN — PANTOPRAZOLE SODIUM 40 MG: 40 INJECTION, POWDER, FOR SOLUTION INTRAVENOUS at 09:23

## 2024-12-24 RX ADMIN — LORAZEPAM 0.25 MG: 2 INJECTION INTRAMUSCULAR; INTRAVENOUS at 19:29

## 2024-12-24 RX ADMIN — IPRATROPIUM BROMIDE AND ALBUTEROL SULFATE 3 ML: .5; 3 SOLUTION RESPIRATORY (INHALATION) at 18:37

## 2024-12-24 RX ADMIN — ETOMIDATE 20 MG: 2 INJECTION INTRAVENOUS at 04:21

## 2024-12-24 RX ADMIN — DEXMEDETOMIDINE HYDROCHLORIDE IN SODIUM CHLORIDE 1 MCG/KG/HR: 4 INJECTION INTRAVENOUS at 13:11

## 2024-12-24 RX ADMIN — IPRATROPIUM BROMIDE AND ALBUTEROL SULFATE 3 ML: .5; 3 SOLUTION RESPIRATORY (INHALATION) at 04:42

## 2024-12-24 RX ADMIN — CEFTRIAXONE SODIUM 1000 MG: 1 INJECTION, POWDER, FOR SOLUTION INTRAMUSCULAR; INTRAVENOUS at 09:22

## 2024-12-24 RX ADMIN — MUPIROCIN 1 APPLICATION: 20 OINTMENT TOPICAL at 20:36

## 2024-12-24 RX ADMIN — IPRATROPIUM BROMIDE AND ALBUTEROL SULFATE 3 ML: .5; 3 SOLUTION RESPIRATORY (INHALATION) at 11:04

## 2024-12-24 RX ADMIN — CHLORHEXIDINE GLUCONATE 15 ML: 1.2 RINSE ORAL at 09:23

## 2024-12-24 RX ADMIN — BUDESONIDE INHALATION 0.5 MG: 0.5 SUSPENSION RESPIRATORY (INHALATION) at 18:37

## 2024-12-24 RX ADMIN — IPRATROPIUM BROMIDE AND ALBUTEROL SULFATE 3 ML: .5; 3 SOLUTION RESPIRATORY (INHALATION) at 07:23

## 2024-12-24 RX ADMIN — NITROGLYCERIN 10 MCG/MIN: 5 INJECTION, SOLUTION INTRAVENOUS at 04:27

## 2024-12-24 RX ADMIN — INSULIN LISPRO 6 UNITS: 100 INJECTION, SOLUTION INTRAVENOUS; SUBCUTANEOUS at 08:38

## 2024-12-24 RX ADMIN — PROPOFOL INJECTABLE EMULSION 45 MCG/KG/MIN: 10 INJECTION, EMULSION INTRAVENOUS at 12:26

## 2024-12-24 RX ADMIN — ACETAMINOPHEN 975 MG: 650 SUPPOSITORY RECTAL at 05:44

## 2024-12-24 RX ADMIN — HYDROCORTISONE SODIUM SUCCINATE 50 MG: 100 INJECTION, POWDER, FOR SOLUTION INTRAMUSCULAR; INTRAVENOUS at 05:44

## 2024-12-24 RX ADMIN — METHYLPREDNISOLONE SODIUM SUCCINATE 40 MG: 40 INJECTION, POWDER, FOR SOLUTION INTRAMUSCULAR; INTRAVENOUS at 14:28

## 2024-12-24 RX ADMIN — Medication 10 ML: at 10:06

## 2024-12-24 RX ADMIN — PROPOFOL 45 MCG/KG/MIN: 10 INJECTION, EMULSION INTRAVENOUS at 08:28

## 2024-12-24 RX ADMIN — Medication 10 ML: at 20:36

## 2024-12-24 RX ADMIN — IOPAMIDOL 100 ML: 755 INJECTION, SOLUTION INTRAVENOUS at 13:06

## 2024-12-24 RX ADMIN — MUPIROCIN 1 APPLICATION: 20 OINTMENT TOPICAL at 08:13

## 2024-12-24 RX ADMIN — POTASSIUM CHLORIDE 10 MEQ: 7.46 INJECTION, SOLUTION INTRAVENOUS at 09:15

## 2024-12-24 RX ADMIN — CEFEPIME 2000 MG: 2 INJECTION, POWDER, FOR SOLUTION INTRAVENOUS at 05:41

## 2024-12-24 RX ADMIN — POTASSIUM CHLORIDE 10 MEQ: 7.46 INJECTION, SOLUTION INTRAVENOUS at 10:06

## 2024-12-24 RX ADMIN — ETOMIDATE 20 MG: 2 INJECTION INTRAVENOUS at 04:08

## 2024-12-24 RX ADMIN — MIDAZOLAM 2 MG: 1 INJECTION INTRAMUSCULAR; INTRAVENOUS at 04:22

## 2024-12-24 RX ADMIN — IPRATROPIUM BROMIDE AND ALBUTEROL SULFATE 3 ML: .5; 3 SOLUTION RESPIRATORY (INHALATION) at 23:23

## 2024-12-24 RX ADMIN — INSULIN LISPRO 4 UNITS: 100 INJECTION, SOLUTION INTRAVENOUS; SUBCUTANEOUS at 17:16

## 2024-12-24 RX ADMIN — BUDESONIDE INHALATION 0.5 MG: 0.5 SUSPENSION RESPIRATORY (INHALATION) at 07:27

## 2024-12-24 RX ADMIN — POTASSIUM CHLORIDE 10 MEQ: 7.46 INJECTION, SOLUTION INTRAVENOUS at 09:26

## 2024-12-24 RX ADMIN — POTASSIUM CHLORIDE 10 MEQ: 7.46 INJECTION, SOLUTION INTRAVENOUS at 08:13

## 2024-12-24 RX ADMIN — METHYLPREDNISOLONE SODIUM SUCCINATE 40 MG: 40 INJECTION, POWDER, FOR SOLUTION INTRAMUSCULAR; INTRAVENOUS at 11:23

## 2024-12-24 RX ADMIN — NICOTINE 1 PATCH: 21 PATCH TRANSDERMAL at 16:33

## 2024-12-24 RX ADMIN — DIPHENHYDRAMINE HYDROCHLORIDE 50 MG: 50 INJECTION, SOLUTION INTRAMUSCULAR; INTRAVENOUS at 11:23

## 2024-12-24 RX ADMIN — ENOXAPARIN SODIUM 80 MG: 100 INJECTION SUBCUTANEOUS at 08:13

## 2024-12-24 RX ADMIN — PROPOFOL 50 MCG/KG/MIN: 10 INJECTION, EMULSION INTRAVENOUS at 04:12

## 2024-12-24 RX ADMIN — IPRATROPIUM BROMIDE AND ALBUTEROL SULFATE 3 ML: .5; 3 SOLUTION RESPIRATORY (INHALATION) at 15:40

## 2024-12-24 RX ADMIN — MIDAZOLAM 2 MG: 1 INJECTION INTRAMUSCULAR; INTRAVENOUS at 04:07

## 2024-12-24 RX ADMIN — INSULIN LISPRO 4 UNITS: 100 INJECTION, SOLUTION INTRAVENOUS; SUBCUTANEOUS at 11:51

## 2024-12-24 RX ADMIN — PROPOFOL 45 MCG/KG/MIN: 10 INJECTION, EMULSION INTRAVENOUS at 05:22

## 2024-12-24 NOTE — PLAN OF CARE
Goal Outcome Evaluation:              Outcome Evaluation: Pt resting comfortably abed this shift, eyes closed, arousable by verbal stimuli. Pt nods 'yes' to complaints of pain, repostitioned to provide comfort. No apparent distress observed this shift. Pt remains on dex gtt at this time. K+ replaced this shift. Abx administered, no adse observed. Pt on bipap mode w/plans to extubate this shift. VSS call light in reach, plan of care on going.

## 2024-12-24 NOTE — ED NOTES
Patient belongings handed off to ICU nurse DANNIELLE Izaguirre. Including jewelry which includes 3 silver teetee rings and one silver teetee heart necklace.

## 2024-12-24 NOTE — CASE MANAGEMENT/SOCIAL WORK
Discharge Planning Assessment   Jeff     Patient Name: Melvi Rayo  MRN: [id number]  Today's Date: 12/24/2024    Admit Date: 12/24/2024    Plan: Plan to return home with brother. Current home O2 [place]L [place] [employer].   Discharge Needs Assessment       Row Name 12/24/24 0933       Living Environment    People in Home sibling(s)    Name(s) of People in Home Carl - brother    Current Living Arrangements home    Potentially Unsafe Housing Conditions none    In the past 12 months has the electric, gas, oil, or water company threatened to shut off services in your home? No    Primary Care Provided by self    Provides Primary Care For no one    Family Caregiver if Needed sibling(s)    Family Caregiver Names Carl - hussain    Quality of Family Relationships helpful;involved;supportive    Able to Return to Prior Arrangements yes       Resource/Environmental Concerns    Resource/Environmental Concerns none    Transportation Concerns none       Transportation Needs    In the past 12 months, has lack of transportation kept you from medical appointments or from getting medications? no    In the past 12 months, has lack of transportation kept you from meetings, work, or from getting things needed for daily living? No       Food Insecurity    Within the past 12 months, you worried that your food would run out before you got the money to buy more. Never true    Within the past 12 months, the food you bought just didn't last and you didn't have money to get more. Never true       Transition Planning    Patient/Family Anticipates Transition to home    Patient/Family Anticipated Services at Transition none    Transportation Anticipated car, drives self;family or friend will provide       Discharge Needs Assessment    Readmission Within the Last 30 Days no previous admission in last 30 days    Equipment Currently Used at Home rollator;hospital bed;wheelchair;oxygen    Concerns to be Addressed discharge planning    Anticipated Changes  Related to Illness none    Equipment Needed After Discharge none                   Discharge Plan       Row Name 12/24/24 0934       Plan    Plan Plan to return home with brother. Current home O2 [place] [employer].    Patient/Family in Agreement with Plan yes    Plan Comments CM met with patient at bedside. Patient intubated/sedated. Patient lives at home with brother who asssts with ADLS.  Patient is WC bound and can transfer herself, has a hospital bed, rollator, WC, current home O2 [place] [place] [employer]. Brother will transport at discharge. PCP and pharmacy confirmed. Agreeable to [facility].  Denies financial assistance needs for medication and/or food. Denies any current HH, Caregiver, or rehab services. DC Barriers:  RSV + 12/24, vent 40/5, NPO, ext cath, IV Abxs/steroids, nebs, Tridil/Diprivan gtt, monitor labs.                 Expected Discharge Date and Time       Expected Discharge Date Expected Discharge Time    Dec 27, 2024            Demographic Summary       Row Name 12/24/24 0932       General Information    Admission Type inpatient    Arrived From emergency department    Referral Source admission list    Reason for Consult discharge planning    Preferred Language English                   Functional Status       Row Name 12/24/24 0932       Functional Status    Usual Activity Tolerance fair    Current Activity Tolerance poor       Functional Status, IADL    Medications completely dependent    Meal Preparation completely dependent    Housekeeping completely dependent    Laundry completely dependent    Shopping completely dependent       Mental Status    General Appearance WDL WDL       Mental Status Summary    Recent Changes in Mental Status/Cognitive Functioning no changes             FELIPE Ann RN  ICU/CVU   O: [phone]  C: [phone]  [email or web address]

## 2024-12-24 NOTE — H&P
Critical Care History and Physical     Melvi Rayo : 1965 MRN:[id number] LOS:0 ROOM:      Reason for admission: Acute on chronic respiratory failure     Assessment / Plan     Acute on chronic respiratory failure with hypoxia and hypercapnia / On chronic oxygen supplementation  Likely due to pneumonia from RSV  Baseline of 4 LPM.  Sees pulmonology as outpatient.  CXR: Without infiltrates or effusions.  CPAP attempted in transit, unresponsive on arrival, intubated in ED on 24.  Ventilator settings noted and adjusted as needed.   Close monitoring of ABG as ordered.   Minimize sedation/analgesia to keep RASS of 0 to -1.  Continue propofol.  Dilaudid IVP ordered, as patient has allergy to several other analgesic medications.  Due to lower extremity swelling and history of DVT, checking bilateral lower extremity venous duplex.  Patient is not on anticoagulation as outpatient    Acute COPD exacerbation  Likely precipitated due to pneumonia.   Started on hydrocortisone 50 mg every 6 hours per [place] study recommendations, x 4 days.  Continue budesonide and duoneb scheduled and as needed.    Severe Sepsis due to pneumonia with RSV infection   Likely due to pneumonia, secondary to RSV.   Tmax of 104.3 in ED, treated with acetaminophen.  Follow blood cultures.  UA with 4+ bacteria, did trigger a reflex culture though squamous epithelial cells were 7-12, could be contaminated.  Follow urine culture.  RVP: Positive for RSV; placed and contact/droplet precautions.  Check sputum culture.  Patient was noted to have purulent secretions following intubation, but unfortunately, no sputum culture was obtained.  Check urine antigens.  Check MRSA screen.  Received cefepime in the ED, de-escalated to ceftriaxone monotherapy for a total of 5 days.  Patient was without evidence of shock.    EKG change of ST depression / History of recurrent atypical chest pain  Initial troponin 6, monitor reflex.  Previous negative  caths in   Per report on psychiatric evaluation, patient has history of panic attacks, almost daily, of which she does take Xanax and it reportedly helps with her chest pain.  EKG obtained, of note there appears to be significant respiratory artifact, though ST depression noted in V3, V4, V5.  Heart rate 116, QTc 467 based on Fridericia.  Possibly secondary to demand ischemia from respiratory distress, versus panic attack.  Repeat EKG.  Treatment dose Lovenox given x 1 dose in ED.  Further anticoagulant recommendations after additional findings.  Check echocardiogram.    Diabetes mellitus Type 2, not insulin-dependent:   Home regimen includes: Metformin  Hold oral agents while in ICU.  Check hemoglobin A1c.  Accu checks ACHS or Every 6 Hours, based on diet, with sliding scale Admelog coverage as needed.     Chronic diarrhea  Followed by GI as outpatient.  Previous colonic workup was negative.  Planned EGD/Colonoscopy before end of year.    History of DVT: Not on chronic anticoagulation.  Dysphagia, history of esophageal dilation  Possible history of TIA/CVA: continue ASA and statin therapy.  Chronic pain syndrome / Degenerative disc disease: On Percocet as outpatient.  Dyslipidemia: Resume statin therapy, recommending increasing statin therapy based on lipid profile.  Neuropathy: holding gabapentin, resume once able.  History of RA: Not on immunosuppressants.  History of seizure disorder: On Lamictal.  Severe depression /chronic PTSD/JOVITA: On Lamictal, Zoloft, trazodone, Xanax.  Last seen on 11/20/2024.  History of Breast Cancer, status post bilateral mastectomy  GERD: Continue PPI.  Tobacco abuse:  on smoking cessation.    Code Status (Patient has no pulse and is not breathing): CPR (Attempt to Resuscitate)  Medical Interventions (Patient has pulse or is breathing): Full Support       Nutrition:   NPO Diet NPO Type: Strict NPO     VTE Prophylaxis:  Pharmacologic VTE prophylaxis orders are present.        History of Present illness     Melvi Rayo is a 59 y.o. female with PMH of COPD, chronic hypoxic respiratory failure on continuous oxygen supplementation, history of breast cancer status post bilateral mastectomy, GERD, severe depression, chronic PTSD, JOVITA, seizure disorder, DDD, chronic pain syndrome on narcotics, chronic diarrhea, and diabetes mellitus type 2, and presented to the hospital for severe respiratory distress, and was admitted with a principal diagnosis of Acute on chronic respiratory failure.  EMS was called due to increasing respiratory distress over the preceding 24 hours.  Upon arrival of EMS, patient was sitting in a tripod position, was poorly responsive.  It had been reported that patient had fever, chills, and cough.  EMS placed patient on CPAP in transit, and throughout the ride, patient became increasingly less responsive to the point that she was completely unresponsive on arrival.  There had been some concern with some EKG changes, but no nitroglycerin was given en route, as they thought that patient was allergic to nitroglycerin, but it is actually nitrofurantoin that patient has an allergy to.  Patient had some productive, green sputum and also had some swelling of her bilateral lower extremities.    In the ED, patient due to her poor responsiveness, was intubated upon arrival.  Following intubation, patient had profuse amount of purulent sputum suctioned.  Labs were obtained with the following abnormalities: Potassium 3.3, glucose 126, alk phos 170, total cholesterol 214, HDL 32, , VLDL 51, triglycerides 283, WBC 17.79 without bandemia.  UA was with 4+ bacteria, but epithelial cells were elevated at 7-12.  Patient triggered severe sepsis without septic shock.  Blood cultures were obtained and urine culture is pending.  Respiratory viral panel was positive for RSV.  Empiric antimicrobial therapy was initiated by ED with cefepime x 1 dose.  Chest x-ray was obtained and revealed  no infiltrates or effusions.  EKG obtained, of note there appears to be significant respiratory artifact, though ST depression noted in V3, V4, V5.  Heart rate 116, QTc 467 based on Fridericia.  Patient was given treatment dose of Lovenox x 1 in ED.  Postintubation ABG with pH 7.304, pCO2 51.9, pO2 538.1, HCO3 25.8, with O2 saturation of 100%.  Vent settings were then adjusted.  Critical care team was consulted for admission and further treatment and evaluation.    ACP: No advance care planning documentation on file, patient's emergency contact is listed as her brother.  It is unknown if patient has any children, but it is noted that patient is .    Patient was seen and examined on 24 at 06:01 EST .    Past Medical/Surgical/Social/Family History & Allergies     Past Medical History:   Diagnosis Date    Anxiety     Asthma     Breast cancer     Chest tightness     COPD (chronic obstructive pulmonary disease)     Degenerative disorder of bone     Foot pain     S/P multiple foot surguries.    GERD (gastroesophageal reflux disease)     Lesion of eye     Lesion behind eye, cancer associated retinopathopthy. Documented from [facility].     Low back pain     Migraines     Neck nodule     Pancreatitis     RA (rheumatoid arthritis)     Seizure disorder     Generalized seizure, possible partial complex.    Skin cancer     Age 18.    Stroke     Type 2 diabetes mellitus       Past Surgical History:   Procedure Laterality Date    BRONCHOSCOPY N/A 2022    Procedure: BRONCHOSCOPY with bronchial washing;  Surgeon: Gonzales Mendoza MD;  Location: [facility] ENDOSCOPY;  Service: Pulmonary;  Laterality: N/A;  post op: pneumonia    CARDIAC CATHETERIZATION      x2    CARDIAC CATHETERIZATION  2015     SECTION      x2    ESOPHAGEAL DILATATION      FOOT SURGERY  2015    FOOT SURGERY Left 2016    FOOT SURGERY Right 2017    MASTECTOMY Bilateral     OTHER SURGICAL HISTORY  2017    LOOP Recorder     DC  06/27/2016    [facility]    TOTAL ABDOMINAL HYSTERECTOMY WITH SALPINGO OOPHORECTOMY        Social History     Socioeconomic History    Marital status:    Tobacco Use    Smoking status: Every Day     Current packs/day: 0.25     Average packs/day: 0.3 packs/day for 13.1 years (3.3 ttl pk-yrs)     Types: Cigarettes     Start date: 12/4/2011    Smokeless tobacco: Never   Vaping Use    Vaping status: Never Used   Substance and Sexual Activity    Alcohol use: No    Drug use: No    Sexual activity: Defer      Family History   Problem Relation Age of Onset    Heart disease Mother     Stroke Mother     Hyperlipidemia Mother     Hypertension Mother     Heart disease Father     Stroke Father     Hypertension Father     Hyperlipidemia Father     Heart disease Other     COPD Other     Cancer Other     Diabetes Other     Hypertension Other     Hyperlipidemia Other     Stroke Other       Allergies   Allergen Reactions    Aspirin Palpitations    Codeine Shortness Of Breath    Contrast Dye (Echo Or Unknown Ct/Mr) Shortness Of Breath    Erythromycin Hives    Morphine Unknown (See Comments)    Penicillin G Itching    Sulfa Antibiotics Unknown (See Comments)    Doxycycline Other (See Comments)     Rash and SOA    Fentanyl Itching    Levofloxacin Other (See Comments)    Nitrofuran Derivatives Unknown - High Severity      Social Drivers of Health     Tobacco Use: High Risk (11/20/2024)    Patient History     Smoking Tobacco Use: Every Day     Smokeless Tobacco Use: Never     Passive Exposure: Not on file   Alcohol Use: Not At Risk (11/5/2022)    AUDIT-C     Frequency of Alcohol Consumption: Never     Average Number of Drinks: Patient does not drink     Frequency of Binge Drinking: Never   Financial Resource Strain: Not on file   Food Insecurity: Not on file   Transportation Needs: Not on file   Physical Activity: Not on file   Stress: Not on file   Social Connections: Not on file   Interpersonal Safety: Not At Risk (12/24/2024)     Abuse Screen     Unsafe at Home or Work/School: no     Feels Threatened by Someone?: no     Does Anyone Keep You from Contacting Others or Doint Things Outside the Home?: no     Physical Sign of Abuse Present: no   Depression: At risk (11/20/2024)    PHQ-2     PHQ-2 Score: 16   Housing Stability: Unknown (11/5/2022)    Housing Stability     Current Living Arrangements: home     Potentially Unsafe Housing Conditions: Not on file   Utilities: Not on file   Health Literacy: Not on file   Employment: Not on file   Disabilities: At Risk (11/5/2022)    Disabilities     Concentrating, Remembering, or Making Decisions Difficulty: no     Doing Errands Independently Difficulty: yes        Home Medications     Prior to Admission medications    Medication Sig Start Date End Date Taking? Authorizing Provider   albuterol sulfate  (90 Base) MCG/ACT inhaler INHALE ONE PUFF BY MOUTH EVERY 4 TO 6 HOURS 5/10/22   Hiren Sellers MD   ALPRAZolam (XANAX) 1 MG tablet TAKE 1 TABLET BY MOUTH 2 TIMES A DAY AND 1/2 TABLET AT NOON 11/20/24   Mayra Jaime MD   aspirin 81 MG EC tablet Take 1 tablet by mouth Daily. 11/20/23   Bright Steele MD   Blood Glucose Monitoring Suppl (FreeStyle Lite) w/Device kit Use to check blood sugars 2-3 times a day 7/28/22   Provider, Historical, MD   FREESTYLE LITE test strip test TWICE DAILY 7/19/21   Hiren Sellers MD   furosemide (LASIX) 40 MG tablet Take 1 tablet by mouth Daily. 2/1/23   Tanya Berger APRN   gabapentin (NEURONTIN) 300 MG capsule Take 1 capsule by mouth 4 (Four) Times a Day. 11/20/24   Mayra Jaime MD   hydrOXYzine (ATARAX) 50 MG tablet Take 1 tablet by mouth 3 (Three) Times a Day. 11/20/24   Mayra Jaime MD   ipratropium-albuterol (DUO-NEB) 0.5-2.5 mg/3 ml nebulizer  8/24/22   ProviderEvelin MD   lamoTRIgine (LaMICtal) 150 MG tablet Take 1 tablet by mouth every night at bedtime. 11/20/24   Mayra Jaime MD   Lancets (freestyle) lancets TEST  EVERY DAY  Patient taking differently: As Needed. 10/4/20   Hiren Sellers MD   linaclotide (LINZESS) 145 MCG capsule capsule Linzess 145 mcg capsule    Evelin Winters MD   Magnesium Oxide -Mg Supplement 400 (240 Mg) MG tablet Take 1 tablet by mouth Daily. 11/13/23   Evelin Winters MD   metFORMIN (GLUCOPHAGE) 500 MG tablet Take 1 tablet by mouth 3 (Three) Times a Day.    Evelin Winters MD   methocarbamol (ROBAXIN) 750 MG tablet TAKE ONE TABLET BY MOUTH THREE TIMES DAILY 1/27/21   Hiren Sellers MD   metoprolol succinate XL (TOPROL-XL) 25 MG 24 hr tablet Take 1 tablet by mouth Daily. 12/17/24   Bright Steele MD   midodrine (PROAMATINE) 10 MG tablet TAKE 1 TABLET BY MOUTH 3 TIMES A DAY 12/17/24   Bright Steele MD   montelukast (SINGULAIR) 10 MG tablet Take 1 tablet by mouth Every Night.    Evelin Winters MD   mupirocin (BACTROBAN) 2 % cream Apply 1 application topically to the appropriate area as directed 3 (Three) Times a Day. Apply to wound 2/2/23   Tanya Berger APRN   NON FORMULARY 1 dose into the nostril(s) as directed by provider Continuous. Oxygen 3 lpm    Evelin Winters MD   omeprazole (priLOSEC) 20 MG capsule Take 1 capsule by mouth 2 (two) times a day.    Evelin Winters MD   oxyCODONE-acetaminophen (PERCOCET)  MG per tablet  8/24/22   Evelin Winters MD   potassium chloride (K-DUR,KLOR-CON) 20 MEQ CR tablet Take 1 tablet by mouth Daily.    Evelin Winters MD   potassium chloride 10 MEQ CR tablet Take 2 tablets by mouth 2 (Two) Times a Day.    Evelin Winters MD   pravastatin (PRAVACHOL) 10 MG tablet Take 1 tablet by mouth Daily. 11/20/23   Bright Steele MD   predniSONE (DELTASONE) 10 MG tablet Take 1 tablet by mouth Daily.    Evelin Winters MD   promethazine (PHENERGAN) 25 MG tablet TAKE ONE TABLET BY MOUTH EVERY 8 HOURS AS NEEDED FOR NAUSEA AND VOMITING 1/8/21   Hiren Sellers MD    sertraline (ZOLOFT) 50 MG tablet Take 1 tablet by mouth every night at bedtime. 11/20/24   Mayra Jaime MD   Stiolto Respimat 2.5-2.5 MCG/ACT aerosol solution inhaler Inhale 2 puffs Daily. 11/15/23   Evelin Winters MD   sucralfate (CARAFATE) 1 g tablet Take 1 tablet by mouth 4 (Four) Times a Day. 6/13/21   Evelin Winters MD   topiramate (TOPAMAX) 100 MG tablet TAKE ONE TABLET BY MOUTH EVERY MORNING AND ONE-HALF TABLET EVERY NIGHT AT BEDTIME 7/25/22   Seipel, Joseph F, MD   traZODone (DESYREL) 150 MG tablet Take 1 tablet by mouth every night at bedtime. 11/20/24   Mayra Jaime MD   vitamin D (ERGOCALCIFEROL) 1.25 MG (38353 UT) capsule capsule Take 1 capsule by mouth 2 (Two) Times a Week.    ProviderEvelin MD        Objective / Physical Exam     Vital signs:  Temp: (!) 104.3 °F (40.2 °C)  BP: 115/55  Heart Rate: (!) 126  Resp: 24  SpO2: 100 %  Weight: 76.2 kg (168 lb)    Admission Weight: Weight: 76.2 kg (168 lb)    Physical Exam  Vitals and nursing note reviewed.   Constitutional:       General: She is not in acute distress.     Appearance: She is ill-appearing and toxic-appearing. She is not diaphoretic.      Interventions: She is sedated and intubated.   HENT:      Head: Normocephalic.      Nose: Nose normal. No congestion.      Mouth/Throat:      Mouth: Mucous membranes are moist.      Pharynx: Oropharynx is clear. No oropharyngeal exudate.      Comments: OETT in place.  Eyes:      Conjunctiva/sclera: Conjunctivae normal.      Comments: Eyelids closed.   Cardiovascular:      Rate and Rhythm: Regular rhythm. Tachycardia present.      Pulses: Normal pulses.      Heart sounds: Normal heart sounds.   Pulmonary:      Effort: Pulmonary effort is normal. No respiratory distress. She is intubated.      Comments: Diminished bibasilar breath sounds without wheezes and rales.  Abdominal:      General: Bowel sounds are normal. There is no distension.      Palpations: Abdomen is soft.       Tenderness: There is no abdominal tenderness.   Musculoskeletal:         General: No swelling.      Right lower leg: Edema present.      Left lower leg: Edema present.   Skin:     General: Skin is warm and dry.      Comments: Scarring noted on patient's bilateral shoulders.   Neurological:      General: No focal deficit present.      Comments: Intubated, sedated, withdrawals to pain.   Psychiatric:      Comments: Intubated, sedated.            Labs     Results from last 7 days   Lab Units 12/24/24  0423   WBC 10*3/mm3 17.79*   HEMATOCRIT % 48.3*   PLATELETS 10*3/mm3 299      Results from last 7 days   Lab Units 12/24/24  0423   SODIUM mmol/L 143   POTASSIUM mmol/L 3.3*   CHLORIDE mmol/L 104   CO2 mmol/L 23.2   BUN mg/dL 12   CREATININE mg/dL 0.90        Imaging     Chest X ray: My independent assessment showed no infiltrates or effusions.    EKG: My independent evaluation showed appearance to be significant respiratory artifact, though ST depression noted in V3, V4, V5.  Heart rate 116, QTc 467 based on Fridericia.    Current Medications     Scheduled Meds:  budesonide, 0.5 mg, Nebulization, BID - RT  cefepime, 2,000 mg, Intravenous, Once  cefTRIAXone, 1,000 mg, Intravenous, Q24H  chlorhexidine, 15 mL, Mouth/Throat, Q12H  enoxaparin, 1 mg/kg, Subcutaneous, Once  hydrocortisone sodium succinate, 50 mg, Intravenous, Q6H  ipratropium-albuterol, 3 mL, Nebulization, Q4H - RT  mupirocin, 1 Application, Each Nare, BID  pantoprazole, 40 mg, Intravenous, Q24H  potassium chloride, 10 mEq, Intravenous, Q1H  sodium chloride, 10 mL, Intravenous, Q12H         Continuous Infusions:  nitroglycerin, 10-50 mcg/min, Last Rate: 10 mcg/min (12/24/24 0427)  propofol, 5-50 mcg/kg/min, Last Rate: 45 mcg/kg/min (12/24/24 0524)       Patient continues to be critically ill, remains at risk of clinical deterioration or death and needed high complexity decision making. I have spent a total of 37 minutes providing critical care services to this  patient including but not limited to: review of labs/ microbiology/imaging/medications, serial monitoring of vital signs, adjusting ventilator settings as needed, review of other consultant's notes, review of events in the last 24 hrs, monitoring input/output, review of treatment plan with bedside nurse, RT and other treatment team, management of life support and nutrition needs.    Time spent in performing separately billable procedures and updating family is not included in the critical care time.      Billy Tejeda, ANIBAL   Critical Care  12/24/24   06:01 EST

## 2024-12-24 NOTE — SIGNIFICANT NOTE
12/24/24 0939   Living Situation   Current Living Arrangements home   Potentially Unsafe Housing Conditions none   Food Insecurity   Within the past 12 months, you worried that your food would run out before you got the money to buy more. Never true   Within the past 12 months, the food you bought just didn't last and you didn't have money to get more. Never true   Transportation Needs   In the past 12 months, has lack of transportation kept you from medical appointments or from getting medications? no   In the past 12 months, has lack of transportation kept you from meetings, work, or from getting things needed for daily living? No   Utilities   In the past 12 months has the electric, gas, oil, or water company threatened to shut off services in your home? No   Abuse Screen (yes response referral indicated)   Feels Unsafe at Home or Work/School unable to answer (comment required)  (intubated/sedated)   Feels Threatened by Someone unable to answer (comment required)   Does Anyone Try to Keep You From Having Contact with Others or Doing Things Outside Your Home? unable to answer (comment required)   Physical Signs of Abuse Present no   Financial Resource Strain   How hard is it for you to pay for the very basics like food, housing, medical care, and heating? Not hard   Employment   Do you want help finding or keeping work or a job? I do not need or want help   Family and Community Support   If for any reason you need help with day-to-day activities such as bathing, preparing meals, shopping, managing finances, etc., do you get the help you need? I get all the help I need   How often do you feel lonely or isolated from those around you? Patient unable to answer   Education   Preferred Language English   Do you want help with school or training? For example, starting or completing job training or getting a high school diploma, GED or equivalent No   Physical Activity   On average, how many minutes do you engage in  exercise at this level? 0 min   Alcohol Use   Q1: How often do you have a drink containing alcohol? Never   Q2: How many drinks containing alcohol do you have on a typical day when you are drinking? None   Q3: How often do you have six or more drinks on one occasion? Never   Stress   Do you feel stress - tense, restless, nervous, or anxious, or unable to sleep at night because your mind is troubled all the time - these days? Pt Unable   Mental Health   Why did the patient not complete the Depression Screen questions? Patient unable to answer  (intubated/sedated)   Disabilities   Difficulty Concentrating, Remembering or Making Decisions patient unable to answer   Difficulty Managing Errands Independently yes   Errands Management brother provides transportation

## 2024-12-24 NOTE — ED PROVIDER NOTES
Subjective   History of Present Illness  59-year-old female was found in severe respiratory distress sitting in a tripod position poorly responsive by EMS the patient had reportedly had increasing respiratory distress in the last 24 hours.  The patient had reported had fever chills and cough.  The patient was placed on CPAP by EMS and they noted that the patient declined to the point that she was completely unresponsive en route to the hospital.  The patient was not given nitroglycerin because they thought the patient was allergic to nitroglycerin but it turned out that she was allergic to nitrofurantoin.  The patient coughed up green sputum according to EMS and reportedly had had some swollen lower extremity      Review of Systems   Unable to perform ROS: Patient unresponsive   Neurological:  Facial asymmetry: .[id number].       Past Medical History:   Diagnosis Date    Anxiety     Asthma     Breast cancer     Chest tightness     COPD (chronic obstructive pulmonary disease)     Degenerative disorder of bone     Foot pain     S/P multiple foot surguries.    GERD (gastroesophageal reflux disease)     Lesion of eye     Lesion behind eye, cancer associated retinopathopthy. [facility] from [facility].     Low back pain     Migraines     Neck nodule 2010    Pancreatitis     RA (rheumatoid arthritis)     Seizure disorder     Generalized seizure, possible partial complex.    Skin cancer     Age 18.    Stroke     Type 2 diabetes mellitus        Allergies   Allergen Reactions    Codeine Shortness Of Breath    Contrast Dye (Echo Or Unknown Ct/Mr) Shortness Of Breath    Erythromycin Hives    Morphine Unknown (See Comments)    Penicillin G Itching    Sulfa Antibiotics Unknown (See Comments)    Doxycycline Other (See Comments)     Rash and SOA    Fentanyl Itching    Levofloxacin Other (See Comments)    Nitrofuran Derivatives Unknown - High Severity       Past Surgical History:   Procedure Laterality Date    BRONCHOSCOPY N/A  2022    Procedure: BRONCHOSCOPY with bronchial washing;  Surgeon: Gonzales Mendoza MD;  Location: [facility] ENDOSCOPY;  Service: Pulmonary;  Laterality: N/A;  post op: pneumonia    CARDIAC CATHETERIZATION      x2    CARDIAC CATHETERIZATION  2015     SECTION      x2    ESOPHAGEAL DILATATION      FOOT SURGERY  2015    FOOT SURGERY Left 2016    FOOT SURGERY Right 2017    MASTECTOMY Bilateral     OTHER SURGICAL HISTORY  2017    LOOP Recorder    DC  2016    [facility]    TOTAL ABDOMINAL HYSTERECTOMY WITH SALPINGO OOPHORECTOMY         Family History   Problem Relation Age of Onset    Heart disease Mother     Stroke Mother     Hyperlipidemia Mother     Hypertension Mother     Heart disease Father     Stroke Father     Hypertension Father     Hyperlipidemia Father     Heart disease Other     COPD Other     Cancer Other     Diabetes Other     Hypertension Other     Hyperlipidemia Other     Stroke Other        Social History     Socioeconomic History    Marital status:    Tobacco Use    Smoking status: Every Day     Current packs/day: 0.25     Average packs/day: 0.3 packs/day for 13.1 years (3.3 ttl pk-yrs)     Types: Cigarettes     Start date: 2011    Smokeless tobacco: Never   Vaping Use    Vaping status: Never Used   Substance and Sexual Activity    Alcohol use: No    Drug use: Not Currently           Objective   Physical Exam  unresponsive Wood Coma Scale 5   HEENT: Pupils equal and reactive to light. Conjunctivae are not injected. Normal tympanic membranes. Oropharynx and nares are normal.   Neck: Supple. Midline trachea. No JVD. No goiter.   Chest: Shallow respirations with bibasilar Rales and extensive rhonchi and wheezing bilateral and equal breath sounds bilaterally, regular rate and rhythm without murmur or rub.   Abdomen: Positive bowel sounds, nontender, nondistended. No rebound or peritoneal signs. No CVA tenderness.   Extremities no clubbing. cyanosis or edema. Motor  sensory exam is normal. The full range of motion is intact   Skin: Warm and dry, no rashes or petechia.   Lymphatic: No regional lymphadenopathy. No calf pain, swelling or Homans sign    Procedures  Low O2 saturations were noted with CPAP and the patient was increasingly poorly responsive per EMS report.  Decision was made to intubate the patient the patient was given Versed 2 mg and etomidate 20 mg the patient was orally intubated without difficulty on the first pass with a 7 mm endotracheal tube of the subglottic suctioning type it was secured at the 24 cm orlando at the teeth and the cuff inflated.  Placement was confirmed by visualization of the tube going through the cords, colorimetric change, tube fogging, auscultation, and ultimately chest x-ray         ED Course      Labs Reviewed   RESPIRATORY PANEL PCR W/ COVID-19 (SARS-COV-2), NP SWAB IN UTM/VTP, 2 HR TAT - Abnormal; Notable for the following components:       Result Value    RSV, PCR Detected (*)     All other components within normal limits    Narrative:     In the setting of a positive respiratory panel with a viral infection PLUS a negative procalcitonin without other underlying concern for bacterial infection, consider observing off antibiotics or discontinuation of antibiotics and continue supportive care. If the respiratory panel is positive for atypical bacterial infection (Bordetella pertussis, Chlamydophila pneumoniae, or Mycoplasma pneumoniae), consider antibiotic de-escalation to target atypical bacterial infection.   URINE CULTURE - Abnormal; Notable for the following components:    Urine Culture >100,000 CFU/mL Enterobacter cloacae complex (*)     All other components within normal limits    Narrative:     Colonization of the urinary tract without infection is common. Treatment is discouraged unless the patient is symptomatic, pregnant, or undergoing an invasive urologic procedure.   COMPREHENSIVE METABOLIC PANEL - Abnormal; Notable for the  following components:    Glucose 126 (*)     Potassium 3.3 (*)     Alkaline Phosphatase 170 (*)     Anion Gap 15.8 (*)     All other components within normal limits    Narrative:     GFR Categories in Chronic Kidney Disease (CKD)      GFR Category          GFR (mL/min/1.73)    Interpretation  G1                     90 or greater         Normal or high (1)  G2                      60-89                Mild decrease (1)  G3a                   45-59                Mild to moderate decrease  G3b                   30-44                Moderate to severe decrease  G4                    15-29                Severe decrease  G5                    14 or less           Kidney failure          (1)In the absence of evidence of kidney disease, neither GFR category G1 or G2 fulfill the criteria for CKD.    eGFR calculation 2021 CKD-EPI creatinine equation, which does not include race as a factor   URINALYSIS W/ CULTURE IF INDICATED - Abnormal; Notable for the following components:    Blood, UA Trace (*)     Leuk Esterase, UA Trace (*)     Nitrite, UA Positive (*)     All other components within normal limits    Narrative:     In absence of clinical symptoms, the presence of pyuria, bacteria, and/or nitrites on the urinalysis result does not correlate with infection.   CBC WITH AUTO DIFFERENTIAL - Abnormal; Notable for the following components:    WBC 17.79 (*)     Hematocrit 48.3 (*)     Immature Grans % 0.6 (*)     Neutrophils, Absolute 11.87 (*)     Lymphocytes, Absolute 4.08 (*)     Monocytes, Absolute 1.58 (*)     Immature Grans, Absolute 0.10 (*)     All other components within normal limits   URINALYSIS, MICROSCOPIC ONLY - Abnormal; Notable for the following components:    WBC, UA 6-10 (*)     Bacteria, UA 4+ (*)     Squamous Epithelial Cells, UA 7-12 (*)     Mucus, UA Small/1+ (*)     All other components within normal limits   BLOOD GAS, ARTERIAL - Abnormal; Notable for the following components:    pH, Arterial 7.304 (*)      pCO2, Arterial 51.9 (*)     pO2, Arterial 538.1 (*)     Base Excess, Arterial -1.6 (*)     O2 Saturation, Arterial 100.0 (*)     PO2/FIO2 538 (*)     All other components within normal limits   LIPID PANEL - Abnormal; Notable for the following components:    Total Cholesterol 214 (*)     Triglycerides 283 (*)     HDL Cholesterol 32 (*)     LDL Cholesterol  131 (*)     VLDL Cholesterol 51 (*)     All other components within normal limits    Narrative:     Cholesterol Reference Ranges  (U.S. Department of Health and Human Services ATP III Classifications)    Desirable          <200 mg/dL  Borderline High    200-239 mg/dL  High Risk          >240 mg/dL      Triglyceride Reference Ranges  (U.S. Department of Health and Human Services ATP III Classifications)    Normal           <150 mg/dL  Borderline High  150-199 mg/dL  High             200-499 mg/dL  Very High        >500 mg/dL    HDL Reference Ranges  (U.S. Department of Health and Human Services ATP III Classifications)    Low     <40 mg/dl (major risk factor for CHD)  High    >60 mg/dl ('negative' risk factor for CHD)        LDL Reference Ranges  (U.S. Department of Health and Human Services ATP III Classifications)    Optimal          <100 mg/dL  Near Optimal     100-129 mg/dL  Borderline High  130-159 mg/dL  High             160-189 mg/dL  Very High        >189 mg/dL   BLOOD GAS, ARTERIAL - Abnormal; Notable for the following components:    pO2, Arterial 75.2 (*)     Base Excess, Arterial -1.3 (*)     All other components within normal limits   HEMOGLOBIN A1C - Abnormal; Notable for the following components:    Hemoglobin A1C 6.24 (*)     All other components within normal limits   COMPREHENSIVE METABOLIC PANEL - Abnormal; Notable for the following components:    Glucose 122 (*)     Potassium 3.2 (*)     Chloride 108 (*)     CO2 20.3 (*)     Alkaline Phosphatase 136 (*)     All other components within normal limits    Narrative:     GFR Categories in Chronic  Kidney Disease (CKD)      GFR Category          GFR (mL/min/1.73)    Interpretation  G1                     90 or greater         Normal or high (1)  G2                      60-89                Mild decrease (1)  G3a                   45-59                Mild to moderate decrease  G3b                   30-44                Moderate to severe decrease  G4                    15-29                Severe decrease  G5                    14 or less           Kidney failure          (1)In the absence of evidence of kidney disease, neither GFR category G1 or G2 fulfill the criteria for CKD.    eGFR calculation 2021 CKD-EPI creatinine equation, which does not include race as a factor   CBC WITH AUTO DIFFERENTIAL - Abnormal; Notable for the following components:    WBC 19.15 (*)     Neutrophil % 79.9 (*)     Lymphocyte % 12.3 (*)     Eosinophil % 0.0 (*)     Neutrophils, Absolute 15.30 (*)     Monocytes, Absolute 1.40 (*)     Immature Grans, Absolute 0.06 (*)     All other components within normal limits   BLOOD GAS, ARTERIAL - Abnormal; Notable for the following components:    pCO2, Arterial 34.7 (*)     Base Excess, Arterial -3.2 (*)     All other components within normal limits   PHOSPHORUS - Abnormal; Notable for the following components:    Phosphorus 1.5 (*)     All other components within normal limits   COMPREHENSIVE METABOLIC PANEL - Abnormal; Notable for the following components:    CO2 21.2 (*)     Alkaline Phosphatase 129 (*)     Anion Gap 15.8 (*)     All other components within normal limits    Narrative:     GFR Categories in Chronic Kidney Disease (CKD)      GFR Category          GFR (mL/min/1.73)    Interpretation  G1                     90 or greater         Normal or high (1)  G2                      60-89                Mild decrease (1)  G3a                   45-59                Mild to moderate decrease  G3b                   30-44                Moderate to severe decrease  G4                     15-29                Severe decrease  G5                    14 or less           Kidney failure          (1)In the absence of evidence of kidney disease, neither GFR category G1 or G2 fulfill the criteria for CKD.    eGFR calculation 2021 CKD-EPI creatinine equation, which does not include race as a factor   CBC WITH AUTO DIFFERENTIAL - Abnormal; Notable for the following components:    WBC 16.45 (*)     MCHC 31.4 (*)     Neutrophil % 79.8 (*)     Lymphocyte % 12.9 (*)     Eosinophil % 0.1 (*)     Immature Grans % 0.6 (*)     Neutrophils, Absolute 13.12 (*)     Monocytes, Absolute 1.05 (*)     Immature Grans, Absolute 0.10 (*)     All other components within normal limits   BLOOD GAS, ARTERIAL - Abnormal; Notable for the following components:    pO2, Arterial 73.8 (*)     All other components within normal limits   COMPREHENSIVE METABOLIC PANEL - Abnormal; Notable for the following components:    Potassium 3.3 (*)     Alkaline Phosphatase 119 (*)     All other components within normal limits    Narrative:     GFR Categories in Chronic Kidney Disease (CKD)      GFR Category          GFR (mL/min/1.73)    Interpretation  G1                     90 or greater         Normal or high (1)  G2                      60-89                Mild decrease (1)  G3a                   45-59                Mild to moderate decrease  G3b                   30-44                Moderate to severe decrease  G4                    15-29                Severe decrease  G5                    14 or less           Kidney failure          (1)In the absence of evidence of kidney disease, neither GFR category G1 or G2 fulfill the criteria for CKD.    eGFR calculation 2021 CKD-EPI creatinine equation, which does not include race as a factor   CBC WITH AUTO DIFFERENTIAL - Abnormal; Notable for the following components:    WBC 13.31 (*)     Lymphocyte % 19.2 (*)     Eosinophil % 0.0 (*)     Immature Grans % 1.0 (*)     Neutrophils, Absolute 9.56 (*)      Monocytes, Absolute 1.02 (*)     Immature Grans, Absolute 0.13 (*)     All other components within normal limits   POCT GLUCOSE FINGERSTICK - Abnormal; Notable for the following components:    Glucose 131 (*)     All other components within normal limits   POCT GLUCOSE FINGERSTICK - Abnormal; Notable for the following components:    Glucose 251 (*)     All other components within normal limits   POCT GLUCOSE FINGERSTICK - Abnormal; Notable for the following components:    Glucose 227 (*)     All other components within normal limits   POCT GLUCOSE FINGERSTICK - Abnormal; Notable for the following components:    Glucose 215 (*)     All other components within normal limits   POCT GLUCOSE FINGERSTICK - Abnormal; Notable for the following components:    Glucose 171 (*)     All other components within normal limits   POCT GLUCOSE FINGERSTICK - Abnormal; Notable for the following components:    Glucose 114 (*)     All other components within normal limits   ELECTROLYTES + H&H - Abnormal; Notable for the following components:    Sodium 147 (*)     All other components within normal limits   POCT GLUCOSE FINGERSTICK - Abnormal; Notable for the following components:    Glucose 135 (*)     All other components within normal limits   POCT GLUCOSE FINGERSTICK - Abnormal; Notable for the following components:    Glucose 236 (*)     All other components within normal limits   POCT GLUCOSE FINGERSTICK - Abnormal; Notable for the following components:    Glucose 145 (*)     All other components within normal limits   POCT GLUCOSE FINGERSTICK - Abnormal; Notable for the following components:    Glucose 209 (*)     All other components within normal limits   POCT GLUCOSE FINGERSTICK - Abnormal; Notable for the following components:    Glucose 133 (*)     All other components within normal limits   BLOOD CULTURE - Normal   BLOOD CULTURE - Normal   MRSA SCREEN, PCR - Normal    Narrative:     The negative predictive value of this  diagnostic test is high and should only be used to consider de-escalating anti-MRSA therapy. A positive result may indicate colonization with MRSA and must be correlated clinically.   STREP PNEUMO AG, URINE OR CSF - Normal   LEGIONELLA ANTIGEN, URINE - Normal   BNP (IN-HOUSE) - Normal    Narrative:     This assay is used as an aid in the diagnosis of individuals suspected of having heart failure. It can be used as an aid in the diagnosis of acute decompensated heart failure (ADHF) in patients presenting with signs and symptoms of ADHF to the emergency department (ED). In addition, NT-proBNP of <300 pg/mL indicates ADHF is not likely.    Age Range Result Interpretation  NT-proBNP Concentration (pg/mL:      <50             Positive            >450                   Gray                 300-450                    Negative             <300    50-75           Positive            >900                  Gray                300-900                  Negative            <300      >75             Positive            >1800                  Gray                300-1800                  Negative            <300   TROPONIN - Normal    Narrative:     High Sensitive Troponin T Reference Range:  <14.0 ng/L- Negative Female for AMI  <22.0 ng/L- Negative Male for AMI  >=14 - Abnormal Female indicating possible myocardial injury.  >=22 - Abnormal Male indicating possible myocardial injury.   Clinicians would have to utilize clinical acumen, EKG, Troponin, and serial changes to determine if it is an Acute Myocardial Infarction or myocardial injury due to an underlying chronic condition.        HIGH SENSITIVITIY TROPONIN T 1HR - Normal    Narrative:     High Sensitive Troponin T Reference Range:  <14.0 ng/L- Negative Female for AMI  <22.0 ng/L- Negative Male for AMI  >=14 - Abnormal Female indicating possible myocardial injury.  >=22 - Abnormal Male indicating possible myocardial injury.   Clinicians would have to utilize clinical acumen,  EKG, Troponin, and serial changes to determine if it is an Acute Myocardial Infarction or myocardial injury due to an underlying chronic condition.        MAGNESIUM - Normal   MAGNESIUM - Normal   PHOSPHORUS - Normal   POTASSIUM - Normal   MAGNESIUM - Normal   PHOSPHORUS - Normal   MAGNESIUM - Normal   PHOSPHORUS - Normal   POC LACTATE - Normal   POCT GLUCOSE FINGERSTICK - Normal   POCT CREATININE - Normal   POC LACTATE - Normal   POCT GLUCOSE FINGERSTICK - Normal   POCT GLUCOSE FINGERSTICK - Normal   RESPIRATORY CULTURE   BLOOD GAS, ARTERIAL   BLOOD GAS, ARTERIAL   BLOOD GAS, ARTERIAL   POCT GLUCOSE FINGERSTICK   POCT GLUCOSE FINGERSTICK   POCT GLUCOSE FINGERSTICK   POCT GLUCOSE FINGERSTICK   POCT GLUCOSE FINGERSTICK   POCT GLUCOSE FINGERSTICK   POCT GLUCOSE FINGERSTICK   POCT GLUCOSE FINGERSTICK   POCT GLUCOSE FINGERSTICK   POCT GLUCOSE FINGERSTICK   CBC AND DIFFERENTIAL    Narrative:     The following orders were created for panel order CBC & Differential.  Procedure                               Abnormality         Status                     ---------                               -----------         ------                     CBC Auto Differential[[id number]]        Abnormal            Final result                 Please view results for these tests on the individual orders.   EXTRA TUBES    Narrative:     The following orders were created for panel order Extra Tubes.  Procedure                               Abnormality         Status                     ---------                               -----------         ------                     Gold Top - SST[[id number]]                                   Final result               Light Blue Top[[id number]]                                   Final result                 Please view results for these tests on the individual orders.   GOLD TOP - SST   LIGHT BLUE TOP   CBC AND DIFFERENTIAL    Narrative:     The following orders were created for panel order CBC &  Differential.  Procedure                               Abnormality         Status                     ---------                               -----------         ------                     CBC Auto Differential[[id number]]        Abnormal            Final result                 Please view results for these tests on the individual orders.   CBC AND DIFFERENTIAL    Narrative:     The following orders were created for panel order CBC & Differential.  Procedure                               Abnormality         Status                     ---------                               -----------         ------                     CBC Auto Differential[[id number]]        Abnormal            Final result                 Please view results for these tests on the individual orders.   CBC AND DIFFERENTIAL    Narrative:     The following orders were created for panel order CBC & Differential.  Procedure                               Abnormality         Status                     ---------                               -----------         ------                     CBC Auto Differential[[id number]]        Abnormal            Final result                 Please view results for these tests on the individual orders.     .EDS                                                   Medical Decision Making  Ischemic changes are noted on the EKG the patient was started on IV cefepime and nitroglycerin.  Patient was sedated with IV propofol.  The patient remained hemodynamically stable in the emergency department fever was treated with Tylenol ET tube was suctioned out by tenacious green sputum the case was discussed with the intensivist practitioner the patient mated to ICU in critical condition    Amount and/or Complexity of Data Reviewed  Labs: ordered.  Radiology: ordered.  ECG/medicine tests: ordered.    Risk  Prescription drug management.  Parenteral controlled substances.  Decision regarding hospitalization.        Final diagnoses:   Acute  respiratory failure with hypoxia and hypercapnia   Multifocal pneumonia   Myocardial ischemia       ED Disposition  ED Disposition       ED Disposition   Decision to Admit    Condition   --    Comment   Level of Care: Critical Care [6]   Diagnosis: Acute on chronic respiratory failure [6361125]   Admitting Physician: MICHELE YOUNG [[id number]]   Attending Physician: MICHELE YOUNG [[id number]]   Certification: I Certify That Inpatient Hospital Services Are Medically Necessary For Greater Than 2 Midnights                 Bharti Norton APRN  [address]  [address]  [place] [address]  [phone]      1-2 weeks    DR CHINMAY RALPH MD  [address] [facility] [address]  [phone]  Follow up      Bharti Norton APRN  [address]  [address]  [place] [id number] [phone]               Medication List        New Prescriptions      nystatin 100,000 unit/mL suspension  Commonly known as: MYCOSTATIN  Swish and swallow 5 mL 4 (Four) Times a Day for 14 days.            Changed      freestyle lancets  TEST EVERY DAY  What changed: See the new instructions.     * predniSONE 10 MG tablet  Commonly known as: DELTASONE  Take 4 tablets by mouth Daily for 2 days, THEN 3 tablets Daily for 2 days, THEN 2 tablets Daily for 2 days, THEN 1 tablet Daily for 2 days.  Start taking on: December 27, 2024  What changed: You were already taking a medication with the same name, and this prescription was added. Make sure you understand how and when to take each.     * predniSONE 10 MG tablet  Commonly known as: DELTASONE  Take 1 tablet by mouth Daily.  Start taking on: January 5, 2025  What changed: These instructions start on January 5, 2025. If you are unsure what to do until then, ask your doctor or other care provider.           * This list has 2 medication(s) that are the same as other medications prescribed for you. Read the directions carefully, and ask your doctor or other care provider to review  them with you.                   Where to Get Your Medications        These medications were sent to [facility] - [address] [place] [address]      Hours: Monday to Friday 7 AM to 7 PM Phone: [phone]   nystatin 100,000 unit/mL suspension  predniSONE 10 MG tablet       Information about where to get these medications is not yet available    Ask your nurse or doctor about these medications  predniSONE 10 MG tablet            Mani Ziegler MD  12/30/24 1510

## 2024-12-25 LAB
ALBUMIN SERPL-MCNC: 3.7 G/DL (ref 3.5–5.2)
ALBUMIN/GLOB SERPL: 1.3 G/DL
ALP SERPL-CCNC: 136 U/L (ref 39–117)
ALT SERPL W P-5'-P-CCNC: 9 U/L (ref 1–33)
ANION GAP SERPL CALCULATED.3IONS-SCNC: 14.7 MMOL/L (ref 5–15)
ARTERIAL PATENCY WRIST A: POSITIVE
AST SERPL-CCNC: 13 U/L (ref 1–32)
ATMOSPHERIC PRESS: ABNORMAL MM[HG]
BASE EXCESS BLDA CALC-SCNC: -3.2 MMOL/L (ref 0–3)
BASOPHILS # BLD AUTO: 0.03 10*3/MM3 (ref 0–0.2)
BASOPHILS NFR BLD AUTO: 0.2 % (ref 0–1.5)
BDY SITE: ABNORMAL
BILIRUB SERPL-MCNC: 0.3 MG/DL (ref 0–1.2)
BUN SERPL-MCNC: 14 MG/DL (ref 6–20)
BUN/CREAT SERPL: 21.5 (ref 7–25)
CALCIUM SPEC-SCNC: 8.9 MG/DL (ref 8.6–10.5)
CHLORIDE SERPL-SCNC: 108 MMOL/L (ref 98–107)
CO2 BLDA-SCNC: 22.1 MMOL/L (ref 22–29)
CO2 SERPL-SCNC: 20.3 MMOL/L (ref 22–29)
CREAT SERPL-MCNC: 0.65 MG/DL (ref 0.57–1)
DEPRECATED RDW RBC AUTO: 48.7 FL (ref 37–54)
EGFRCR SERPLBLD CKD-EPI 2021: 102.2 ML/MIN/1.73
EOSINOPHIL # BLD AUTO: 0 10*3/MM3 (ref 0–0.4)
EOSINOPHIL NFR BLD AUTO: 0 % (ref 0.3–6.2)
ERYTHROCYTE [DISTWIDTH] IN BLOOD BY AUTOMATED COUNT: 14.1 % (ref 12.3–15.4)
GLOBULIN UR ELPH-MCNC: 2.8 GM/DL
GLUCOSE BLDC GLUCOMTR-MCNC: 114 MG/DL (ref 70–105)
GLUCOSE BLDC GLUCOMTR-MCNC: 131 MG/DL (ref 70–105)
GLUCOSE BLDC GLUCOMTR-MCNC: 171 MG/DL (ref 70–105)
GLUCOSE SERPL-MCNC: 122 MG/DL (ref 65–99)
HCO3 BLDA-SCNC: 21 MMOL/L (ref 21–28)
HCT VFR BLD AUTO: 39.2 % (ref 34–46.6)
HEMODILUTION: NO
HGB BLD-MCNC: 12.4 G/DL (ref 12–15.9)
IMM GRANULOCYTES # BLD AUTO: 0.06 10*3/MM3 (ref 0–0.05)
IMM GRANULOCYTES NFR BLD AUTO: 0.3 % (ref 0–0.5)
INHALED O2 CONCENTRATION: 60 %
LYMPHOCYTES # BLD AUTO: 2.36 10*3/MM3 (ref 0.7–3.1)
LYMPHOCYTES NFR BLD AUTO: 12.3 % (ref 19.6–45.3)
MAGNESIUM SERPL-MCNC: 2.5 MG/DL (ref 1.6–2.6)
MCH RBC QN AUTO: 29.7 PG (ref 26.6–33)
MCHC RBC AUTO-ENTMCNC: 31.6 G/DL (ref 31.5–35.7)
MCV RBC AUTO: 93.8 FL (ref 79–97)
MODALITY: ABNORMAL
MONOCYTES # BLD AUTO: 1.4 10*3/MM3 (ref 0.1–0.9)
MONOCYTES NFR BLD AUTO: 7.3 % (ref 5–12)
NEUTROPHILS NFR BLD AUTO: 15.3 10*3/MM3 (ref 1.7–7)
NEUTROPHILS NFR BLD AUTO: 79.9 % (ref 42.7–76)
NRBC BLD AUTO-RTO: 0 /100 WBC (ref 0–0.2)
PCO2 BLDA: 34.7 MM HG (ref 35–48)
PH BLDA: 7.39 PH UNITS (ref 7.35–7.45)
PHOSPHATE SERPL-MCNC: 2.8 MG/DL (ref 2.5–4.5)
PLATELET # BLD AUTO: 226 10*3/MM3 (ref 140–450)
PMV BLD AUTO: 8.7 FL (ref 6–12)
PO2 BLD: 145 MM[HG] (ref 0–500)
PO2 BLDA: 86.7 MM HG (ref 83–108)
POTASSIUM SERPL-SCNC: 3.2 MMOL/L (ref 3.5–5.2)
POTASSIUM SERPL-SCNC: 3.6 MMOL/L (ref 3.5–5.2)
PROT SERPL-MCNC: 6.5 G/DL (ref 6–8.5)
RBC # BLD AUTO: 4.18 10*6/MM3 (ref 3.77–5.28)
SAO2 % BLDCOA: 96.6 % (ref 94–98)
SODIUM SERPL-SCNC: 143 MMOL/L (ref 136–145)
WBC NRBC COR # BLD AUTO: 19.15 10*3/MM3 (ref 3.4–10.8)

## 2024-12-25 PROCEDURE — 83735 ASSAY OF MAGNESIUM: CPT | Performed by: NURSE PRACTITIONER

## 2024-12-25 PROCEDURE — 63710000001 INSULIN LISPRO (HUMAN) PER 5 UNITS: Performed by: NURSE PRACTITIONER

## 2024-12-25 PROCEDURE — 85025 COMPLETE CBC W/AUTO DIFF WBC: CPT | Performed by: NURSE PRACTITIONER

## 2024-12-25 PROCEDURE — 25010000002 LORAZEPAM PER 2 MG

## 2024-12-25 PROCEDURE — 84132 ASSAY OF SERUM POTASSIUM: CPT | Performed by: EMERGENCY MEDICINE

## 2024-12-25 PROCEDURE — 25010000002 ENOXAPARIN PER 10 MG: Performed by: NURSE PRACTITIONER

## 2024-12-25 PROCEDURE — 93010 ELECTROCARDIOGRAM REPORT: CPT | Performed by: INTERNAL MEDICINE

## 2024-12-25 PROCEDURE — 93005 ELECTROCARDIOGRAM TRACING: CPT | Performed by: NURSE PRACTITIONER

## 2024-12-25 PROCEDURE — 25010000002 ONDANSETRON PER 1 MG: Performed by: NURSE PRACTITIONER

## 2024-12-25 PROCEDURE — 25010000002 POTASSIUM CHLORIDE 10 MEQ/100ML SOLUTION: Performed by: NURSE PRACTITIONER

## 2024-12-25 PROCEDURE — 36600 WITHDRAWAL OF ARTERIAL BLOOD: CPT

## 2024-12-25 PROCEDURE — 94799 UNLISTED PULMONARY SVC/PX: CPT

## 2024-12-25 PROCEDURE — 25010000002 HYDROMORPHONE PER 4 MG: Performed by: NURSE PRACTITIONER

## 2024-12-25 PROCEDURE — 84100 ASSAY OF PHOSPHORUS: CPT | Performed by: NURSE PRACTITIONER

## 2024-12-25 PROCEDURE — 25010000002 CEFTRIAXONE PER 250 MG: Performed by: NURSE PRACTITIONER

## 2024-12-25 PROCEDURE — 94660 CPAP INITIATION&MGMT: CPT

## 2024-12-25 PROCEDURE — 82948 REAGENT STRIP/BLOOD GLUCOSE: CPT | Performed by: NURSE PRACTITIONER

## 2024-12-25 PROCEDURE — 80053 COMPREHEN METABOLIC PANEL: CPT | Performed by: NURSE PRACTITIONER

## 2024-12-25 PROCEDURE — 25010000002 METHYLPREDNISOLONE PER 40 MG: Performed by: EMERGENCY MEDICINE

## 2024-12-25 PROCEDURE — 82803 BLOOD GASES ANY COMBINATION: CPT

## 2024-12-25 PROCEDURE — 82948 REAGENT STRIP/BLOOD GLUCOSE: CPT

## 2024-12-25 RX ORDER — POTASSIUM CHLORIDE 7.45 MG/ML
10 INJECTION INTRAVENOUS
Status: COMPLETED | OUTPATIENT
Start: 2024-12-25 | End: 2024-12-25

## 2024-12-25 RX ORDER — IPRATROPIUM BROMIDE AND ALBUTEROL SULFATE 2.5; .5 MG/3ML; MG/3ML
3 SOLUTION RESPIRATORY (INHALATION)
Status: DISCONTINUED | OUTPATIENT
Start: 2024-12-25 | End: 2024-12-27 | Stop reason: HOSPADM

## 2024-12-25 RX ORDER — POTASSIUM CHLORIDE 1500 MG/1
40 TABLET, EXTENDED RELEASE ORAL EVERY 4 HOURS
Status: DISPENSED | OUTPATIENT
Start: 2024-12-25 | End: 2024-12-26

## 2024-12-25 RX ORDER — PREDNISONE 50 MG/1
50 TABLET ORAL
Status: DISCONTINUED | OUTPATIENT
Start: 2024-12-26 | End: 2024-12-27 | Stop reason: HOSPADM

## 2024-12-25 RX ADMIN — IPRATROPIUM BROMIDE AND ALBUTEROL SULFATE 3 ML: .5; 3 SOLUTION RESPIRATORY (INHALATION) at 02:31

## 2024-12-25 RX ADMIN — IPRATROPIUM BROMIDE AND ALBUTEROL SULFATE 3 ML: .5; 3 SOLUTION RESPIRATORY (INHALATION) at 06:42

## 2024-12-25 RX ADMIN — BUDESONIDE INHALATION 0.5 MG: 0.5 SUSPENSION RESPIRATORY (INHALATION) at 18:08

## 2024-12-25 RX ADMIN — POTASSIUM CHLORIDE 10 MEQ: 7.46 INJECTION, SOLUTION INTRAVENOUS at 08:03

## 2024-12-25 RX ADMIN — LORAZEPAM 0.25 MG: 2 INJECTION INTRAMUSCULAR; INTRAVENOUS at 03:00

## 2024-12-25 RX ADMIN — MUPIROCIN 1 APPLICATION: 20 OINTMENT TOPICAL at 08:08

## 2024-12-25 RX ADMIN — POTASSIUM CHLORIDE 10 MEQ: 7.46 INJECTION, SOLUTION INTRAVENOUS at 12:56

## 2024-12-25 RX ADMIN — HYDROXYZINE HYDROCHLORIDE 50 MG: 25 TABLET, FILM COATED ORAL at 16:43

## 2024-12-25 RX ADMIN — ACETAMINOPHEN 650 MG: 325 TABLET, FILM COATED ORAL at 11:48

## 2024-12-25 RX ADMIN — PANTOPRAZOLE SODIUM 40 MG: 40 INJECTION, POWDER, FOR SOLUTION INTRAVENOUS at 06:21

## 2024-12-25 RX ADMIN — POTASSIUM CHLORIDE 10 MEQ: 7.46 INJECTION, SOLUTION INTRAVENOUS at 10:50

## 2024-12-25 RX ADMIN — GABAPENTIN 300 MG: 300 CAPSULE ORAL at 18:22

## 2024-12-25 RX ADMIN — POTASSIUM CHLORIDE 40 MEQ: 1500 TABLET, EXTENDED RELEASE ORAL at 21:25

## 2024-12-25 RX ADMIN — INSULIN LISPRO 2 UNITS: 100 INJECTION, SOLUTION INTRAVENOUS; SUBCUTANEOUS at 11:47

## 2024-12-25 RX ADMIN — MONTELUKAST 10 MG: 10 TABLET, FILM COATED ORAL at 21:35

## 2024-12-25 RX ADMIN — GABAPENTIN 300 MG: 300 CAPSULE ORAL at 21:26

## 2024-12-25 RX ADMIN — ONDANSETRON 4 MG: 2 INJECTION, SOLUTION INTRAMUSCULAR; INTRAVENOUS at 03:31

## 2024-12-25 RX ADMIN — NICOTINE 1 PATCH: 21 PATCH TRANSDERMAL at 08:07

## 2024-12-25 RX ADMIN — POTASSIUM CHLORIDE 10 MEQ: 7.46 INJECTION, SOLUTION INTRAVENOUS at 06:32

## 2024-12-25 RX ADMIN — ENOXAPARIN SODIUM 40 MG: 100 INJECTION SUBCUTANEOUS at 16:43

## 2024-12-25 RX ADMIN — Medication 10 ML: at 21:27

## 2024-12-25 RX ADMIN — ACETAMINOPHEN 650 MG: 325 TABLET, FILM COATED ORAL at 16:43

## 2024-12-25 RX ADMIN — IPRATROPIUM BROMIDE AND ALBUTEROL SULFATE 3 ML: .5; 3 SOLUTION RESPIRATORY (INHALATION) at 18:08

## 2024-12-25 RX ADMIN — METHYLPREDNISOLONE SODIUM SUCCINATE 40 MG: 40 INJECTION, POWDER, FOR SOLUTION INTRAMUSCULAR; INTRAVENOUS at 03:03

## 2024-12-25 RX ADMIN — BUDESONIDE INHALATION 0.5 MG: 0.5 SUSPENSION RESPIRATORY (INHALATION) at 06:47

## 2024-12-25 RX ADMIN — IPRATROPIUM BROMIDE AND ALBUTEROL SULFATE 3 ML: .5; 3 SOLUTION RESPIRATORY (INHALATION) at 16:01

## 2024-12-25 RX ADMIN — HYDROMORPHONE HYDROCHLORIDE 0.5 MG: 1 INJECTION, SOLUTION INTRAMUSCULAR; INTRAVENOUS; SUBCUTANEOUS at 03:17

## 2024-12-25 RX ADMIN — FUROSEMIDE 40 MG: 40 TABLET ORAL at 18:22

## 2024-12-25 RX ADMIN — Medication 10 ML: at 08:08

## 2024-12-25 RX ADMIN — LAMOTRIGINE 150 MG: 100 TABLET ORAL at 21:26

## 2024-12-25 RX ADMIN — TRAZODONE HYDROCHLORIDE 150 MG: 50 TABLET ORAL at 21:26

## 2024-12-25 RX ADMIN — CEFTRIAXONE SODIUM 1000 MG: 1 INJECTION, POWDER, FOR SOLUTION INTRAMUSCULAR; INTRAVENOUS at 08:08

## 2024-12-25 RX ADMIN — MUPIROCIN 1 APPLICATION: 20 OINTMENT TOPICAL at 21:26

## 2024-12-25 RX ADMIN — HYDROXYZINE HYDROCHLORIDE 50 MG: 25 TABLET, FILM COATED ORAL at 21:26

## 2024-12-25 RX ADMIN — HYDROMORPHONE HYDROCHLORIDE 0.5 MG: 1 INJECTION, SOLUTION INTRAMUSCULAR; INTRAVENOUS; SUBCUTANEOUS at 08:13

## 2024-12-25 RX ADMIN — IPRATROPIUM BROMIDE AND ALBUTEROL SULFATE 3 ML: .5; 3 SOLUTION RESPIRATORY (INHALATION) at 12:00

## 2024-12-25 RX ADMIN — LORAZEPAM 0.25 MG: 2 INJECTION INTRAMUSCULAR; INTRAVENOUS at 08:10

## 2024-12-25 NOTE — PROGRESS NOTES
Critical Care Progress Note     Melvi Rayo : 1965 MRN:[id number] LOS:1  Rm: 2304/1     Principal Problem: Acute on chronic respiratory failure     Reason for follow up: All the medical problems listed below    Summary     Melvi Rayo is a 59 y.o. female with PMH of chronic RF on 4L O2, COPD, breast cancer s/p mastectomy, depression, DM who presented to the hospital for dyspnea, and was admitted with a principal diagnosis of Acute on chronic respiratory failure.      Patient called EMS and was brought in after having 24 hours of symptoms. The patient was intubated upon arrival to the emergency department. There was concern for purulence in the airway, patient was broadly covered with antibiotics, and patient was cultured. Patient's most recent ABG is without acidosis or hypercapnia. She is oxygenating well on minimal settings, there is concern for abnormal EKG however her repeat troponin went from 6-8. Patient had a DVT ultrasound which shows no acute DVTs, her chest x-ray did not show evidence of infection, and her urine and blood cultures are pending. MRSA nares was negative. Patient's RSV was noted to be positive. CT Scan with no PE.    Significant Events / Subjective     24 : Extubated to BIPAP yesterday, LESIA overnight, now on 10 L HFNC. Feels better    Assessment / Plan     Acute on chronic respiratory failure with hypoxia and hypercapnia / On chronic oxygen supplementation  Likely due to pneumonia from RSV  Baseline of 4 LPM.  Sees pulmonology as outpatient.  CTPE without evidence of PE  Extubated to Bipap on , now on HFNC  Duplex DVT negative     Acute COPD exacerbation  Likely precipitated due to pneumonia.   Duo-nebs q4  Solumedrol 40 q12, will change to prednisone daily, tomorrow  Resume home nebs     Severe Sepsis due to pneumonia with RSV infection and possible UTI  Likely due to pneumonia, secondary to RSV.   Tmax of 104.3 in ED, treated with acetaminophen.  Follow blood  cultures.  Urine culture growing GNB, pending speciation  RVP: Positive for RSV; placed and contact/droplet precautions.  urine antigens negartive  MRSA nares negative  Received cefepime in the ED, de-escalated to ceftriaxone monotherapy for a total of 5 days.     EKG change of ST depression / History of recurrent atypical chest pain  Negative troponins  Per report on psychiatric evaluation, patient has history of panic attacks, almost daily, of which she does take Xanax and it reportedly helps with her chest pain.  EKG obtained, of note there appears to be significant respiratory artifact, though ST depression noted in V3, V4, V5.  Heart rate 116, QTc 467 based on Fridericia.  Possibly secondary to demand ischemia from respiratory distress, versus panic attack.  Echo showing EF 61-65% from 12/24     Diabetes mellitus Type 2, not insulin-dependent:   Home regimen includes: Metformin  Hold oral agents while in ICU.  Check hemoglobin A1c.  Accu checks ACHS or Every 6 Hours, based on diet, with sliding scale Admelog coverage as needed.      Chronic diarrhea  Followed by GI as outpatient.  Previous colonic workup was negative.  Planned EGD/Colonoscopy before end of year.    Disposition:  PCU    Code status:   Code Status (Patient has no pulse and is not breathing): CPR (Attempt to Resuscitate)  Medical Interventions (Patient has pulse or is breathing): Full Support       Nutrition:   Diet: Regular/House; Fluid Consistency: Thin (IDDSI 0)   Patient isn't on Tube Feeding     VTE Prophylaxis:  Pharmacologic VTE prophylaxis orders are present.           Objective / Physical Exam     Vital signs:  Temp: 98.4 °F (36.9 °C)  BP: 115/70  Heart Rate: 98  Resp: 16  SpO2: 100 %  Weight: 76.2 kg (168 lb)    Admission Weight: Weight: 76.2 kg (168 lb)  Current Weight: Weight: 76.2 kg (168 lb)    Input/Output in last 24 hours:    Intake/Output Summary (Last 24 hours) at 12/25/2024 1330  Last data filed at 12/25/2024 0430  Gross per 24  hour   Intake 100 ml   Output 650 ml   Net -550 ml      Net IO Since Admission: -550 mL [12/25/24 1330]     Physical Exam  Vitals and nursing note reviewed.   Constitutional:       Appearance: Normal appearance.   HENT:      Mouth/Throat:      Mouth: Mucous membranes are moist.   Eyes:      Conjunctiva/sclera: Conjunctivae normal.   Cardiovascular:      Rate and Rhythm: Normal rate.      Pulses: Normal pulses.   Pulmonary:      Effort: Pulmonary effort is normal. No respiratory distress.      Breath sounds: Wheezing (R apices >L) present.   Abdominal:      General: There is no distension.      Tenderness: There is no abdominal tenderness.   Musculoskeletal:         General: No swelling. Normal range of motion.      Right lower leg: No edema.      Left lower leg: No edema.   Skin:     General: Skin is warm and dry.      Capillary Refill: Capillary refill takes less than 2 seconds.   Neurological:      General: No focal deficit present.      Mental Status: She is alert.          Radiology and Labs     Results from last 7 days   Lab Units 12/25/24  0327 12/24/24  0423   WBC 10*3/mm3 19.15* 17.79*   HEMOGLOBIN g/dL 12.4 15.3   HEMATOCRIT % 39.2 48.3*   PLATELETS 10*3/mm3 226 299           Results from last 7 days   Lab Units 12/25/24  0327 12/24/24  0423   SODIUM mmol/L 143 143   POTASSIUM mmol/L 3.2* 3.3*   CHLORIDE mmol/L 108* 104   CO2 mmol/L 20.3* 23.2   BUN mg/dL 14 12   CREATININE mg/dL 0.65 0.90   GLUCOSE mg/dL 122* 126*   MAGNESIUM mg/dL 2.5 2.2   PHOSPHORUS mg/dL 2.8  --       Results from last 7 days   Lab Units 12/25/24  0327 12/24/24  0423   ALK PHOS U/L 136* 170*   AST (SGOT) U/L 13 17   ALT (SGPT) U/L 9 11     Results from last 7 days   Lab Units 12/25/24  0315 12/24/24  0831 12/24/24  0511   PH, ARTERIAL pH units 7.391 7.355 7.304*   PCO2, ARTERIAL mm Hg 34.7* 43.8 51.9*   PO2 ART mm Hg 86.7 75.2* 538.1*   O2 SATURATION ART % 96.6 94.2 100.0*   FIO2 % 60 40 100   HCO3 ART mmol/L 21.0 24.4 25.8   BASE  EXCESS ART mmol/L -3.2* -1.3* -1.6*       Adult Transthoracic Echo Complete w/ Color, Spectral and Contrast if Necessary Per Protocol    Addendum Date: 12/24/2024      Left ventricular systolic function is normal. Left ventricular ejection fraction appears to be 61 - 65%.   Left ventricular diastolic function was normal.   Estimated right ventricular systolic pressure from tricuspid regurgitation is normal (<35 mmHg).   There is a small (<1cm) pericardial effusion. There is no evidence of cardiac tamponade.     CT Angiogram Chest Pulmonary Embolism    Result Date: 12/24/2024  Impression: 1.A pulmonary embolus not definitely identified on this exam within the limitations of the study. 2.Emphysematous changes. 3.Minimal atelectasis in the cardiophrenic angle areas. 4.Atherosclerotic vascular calcification including coronary artery calcification. 5.Slight depression of T5 vertebral body which is unchanged. Electronically Signed: Quentin Guo MD  12/24/2024 1:30 PM EST  Workstation ID: [id number]    XR Chest 1 View    Result Date: 12/24/2024  Impression: 1.Endotracheal tube tip is 4.4 cm above the flor. 2.No acute cardiopulmonary abnormality. Electronically Signed: Ian Tse MD  12/24/2024 5:42 AM EST  Workstation ID: [id number]     Current medications     Scheduled Meds:   atorvastatin, 10 mg, Oral, Daily  budesonide, 0.5 mg, Nebulization, BID - RT  cefTRIAXone, 1,000 mg, Intravenous, Q24H  enoxaparin, 40 mg, Subcutaneous, Q24H  furosemide, 40 mg, Oral, BID Diuretics  [Held by provider] gabapentin, 300 mg, Oral, 4x Daily  [Held by provider] hydrOXYzine, 50 mg, Oral, TID  insulin lispro, 2-9 Units, Subcutaneous, Q6H  ipratropium-albuterol, 3 mL, Nebulization, Q4H - RT  [Held by provider] lamoTRIgine, 150 mg, Oral, Nightly  [Held by provider] methocarbamol, 750 mg, Oral, TID  methylPREDNISolone sodium succinate, 40 mg, Intravenous, Q12H  [Held by provider] metoprolol succinate XL, 25 mg, Oral, Daily  [Held by  provider] midodrine, 10 mg, Oral, TID AC  montelukast, 10 mg, Oral, Nightly  mupirocin, 1 Application, Each Nare, BID  nicotine, 1 patch, Transdermal, Q24H  pantoprazole, 40 mg, Intravenous, Q AM  potassium chloride, 10 mEq, Intravenous, Q1H  sertraline, 50 mg, Oral, Daily  sodium chloride, 10 mL, Intravenous, Q12H  traZODone, 150 mg, Oral, Nightly        Continuous Infusions:   Pharmacy to Dose enoxaparin (LOVENOX),           Plan discussed with RN. Reviewed all other data in the last 24 hours, including but not limited to vitals, labs, microbiology, imaging and pertinent notes from other providers.  Plan also discussed with the patient at the bedside.      Beto Umanzor MD   Critical Care  12/25/24   13:30 EST

## 2024-12-25 NOTE — PLAN OF CARE
Goal Outcome Evaluation:      Pt wore bi-pap throughout most of shift. Pt very anxious at baseline. Pt needed lots of encouragement and therapeutic communication throughout shift. Prn Ativan given, pt tolerated well.   Prn dilaudid also given but I pushed it very slow and not all at once (pushed a little, then slowly flushed, then pushed a little more 3x) stated her pain was relieved.

## 2024-12-26 LAB
ALBUMIN SERPL-MCNC: 3.8 G/DL (ref 3.5–5.2)
ALBUMIN/GLOB SERPL: 1.3 G/DL
ALP SERPL-CCNC: 129 U/L (ref 39–117)
ALT SERPL W P-5'-P-CCNC: 9 U/L (ref 1–33)
ANION GAP SERPL CALCULATED.3IONS-SCNC: 15.8 MMOL/L (ref 5–15)
ARTERIAL PATENCY WRIST A: POSITIVE
AST SERPL-CCNC: 14 U/L (ref 1–32)
ATMOSPHERIC PRESS: ABNORMAL MM[HG]
BASE EXCESS BLDA CALC-SCNC: 0.7 MMOL/L (ref 0–3)
BASOPHILS # BLD AUTO: 0.03 10*3/MM3 (ref 0–0.2)
BASOPHILS NFR BLD AUTO: 0.2 % (ref 0–1.5)
BDY SITE: ABNORMAL
BILIRUB SERPL-MCNC: 0.3 MG/DL (ref 0–1.2)
BUN SERPL-MCNC: 14 MG/DL (ref 6–20)
BUN/CREAT SERPL: 18.2 (ref 7–25)
CA-I BLDA-SCNC: 1.25 MMOL/L (ref 1.15–1.33)
CALCIUM SPEC-SCNC: 9.3 MG/DL (ref 8.6–10.5)
CHLORIDE SERPL-SCNC: 107 MMOL/L (ref 98–107)
CO2 SERPL-SCNC: 21.2 MMOL/L (ref 22–29)
CREAT BLDA-MCNC: 0.77 MG/DL (ref 0.6–1.3)
CREAT SERPL-MCNC: 0.77 MG/DL (ref 0.57–1)
D-LACTATE SERPL-SCNC: 0.9 MMOL/L (ref 0.2–2)
DEPRECATED RDW RBC AUTO: 50.3 FL (ref 37–54)
EGFRCR SERPLBLD CKD-EPI 2021: 89.5 ML/MIN/1.73
EGFRCR SERPLBLD CKD-EPI 2021: 89.5 ML/MIN/1.73
EOSINOPHIL # BLD AUTO: 0.02 10*3/MM3 (ref 0–0.4)
EOSINOPHIL NFR BLD AUTO: 0.1 % (ref 0.3–6.2)
ERYTHROCYTE [DISTWIDTH] IN BLOOD BY AUTOMATED COUNT: 14.4 % (ref 12.3–15.4)
GLOBULIN UR ELPH-MCNC: 3 GM/DL
GLUCOSE BLDC GLUCOMTR-MCNC: 133 MG/DL (ref 70–105)
GLUCOSE BLDC GLUCOMTR-MCNC: 135 MG/DL (ref 70–105)
GLUCOSE BLDC GLUCOMTR-MCNC: 209 MG/DL (ref 70–105)
GLUCOSE BLDC GLUCOMTR-MCNC: 236 MG/DL (ref 70–105)
GLUCOSE BLDC GLUCOMTR-MCNC: 76 MG/DL (ref 70–105)
GLUCOSE BLDC GLUCOMTR-MCNC: 97 MG/DL (ref 74–100)
GLUCOSE BLDC GLUCOMTR-MCNC: 97 MG/DL (ref 74–100)
GLUCOSE SERPL-MCNC: 94 MG/DL (ref 65–99)
HCO3 BLDA-SCNC: 24.7 MMOL/L (ref 21–28)
HCT VFR BLD AUTO: 45.2 % (ref 34–46.6)
HCT VFR BLDA CALC: 49 % (ref 38–51)
HEMODILUTION: NO
HGB BLD-MCNC: 14.2 G/DL (ref 12–15.9)
HGB BLDA-MCNC: 16.7 G/DL (ref 12–17)
IMM GRANULOCYTES # BLD AUTO: 0.1 10*3/MM3 (ref 0–0.05)
IMM GRANULOCYTES NFR BLD AUTO: 0.6 % (ref 0–0.5)
INHALED O2 CONCENTRATION: 40 %
LYMPHOCYTES # BLD AUTO: 2.13 10*3/MM3 (ref 0.7–3.1)
LYMPHOCYTES NFR BLD AUTO: 12.9 % (ref 19.6–45.3)
MAGNESIUM SERPL-MCNC: 2.4 MG/DL (ref 1.6–2.6)
MCH RBC QN AUTO: 29.6 PG (ref 26.6–33)
MCHC RBC AUTO-ENTMCNC: 31.4 G/DL (ref 31.5–35.7)
MCV RBC AUTO: 94.4 FL (ref 79–97)
MODALITY: ABNORMAL
MONOCYTES # BLD AUTO: 1.05 10*3/MM3 (ref 0.1–0.9)
MONOCYTES NFR BLD AUTO: 6.4 % (ref 5–12)
NEUTROPHILS NFR BLD AUTO: 13.12 10*3/MM3 (ref 1.7–7)
NEUTROPHILS NFR BLD AUTO: 79.8 % (ref 42.7–76)
NRBC BLD AUTO-RTO: 0 /100 WBC (ref 0–0.2)
PCO2 BLDA: 37 MM HG (ref 35–48)
PEEP RESPIRATORY: 5 CM[H2O]
PH BLDA: 7.43 PH UNITS (ref 7.35–7.45)
PHOSPHATE SERPL-MCNC: 1.5 MG/DL (ref 2.5–4.5)
PHOSPHATE SERPL-MCNC: 3.7 MG/DL (ref 2.5–4.5)
PLATELET # BLD AUTO: 291 10*3/MM3 (ref 140–450)
PMV BLD AUTO: 8.6 FL (ref 6–12)
PO2 BLD: 185 MM[HG] (ref 0–500)
PO2 BLDA: 73.8 MM HG (ref 83–108)
POTASSIUM BLDA-SCNC: 4.2 MMOL/L (ref 3.5–4.5)
POTASSIUM SERPL-SCNC: 3.9 MMOL/L (ref 3.5–5.2)
PROT SERPL-MCNC: 6.8 G/DL (ref 6–8.5)
QT INTERVAL: 298 MS
QT INTERVAL: 356 MS
QTC INTERVAL: 416 MS
QTC INTERVAL: 488 MS
RBC # BLD AUTO: 4.79 10*6/MM3 (ref 3.77–5.28)
RESPIRATORY RATE: 18
SAO2 % BLDCOA: 95.2 % (ref 94–98)
SODIUM BLD-SCNC: 147 MMOL/L (ref 138–146)
SODIUM SERPL-SCNC: 144 MMOL/L (ref 136–145)
VENTILATOR MODE: ABNORMAL
VT ON VENT VENT: 400 ML
WBC NRBC COR # BLD AUTO: 16.45 10*3/MM3 (ref 3.4–10.8)

## 2024-12-26 PROCEDURE — 94660 CPAP INITIATION&MGMT: CPT

## 2024-12-26 PROCEDURE — 94799 UNLISTED PULMONARY SVC/PX: CPT

## 2024-12-26 PROCEDURE — 63710000001 INSULIN LISPRO (HUMAN) PER 5 UNITS: Performed by: NURSE PRACTITIONER

## 2024-12-26 PROCEDURE — 94761 N-INVAS EAR/PLS OXIMETRY MLT: CPT

## 2024-12-26 PROCEDURE — 93005 ELECTROCARDIOGRAM TRACING: CPT | Performed by: NURSE PRACTITIONER

## 2024-12-26 PROCEDURE — 25010000002 CEFTRIAXONE PER 250 MG: Performed by: NURSE PRACTITIONER

## 2024-12-26 PROCEDURE — 36600 WITHDRAWAL OF ARTERIAL BLOOD: CPT

## 2024-12-26 PROCEDURE — 82330 ASSAY OF CALCIUM: CPT

## 2024-12-26 PROCEDURE — 84100 ASSAY OF PHOSPHORUS: CPT

## 2024-12-26 PROCEDURE — 83605 ASSAY OF LACTIC ACID: CPT

## 2024-12-26 PROCEDURE — 93010 ELECTROCARDIOGRAM REPORT: CPT | Performed by: INTERNAL MEDICINE

## 2024-12-26 PROCEDURE — 82565 ASSAY OF CREATININE: CPT

## 2024-12-26 PROCEDURE — 82948 REAGENT STRIP/BLOOD GLUCOSE: CPT | Performed by: NURSE PRACTITIONER

## 2024-12-26 PROCEDURE — 85018 HEMOGLOBIN: CPT

## 2024-12-26 PROCEDURE — 25010000002 ENOXAPARIN PER 10 MG: Performed by: NURSE PRACTITIONER

## 2024-12-26 PROCEDURE — 94664 DEMO&/EVAL PT USE INHALER: CPT

## 2024-12-26 PROCEDURE — 82803 BLOOD GASES ANY COMBINATION: CPT

## 2024-12-26 PROCEDURE — 80053 COMPREHEN METABOLIC PANEL: CPT | Performed by: NURSE PRACTITIONER

## 2024-12-26 PROCEDURE — 80051 ELECTROLYTE PANEL: CPT

## 2024-12-26 PROCEDURE — 25810000003 SODIUM CHLORIDE 0.9 % SOLUTION: Performed by: INTERNAL MEDICINE

## 2024-12-26 PROCEDURE — 85025 COMPLETE CBC W/AUTO DIFF WBC: CPT | Performed by: NURSE PRACTITIONER

## 2024-12-26 PROCEDURE — 83735 ASSAY OF MAGNESIUM: CPT | Performed by: NURSE PRACTITIONER

## 2024-12-26 PROCEDURE — 84100 ASSAY OF PHOSPHORUS: CPT | Performed by: NURSE PRACTITIONER

## 2024-12-26 PROCEDURE — 82948 REAGENT STRIP/BLOOD GLUCOSE: CPT

## 2024-12-26 PROCEDURE — 63710000001 PREDNISONE PER 5 MG: Performed by: EMERGENCY MEDICINE

## 2024-12-26 RX ORDER — TOPIRAMATE 100 MG/1
100 TABLET, FILM COATED ORAL EVERY MORNING
Status: DISCONTINUED | OUTPATIENT
Start: 2024-12-26 | End: 2024-12-27 | Stop reason: HOSPADM

## 2024-12-26 RX ORDER — GUAIFENESIN 200 MG/10ML
200 LIQUID ORAL EVERY 4 HOURS PRN
Status: DISCONTINUED | OUTPATIENT
Start: 2024-12-26 | End: 2024-12-27 | Stop reason: HOSPADM

## 2024-12-26 RX ORDER — TOPIRAMATE 25 MG/1
50 TABLET, FILM COATED ORAL NIGHTLY
Status: DISCONTINUED | OUTPATIENT
Start: 2024-12-26 | End: 2024-12-27 | Stop reason: HOSPADM

## 2024-12-26 RX ORDER — ALPRAZOLAM 0.25 MG/1
0.25 TABLET ORAL 3 TIMES DAILY PRN
Status: DISCONTINUED | OUTPATIENT
Start: 2024-12-26 | End: 2024-12-27 | Stop reason: HOSPADM

## 2024-12-26 RX ORDER — INSULIN LISPRO 100 [IU]/ML
2-9 INJECTION, SOLUTION INTRAVENOUS; SUBCUTANEOUS
Status: DISCONTINUED | OUTPATIENT
Start: 2024-12-26 | End: 2024-12-27 | Stop reason: HOSPADM

## 2024-12-26 RX ORDER — PANTOPRAZOLE SODIUM 40 MG/1
40 TABLET, DELAYED RELEASE ORAL
Status: DISCONTINUED | OUTPATIENT
Start: 2024-12-27 | End: 2024-12-27 | Stop reason: HOSPADM

## 2024-12-26 RX ADMIN — GABAPENTIN 300 MG: 300 CAPSULE ORAL at 18:12

## 2024-12-26 RX ADMIN — ATORVASTATIN CALCIUM 10 MG: 10 TABLET ORAL at 08:17

## 2024-12-26 RX ADMIN — MIDODRINE HYDROCHLORIDE 10 MG: 5 TABLET ORAL at 16:45

## 2024-12-26 RX ADMIN — GABAPENTIN 300 MG: 300 CAPSULE ORAL at 11:26

## 2024-12-26 RX ADMIN — PANTOPRAZOLE SODIUM 40 MG: 40 INJECTION, POWDER, FOR SOLUTION INTRAVENOUS at 05:11

## 2024-12-26 RX ADMIN — GABAPENTIN 300 MG: 300 CAPSULE ORAL at 21:23

## 2024-12-26 RX ADMIN — Medication 10 ML: at 08:13

## 2024-12-26 RX ADMIN — ACETAMINOPHEN 650 MG: 325 TABLET, FILM COATED ORAL at 05:13

## 2024-12-26 RX ADMIN — IPRATROPIUM BROMIDE AND ALBUTEROL SULFATE 3 ML: .5; 3 SOLUTION RESPIRATORY (INHALATION) at 20:39

## 2024-12-26 RX ADMIN — GABAPENTIN 300 MG: 300 CAPSULE ORAL at 08:17

## 2024-12-26 RX ADMIN — MUPIROCIN 1 APPLICATION: 20 OINTMENT TOPICAL at 08:17

## 2024-12-26 RX ADMIN — CEFTRIAXONE SODIUM 1000 MG: 1 INJECTION, POWDER, FOR SOLUTION INTRAMUSCULAR; INTRAVENOUS at 08:13

## 2024-12-26 RX ADMIN — LAMOTRIGINE 150 MG: 100 TABLET ORAL at 21:23

## 2024-12-26 RX ADMIN — FUROSEMIDE 40 MG: 40 TABLET ORAL at 08:16

## 2024-12-26 RX ADMIN — IPRATROPIUM BROMIDE AND ALBUTEROL SULFATE 3 ML: .5; 3 SOLUTION RESPIRATORY (INHALATION) at 23:57

## 2024-12-26 RX ADMIN — ALPRAZOLAM 0.25 MG: 0.25 TABLET ORAL at 12:29

## 2024-12-26 RX ADMIN — IPRATROPIUM BROMIDE AND ALBUTEROL SULFATE 3 ML: .5; 3 SOLUTION RESPIRATORY (INHALATION) at 14:50

## 2024-12-26 RX ADMIN — Medication 10 ML: at 21:24

## 2024-12-26 RX ADMIN — GUAIFENESIN 200 MG: 200 SOLUTION ORAL at 23:22

## 2024-12-26 RX ADMIN — HYDROXYZINE HYDROCHLORIDE 50 MG: 25 TABLET, FILM COATED ORAL at 08:16

## 2024-12-26 RX ADMIN — ALPRAZOLAM 0.25 MG: 0.25 TABLET ORAL at 21:23

## 2024-12-26 RX ADMIN — TOPIRAMATE 100 MG: 100 TABLET, FILM COATED ORAL at 12:29

## 2024-12-26 RX ADMIN — SODIUM CHLORIDE 15 MMOL: 9 INJECTION, SOLUTION INTRAVENOUS at 11:26

## 2024-12-26 RX ADMIN — MUPIROCIN 1 APPLICATION: 20 OINTMENT TOPICAL at 21:24

## 2024-12-26 RX ADMIN — MONTELUKAST 10 MG: 10 TABLET, FILM COATED ORAL at 21:24

## 2024-12-26 RX ADMIN — INSULIN LISPRO 4 UNITS: 100 INJECTION, SOLUTION INTRAVENOUS; SUBCUTANEOUS at 18:12

## 2024-12-26 RX ADMIN — ENOXAPARIN SODIUM 40 MG: 100 INJECTION SUBCUTANEOUS at 16:46

## 2024-12-26 RX ADMIN — HYDROXYZINE HYDROCHLORIDE 50 MG: 25 TABLET, FILM COATED ORAL at 21:23

## 2024-12-26 RX ADMIN — HYDROXYZINE HYDROCHLORIDE 50 MG: 25 TABLET, FILM COATED ORAL at 16:46

## 2024-12-26 RX ADMIN — PREDNISONE 50 MG: 50 TABLET ORAL at 08:16

## 2024-12-26 RX ADMIN — SERTRALINE HYDROCHLORIDE 50 MG: 50 TABLET, FILM COATED ORAL at 08:17

## 2024-12-26 RX ADMIN — NICOTINE 1 PATCH: 21 PATCH TRANSDERMAL at 08:20

## 2024-12-26 RX ADMIN — TOPIRAMATE 50 MG: 25 TABLET, FILM COATED ORAL at 21:23

## 2024-12-26 RX ADMIN — IPRATROPIUM BROMIDE AND ALBUTEROL SULFATE 3 ML: .5; 3 SOLUTION RESPIRATORY (INHALATION) at 11:39

## 2024-12-26 RX ADMIN — IPRATROPIUM BROMIDE AND ALBUTEROL SULFATE 3 ML: .5; 3 SOLUTION RESPIRATORY (INHALATION) at 03:10

## 2024-12-26 RX ADMIN — FUROSEMIDE 40 MG: 40 TABLET ORAL at 18:12

## 2024-12-26 RX ADMIN — IPRATROPIUM BROMIDE AND ALBUTEROL SULFATE 3 ML: .5; 3 SOLUTION RESPIRATORY (INHALATION) at 00:07

## 2024-12-26 RX ADMIN — TRAZODONE HYDROCHLORIDE 150 MG: 50 TABLET ORAL at 21:23

## 2024-12-26 RX ADMIN — BUDESONIDE INHALATION 0.5 MG: 0.5 SUSPENSION RESPIRATORY (INHALATION) at 21:05

## 2024-12-26 NOTE — PLAN OF CARE
Goal Outcome Evaluation:    Pt titrated down to 5L HFNC. Wore BiPap most of shift and rested comfortably in bed. VSS overnight. No other changes.

## 2024-12-26 NOTE — PROGRESS NOTES
[facility] MEDICINE SERVICE  DAILY PROGRESS NOTE    NAME: Melvi Rayo  : 1965  MRN: [id number]      LOS: 2 days     PROVIDER OF SERVICE: Nicole Garces MD    Chief Complaint: Acute on chronic respiratory failure    Subjective:     Interval History:  History taken from: patient    No new complaint        Review of Systems:   Review of Systems   All other systems reviewed and are negative.      Objective:     Vital Signs  Temp:  [97.8 °F (36.6 °C)-99 °F (37.2 °C)] 97.9 °F (36.6 °C)  Heart Rate:  [] 105  Resp:  [16-20] 19  BP: ()/(54-72) 107/65  Flow (L/min) (Oxygen Therapy):  [5] 5   Body mass index is 25.79 kg/m².    Physical Exam  Physical Exam  Constitutional:       Appearance: Normal appearance.   HENT:      Head: Normocephalic and atraumatic.      Nose: Nose normal.      Mouth/Throat:      Mouth: Mucous membranes are moist.   Eyes:      Extraocular Movements: Extraocular movements intact.      Pupils: Pupils are equal, round, and reactive to light.   Cardiovascular:      Rate and Rhythm: Normal rate and regular rhythm.   Pulmonary:      Effort: Pulmonary effort is normal.      Breath sounds: Normal breath sounds.   Abdominal:      General: Abdomen is flat. Bowel sounds are normal.      Palpations: Abdomen is soft.   Musculoskeletal:         General: Normal range of motion.      Cervical back: Normal range of motion and neck supple.   Skin:     General: Skin is warm and dry.   Neurological:      General: No focal deficit present.      Mental Status: She is alert and oriented to person, place, and time.   Psychiatric:         Mood and Affect: Mood normal.         Behavior: Behavior normal.         Thought Content: Thought content normal.         Judgment: Judgment normal.         Current Medications:  Scheduled Meds:atorvastatin, 10 mg, Oral, Daily  budesonide, 0.5 mg, Nebulization, BID - RT  cefTRIAXone, 1,000 mg, Intravenous, Q24H  enoxaparin, 40 mg, Subcutaneous,  Q24H  furosemide, 40 mg, Oral, BID Diuretics  gabapentin, 300 mg, Oral, 4x Daily  hydrOXYzine, 50 mg, Oral, TID  insulin lispro, 2-9 Units, Subcutaneous, Q6H  ipratropium-albuterol, 3 mL, Nebulization, Q4H - RT  lamoTRIgine, 150 mg, Oral, Nightly  [Held by provider] methocarbamol, 750 mg, Oral, TID  metoprolol succinate XL, 25 mg, Oral, Daily  midodrine, 10 mg, Oral, TID AC  montelukast, 10 mg, Oral, Nightly  mupirocin, 1 Application, Each Nare, BID  nicotine, 1 patch, Transdermal, Q24H  [START ON 12/27/2024] pantoprazole, 40 mg, Oral, Q AM  predniSONE, 50 mg, Oral, Daily With Breakfast  sertraline, 50 mg, Oral, Daily  sodium chloride, 10 mL, Intravenous, Q12H  topiramate, 100 mg, Oral, QAM  topiramate, 50 mg, Oral, Nightly  traZODone, 150 mg, Oral, Nightly      Continuous Infusions:Pharmacy to Dose enoxaparin (LOVENOX),       PRN Meds:.  acetaminophen **OR** acetaminophen    ALPRAZolam    aluminum-magnesium hydroxide-simethicone    senna-docusate sodium **AND** polyethylene glycol **AND** bisacodyl **AND** bisacodyl    Calcium Replacement - Follow Nurse / BPA Driven Protocol    dextrose    dextrose    glucagon (human recombinant)    guaifenesin    HYDROmorphone    Magnesium Cardiology Dose Replacement - Follow Nurse / BPA Driven Protocol    nitroglycerin    ondansetron ODT **OR** ondansetron    Pharmacy to Dose enoxaparin (LOVENOX)    Phosphorus Replacement - Follow Nurse / BPA Driven Protocol    Potassium Replacement - Follow Nurse / BPA Driven Protocol    sodium chloride    sodium chloride       Diagnostic Data    Results from last 7 days   Lab Units 12/26/24  0509   WBC 10*3/mm3 16.45*   HEMOGLOBIN g/dL 14.2   HEMATOCRIT % 45.2   PLATELETS 10*3/mm3 291   GLUCOSE mg/dL 94   CREATININE mg/dL 0.77   BUN mg/dL 14   SODIUM mmol/L 144   POTASSIUM mmol/L 3.9   AST (SGOT) U/L 14   ALT (SGPT) U/L 9   ALK PHOS U/L 129*   BILIRUBIN mg/dL 0.3   ANION GAP mmol/L 15.8*       No radiology results for the last day      I  reviewed the patient's new clinical results.    Assessment/Plan:     Active and Resolved Problems  Active Hospital Problems    Diagnosis  POA    **Acute on chronic respiratory failure [J96.20]  Yes      Resolved Hospital Problems   No resolved problems to display.       Acute hypoxic respiratory failure  Pneumonia  Acute COPD exacerbation  Diabetes mellitus type 2  Hypophosphatemia      - Currently on 5 L oxygen via nasal cannula.  On IV ceftriaxone for treatment of pneumonia.  Follow-up on blood and sputum cultures.  - Continue steroids and bronchodilators for acute COPD exacerbation.  - Blood glucose relatively well-controlled with regards diabetes mellitus.  Monitor blood glucose trend.  Hemoglobin A1c is 6.24%.  - Replace phosphorus for hypophosphatemia.        VTE Prophylaxis:  Pharmacologic VTE prophylaxis orders are present.             Disposition Planning:     Barriers to Discharge: Pending clinical improvement  Anticipated Date of Discharge: 12/30/2024  Place of Discharge: Home      Code Status and Medical Interventions: CPR (Attempt to Resuscitate); Full Support   Ordered at: 12/24/24 0533     Code Status (Patient has no pulse and is not breathing):    CPR (Attempt to Resuscitate)     Medical Interventions (Patient has pulse or is breathing):    Full Support       Signature: Electronically signed by Nicole Garces MD, 12/26/24, 18:49 EST.  [facility] Hospitalist Team

## 2024-12-26 NOTE — PLAN OF CARE
Goal Outcome Evaluation:   Patient unable to void this shift. Did straight cath for 600ml output. Did complain of headache all day, pain medications didn't help much according to patient. Was placed on high flow NC, and was able to titrate down to 7 liters.

## 2024-12-26 NOTE — PLAN OF CARE
Goal Outcome Evaluation:  Plan of Care Reviewed With: patient        Progress: improving  Outcome Evaluation: Pt. abed this shift. Phos being replaced. Has stayed on 5L of 02 most of shift. C/O chest pain/soreness from coughing. ABX given. Did have to straight cath d/t inability to void.

## 2024-12-27 ENCOUNTER — READMISSION MANAGEMENT (OUTPATIENT)
Dept: CALL CENTER | Facility: HOSPITAL | Age: 59
End: 2024-12-27
Payer: MEDICAID

## 2024-12-27 VITALS
BODY MASS INDEX: 25.47 KG/M2 | SYSTOLIC BLOOD PRESSURE: 113 MMHG | HEIGHT: 67 IN | WEIGHT: 162.26 LBS | DIASTOLIC BLOOD PRESSURE: 74 MMHG | HEART RATE: 100 BPM | TEMPERATURE: 98 F | RESPIRATION RATE: 20 BRPM | OXYGEN SATURATION: 95 %

## 2024-12-27 LAB
ALBUMIN SERPL-MCNC: 3.5 G/DL (ref 3.5–5.2)
ALBUMIN/GLOB SERPL: 1.1 G/DL
ALP SERPL-CCNC: 119 U/L (ref 39–117)
ALT SERPL W P-5'-P-CCNC: 12 U/L (ref 1–33)
ANION GAP SERPL CALCULATED.3IONS-SCNC: 14 MMOL/L (ref 5–15)
AST SERPL-CCNC: 18 U/L (ref 1–32)
BACTERIA SPEC AEROBE CULT: ABNORMAL
BASOPHILS # BLD AUTO: 0.04 10*3/MM3 (ref 0–0.2)
BASOPHILS NFR BLD AUTO: 0.3 % (ref 0–1.5)
BILIRUB SERPL-MCNC: 0.3 MG/DL (ref 0–1.2)
BUN SERPL-MCNC: 17 MG/DL (ref 6–20)
BUN/CREAT SERPL: 22.7 (ref 7–25)
CALCIUM SPEC-SCNC: 9.1 MG/DL (ref 8.6–10.5)
CHLORIDE SERPL-SCNC: 104 MMOL/L (ref 98–107)
CO2 SERPL-SCNC: 25 MMOL/L (ref 22–29)
CREAT SERPL-MCNC: 0.75 MG/DL (ref 0.57–1)
DEPRECATED RDW RBC AUTO: 49.3 FL (ref 37–54)
EGFRCR SERPLBLD CKD-EPI 2021: 92.4 ML/MIN/1.73
EOSINOPHIL # BLD AUTO: 0 10*3/MM3 (ref 0–0.4)
EOSINOPHIL NFR BLD AUTO: 0 % (ref 0.3–6.2)
ERYTHROCYTE [DISTWIDTH] IN BLOOD BY AUTOMATED COUNT: 14.3 % (ref 12.3–15.4)
GLOBULIN UR ELPH-MCNC: 3.2 GM/DL
GLUCOSE BLDC GLUCOMTR-MCNC: 145 MG/DL (ref 70–105)
GLUCOSE BLDC GLUCOMTR-MCNC: 85 MG/DL (ref 70–105)
GLUCOSE SERPL-MCNC: 84 MG/DL (ref 65–99)
HCT VFR BLD AUTO: 42.6 % (ref 34–46.6)
HGB BLD-MCNC: 13.7 G/DL (ref 12–15.9)
IMM GRANULOCYTES # BLD AUTO: 0.13 10*3/MM3 (ref 0–0.05)
IMM GRANULOCYTES NFR BLD AUTO: 1 % (ref 0–0.5)
LYMPHOCYTES # BLD AUTO: 2.56 10*3/MM3 (ref 0.7–3.1)
LYMPHOCYTES NFR BLD AUTO: 19.2 % (ref 19.6–45.3)
MAGNESIUM SERPL-MCNC: 2.5 MG/DL (ref 1.6–2.6)
MCH RBC QN AUTO: 30.4 PG (ref 26.6–33)
MCHC RBC AUTO-ENTMCNC: 32.2 G/DL (ref 31.5–35.7)
MCV RBC AUTO: 94.5 FL (ref 79–97)
MONOCYTES # BLD AUTO: 1.02 10*3/MM3 (ref 0.1–0.9)
MONOCYTES NFR BLD AUTO: 7.7 % (ref 5–12)
NEUTROPHILS NFR BLD AUTO: 71.8 % (ref 42.7–76)
NEUTROPHILS NFR BLD AUTO: 9.56 10*3/MM3 (ref 1.7–7)
NRBC BLD AUTO-RTO: 0 /100 WBC (ref 0–0.2)
PHOSPHATE SERPL-MCNC: 2.7 MG/DL (ref 2.5–4.5)
PLATELET # BLD AUTO: 299 10*3/MM3 (ref 140–450)
PMV BLD AUTO: 8.8 FL (ref 6–12)
POTASSIUM SERPL-SCNC: 3.3 MMOL/L (ref 3.5–5.2)
PROT SERPL-MCNC: 6.7 G/DL (ref 6–8.5)
RBC # BLD AUTO: 4.51 10*6/MM3 (ref 3.77–5.28)
SODIUM SERPL-SCNC: 143 MMOL/L (ref 136–145)
WBC NRBC COR # BLD AUTO: 13.31 10*3/MM3 (ref 3.4–10.8)

## 2024-12-27 PROCEDURE — 83735 ASSAY OF MAGNESIUM: CPT | Performed by: NURSE PRACTITIONER

## 2024-12-27 PROCEDURE — 80053 COMPREHEN METABOLIC PANEL: CPT | Performed by: NURSE PRACTITIONER

## 2024-12-27 PROCEDURE — 94799 UNLISTED PULMONARY SVC/PX: CPT

## 2024-12-27 PROCEDURE — 63710000001 PREDNISONE PER 5 MG: Performed by: EMERGENCY MEDICINE

## 2024-12-27 PROCEDURE — 94664 DEMO&/EVAL PT USE INHALER: CPT

## 2024-12-27 PROCEDURE — 85025 COMPLETE CBC W/AUTO DIFF WBC: CPT | Performed by: NURSE PRACTITIONER

## 2024-12-27 PROCEDURE — 84100 ASSAY OF PHOSPHORUS: CPT | Performed by: NURSE PRACTITIONER

## 2024-12-27 PROCEDURE — 36415 COLL VENOUS BLD VENIPUNCTURE: CPT | Performed by: NURSE PRACTITIONER

## 2024-12-27 PROCEDURE — 25010000002 CEFTRIAXONE PER 250 MG: Performed by: INTERNAL MEDICINE

## 2024-12-27 PROCEDURE — 82948 REAGENT STRIP/BLOOD GLUCOSE: CPT | Performed by: INTERNAL MEDICINE

## 2024-12-27 PROCEDURE — 94761 N-INVAS EAR/PLS OXIMETRY MLT: CPT

## 2024-12-27 RX ORDER — IPRATROPIUM BROMIDE AND ALBUTEROL SULFATE 2.5; .5 MG/3ML; MG/3ML
3 SOLUTION RESPIRATORY (INHALATION)
Status: SHIPPED | OUTPATIENT
Start: 2024-12-27

## 2024-12-27 RX ORDER — NYSTATIN 100000 [USP'U]/ML
500000 SUSPENSION ORAL 4 TIMES DAILY
Qty: 280 ML | Refills: 0 | Status: SHIPPED | OUTPATIENT
Start: 2024-12-27 | End: 2025-01-10

## 2024-12-27 RX ORDER — PREDNISONE 10 MG/1
10 TABLET ORAL DAILY
Start: 2025-01-05

## 2024-12-27 RX ORDER — POTASSIUM CHLORIDE 1500 MG/1
40 TABLET, EXTENDED RELEASE ORAL EVERY 4 HOURS
Status: DISCONTINUED | OUTPATIENT
Start: 2024-12-27 | End: 2024-12-27 | Stop reason: HOSPADM

## 2024-12-27 RX ORDER — PREDNISONE 10 MG/1
TABLET ORAL
Qty: 20 TABLET | Refills: 0 | Status: SHIPPED | OUTPATIENT
Start: 2024-12-27 | End: 2025-01-04

## 2024-12-27 RX ADMIN — IPRATROPIUM BROMIDE AND ALBUTEROL SULFATE 3 ML: .5; 3 SOLUTION RESPIRATORY (INHALATION) at 03:06

## 2024-12-27 RX ADMIN — FUROSEMIDE 40 MG: 40 TABLET ORAL at 07:51

## 2024-12-27 RX ADMIN — GABAPENTIN 300 MG: 300 CAPSULE ORAL at 12:55

## 2024-12-27 RX ADMIN — MIDODRINE HYDROCHLORIDE 10 MG: 5 TABLET ORAL at 07:53

## 2024-12-27 RX ADMIN — Medication 10 ML: at 07:53

## 2024-12-27 RX ADMIN — PANTOPRAZOLE SODIUM 40 MG: 40 TABLET, DELAYED RELEASE ORAL at 05:20

## 2024-12-27 RX ADMIN — IPRATROPIUM BROMIDE AND ALBUTEROL SULFATE 3 ML: .5; 3 SOLUTION RESPIRATORY (INHALATION) at 12:22

## 2024-12-27 RX ADMIN — ATORVASTATIN CALCIUM 10 MG: 10 TABLET ORAL at 07:51

## 2024-12-27 RX ADMIN — PREDNISONE 50 MG: 50 TABLET ORAL at 07:51

## 2024-12-27 RX ADMIN — SERTRALINE HYDROCHLORIDE 25 MG: 50 TABLET, FILM COATED ORAL at 07:51

## 2024-12-27 RX ADMIN — IPRATROPIUM BROMIDE AND ALBUTEROL SULFATE 3 ML: .5; 3 SOLUTION RESPIRATORY (INHALATION) at 08:16

## 2024-12-27 RX ADMIN — GUAIFENESIN 200 MG: 200 SOLUTION ORAL at 07:52

## 2024-12-27 RX ADMIN — BUDESONIDE INHALATION 0.5 MG: 0.5 SUSPENSION RESPIRATORY (INHALATION) at 08:22

## 2024-12-27 RX ADMIN — GABAPENTIN 300 MG: 300 CAPSULE ORAL at 07:51

## 2024-12-27 RX ADMIN — ALPRAZOLAM 0.25 MG: 0.25 TABLET ORAL at 07:52

## 2024-12-27 RX ADMIN — MIDODRINE HYDROCHLORIDE 10 MG: 5 TABLET ORAL at 12:55

## 2024-12-27 RX ADMIN — HYDROXYZINE HYDROCHLORIDE 50 MG: 25 TABLET, FILM COATED ORAL at 07:51

## 2024-12-27 RX ADMIN — TOPIRAMATE 100 MG: 100 TABLET, FILM COATED ORAL at 07:51

## 2024-12-27 RX ADMIN — CEFTRIAXONE 2000 MG: 2 INJECTION, POWDER, FOR SOLUTION INTRAMUSCULAR; INTRAVENOUS at 07:52

## 2024-12-27 RX ADMIN — POTASSIUM CHLORIDE 40 MEQ: 20 TABLET, EXTENDED RELEASE ORAL at 07:51

## 2024-12-27 RX ADMIN — NICOTINE 1 PATCH: 21 PATCH TRANSDERMAL at 07:51

## 2024-12-27 NOTE — PAYOR COMM NOTE
"This is discharge notification for Abelardo Carballo  Reference/Auth # [id number]   Pt discharged on 12/27/24    Yola Walters RN, BSN  Utilization Review Nurse  [facility]  Direct & confidential phone # [phone]  Fax # [phone]      Abelardo Carballo (59 y.o. Female)       Date of Birth   1965    Social Security Number       Address   [address] [place] [address] [place] [facility]    Home Phone   [phone]    MRN   [id number]       Confucianist   [facility]    Marital Status                               Admission Date   12/24/24    Admission Type   Emergency    Admitting Provider   Beto Umanzor MD    Attending Provider       Department, Room/Bed   [facility] 2D, 271/1       Discharge Date   12/27/2024    Discharge Disposition   Home-[facility]    Discharge Destination                                 Attending Provider: (none)   Allergies: Codeine, Contrast Dye (Echo Or Unknown Ct/mr), Erythromycin, Morphine, Penicillin G, Sulfa Antibiotics, Doxycycline, Fentanyl, Levofloxacin, Nitrofuran Derivatives    Isolation: Contact Drop   Infection: RSV (12/24/24)   Code Status: CPR    Ht: 170.2 cm (67\")   Wt: 73.6 kg (162 lb 4.1 oz)    Admission Cmt: None   Principal Problem: Acute on chronic respiratory failure [J96.20]                   Active Insurance as of 12/24/2024       Primary Coverage       Payor Plan Insurance Group Employer/Plan Group    [employer] [place] [id number]       Payor Plan Address Payor Plan Phone Number Payor Plan Fax Number Effective Dates    MAIL STOP:   4/1/2017 - None Entered    [address] [address]       [facility] [id number]         Subscriber Name Subscriber Birth Date Member ID       ABELARDO CARBALLO 1965 [id number]                     Emergency Contacts        (Rel.) Home Phone Work Phone Mobile Phone    Carl Kincaid (Brother) [phone] -- [phone]                 Discharge Summary    " "    Magalie Roberto APRN at 24 1435       Attestation signed by Sadi Pruitt MD at 24 1533    I have reviewed this documentation and agree.                               [facility] Medicine Services  Discharge Summary    Date of Service: 2024  Patient Name: Melvi Rayo  : 1965  MRN: [id number]    Date of Admission: 2024  Discharge Diagnosis: Acute on chronic respiratory failure  Date of Discharge: 2024  Primary Care Physician: Bharti Norton APRN      Presenting Problem:   Myocardial ischemia [I25.9]  Acute on chronic respiratory failure [J96.20]  Acute respiratory failure with hypoxia and hypercapnia [J96.01, J96.02]  Multifocal pneumonia [J18.9]    Active and Resolved Hospital Problems:  Active Hospital Problems    Diagnosis POA    **Acute on chronic respiratory failure [J96.20] Yes      Resolved Hospital Problems   No resolved problems to display.         Hospital Course     HPI:    \"Patient is a 59-year-old female with a history of COPD, chronic respiratory failure on home O2, breast cancer status postmastectomy, depression, anxiety, seizure disorder chronic diarrhea, diabetes who presented for worsening respiratory failure.  Patient called EMS and was brought in after having 24 hours of symptoms.  The patient was intubated upon arrival to the emergency department.  There was concern for purulence in the airway, patient was broadly covered with antibiotics, and patient was cultured.  Patient's most recent ABG is without acidosis or hypercapnia.  She is oxygenating well on minimal settings, there is concern for abnormal EKG however her repeat troponin went from 6-8.  Patient had a DVT ultrasound which shows no acute DVTs, her chest x-ray did not show evidence of infection, and her urine and blood cultures are pending.  MRSA nares was negative.  Patient's RSV was noted to be elevated.  Given the patient's abrupt tachypnea, hypoxia, and largely benign chest " "x-ray we will CT PE the patient given her history of malignancy.  Will also continue to support the patient with antibiotics, sedation with propofol which we will wean after CT scan, and nebs.  Patient is not actively requiring vasopressors at this time so we will continue to closely monitor her.   \"    Hospital Course:  Patient was admitted to ICU from ED on 12/24/2024 with respiratory failure and transitioned to PCU on 1220 6/2024 with acute on chronic respiratory failure.  She is discharging on 4 L, home dose O2.  She tested positive for RSV and supportive care was given.  Patient will discharge home with her brother. She will need to be seen by pulmonology outpatient.         DISCHARGE Follow Up Recommendations for labs and diagnostics: Follow-up with PCP in 1 to 2 weeks.  Take steroids as ordered.  After you complete the 8 days steroid taper, you may continue the normal dose of prednisone.  Continue wearing your oxygen 4 L.  Return to ED if shortness of breath increases or fever develops. Follow up with pulmonology outpatient.         Day of Discharge     Vital Signs:  Temp:  [97.9 °F (36.6 °C)-98.1 °F (36.7 °C)] 98 °F (36.7 °C)  Heart Rate:  [] 100  Resp:  [16-25] 20  BP: ()/(53-93) 113/74  Flow (L/min) (Oxygen Therapy):  [4-5] 4    Physical Exam:  Physical Exam  Constitutional:       Appearance: Normal appearance.   HENT:      Head: Normocephalic and atraumatic.      Nose: Nose normal.      Mouth/Throat:      Mouth: Mucous membranes are moist.   Eyes:      Extraocular Movements: Extraocular movements intact.      Conjunctiva/sclera: Conjunctivae normal.      Pupils: Pupils are equal, round, and reactive to light.   Cardiovascular:      Rate and Rhythm: Regular rhythm. Tachycardia present.      Pulses: Normal pulses.      Heart sounds: Normal heart sounds.   Pulmonary:      Effort: Pulmonary effort is normal.      Breath sounds: Wheezing present.   Abdominal:      General: Abdomen is flat. Bowel " sounds are normal.      Palpations: Abdomen is soft.   Musculoskeletal:         General: Normal range of motion.      Cervical back: Normal range of motion.   Skin:     General: Skin is warm and dry.   Neurological:      General: No focal deficit present.      Mental Status: She is alert and oriented to person, place, and time. Mental status is at baseline.            Pertinent  and/or Most Recent Results     LAB RESULTS:      Lab 12/27/24  0519 12/26/24  0509 12/26/24  0458 12/25/24  0327 12/24/24  0423 12/24/24  0421   WBC 13.31* 16.45*  --  19.15* 17.79*  --    HEMOGLOBIN 13.7 14.2  --  12.4 15.3  --    HEMOGLOBIN, POC  --   --  16.7  --   --   --    HEMATOCRIT 42.6 45.2  --  39.2 48.3*  --    HEMATOCRIT POC  --   --  49  --   --   --    PLATELETS 299 291  --  226 299  --    NEUTROS ABS 9.56* 13.12*  --  15.30* 11.87*  --    IMMATURE GRANS (ABS) 0.13* 0.10*  --  0.06* 0.10*  --    LYMPHS ABS 2.56 2.13  --  2.36 4.08*  --    MONOS ABS 1.02* 1.05*  --  1.40* 1.58*  --    EOS ABS 0.00 0.02  --  0.00 0.08  --    MCV 94.5 94.4  --  93.8 96.0  --    LACTATE  --   --  0.9  --   --  1.8         Lab 12/27/24  0519 12/26/24  1712 12/26/24  0509 12/26/24  0458 12/25/24  1945 12/25/24  0327 12/24/24  0423   SODIUM 143  --  144  --   --  143 143   POTASSIUM 3.3*  --  3.9  --  3.6 3.2* 3.3*   CHLORIDE 104  --  107  --   --  108* 104   CO2 25.0  --  21.2*  --   --  20.3* 23.2   ANION GAP 14.0  --  15.8*  --   --  14.7 15.8*   BUN 17  --  14  --   --  14 12   CREATININE 0.75  --  0.77 0.77  --  0.65 0.90   EGFR 92.4  --  89.5 89.5  --  102.2 74.3   GLUCOSE 84  --  94  --   --  122* 126*   CALCIUM 9.1  --  9.3  --   --  8.9 9.3   MAGNESIUM 2.5  --  2.4  --   --  2.5 2.2   PHOSPHORUS 2.7 3.7 1.5*  --   --  2.8  --    HEMOGLOBIN A1C  --   --   --   --   --   --  6.24*         Lab 12/27/24  0519 12/26/24  0509 12/25/24  0327 12/24/24  0423   TOTAL PROTEIN 6.7 6.8 6.5 7.4   ALBUMIN 3.5 3.8 3.7 4.3   GLOBULIN 3.2 3.0 2.8 3.1   ALT  (SGPT) 12 9 9 11   AST (SGOT) 18 14 13 17   BILIRUBIN 0.3 0.3 0.3 0.3   ALK PHOS 119* 129* 136* 170*         Lab 12/24/24  0812 12/24/24  0423   PROBNP  --  336.0   HSTROP T 8 6         Lab 12/24/24  0546   CHOLESTEROL 214*   LDL CHOL 131*   HDL CHOL 32*   TRIGLYCERIDES 283*             Lab 12/26/24  0458 12/25/24  0315 12/24/24  0831   PH, ARTERIAL 7.433 7.391 7.355   PCO2, ARTERIAL 37.0 34.7* 43.8   PO2 ART 73.8* 86.7 75.2*   O2 SATURATION ART 95.2 96.6 94.2   FIO2 40 60 40   HCO3 ART 24.7 21.0 24.4   BASE EXCESS ART 0.7 -3.2* -1.3*     Brief Urine Lab Results  (Last result in the past 365 days)        Color   Clarity   Blood   Leuk Est   Nitrite   Protein   CREAT   Urine HCG        12/24/24 0459 Yellow   Clear   Trace   Trace   Positive   Negative                 Microbiology Results (last 10 days)       Procedure Component Value - Date/Time    MRSA Screen, PCR (Inpatient) - Swab, Nares [[id number]]  (Normal) Collected: 12/24/24 0546    Lab Status: Final result Specimen: Swab from Nares Updated: 12/24/24 0739     MRSA PCR No MRSA Detected    Narrative:      The negative predictive value of this diagnostic test is high and should only be used to consider de-escalating anti-MRSA therapy. A positive result may indicate colonization with MRSA and must be correlated clinically.    Blood Culture - Blood, Wrist, Left [[id number]]  (Normal) Collected: 12/24/24 0520    Lab Status: Preliminary result Specimen: Blood from Wrist, Left Updated: 12/27/24 0530     Blood Culture No growth at 3 days    Respiratory Panel PCR w/COVID-19(SARS-CoV-2) RONALD/AJ/[facility]/PAD/COR/ASPEN In-House, NP Swab in UTM/VTM, 2 HR TAT - Swab, Nasopharynx [[id number]]  (Abnormal) Collected: 12/24/24 0459    Lab Status: Final result Specimen: Swab from Nasopharynx Updated: 12/24/24 0606     ADENOVIRUS, PCR Not Detected     Coronavirus 229E Not Detected     Coronavirus HKU1 Not Detected     Coronavirus NL63 Not Detected     Coronavirus OC43 Not Detected      COVID19 Not Detected     Human Metapneumovirus Not Detected     Human Rhinovirus/Enterovirus Not Detected     Influenza A PCR Not Detected     Influenza B PCR Not Detected     Parainfluenza Virus 1 Not Detected     Parainfluenza Virus 2 Not Detected     Parainfluenza Virus 3 Not Detected     Parainfluenza Virus 4 Not Detected     RSV, PCR Detected     Bordetella pertussis pcr Not Detected     Bordetella parapertussis PCR Not Detected     Chlamydophila pneumoniae PCR Not Detected     Mycoplasma pneumo by PCR Not Detected    Narrative:      In the setting of a positive respiratory panel with a viral infection PLUS a negative procalcitonin without other underlying concern for bacterial infection, consider observing off antibiotics or discontinuation of antibiotics and continue supportive care. If the respiratory panel is positive for atypical bacterial infection (Bordetella pertussis, Chlamydophila pneumoniae, or Mycoplasma pneumoniae), consider antibiotic de-escalation to target atypical bacterial infection.    S. Pneumo Ag Urine or CSF - Urine, Straight Cath [[id number]]  (Normal) Collected: 12/24/24 0459    Lab Status: Final result Specimen: Urine from Straight Cath Updated: 12/24/24 0729     Strep Pneumo Ag Negative    Legionella Antigen, Urine - Urine, Straight Cath [[id number]]  (Normal) Collected: 12/24/24 0459    Lab Status: Final result Specimen: Urine from Straight Cath Updated: 12/24/24 0729     LEGIONELLA ANTIGEN, URINE Negative    Urine Culture - Urine, Straight Cath [[id number]]  (Abnormal)  (Susceptibility) Collected: 12/24/24 0459    Lab Status: Final result Specimen: Urine from Straight Cath Updated: 12/27/24 0956     Urine Culture >100,000 CFU/mL Enterobacter cloacae complex     Comment:   This organism may develop resistance during prolonged therapy with 3rd generation cephalosporins (e.g. ceftriaxone) as a result of de-repression of AmpC B-lactamase.  Ceftriaxone may be a reasonable treatment option  for uncomplicated cystitis or other lower severity infections when susceptibility is demonstrated.       Narrative:      Colonization of the urinary tract without infection is common. Treatment is discouraged unless the patient is symptomatic, pregnant, or undergoing an invasive urologic procedure.    Susceptibility        Enterobacter cloacae complex      JUAN      Cefepime Susceptible      Ceftazidime Susceptible      Ceftriaxone Susceptible  [1]       Gentamicin Susceptible      Levofloxacin Susceptible      Nitrofurantoin Intermediate      Piperacillin + Tazobactam Susceptible      Trimethoprim + Sulfamethoxazole Susceptible                   [1]  Appended report. These results have been appended to a previously preliminary verified report.                    Blood Culture - Blood, Hand, Right [[id number]]  (Normal) Collected: 12/24/24 0423    Lab Status: Preliminary result Specimen: Blood from Hand, Right Updated: 12/27/24 0445     Blood Culture No growth at 3 days            Adult Transthoracic Echo Complete w/ Color, Spectral and Contrast if Necessary Per Protocol    Addendum Date: 12/24/2024      Left ventricular systolic function is normal. Left ventricular ejection fraction appears to be 61 - 65%.   Left ventricular diastolic function was normal.   Estimated right ventricular systolic pressure from tricuspid regurgitation is normal (<35 mmHg).   There is a small (<1cm) pericardial effusion. There is no evidence of cardiac tamponade.     CT Angiogram Chest Pulmonary Embolism    Result Date: 12/24/2024  Impression: Impression: 1.A pulmonary embolus not definitely identified on this exam within the limitations of the study. 2.Emphysematous changes. 3.Minimal atelectasis in the cardiophrenic angle areas. 4.Atherosclerotic vascular calcification including coronary artery calcification. 5.Slight depression of T5 vertebral body which is unchanged. Electronically Signed: Quentin Guo MD  12/24/2024 1:30 PM EST   Workstation ID: [id number]    XR Chest 1 View    Result Date: 12/24/2024  Impression: Impression: 1.Endotracheal tube tip is 4.4 cm above the flor. 2.No acute cardiopulmonary abnormality. Electronically Signed: Ian Tse MD  12/24/2024 5:42 AM EST  Workstation ID: [id number]     Results for orders placed during the hospital encounter of 12/24/24    Duplex Venous Lower Extremity - Bilateral CAR    Interpretation Summary    Normal bilateral lower extremity venous duplex scan.      Results for orders placed during the hospital encounter of 12/24/24    Duplex Venous Lower Extremity - Bilateral CAR    Interpretation Summary    Normal bilateral lower extremity venous duplex scan.      Results for orders placed during the hospital encounter of 12/24/24    Adult Transthoracic Echo Complete w/ Color, Spectral and Contrast if Necessary Per Protocol    Interpretation Summary    Left ventricular systolic function is normal. Left ventricular ejection fraction appears to be 61 - 65%.    Left ventricular diastolic function was normal.    Estimated right ventricular systolic pressure from tricuspid regurgitation is normal (<35 mmHg).    There is a small (<1cm) pericardial effusion. There is no evidence of cardiac tamponade.      Labs Pending at Discharge:  Pending Results       Procedure [Order ID] Specimen - Date/Time    Potassium [[id number]]     Specimen: Blood     Respiratory Culture - Sputum, ET Suction [[id number]]     Specimen: Sputum from ET Suction             Procedures Performed           Consults:   Consults       Date and Time Order Name Status Description    12/25/2024  3:34 PM Inpatient Hospitalist Consult                Discharge Details        Discharge Medications        Changes to Medications        Instructions Start Date   freestyle lancets  What changed: See the new instructions.   TEST EVERY DAY             Continue These Medications        Instructions Start Date   FreeStyle Lite w/Device kit   Use to check  blood sugars 2-3 times a day             ASK your doctor about these medications        Instructions Start Date   albuterol sulfate  (90 Base) MCG/ACT inhaler  Commonly known as: PROVENTIL HFA;VENTOLIN HFA;PROAIR HFA   INHALE ONE PUFF BY MOUTH EVERY 4 TO 6 HOURS      ALPRAZolam 1 MG tablet  Commonly known as: XANAX   TAKE 1 TABLET BY MOUTH 2 TIMES A DAY AND 1/2 TABLET AT NOON      aspirin 81 MG EC tablet   81 mg, Oral, Daily      FREESTYLE LITE test strip  Generic drug: glucose blood   test TWICE DAILY      furosemide 40 MG tablet  Commonly known as: LASIX   40 mg, Oral, Daily      gabapentin 300 MG capsule  Commonly known as: NEURONTIN   300 mg, Oral, 4 Times Daily      hydrOXYzine 50 MG tablet  Commonly known as: ATARAX   50 mg, Oral, 3 Times Daily      ipratropium-albuterol 0.5-2.5 mg/3 ml nebulizer  Commonly known as: DUO-NEB   No dose, route, or frequency recorded.      lamoTRIgine 150 MG tablet  Commonly known as: LaMICtal   150 mg, Oral, Every Night at Bedtime      linaclotide 145 MCG capsule capsule  Commonly known as: LINZESS   Linzess 145 mcg capsule      Magnesium Oxide -Mg Supplement 400 (240 Mg) MG tablet   1 tablet, Oral, Daily      metFORMIN 500 MG tablet  Commonly known as: GLUCOPHAGE   500 mg, Oral, 3 Times Daily      methocarbamol 750 MG tablet  Commonly known as: ROBAXIN   TAKE ONE TABLET BY MOUTH THREE TIMES DAILY      metoprolol succinate XL 25 MG 24 hr tablet  Commonly known as: TOPROL-XL   25 mg, Oral, Daily      midodrine 10 MG tablet  Commonly known as: PROAMATINE   10 mg, Oral, 3 Times Daily      montelukast 10 MG tablet  Commonly known as: SINGULAIR   10 mg, Oral, Nightly      mupirocin 2 % cream  Commonly known as: BACTROBAN   1 application , Topical, 3 Times Daily, Apply to wound      NON FORMULARY   1 dose, Nasal, Continuous, Oxygen 3 lpm      omeprazole 20 MG capsule  Commonly known as: priLOSEC   20 mg, Oral, 2 times daily      oxyCODONE-acetaminophen  MG per  tablet  Commonly known as: PERCOCET   No dose, route, or frequency recorded.      potassium chloride 10 MEQ CR tablet   20 mEq, Oral, 2 Times Daily      potassium chloride 20 MEQ CR tablet  Commonly known as: KLOR-CON M20   20 mEq, Oral, Daily      pravastatin 10 MG tablet  Commonly known as: PRAVACHOL   10 mg, Oral, Daily      predniSONE 10 MG tablet  Commonly known as: DELTASONE   10 mg, Oral, Daily      promethazine 25 MG tablet  Commonly known as: PHENERGAN   TAKE ONE TABLET BY MOUTH EVERY 8 HOURS AS NEEDED FOR NAUSEA AND VOMITING      sertraline 50 MG tablet  Commonly known as: ZOLOFT   50 mg, Oral, Every Night at Bedtime      Stiolto Respimat 2.5-2.5 MCG/ACT aerosol solution inhaler  Generic drug: tiotropium bromide-olodaterol   2 puffs, Inhalation, Daily      sucralfate 1 g tablet  Commonly known as: CARAFATE   1 g, Oral, 4 Times Daily      topiramate 100 MG tablet  Commonly known as: TOPAMAX   TAKE ONE TABLET BY MOUTH EVERY MORNING AND ONE-HALF TABLET EVERY NIGHT AT BEDTIME      traZODone 150 MG tablet  Commonly known as: DESYREL   150 mg, Oral, Every Night at Bedtime      vitamin D 1.25 MG (67902 UT) capsule capsule  Commonly known as: ERGOCALCIFEROL   50,000 Units, Oral, 2 Times Weekly               Allergies   Allergen Reactions    Codeine Shortness Of Breath    Contrast Dye (Echo Or Unknown Ct/Mr) Shortness Of Breath    Erythromycin Hives    Morphine Unknown (See Comments)    Penicillin G Itching    Sulfa Antibiotics Unknown (See Comments)    Doxycycline Other (See Comments)     Rash and SOA    Fentanyl Itching    Levofloxacin Other (See Comments)    Nitrofuran Derivatives Unknown - High Severity         Discharge Disposition: Home with home health      Diet:  Hospital:  Diet Order   Procedures    Diet: Diabetic; Consistent Carbohydrate; Fluid Consistency: Thin (IDDSI 0)         Discharge Activity:         CODE STATUS:  Code Status and Medical Interventions: CPR (Attempt to Resuscitate); Full Support    Ordered at: 12/24/24 0533     Code Status (Patient has no pulse and is not breathing):    CPR (Attempt to Resuscitate)     Medical Interventions (Patient has pulse or is breathing):    Full Support         Future Appointments   Date Time Provider Department Center   1/16/2025  2:15 PM Bright Steele MD [facility]   5/20/2025  2:30 PM Mayra Jaime MD [facility]           Time spent on Discharge including face to face service: 30 minutes    Signature: Electronically signed by ANIBAL Anand, 12/27/24, 14:35 EST.  [facility] Hospitalist Team     Electronically signed by Sadi Pruitt MD at 12/27/24 2689

## 2024-12-27 NOTE — NURSING NOTE
Pt is alert and oriented x4 this morning but forgetful, patient is convinced that she left the hospital with the nurse last pm and the  had to bring her back. Staff educated patient that it was reported that she was in her room all night and slept most of the night. She did not believe what I was telling her.

## 2024-12-27 NOTE — PLAN OF CARE
Goal Outcome Evaluation:  Plan of Care Reviewed With: patient           Outcome Evaluation: Pt has slept throughout the night, no c/o pain at this time. Currently on 4L of O2 and getting IV abx and PO steroids for pneumonia. Remains in isolation for RSV. Will continue to monitor.

## 2024-12-27 NOTE — DISCHARGE SUMMARY
"             [facility] Medicine Services  Discharge Summary    Date of Service: 2024  Patient Name: Melvi Rayo  : 1965  MRN: [id number]    Date of Admission: 2024  Discharge Diagnosis: Acute on chronic respiratory failure  Date of Discharge: 2024  Primary Care Physician: Bharti Norton APRN      Presenting Problem:   Myocardial ischemia [I25.9]  Acute on chronic respiratory failure [J96.20]  Acute respiratory failure with hypoxia and hypercapnia [J96.01, J96.02]  Multifocal pneumonia [J18.9]    Active and Resolved Hospital Problems:  Active Hospital Problems    Diagnosis POA    **Acute on chronic respiratory failure [J96.20] Yes      Resolved Hospital Problems   No resolved problems to display.         Hospital Course     HPI:    \"Patient is a 59-year-old female with a history of COPD, chronic respiratory failure on home O2, breast cancer status postmastectomy, depression, anxiety, seizure disorder chronic diarrhea, diabetes who presented for worsening respiratory failure.  Patient called EMS and was brought in after having 24 hours of symptoms.  The patient was intubated upon arrival to the emergency department.  There was concern for purulence in the airway, patient was broadly covered with antibiotics, and patient was cultured.  Patient's most recent ABG is without acidosis or hypercapnia.  She is oxygenating well on minimal settings, there is concern for abnormal EKG however her repeat troponin went from 6-8.  Patient had a DVT ultrasound which shows no acute DVTs, her chest x-ray did not show evidence of infection, and her urine and blood cultures are pending.  MRSA nares was negative.  Patient's RSV was noted to be elevated.  Given the patient's abrupt tachypnea, hypoxia, and largely benign chest x-ray we will CT PE the patient given her history of malignancy.  Will also continue to support the patient with antibiotics, sedation with propofol which we will wean after CT " "scan, and nebs.  Patient is not actively requiring vasopressors at this time so we will continue to closely monitor her.   \"    Hospital Course:  Patient was admitted to ICU from ED on 12/24/2024 with respiratory failure and transitioned to PCU on 1220 6/2024 with acute on chronic respiratory failure.  She is discharging on 4 L, home dose O2.  She tested positive for RSV and supportive care was given.  Patient will discharge home with her brother. She will need to be seen by pulmonology outpatient.         DISCHARGE Follow Up Recommendations for labs and diagnostics: Follow-up with PCP in 1 to 2 weeks.  Take steroids as ordered.  After you complete the 8 days steroid taper, you may continue the normal dose of prednisone.  Continue wearing your oxygen 4 L.  Return to ED if shortness of breath increases or fever develops. Follow up with pulmonology outpatient.         Day of Discharge     Vital Signs:  Temp:  [97.9 °F (36.6 °C)-98.1 °F (36.7 °C)] 98 °F (36.7 °C)  Heart Rate:  [] 100  Resp:  [16-25] 20  BP: ()/(53-93) 113/74  Flow (L/min) (Oxygen Therapy):  [4-5] 4    Physical Exam:  Physical Exam  Constitutional:       Appearance: Normal appearance.   HENT:      Head: Normocephalic and atraumatic.      Nose: Nose normal.      Mouth/Throat:      Mouth: Mucous membranes are moist.   Eyes:      Extraocular Movements: Extraocular movements intact.      Conjunctiva/sclera: Conjunctivae normal.      Pupils: Pupils are equal, round, and reactive to light.   Cardiovascular:      Rate and Rhythm: Regular rhythm. Tachycardia present.      Pulses: Normal pulses.      Heart sounds: Normal heart sounds.   Pulmonary:      Effort: Pulmonary effort is normal.      Breath sounds: Wheezing present.   Abdominal:      General: Abdomen is flat. Bowel sounds are normal.      Palpations: Abdomen is soft.   Musculoskeletal:         General: Normal range of motion.      Cervical back: Normal range of motion.   Skin:     General: " Skin is warm and dry.   Neurological:      General: No focal deficit present.      Mental Status: She is alert and oriented to person, place, and time. Mental status is at baseline.            Pertinent  and/or Most Recent Results     LAB RESULTS:      Lab 12/27/24  0519 12/26/24  0509 12/26/24  0458 12/25/24 0327 12/24/24 0423 12/24/24  0421   WBC 13.31* 16.45*  --  19.15* 17.79*  --    HEMOGLOBIN 13.7 14.2  --  12.4 15.3  --    HEMOGLOBIN, POC  --   --  16.7  --   --   --    HEMATOCRIT 42.6 45.2  --  39.2 48.3*  --    HEMATOCRIT POC  --   --  49  --   --   --    PLATELETS 299 291  --  226 299  --    NEUTROS ABS 9.56* 13.12*  --  15.30* 11.87*  --    IMMATURE GRANS (ABS) 0.13* 0.10*  --  0.06* 0.10*  --    LYMPHS ABS 2.56 2.13  --  2.36 4.08*  --    MONOS ABS 1.02* 1.05*  --  1.40* 1.58*  --    EOS ABS 0.00 0.02  --  0.00 0.08  --    MCV 94.5 94.4  --  93.8 96.0  --    LACTATE  --   --  0.9  --   --  1.8         Lab 12/27/24  0519 12/26/24  1712 12/26/24  0509 12/26/24 0458 12/25/24  1945 12/25/24 0327 12/24/24 0423   SODIUM 143  --  144  --   --  143 143   POTASSIUM 3.3*  --  3.9  --  3.6 3.2* 3.3*   CHLORIDE 104  --  107  --   --  108* 104   CO2 25.0  --  21.2*  --   --  20.3* 23.2   ANION GAP 14.0  --  15.8*  --   --  14.7 15.8*   BUN 17  --  14  --   --  14 12   CREATININE 0.75  --  0.77 0.77  --  0.65 0.90   EGFR 92.4  --  89.5 89.5  --  102.2 74.3   GLUCOSE 84  --  94  --   --  122* 126*   CALCIUM 9.1  --  9.3  --   --  8.9 9.3   MAGNESIUM 2.5  --  2.4  --   --  2.5 2.2   PHOSPHORUS 2.7 3.7 1.5*  --   --  2.8  --    HEMOGLOBIN A1C  --   --   --   --   --   --  6.24*         Lab 12/27/24  0519 12/26/24  0509 12/25/24  0327 12/24/24  0423   TOTAL PROTEIN 6.7 6.8 6.5 7.4   ALBUMIN 3.5 3.8 3.7 4.3   GLOBULIN 3.2 3.0 2.8 3.1   ALT (SGPT) 12 9 9 11   AST (SGOT) 18 14 13 17   BILIRUBIN 0.3 0.3 0.3 0.3   ALK PHOS 119* 129* 136* 170*         Lab 12/24/24  0812 12/24/24  0423   PROBNP  --  336.0   HSTROP T 8 6          Lab 12/24/24  0546   CHOLESTEROL 214*   LDL CHOL 131*   HDL CHOL 32*   TRIGLYCERIDES 283*             Lab 12/26/24  0458 12/25/24  0315 12/24/24  0831   PH, ARTERIAL 7.433 7.391 7.355   PCO2, ARTERIAL 37.0 34.7* 43.8   PO2 ART 73.8* 86.7 75.2*   O2 SATURATION ART 95.2 96.6 94.2   FIO2 40 60 40   HCO3 ART 24.7 21.0 24.4   BASE EXCESS ART 0.7 -3.2* -1.3*     Brief Urine Lab Results  (Last result in the past 365 days)        Color   Clarity   Blood   Leuk Est   Nitrite   Protein   CREAT   Urine HCG        12/24/24 0459 Yellow   Clear   Trace   Trace   Positive   Negative                 Microbiology Results (last 10 days)       Procedure Component Value - Date/Time    MRSA Screen, PCR (Inpatient) - Swab, Nares [[id number]]  (Normal) Collected: 12/24/24 0546    Lab Status: Final result Specimen: Swab from Nares Updated: 12/24/24 0739     MRSA PCR No MRSA Detected    Narrative:      The negative predictive value of this diagnostic test is high and should only be used to consider de-escalating anti-MRSA therapy. A positive result may indicate colonization with MRSA and must be correlated clinically.    Blood Culture - Blood, Wrist, Left [[id number]]  (Normal) Collected: 12/24/24 0520    Lab Status: Preliminary result Specimen: Blood from Wrist, Left Updated: 12/27/24 0530     Blood Culture No growth at 3 days    Respiratory Panel PCR w/COVID-19(SARS-CoV-2) [facility] In-House, NP Swab in UTM/VTM, 2 HR TAT - Swab, Nasopharynx [[id number]]  (Abnormal) Collected: 12/24/24 0459    Lab Status: Final result Specimen: Swab from Nasopharynx Updated: 12/24/24 0606     ADENOVIRUS, PCR Not Detected     Coronavirus 229E Not Detected     Coronavirus HKU1 Not Detected     Coronavirus NL63 Not Detected     Coronavirus OC43 Not Detected     COVID19 Not Detected     Human Metapneumovirus Not Detected     Human Rhinovirus/Enterovirus Not Detected     Influenza A PCR Not Detected     Influenza B PCR Not Detected      Parainfluenza Virus 1 Not Detected     Parainfluenza Virus 2 Not Detected     Parainfluenza Virus 3 Not Detected     Parainfluenza Virus 4 Not Detected     RSV, PCR Detected     Bordetella pertussis pcr Not Detected     Bordetella parapertussis PCR Not Detected     Chlamydophila pneumoniae PCR Not Detected     Mycoplasma pneumo by PCR Not Detected    Narrative:      In the setting of a positive respiratory panel with a viral infection PLUS a negative procalcitonin without other underlying concern for bacterial infection, consider observing off antibiotics or discontinuation of antibiotics and continue supportive care. If the respiratory panel is positive for atypical bacterial infection (Bordetella pertussis, Chlamydophila pneumoniae, or Mycoplasma pneumoniae), consider antibiotic de-escalation to target atypical bacterial infection.    S. Pneumo Ag Urine or CSF - Urine, Straight Cath [[id number]]  (Normal) Collected: 12/24/24 0459    Lab Status: Final result Specimen: Urine from Straight Cath Updated: 12/24/24 0729     Strep Pneumo Ag Negative    Legionella Antigen, Urine - Urine, Straight Cath [[id number]]  (Normal) Collected: 12/24/24 0459    Lab Status: Final result Specimen: Urine from Straight Cath Updated: 12/24/24 0729     LEGIONELLA ANTIGEN, URINE Negative    Urine Culture - Urine, Straight Cath [[id number]]  (Abnormal)  (Susceptibility) Collected: 12/24/24 0459    Lab Status: Final result Specimen: Urine from Straight Cath Updated: 12/27/24 0956     Urine Culture >100,000 CFU/mL Enterobacter cloacae complex     Comment:   This organism may develop resistance during prolonged therapy with 3rd generation cephalosporins (e.g. ceftriaxone) as a result of de-repression of AmpC B-lactamase.  Ceftriaxone may be a reasonable treatment option for uncomplicated cystitis or other lower severity infections when susceptibility is demonstrated.       Narrative:      Colonization of the urinary tract without infection is  common. Treatment is discouraged unless the patient is symptomatic, pregnant, or undergoing an invasive urologic procedure.    Susceptibility        Enterobacter cloacae complex      JUAN      Cefepime Susceptible      Ceftazidime Susceptible      Ceftriaxone Susceptible  [1]       Gentamicin Susceptible      Levofloxacin Susceptible      Nitrofurantoin Intermediate      Piperacillin + Tazobactam Susceptible      Trimethoprim + Sulfamethoxazole Susceptible                   [1]  Appended report. These results have been appended to a previously preliminary verified report.                    Blood Culture - Blood, Hand, Right [[id number]]  (Normal) Collected: 12/24/24 0423    Lab Status: Preliminary result Specimen: Blood from Hand, Right Updated: 12/27/24 0445     Blood Culture No growth at 3 days            Adult Transthoracic Echo Complete w/ Color, Spectral and Contrast if Necessary Per Protocol    Addendum Date: 12/24/2024      Left ventricular systolic function is normal. Left ventricular ejection fraction appears to be 61 - 65%.   Left ventricular diastolic function was normal.   Estimated right ventricular systolic pressure from tricuspid regurgitation is normal (<35 mmHg).   There is a small (<1cm) pericardial effusion. There is no evidence of cardiac tamponade.     CT Angiogram Chest Pulmonary Embolism    Result Date: 12/24/2024  Impression: Impression: 1.A pulmonary embolus not definitely identified on this exam within the limitations of the study. 2.Emphysematous changes. 3.Minimal atelectasis in the cardiophrenic angle areas. 4.Atherosclerotic vascular calcification including coronary artery calcification. 5.Slight depression of T5 vertebral body which is unchanged. Electronically Signed: Quentin Guo MD  12/24/2024 1:30 PM EST  Workstation ID: [id number]    XR Chest 1 View    Result Date: 12/24/2024  Impression: Impression: 1.Endotracheal tube tip is 4.4 cm above the flor. 2.No acute cardiopulmonary  abnormality. Electronically Signed: Ian Tse MD  12/24/2024 5:42 AM EST  Workstation ID: [id number]     Results for orders placed during the hospital encounter of 12/24/24    Duplex Venous Lower Extremity - Bilateral CAR    Interpretation Summary    Normal bilateral lower extremity venous duplex scan.      Results for orders placed during the hospital encounter of 12/24/24    Duplex Venous Lower Extremity - Bilateral CAR    Interpretation Summary    Normal bilateral lower extremity venous duplex scan.      Results for orders placed during the hospital encounter of 12/24/24    Adult Transthoracic Echo Complete w/ Color, Spectral and Contrast if Necessary Per Protocol    Interpretation Summary    Left ventricular systolic function is normal. Left ventricular ejection fraction appears to be 61 - 65%.    Left ventricular diastolic function was normal.    Estimated right ventricular systolic pressure from tricuspid regurgitation is normal (<35 mmHg).    There is a small (<1cm) pericardial effusion. There is no evidence of cardiac tamponade.      Labs Pending at Discharge:  Pending Results       Procedure [Order ID] Specimen - Date/Time    Potassium [[id number]]     Specimen: Blood     Respiratory Culture - Sputum, ET Suction [[id number]]     Specimen: Sputum from ET Suction             Procedures Performed           Consults:   Consults       Date and Time Order Name Status Description    12/25/2024  3:34 PM Inpatient Hospitalist Consult                Discharge Details        Discharge Medications        Changes to Medications        Instructions Start Date   freestyle lancets  What changed: See the new instructions.   TEST EVERY DAY             Continue These Medications        Instructions Start Date   FreeStyle Lite w/Device kit   Use to check blood sugars 2-3 times a day             ASK your doctor about these medications        Instructions Start Date   albuterol sulfate  (90 Base) MCG/ACT  inhaler  Commonly known as: PROVENTIL HFA;VENTOLIN HFA;PROAIR HFA   INHALE ONE PUFF BY MOUTH EVERY 4 TO 6 HOURS      ALPRAZolam 1 MG tablet  Commonly known as: XANAX   TAKE 1 TABLET BY MOUTH 2 TIMES A DAY AND 1/2 TABLET AT NOON      aspirin 81 MG EC tablet   81 mg, Oral, Daily      FREESTYLE LITE test strip  Generic drug: glucose blood   test TWICE DAILY      furosemide 40 MG tablet  Commonly known as: LASIX   40 mg, Oral, Daily      gabapentin 300 MG capsule  Commonly known as: NEURONTIN   300 mg, Oral, 4 Times Daily      hydrOXYzine 50 MG tablet  Commonly known as: ATARAX   50 mg, Oral, 3 Times Daily      ipratropium-albuterol 0.5-2.5 mg/3 ml nebulizer  Commonly known as: DUO-NEB   No dose, route, or frequency recorded.      lamoTRIgine 150 MG tablet  Commonly known as: LaMICtal   150 mg, Oral, Every Night at Bedtime      linaclotide 145 MCG capsule capsule  Commonly known as: LINZESS   Linzess 145 mcg capsule      Magnesium Oxide -Mg Supplement 400 (240 Mg) MG tablet   1 tablet, Oral, Daily      metFORMIN 500 MG tablet  Commonly known as: GLUCOPHAGE   500 mg, Oral, 3 Times Daily      methocarbamol 750 MG tablet  Commonly known as: ROBAXIN   TAKE ONE TABLET BY MOUTH THREE TIMES DAILY      metoprolol succinate XL 25 MG 24 hr tablet  Commonly known as: TOPROL-XL   25 mg, Oral, Daily      midodrine 10 MG tablet  Commonly known as: PROAMATINE   10 mg, Oral, 3 Times Daily      montelukast 10 MG tablet  Commonly known as: SINGULAIR   10 mg, Oral, Nightly      mupirocin 2 % cream  Commonly known as: BACTROBAN   1 application , Topical, 3 Times Daily, Apply to wound      NON FORMULARY   1 dose, Nasal, Continuous, Oxygen 3 lpm      omeprazole 20 MG capsule  Commonly known as: priLOSEC   20 mg, Oral, 2 times daily      oxyCODONE-acetaminophen  MG per tablet  Commonly known as: PERCOCET   No dose, route, or frequency recorded.      potassium chloride 10 MEQ CR tablet   20 mEq, Oral, 2 Times Daily      potassium  chloride 20 MEQ CR tablet  Commonly known as: KLOR-CON M20   20 mEq, Oral, Daily      pravastatin 10 MG tablet  Commonly known as: PRAVACHOL   10 mg, Oral, Daily      predniSONE 10 MG tablet  Commonly known as: DELTASONE   10 mg, Oral, Daily      promethazine 25 MG tablet  Commonly known as: PHENERGAN   TAKE ONE TABLET BY MOUTH EVERY 8 HOURS AS NEEDED FOR NAUSEA AND VOMITING      sertraline 50 MG tablet  Commonly known as: ZOLOFT   50 mg, Oral, Every Night at Bedtime      Stiolto Respimat 2.5-2.5 MCG/ACT aerosol solution inhaler  Generic drug: tiotropium bromide-olodaterol   2 puffs, Inhalation, Daily      sucralfate 1 g tablet  Commonly known as: CARAFATE   1 g, Oral, 4 Times Daily      topiramate 100 MG tablet  Commonly known as: TOPAMAX   TAKE ONE TABLET BY MOUTH EVERY MORNING AND ONE-HALF TABLET EVERY NIGHT AT BEDTIME      traZODone 150 MG tablet  Commonly known as: DESYREL   150 mg, Oral, Every Night at Bedtime      vitamin D 1.25 MG (58849 UT) capsule capsule  Commonly known as: ERGOCALCIFEROL   50,000 Units, Oral, 2 Times Weekly               Allergies   Allergen Reactions    Codeine Shortness Of Breath    Contrast Dye (Echo Or Unknown Ct/Mr) Shortness Of Breath    Erythromycin Hives    Morphine Unknown (See Comments)    Penicillin G Itching    Sulfa Antibiotics Unknown (See Comments)    Doxycycline Other (See Comments)     Rash and SOA    Fentanyl Itching    Levofloxacin Other (See Comments)    Nitrofuran Derivatives Unknown - High Severity         Discharge Disposition: Home with home health      Diet:  Hospital:  Diet Order   Procedures    Diet: Diabetic; Consistent Carbohydrate; Fluid Consistency: Thin (IDDSI 0)         Discharge Activity:         CODE STATUS:  Code Status and Medical Interventions: CPR (Attempt to Resuscitate); Full Support   Ordered at: 12/24/24 1005     Code Status (Patient has no pulse and is not breathing):    CPR (Attempt to Resuscitate)     Medical Interventions (Patient has pulse  or is breathing):    Full Support         Future Appointments   Date Time Provider Department Center   1/16/2025  2:15 PM Bright Steele MD [facility]   5/20/2025  2:30 PM Mayra Jaime MD [facility]           Time spent on Discharge including face to face service: 30 minutes    Signature: Electronically signed by ANIBAL Anand, 12/27/24, 14:35 EST.  [facility]

## 2024-12-28 ENCOUNTER — NURSE TRIAGE (OUTPATIENT)
Dept: CALL CENTER | Facility: HOSPITAL | Age: 59
End: 2024-12-28
Payer: MEDICAID

## 2024-12-28 LAB
QT INTERVAL: 303 MS
QT INTERVAL: 430 MS
QTC INTERVAL: 422 MS
QTC INTERVAL: 499 MS

## 2024-12-28 NOTE — OUTREACH NOTE
Prep Survey      Flowsheet Row Responses   [facility] discharged from? [facility]   Is LACE score < 7 ? No   Eligibility Readm Mgmt   Discharge diagnosis Acute on chronic respiratory failure   Does the patient have one of the following disease processes/diagnoses(primary or secondary)? Other   Does the patient have Home health ordered? No   Is there a DME ordered? No   Prep survey completed? Yes            CHRISTI BUTLER - Registered Nurse

## 2024-12-28 NOTE — TELEPHONE ENCOUNTER
I was d/c from  [facility] yesterday , they switched me from my prednisone, to their prednisone, AVS reviewed, went over meds with her, told her to check with her PCP to see if she needs breathing tx. with nebulizer machine , she is asking about this, no order was seen for it and she wanted to know if needed antibiotic and told her he did not order one, to follow up with her PCP as told to.

## 2024-12-28 NOTE — TELEPHONE ENCOUNTER
Reason for Disposition   [1] Caller has NON-URGENT medicine question about med that PCP prescribed AND [2] triager unable to answer question    Additional Information   Negative: [1] Intentional drug overdose AND [2] suicidal thoughts or ideas   Negative: Drug overdose and triager unable to answer question   Negative: Caller requesting a renewal or refill of a medicine patient is currently taking   Negative: Caller requesting information unrelated to medicine   Negative: Caller requesting information about COVID-19 Vaccine   Negative: Caller requesting information about Emergency Contraception   Negative: Caller requesting information about Combined Birth Control Pills   Negative: Caller requesting information about Progestin Birth Control Pills   Negative: Caller requesting information about Post-Op pain or medicines   Negative: Caller requesting a prescription antibiotic (such as Penicillin) for Strep throat and has a positive culture result   Negative: Caller requesting a prescription anti-viral med (such as Tamiflu) and has influenza (flu) symptoms   Negative: Immunization reaction suspected   Negative: Rash while taking a medicine or within 3 days of stopping it   Negative: [1] Asthma and [2] having symptoms of asthma (cough, wheezing, etc.)   Negative: [1] Symptom of illness (e.g., headache, abdominal pain, earache, vomiting) AND [2] more than mild   Negative: Breastfeeding questions about mother's medicines and diet   Negative: MORE THAN A DOUBLE DOSE of a prescription or over-the-counter (OTC) drug   Negative: [1] DOUBLE DOSE (an extra dose or lesser amount) of prescription drug AND [2] any symptoms (e.g., dizziness, nausea, pain, sleepiness)   Negative: [1] DOUBLE DOSE (an extra dose or lesser amount) of over-the-counter (OTC) drug AND [2] any symptoms (e.g., dizziness, nausea, pain, sleepiness)   Negative: Took another person's prescription drug   Negative: [1] DOUBLE DOSE (an extra dose or lesser amount)  "of prescription drug AND [2] NO symptoms  (Exception: A double dose of antibiotics.)   Negative: Diabetes drug error or overdose (e.g., took wrong type of insulin or took extra dose)   Negative: [1] Prescription not at pharmacy AND [2] was prescribed by PCP recently (Exception: Triager has access to EMR and prescription is recorded there. Go to Home Care and confirm for pharmacy.)   Negative: [1] Pharmacy calling with prescription question AND [2] triager unable to answer question   Negative: [1] Caller has URGENT medicine question about med that PCP or specialist prescribed AND [2] triager unable to answer question   Negative: Medicine patch causing local rash or itching   Negative: [1] Caller has medicine question about med NOT prescribed by PCP AND [2] triager unable to answer question (e.g., compatibility with other med, storage)   Negative: Prescription request for new medicine (not a refill)   Negative: Caller wants to use a complementary or alternative medicine    Answer Assessment - Initial Assessment Questions  1. NAME of MEDICINE: \"What medicine(s) are you calling about?\"      Prednisone doses?, if needs antibiotics? If needs neb tx.?  2. QUESTION: \"What is your question?\" (e.g., double dose of medicine, side effect)        Prednisone doses?, if needs antibiotics? If needs neb tx.?  3. PRESCRIBER: \"Who prescribed the medicine?\" Reason: if prescribed by specialist, call should be referred to that group.      [facility] pred[facility]  4. SYMPTOMS: \"Do you have any symptoms?\" If Yes, ask: \"What symptoms are you having?\"  \"How bad are the symptoms (e.g., mild, moderate, severe)      cough  5. PREGNANCY:  \"Is there any chance that you are pregnant?\" \"When was your last menstrual period?\"      no    Protocols used: Medication Question Call-ADULT-    "

## 2024-12-29 LAB
BACTERIA SPEC AEROBE CULT: NORMAL
BACTERIA SPEC AEROBE CULT: NORMAL

## 2024-12-30 NOTE — CASE MANAGEMENT/SOCIAL WORK
Case Management Discharge Note      Final Note: Routine home         Selected Continued Care - Discharged on 12/27/2024 Admission date: 12/24/2024 - Discharge disposition: Home-Health Care c           Transportation Services  Private: Car    Final Discharge Disposition Code: 01 - home or self-care

## 2025-01-07 ENCOUNTER — READMISSION MANAGEMENT (OUTPATIENT)
Dept: CALL CENTER | Facility: HOSPITAL | Age: 60
End: 2025-01-07
Payer: MEDICAID

## 2025-01-07 NOTE — OUTREACH NOTE
Medical Week 2 Survey      Flowsheet Row Responses   [facility] facility patient discharged from? [facility]   Does the patient have one of the following disease processes/diagnoses(primary or secondary)? Other   Week 2 attempt successful? No   Unsuccessful attempts Attempt 1            Marvin PARRA - Registered Nurse

## 2025-01-13 RX ORDER — MIDODRINE HYDROCHLORIDE 10 MG/1
10 TABLET ORAL 3 TIMES DAILY
Qty: 90 TABLET | Refills: 0 | Status: SHIPPED | OUTPATIENT
Start: 2025-01-13

## 2025-01-14 ENCOUNTER — READMISSION MANAGEMENT (OUTPATIENT)
Dept: CALL CENTER | Facility: HOSPITAL | Age: 60
End: 2025-01-14
Payer: MEDICAID

## 2025-01-14 NOTE — OUTREACH NOTE
Medical Week 3 Survey      Flowsheet Row Responses   [facility] facility patient discharged from? [facility]   Does the patient have one of the following disease processes/diagnoses(primary or secondary)? Other   Week 3 attempt successful? No   Unsuccessful attempts Attempt 1            Stephany PARKER - Licensed Nurse

## 2025-03-12 DIAGNOSIS — F41.1 GENERALIZED ANXIETY DISORDER: Chronic | ICD-10-CM

## 2025-03-12 NOTE — TELEPHONE ENCOUNTER
Rx Refill Note  Requested Prescriptions     Pending Prescriptions Disp Refills    ALPRAZolam (XANAX) 1 MG tablet [Pharmacy Med Name: ALPRAZolam Oral Tablet 1 MG] 75 tablet 0     Sig: TAKE 1 TABLET BY MOUTH 2 TIMES A DAY AND 1/2 TABLET AT NOON      Last office visit with prescribing clinician: 11/20/2024   Last telemedicine visit with prescribing clinician: Visit date not found 5/20/2025  Next office visit with prescribing clinician: 5/20/2025   Office Visit with Mayra Jaime MD (11/20/2024)       None     BEHAVIORAL HEALTH - SCAN - INSPECT EMELY 3-12-25 ROSE (03/12/2025)              Would you like a call back once the refill request has been completed: [] Yes [] No    If the office needs to give you a call back, can they leave a voicemail: [] Yes [] No    Elo Perez  03/12/25, 12:12 EDT  Last filled 2/13/25 Uploaded

## 2025-03-13 RX ORDER — ALPRAZOLAM 1 MG/1
TABLET ORAL
Qty: 75 TABLET | Refills: 1 | Status: SHIPPED | OUTPATIENT
Start: 2025-03-13

## 2025-04-12 ENCOUNTER — TELEPHONE (OUTPATIENT)
Dept: CARDIOLOGY | Facility: CLINIC | Age: 60
End: 2025-04-12
Payer: MEDICAID

## 2025-04-14 RX ORDER — MIDODRINE HYDROCHLORIDE 10 MG/1
10 TABLET ORAL 3 TIMES DAILY
Qty: 90 TABLET | Refills: 0 | OUTPATIENT
Start: 2025-04-14

## 2025-04-14 NOTE — TELEPHONE ENCOUNTER
LOV Nov 2023     Pt has been informed she needs appt for more refills     No appt scheduled   Okay to deny refills ?

## 2025-04-16 RX ORDER — MIDODRINE HYDROCHLORIDE 10 MG/1
10 TABLET ORAL 3 TIMES DAILY
Qty: 90 TABLET | Refills: 0 | OUTPATIENT
Start: 2025-04-16

## 2025-04-16 NOTE — TELEPHONE ENCOUNTER
Rx Refill Note  Requested Prescriptions     Pending Prescriptions Disp Refills    midodrine (PROAMATINE) 10 MG tablet [Pharmacy Med Name: Midodrine HCl Oral Tablet 10 MG] 90 tablet 0     Sig: TAKE 1 TABLET BY MOUTH 3 TIMES A DAY      Last office visit with prescribing clinician: 11/20/2023   Last telemedicine visit with prescribing clinician: Visit date not found   Next office visit with prescribing clinician: Visit date not found                         Would you like a call back once the refill request has been completed: [] Yes [] No    If the office needs to give you a call back, can they leave a voicemail: [] Yes [] No    Maddi Garnica MA  04/16/25, 08:55 EDT

## 2025-05-12 DIAGNOSIS — F33.2 SEVERE RECURRENT MAJOR DEPRESSION WITHOUT PSYCHOTIC FEATURES: Chronic | ICD-10-CM

## 2025-05-12 DIAGNOSIS — F41.1 GENERALIZED ANXIETY DISORDER: Chronic | ICD-10-CM

## 2025-05-12 NOTE — TELEPHONE ENCOUNTER
Rx Refill Note  Requested Prescriptions     Pending Prescriptions Disp Refills    ALPRAZolam (XANAX) 1 MG tablet 75 tablet 1     Sig: TAKE 1 TABLET BY MOUTH 2 TIMES A DAY AND 1/2 TABLET AT NOON    traZODone (DESYREL) 150 MG tablet 30 tablet 5     Sig: Take 1 tablet by mouth every night at bedtime.    sertraline (ZOLOFT) 50 MG tablet 30 tablet 5     Sig: Take 1 tablet by mouth every night at bedtime.    lamoTRIgine (LaMICtal) 150 MG tablet 30 tablet 5     Sig: Take 1 tablet by mouth every night at bedtime.      Last office visit with prescribing clinician: 11/20/2024     Next office visit with prescribing clinician: 5/20/2025     Office Visit with Mayra Jaime MD (11/20/2024)     BEHAVIORAL HEALTH - SCAN - Inspect, david, 1965, abilio (05/12/2025)     Med check - 5-    No UDS.    Last Inspect fill: 4-    Feli Menard MA  05/12/25, 16:06 EDT

## 2025-05-13 RX ORDER — LAMOTRIGINE 150 MG/1
150 TABLET ORAL
Qty: 30 TABLET | Refills: 5 | Status: SHIPPED | OUTPATIENT
Start: 2025-05-13

## 2025-05-13 RX ORDER — TRAZODONE HYDROCHLORIDE 150 MG/1
150 TABLET ORAL
Qty: 30 TABLET | Refills: 5 | Status: SHIPPED | OUTPATIENT
Start: 2025-05-13

## 2025-05-13 RX ORDER — ALPRAZOLAM 1 MG/1
TABLET ORAL
Qty: 75 TABLET | Refills: 0 | Status: SHIPPED | OUTPATIENT
Start: 2025-05-13

## 2025-05-20 ENCOUNTER — OFFICE VISIT (OUTPATIENT)
Dept: PSYCHIATRY | Facility: CLINIC | Age: 60
End: 2025-05-20
Payer: MEDICAID

## 2025-05-20 DIAGNOSIS — F43.12 CHRONIC POSTTRAUMATIC STRESS DISORDER: Chronic | ICD-10-CM

## 2025-05-20 DIAGNOSIS — F41.1 GENERALIZED ANXIETY DISORDER: Chronic | ICD-10-CM

## 2025-05-20 DIAGNOSIS — F33.2 SEVERE RECURRENT MAJOR DEPRESSION WITHOUT PSYCHOTIC FEATURES: Primary | Chronic | ICD-10-CM

## 2025-05-20 DIAGNOSIS — G47.00 PERSISTENT INSOMNIA: ICD-10-CM

## 2025-05-20 RX ORDER — TRAZODONE HYDROCHLORIDE 150 MG/1
150 TABLET ORAL
Qty: 30 TABLET | Refills: 5 | Status: SHIPPED | OUTPATIENT
Start: 2025-05-20

## 2025-05-20 RX ORDER — LAMOTRIGINE 150 MG/1
150 TABLET ORAL
Qty: 30 TABLET | Refills: 5 | Status: SHIPPED | OUTPATIENT
Start: 2025-05-20

## 2025-05-20 RX ORDER — GABAPENTIN 300 MG/1
300 CAPSULE ORAL 4 TIMES DAILY
Qty: 120 CAPSULE | Refills: 5 | Status: SHIPPED | OUTPATIENT
Start: 2025-05-20

## 2025-05-20 RX ORDER — HYDROXYZINE HYDROCHLORIDE 50 MG/1
50 TABLET, FILM COATED ORAL 3 TIMES DAILY
Qty: 90 TABLET | Refills: 5 | Status: SHIPPED | OUTPATIENT
Start: 2025-05-20
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
## 2025-06-12 DIAGNOSIS — F41.1 GENERALIZED ANXIETY DISORDER: Chronic | ICD-10-CM

## 2025-06-12 RX ORDER — ALPRAZOLAM 1 MG/1
TABLET ORAL
Qty: 75 TABLET | Refills: 2 | Status: SHIPPED | OUTPATIENT
Start: 2025-06-12

## 2025-06-12 NOTE — TELEPHONE ENCOUNTER
Rx Refill Note  Requested Prescriptions     Pending Prescriptions Disp Refills    ALPRAZolam (XANAX) 1 MG tablet 75 tablet 0     Sig: TAKE 1 TABLET BY MOUTH 2 TIMES A DAY AND 1/2 TABLET AT NOON        Last office visit with prescribing clinician: 5/20/2025     Next office visit with prescribing clinician: 6/12/2025     Office Visit with Mayra Jaime MD (05/20/2025)     BEHAVIORAL HEALTH - SCAN - Inspect report, [facility], 6/12/25 (06/12/2025)     Inspect Fill 5/13/25    Svetlana Carter  06/12/25, 14:08 EDT

## (undated) DEVICE — BAPTIST FLOYD BRONCHOSCOPY: Brand: MEDLINE INDUSTRIES, INC.